# Patient Record
Sex: MALE | Race: WHITE | NOT HISPANIC OR LATINO
[De-identification: names, ages, dates, MRNs, and addresses within clinical notes are randomized per-mention and may not be internally consistent; named-entity substitution may affect disease eponyms.]

---

## 2017-01-20 ENCOUNTER — APPOINTMENT (OUTPATIENT)
Dept: ORTHOPEDIC SURGERY | Facility: CLINIC | Age: 72
End: 2017-01-20

## 2017-06-14 ENCOUNTER — APPOINTMENT (OUTPATIENT)
Dept: PHARMACY | Facility: CLINIC | Age: 72
End: 2017-06-14

## 2017-08-02 ENCOUNTER — APPOINTMENT (OUTPATIENT)
Dept: UROLOGY | Facility: CLINIC | Age: 72
End: 2017-08-02
Payer: MEDICARE

## 2017-08-02 DIAGNOSIS — N13.8 BENIGN PROSTATIC HYPERPLASIA WITH LOWER URINARY TRACT SYMPMS: ICD-10-CM

## 2017-08-02 DIAGNOSIS — N40.1 BENIGN PROSTATIC HYPERPLASIA WITH LOWER URINARY TRACT SYMPMS: ICD-10-CM

## 2017-08-02 PROCEDURE — 99214 OFFICE O/P EST MOD 30 MIN: CPT | Mod: 25

## 2017-08-02 PROCEDURE — 51798 US URINE CAPACITY MEASURE: CPT

## 2017-08-03 LAB
APPEARANCE: CLEAR
BACTERIA: NEGATIVE
BILIRUBIN URINE: NEGATIVE
BLOOD URINE: NEGATIVE
COLOR: YELLOW
CORE LAB FLUID CYTOLOGY: NORMAL
GLUCOSE QUALITATIVE U: NORMAL MG/DL
KETONES URINE: NEGATIVE
LEUKOCYTE ESTERASE URINE: NEGATIVE
MICROSCOPIC-UA: NORMAL
NITRITE URINE: NEGATIVE
PH URINE: 7
PROTEIN URINE: NEGATIVE MG/DL
PSA FREE FLD-MCNC: 10
PSA FREE SERPL-MCNC: 0.07 NG/ML
PSA SERPL-MCNC: 0.7 NG/ML
RED BLOOD CELLS URINE: 0 /HPF
SPECIFIC GRAVITY URINE: 1.01
SQUAMOUS EPITHELIAL CELLS: 0 /HPF
UROBILINOGEN URINE: NORMAL MG/DL
WHITE BLOOD CELLS URINE: 0 /HPF

## 2017-08-16 ENCOUNTER — OUTPATIENT (OUTPATIENT)
Dept: OUTPATIENT SERVICES | Facility: HOSPITAL | Age: 72
LOS: 1 days | End: 2017-08-16
Payer: MEDICARE

## 2017-08-16 VITALS
WEIGHT: 141.1 LBS | HEIGHT: 66 IN | RESPIRATION RATE: 18 BRPM | DIASTOLIC BLOOD PRESSURE: 82 MMHG | TEMPERATURE: 99 F | OXYGEN SATURATION: 99 % | SYSTOLIC BLOOD PRESSURE: 122 MMHG | HEART RATE: 66 BPM

## 2017-08-16 DIAGNOSIS — Z98.890 OTHER SPECIFIED POSTPROCEDURAL STATES: Chronic | ICD-10-CM

## 2017-08-16 DIAGNOSIS — Z96.651 PRESENCE OF RIGHT ARTIFICIAL KNEE JOINT: Chronic | ICD-10-CM

## 2017-08-16 DIAGNOSIS — G56.01 CARPAL TUNNEL SYNDROME, RIGHT UPPER LIMB: ICD-10-CM

## 2017-08-16 DIAGNOSIS — Z96.642 PRESENCE OF LEFT ARTIFICIAL HIP JOINT: Chronic | ICD-10-CM

## 2017-08-16 DIAGNOSIS — G56.00 CARPAL TUNNEL SYNDROME, UNSPECIFIED UPPER LIMB: ICD-10-CM

## 2017-08-16 DIAGNOSIS — Z01.818 ENCOUNTER FOR OTHER PREPROCEDURAL EXAMINATION: ICD-10-CM

## 2017-08-16 DIAGNOSIS — G47.33 OBSTRUCTIVE SLEEP APNEA (ADULT) (PEDIATRIC): ICD-10-CM

## 2017-08-16 LAB
ANION GAP SERPL CALC-SCNC: 14 MMOL/L — SIGNIFICANT CHANGE UP (ref 5–17)
BUN SERPL-MCNC: 19 MG/DL — SIGNIFICANT CHANGE UP (ref 7–23)
CALCIUM SERPL-MCNC: 10 MG/DL — SIGNIFICANT CHANGE UP (ref 8.4–10.5)
CHLORIDE SERPL-SCNC: 102 MMOL/L — SIGNIFICANT CHANGE UP (ref 96–108)
CO2 SERPL-SCNC: 25 MMOL/L — SIGNIFICANT CHANGE UP (ref 22–31)
CREAT SERPL-MCNC: 0.93 MG/DL — SIGNIFICANT CHANGE UP (ref 0.5–1.3)
GLUCOSE SERPL-MCNC: 90 MG/DL — SIGNIFICANT CHANGE UP (ref 70–99)
HCT VFR BLD CALC: 43 % — SIGNIFICANT CHANGE UP (ref 39–50)
HGB BLD-MCNC: 14.3 G/DL — SIGNIFICANT CHANGE UP (ref 13–17)
MCHC RBC-ENTMCNC: 30.8 PG — SIGNIFICANT CHANGE UP (ref 27–34)
MCHC RBC-ENTMCNC: 33.3 GM/DL — SIGNIFICANT CHANGE UP (ref 32–36)
MCV RBC AUTO: 92.7 FL — SIGNIFICANT CHANGE UP (ref 80–100)
PLATELET # BLD AUTO: 222 K/UL — SIGNIFICANT CHANGE UP (ref 150–400)
POTASSIUM SERPL-MCNC: 5.1 MMOL/L — SIGNIFICANT CHANGE UP (ref 3.5–5.3)
POTASSIUM SERPL-SCNC: 5.1 MMOL/L — SIGNIFICANT CHANGE UP (ref 3.5–5.3)
RBC # BLD: 4.64 M/UL — SIGNIFICANT CHANGE UP (ref 4.2–5.8)
RBC # FLD: 15.6 % — HIGH (ref 10.3–14.5)
SODIUM SERPL-SCNC: 141 MMOL/L — SIGNIFICANT CHANGE UP (ref 135–145)
WBC # BLD: 5.55 K/UL — SIGNIFICANT CHANGE UP (ref 3.8–10.5)
WBC # FLD AUTO: 5.55 K/UL — SIGNIFICANT CHANGE UP (ref 3.8–10.5)

## 2017-08-16 PROCEDURE — G0463: CPT

## 2017-08-16 PROCEDURE — 80048 BASIC METABOLIC PNL TOTAL CA: CPT

## 2017-08-16 PROCEDURE — 85027 COMPLETE CBC AUTOMATED: CPT

## 2017-08-16 RX ORDER — ACETAMINOPHEN 500 MG
975 TABLET ORAL ONCE
Qty: 0 | Refills: 0 | Status: COMPLETED | OUTPATIENT
Start: 2017-08-23 | End: 2017-08-23

## 2017-08-16 RX ORDER — LIDOCAINE HCL 20 MG/ML
0.2 VIAL (ML) INJECTION ONCE
Qty: 0 | Refills: 0 | Status: DISCONTINUED | OUTPATIENT
Start: 2017-08-23 | End: 2017-09-07

## 2017-08-16 RX ORDER — SODIUM CHLORIDE 9 MG/ML
3 INJECTION INTRAMUSCULAR; INTRAVENOUS; SUBCUTANEOUS EVERY 8 HOURS
Qty: 0 | Refills: 0 | Status: DISCONTINUED | OUTPATIENT
Start: 2017-08-23 | End: 2017-09-07

## 2017-08-16 RX ORDER — CELECOXIB 200 MG/1
200 CAPSULE ORAL ONCE
Qty: 0 | Refills: 0 | Status: COMPLETED | OUTPATIENT
Start: 2017-08-23 | End: 2017-08-23

## 2017-08-16 NOTE — H&P PST ADULT - PSH
History of hip replacement, total, left  2013/2014  History of knee replacement, total, right  2013/2014  S/P rotator cuff surgery  right repair open 1-2017  S/P sinus surgery  1981

## 2017-08-16 NOTE — H&P PST ADULT - HISTORY OF PRESENT ILLNESS
This is a 72 year old male with right carpal This is a 72 year old male with right carpal  syndrome x 1 year.  Pt states he tried a splint at night without any success.  Ring and middle finger are numb and pain is constant.  Now scheduled for right carpal tunnel release with flexor tenosynovectomy on 8-23-17

## 2017-08-16 NOTE — H&P PST ADULT - ATTENDING COMMENTS
The patient is admitted today for a Right CTR and flexor tenosynovectomy.  I have reviewed the procedure in detail again today with the patient.  The numerous risks, benefits, alternatives, possible complications and expectations are reviewed.  All questions have been thoroughly answered and the patient has verbalized understanding of all of the above and gives consent to proceed The patient is admitted today for a leftt CTR and flexor tenosynovectomy.  I have reviewed the procedure in detail again today with the patient.  The numerous risks, benefits, alternatives, possible complications and expectations are reviewed.  All questions have been thoroughly answered and the patient has verbalized understanding of all of the above and gives consent to proceed

## 2017-08-16 NOTE — H&P PST ADULT - PMH
Anxiety and depression  lexapro  Carpal tunnel syndrome  right and left current  Concussion  "I fell backwards on a hardwood floor"-10/2015  Diverticulitis  treated 2007  DJD (degenerative joint disease)  traction in back 15 years ago x 10 days  Eye abnormality  "I had a bleed in the back of the eye"-left, 2014  Hearing loss  b/l hearing aids  MARIAMA (obstructive sleep apnea)  sleep study 8-12-17/ no report yet/ ptv states he stopped breathing 18 times per hour  PND (post-nasal drip)    Prostate cancer  s/p radiation 2015  Psoriatic arthritis  b/l hip/ knees and wrist and hand pain tens unit on legs alternating days/  TMJ (temporomandibular joint disorder)  right pt with clicking x 1 month

## 2017-08-23 ENCOUNTER — OUTPATIENT (OUTPATIENT)
Dept: OUTPATIENT SERVICES | Facility: HOSPITAL | Age: 72
LOS: 1 days | End: 2017-08-23
Payer: MEDICARE

## 2017-08-23 ENCOUNTER — TRANSCRIPTION ENCOUNTER (OUTPATIENT)
Age: 72
End: 2017-08-23

## 2017-08-23 ENCOUNTER — RESULT REVIEW (OUTPATIENT)
Age: 72
End: 2017-08-23

## 2017-08-23 VITALS
DIASTOLIC BLOOD PRESSURE: 82 MMHG | HEIGHT: 66 IN | TEMPERATURE: 98 F | OXYGEN SATURATION: 97 % | SYSTOLIC BLOOD PRESSURE: 137 MMHG | WEIGHT: 141.1 LBS | HEART RATE: 55 BPM | RESPIRATION RATE: 16 BRPM

## 2017-08-23 VITALS
RESPIRATION RATE: 18 BRPM | OXYGEN SATURATION: 98 % | SYSTOLIC BLOOD PRESSURE: 140 MMHG | HEART RATE: 55 BPM | DIASTOLIC BLOOD PRESSURE: 72 MMHG

## 2017-08-23 DIAGNOSIS — Z98.890 OTHER SPECIFIED POSTPROCEDURAL STATES: Chronic | ICD-10-CM

## 2017-08-23 DIAGNOSIS — G56.01 CARPAL TUNNEL SYNDROME, RIGHT UPPER LIMB: ICD-10-CM

## 2017-08-23 DIAGNOSIS — Z96.642 PRESENCE OF LEFT ARTIFICIAL HIP JOINT: Chronic | ICD-10-CM

## 2017-08-23 DIAGNOSIS — Z96.651 PRESENCE OF RIGHT ARTIFICIAL KNEE JOINT: Chronic | ICD-10-CM

## 2017-08-23 PROCEDURE — 88304 TISSUE EXAM BY PATHOLOGIST: CPT

## 2017-08-23 PROCEDURE — 64721 CARPAL TUNNEL SURGERY: CPT | Mod: RT

## 2017-08-23 PROCEDURE — 25115 REMOVE WRIST/FOREARM LESION: CPT | Mod: RT,59

## 2017-08-23 PROCEDURE — 88304 TISSUE EXAM BY PATHOLOGIST: CPT | Mod: 26

## 2017-08-23 RX ORDER — ONDANSETRON 8 MG/1
4 TABLET, FILM COATED ORAL ONCE
Qty: 0 | Refills: 0 | Status: DISCONTINUED | OUTPATIENT
Start: 2017-08-23 | End: 2017-09-07

## 2017-08-23 RX ORDER — OXYCODONE HYDROCHLORIDE 5 MG/1
5 TABLET ORAL ONCE
Qty: 0 | Refills: 0 | Status: DISCONTINUED | OUTPATIENT
Start: 2017-08-23 | End: 2017-08-23

## 2017-08-23 RX ORDER — SODIUM CHLORIDE 9 MG/ML
1000 INJECTION INTRAMUSCULAR; INTRAVENOUS; SUBCUTANEOUS
Qty: 0 | Refills: 0 | Status: DISCONTINUED | OUTPATIENT
Start: 2017-08-23 | End: 2017-09-07

## 2017-08-23 RX ORDER — CELECOXIB 200 MG/1
200 CAPSULE ORAL ONCE
Qty: 0 | Refills: 0 | Status: DISCONTINUED | OUTPATIENT
Start: 2017-08-23 | End: 2017-09-07

## 2017-08-23 RX ADMIN — CELECOXIB 200 MILLIGRAM(S): 200 CAPSULE ORAL at 09:41

## 2017-08-23 RX ADMIN — Medication 975 MILLIGRAM(S): at 09:41

## 2017-08-23 RX ADMIN — SODIUM CHLORIDE 100 MILLILITER(S): 9 INJECTION INTRAMUSCULAR; INTRAVENOUS; SUBCUTANEOUS at 11:05

## 2017-08-23 NOTE — PRE-ANESTHESIA EVALUATION ADULT - NSANTHPMHFT_GEN_ALL_CORE
MARIAMA recently tested, no CPAP yet  concussion 2015 or 2016, fell backward, hit head on wooden floor, went to hospital one week after because somebody from work took him because of symptoms , saw neurologist, treated with rest, no neuro deficits

## 2017-08-23 NOTE — ASU DISCHARGE PLAN (ADULT/PEDIATRIC). - NOTIFY
Numbness, tingling/Swelling that continues/Fever greater than 101/Pain not relieved by Medications/Bleeding that does not stop

## 2017-08-23 NOTE — ASU PATIENT PROFILE, ADULT - ABILITY TO HEAR (WITH HEARING AID OR HEARING APPLIANCE IF NORMALLY USED):
Adequate: hears normal conversation without difficulty Mildly to Moderately Impaired: difficulty hearing in some environments or speaker may need to increase volume or speak distinctly/uses hearing aid

## 2017-08-28 LAB — SURGICAL PATHOLOGY STUDY: SIGNIFICANT CHANGE UP

## 2017-09-12 RX ORDER — SODIUM CHLORIDE 9 MG/ML
3 INJECTION INTRAMUSCULAR; INTRAVENOUS; SUBCUTANEOUS EVERY 8 HOURS
Qty: 0 | Refills: 0 | Status: DISCONTINUED | OUTPATIENT
Start: 2017-09-13 | End: 2017-09-28

## 2017-09-12 RX ORDER — ACETAMINOPHEN 500 MG
975 TABLET ORAL ONCE
Qty: 0 | Refills: 0 | Status: COMPLETED | OUTPATIENT
Start: 2017-09-13 | End: 2017-09-13

## 2017-09-12 RX ORDER — LIDOCAINE HCL 20 MG/ML
0.2 VIAL (ML) INJECTION ONCE
Qty: 0 | Refills: 0 | Status: DISCONTINUED | OUTPATIENT
Start: 2017-09-13 | End: 2017-09-28

## 2017-09-13 ENCOUNTER — OUTPATIENT (OUTPATIENT)
Dept: OUTPATIENT SERVICES | Facility: HOSPITAL | Age: 72
LOS: 1 days | End: 2017-09-13
Payer: MEDICARE

## 2017-09-13 ENCOUNTER — RESULT REVIEW (OUTPATIENT)
Age: 72
End: 2017-09-13

## 2017-09-13 ENCOUNTER — TRANSCRIPTION ENCOUNTER (OUTPATIENT)
Age: 72
End: 2017-09-13

## 2017-09-13 VITALS
SYSTOLIC BLOOD PRESSURE: 130 MMHG | HEART RATE: 70 BPM | RESPIRATION RATE: 16 BRPM | DIASTOLIC BLOOD PRESSURE: 81 MMHG | OXYGEN SATURATION: 99 % | TEMPERATURE: 98 F

## 2017-09-13 VITALS
HEART RATE: 66 BPM | WEIGHT: 141.1 LBS | RESPIRATION RATE: 18 BRPM | DIASTOLIC BLOOD PRESSURE: 82 MMHG | TEMPERATURE: 99 F | OXYGEN SATURATION: 99 % | SYSTOLIC BLOOD PRESSURE: 149 MMHG | HEIGHT: 66 IN

## 2017-09-13 DIAGNOSIS — Z98.890 OTHER SPECIFIED POSTPROCEDURAL STATES: Chronic | ICD-10-CM

## 2017-09-13 DIAGNOSIS — Z96.651 PRESENCE OF RIGHT ARTIFICIAL KNEE JOINT: Chronic | ICD-10-CM

## 2017-09-13 DIAGNOSIS — G56.02 CARPAL TUNNEL SYNDROME, LEFT UPPER LIMB: ICD-10-CM

## 2017-09-13 DIAGNOSIS — Z96.642 PRESENCE OF LEFT ARTIFICIAL HIP JOINT: Chronic | ICD-10-CM

## 2017-09-13 PROCEDURE — 25115 REMOVE WRIST/FOREARM LESION: CPT | Mod: LT

## 2017-09-13 PROCEDURE — 88304 TISSUE EXAM BY PATHOLOGIST: CPT

## 2017-09-13 PROCEDURE — 64721 CARPAL TUNNEL SURGERY: CPT | Mod: XS,LT

## 2017-09-13 PROCEDURE — 88304 TISSUE EXAM BY PATHOLOGIST: CPT | Mod: 26

## 2017-09-13 RX ORDER — CELECOXIB 200 MG/1
200 CAPSULE ORAL ONCE
Qty: 0 | Refills: 0 | Status: COMPLETED | OUTPATIENT
Start: 2017-09-13 | End: 2017-09-13

## 2017-09-13 RX ORDER — CELECOXIB 200 MG/1
200 CAPSULE ORAL ONCE
Qty: 0 | Refills: 0 | Status: DISCONTINUED | OUTPATIENT
Start: 2017-09-13 | End: 2017-09-28

## 2017-09-13 RX ORDER — SODIUM CHLORIDE 9 MG/ML
1000 INJECTION, SOLUTION INTRAVENOUS
Qty: 0 | Refills: 0 | Status: DISCONTINUED | OUTPATIENT
Start: 2017-09-13 | End: 2017-09-28

## 2017-09-13 RX ORDER — ONDANSETRON 8 MG/1
4 TABLET, FILM COATED ORAL ONCE
Qty: 0 | Refills: 0 | Status: DISCONTINUED | OUTPATIENT
Start: 2017-09-13 | End: 2017-09-28

## 2017-09-13 RX ORDER — OXYCODONE HYDROCHLORIDE 5 MG/1
5 TABLET ORAL ONCE
Qty: 0 | Refills: 0 | Status: DISCONTINUED | OUTPATIENT
Start: 2017-09-13 | End: 2017-09-13

## 2017-09-13 RX ADMIN — Medication 975 MILLIGRAM(S): at 09:10

## 2017-09-13 RX ADMIN — CELECOXIB 200 MILLIGRAM(S): 200 CAPSULE ORAL at 09:11

## 2017-09-13 NOTE — ASU DISCHARGE PLAN (ADULT/PEDIATRIC). - MEDICATION SUMMARY - MEDICATIONS TO TAKE
I will START or STAY ON the medications listed below when I get home from the hospital:    sulfaSALAzine 500 mg oral tablet  -- 2 tab(s) by mouth 2 times a day  -- Indication: For Home med    Excedrin oral tablet  -- 2  by mouth , As Needed for headache last dose 8-15-17  -- Indication: For Home med    Lexapro 10 mg oral tablet  -- 1 tab(s) by mouth once a day  -- Indication: For Home med    Sudafed 30 mg oral tablet  -- 1  by mouth , As Needed for PND  -- Indication: For Home med    Pepcid 20 mg oral tablet  --  by mouth  x2 per ASU protocol  -- Indication: For Home med    Flonase 50 mcg/inh nasal spray  -- 1 spray(s) into nose once a day. used x 5 days last was 8-15-17  -- Indication: For Home med    multivitamin  -- 1  by mouth once a day. last dose 8-15-17  -- Indication: For Home med    vitamin E 1000 intl units oral capsule  -- 1 cap(s) by mouth once a day last dose 8-15-17  -- Indication: For Home med    Vitamin C 500 mg oral tablet  -- 1 tab(s) by mouth once a day  -- Indication: For Home med    Vitamin D3 1000 intl units oral capsule  -- 1 cap(s) by mouth once a day  -- Indication: For Home med

## 2017-09-13 NOTE — ASU DISCHARGE PLAN (ADULT/PEDIATRIC). - ITEMS TO FOLLOWUP WITH YOUR PHYSICIAN'S
Please follow up with Dr. Cardenas within 1-2 weeks after discharge from the hospital. You may call [phone] to schedule an appointment.

## 2017-09-18 LAB — SURGICAL PATHOLOGY STUDY: SIGNIFICANT CHANGE UP

## 2017-09-22 ENCOUNTER — APPOINTMENT (OUTPATIENT)
Dept: PHARMACY | Facility: CLINIC | Age: 72
End: 2017-09-22
Payer: MEDICARE

## 2017-09-22 PROCEDURE — V5299A: CUSTOM | Mod: NC

## 2017-10-06 ENCOUNTER — APPOINTMENT (OUTPATIENT)
Age: 72
End: 2017-10-06

## 2017-10-10 ENCOUNTER — APPOINTMENT (OUTPATIENT)
Dept: PHARMACY | Facility: CLINIC | Age: 72
End: 2017-10-10
Payer: SELF-PAY

## 2017-10-10 ENCOUNTER — APPOINTMENT (OUTPATIENT)
Dept: ORTHOPEDIC SURGERY | Facility: CLINIC | Age: 72
End: 2017-10-10
Payer: MEDICARE

## 2017-10-10 VITALS
HEART RATE: 55 BPM | WEIGHT: 147 LBS | SYSTOLIC BLOOD PRESSURE: 138 MMHG | HEIGHT: 67 IN | BODY MASS INDEX: 23.07 KG/M2 | DIASTOLIC BLOOD PRESSURE: 81 MMHG

## 2017-10-10 DIAGNOSIS — M16.9 OSTEOARTHRITIS OF HIP, UNSPECIFIED: ICD-10-CM

## 2017-10-10 PROCEDURE — V5264A: CUSTOM

## 2017-10-10 PROCEDURE — 73521 X-RAY EXAM HIPS BI 2 VIEWS: CPT

## 2017-10-10 PROCEDURE — 99213 OFFICE O/P EST LOW 20 MIN: CPT

## 2017-10-10 RX ORDER — MULTIVITAMIN
TABLET ORAL
Refills: 0 | Status: ACTIVE | COMMUNITY

## 2017-10-10 RX ORDER — DIAZEPAM 10 MG/1
10 TABLET ORAL DAILY
Qty: 90 | Refills: 0 | Status: DISCONTINUED | COMMUNITY
Start: 2017-02-13 | End: 2017-10-10

## 2017-11-10 ENCOUNTER — OUTPATIENT (OUTPATIENT)
Dept: OUTPATIENT SERVICES | Facility: HOSPITAL | Age: 72
LOS: 1 days | End: 2017-11-10
Payer: MEDICARE

## 2017-11-10 VITALS
WEIGHT: 147.71 LBS | TEMPERATURE: 98 F | DIASTOLIC BLOOD PRESSURE: 80 MMHG | RESPIRATION RATE: 16 BRPM | OXYGEN SATURATION: 96 % | HEIGHT: 66 IN | HEART RATE: 64 BPM | SYSTOLIC BLOOD PRESSURE: 136 MMHG

## 2017-11-10 DIAGNOSIS — Z98.890 OTHER SPECIFIED POSTPROCEDURAL STATES: Chronic | ICD-10-CM

## 2017-11-10 DIAGNOSIS — M16.11 UNILATERAL PRIMARY OSTEOARTHRITIS, RIGHT HIP: ICD-10-CM

## 2017-11-10 DIAGNOSIS — Z01.818 ENCOUNTER FOR OTHER PREPROCEDURAL EXAMINATION: ICD-10-CM

## 2017-11-10 DIAGNOSIS — M25.551 PAIN IN RIGHT HIP: ICD-10-CM

## 2017-11-10 DIAGNOSIS — Z96.651 PRESENCE OF RIGHT ARTIFICIAL KNEE JOINT: Chronic | ICD-10-CM

## 2017-11-10 DIAGNOSIS — G47.33 OBSTRUCTIVE SLEEP APNEA (ADULT) (PEDIATRIC): ICD-10-CM

## 2017-11-10 DIAGNOSIS — Z96.642 PRESENCE OF LEFT ARTIFICIAL HIP JOINT: Chronic | ICD-10-CM

## 2017-11-10 LAB
ALBUMIN SERPL ELPH-MCNC: 3.8 G/DL — SIGNIFICANT CHANGE UP (ref 3.3–5)
ALP SERPL-CCNC: 64 U/L — SIGNIFICANT CHANGE UP (ref 30–120)
ALT FLD-CCNC: 30 U/L DA — SIGNIFICANT CHANGE UP (ref 10–60)
ANION GAP SERPL CALC-SCNC: 6 MMOL/L — SIGNIFICANT CHANGE UP (ref 5–17)
APTT BLD: 34.3 SEC — SIGNIFICANT CHANGE UP (ref 27.5–37.4)
AST SERPL-CCNC: 28 U/L — SIGNIFICANT CHANGE UP (ref 10–40)
BILIRUB SERPL-MCNC: 0.4 MG/DL — SIGNIFICANT CHANGE UP (ref 0.2–1.2)
BLD GP AB SCN SERPL QL: SIGNIFICANT CHANGE UP
BUN SERPL-MCNC: 28 MG/DL — HIGH (ref 7–23)
CALCIUM SERPL-MCNC: 9.5 MG/DL — SIGNIFICANT CHANGE UP (ref 8.4–10.5)
CHLORIDE SERPL-SCNC: 103 MMOL/L — SIGNIFICANT CHANGE UP (ref 96–108)
CO2 SERPL-SCNC: 31 MMOL/L — SIGNIFICANT CHANGE UP (ref 22–31)
CREAT SERPL-MCNC: 0.93 MG/DL — SIGNIFICANT CHANGE UP (ref 0.5–1.3)
GLUCOSE SERPL-MCNC: 81 MG/DL — SIGNIFICANT CHANGE UP (ref 70–99)
HCT VFR BLD CALC: 45.1 % — SIGNIFICANT CHANGE UP (ref 39–50)
HGB BLD-MCNC: 14.6 G/DL — SIGNIFICANT CHANGE UP (ref 13–17)
INR BLD: 1.09 RATIO — SIGNIFICANT CHANGE UP (ref 0.88–1.16)
MCHC RBC-ENTMCNC: 31.9 PG — SIGNIFICANT CHANGE UP (ref 27–34)
MCHC RBC-ENTMCNC: 32.3 GM/DL — SIGNIFICANT CHANGE UP (ref 32–36)
MCV RBC AUTO: 98.9 FL — SIGNIFICANT CHANGE UP (ref 80–100)
MRSA PCR RESULT.: SIGNIFICANT CHANGE UP
PLATELET # BLD AUTO: 187 K/UL — SIGNIFICANT CHANGE UP (ref 150–400)
POTASSIUM SERPL-MCNC: 4.5 MMOL/L — SIGNIFICANT CHANGE UP (ref 3.5–5.3)
POTASSIUM SERPL-SCNC: 4.5 MMOL/L — SIGNIFICANT CHANGE UP (ref 3.5–5.3)
PROT SERPL-MCNC: 7.4 G/DL — SIGNIFICANT CHANGE UP (ref 6–8.3)
PROTHROM AB SERPL-ACNC: 11.9 SEC — SIGNIFICANT CHANGE UP (ref 9.8–12.7)
RBC # BLD: 4.56 M/UL — SIGNIFICANT CHANGE UP (ref 4.2–5.8)
RBC # FLD: 13 % — SIGNIFICANT CHANGE UP (ref 10.3–14.5)
S AUREUS DNA NOSE QL NAA+PROBE: SIGNIFICANT CHANGE UP
SODIUM SERPL-SCNC: 140 MMOL/L — SIGNIFICANT CHANGE UP (ref 135–145)
WBC # BLD: 6.5 K/UL — SIGNIFICANT CHANGE UP (ref 3.8–10.5)
WBC # FLD AUTO: 6.5 K/UL — SIGNIFICANT CHANGE UP (ref 3.8–10.5)

## 2017-11-10 PROCEDURE — 86901 BLOOD TYPING SEROLOGIC RH(D): CPT

## 2017-11-10 PROCEDURE — 93005 ELECTROCARDIOGRAM TRACING: CPT

## 2017-11-10 PROCEDURE — 93010 ELECTROCARDIOGRAM REPORT: CPT | Mod: NC

## 2017-11-10 PROCEDURE — 36415 COLL VENOUS BLD VENIPUNCTURE: CPT

## 2017-11-10 PROCEDURE — 85730 THROMBOPLASTIN TIME PARTIAL: CPT

## 2017-11-10 PROCEDURE — 85610 PROTHROMBIN TIME: CPT

## 2017-11-10 PROCEDURE — G0463: CPT

## 2017-11-10 PROCEDURE — 86850 RBC ANTIBODY SCREEN: CPT

## 2017-11-10 PROCEDURE — 80053 COMPREHEN METABOLIC PANEL: CPT

## 2017-11-10 PROCEDURE — 87640 STAPH A DNA AMP PROBE: CPT

## 2017-11-10 PROCEDURE — 86900 BLOOD TYPING SEROLOGIC ABO: CPT

## 2017-11-10 PROCEDURE — 85027 COMPLETE CBC AUTOMATED: CPT

## 2017-11-10 PROCEDURE — 87641 MR-STAPH DNA AMP PROBE: CPT

## 2017-11-10 RX ORDER — FAMOTIDINE 10 MG/ML
0 INJECTION INTRAVENOUS
Qty: 0 | Refills: 0 | COMMUNITY

## 2017-11-10 RX ORDER — PSEUDOEPHEDRINE HCL 30 MG
1 TABLET ORAL
Qty: 0 | Refills: 0 | COMMUNITY

## 2017-11-10 RX ORDER — FLUTICASONE PROPIONATE 50 MCG
1 SPRAY, SUSPENSION NASAL
Qty: 0 | Refills: 0 | COMMUNITY

## 2017-11-10 RX ORDER — ESCITALOPRAM OXALATE 10 MG/1
1 TABLET, FILM COATED ORAL
Qty: 0 | Refills: 0 | COMMUNITY

## 2017-11-10 NOTE — H&P PST ADULT - PSH
History of hip replacement, total, left  2013/2014  History of knee replacement, total, right  2013/2014  S/P carpal tunnel release  bilaal 9/2017  S/P rotator cuff surgery  right repair open 1-2017  S/P sinus surgery  1981

## 2017-11-10 NOTE — H&P PST ADULT - PMH
Anxiety and depression  lexapro  Carpal tunnel syndrome  right and left  Concussion  "I fell backwards on a hardwood floor"-10/2015  Diverticulitis  treated 2007  DJD (degenerative joint disease)  traction in back 15 years ago x 10 days  Eye abnormality  "I had a bleed in the back of the eye"-left, 2014  Hearing loss  b/l hearing aids  Hip pain, right    MARIAMA (obstructive sleep apnea)  sleep study 8-12-17, setting of 5  PND (post-nasal drip)    Prostate cancer  s/p radiation 2015  Psoriatic arthritis  b/l hip/ knees and wrist and hand pain tens unit on legs alternating days/  TMJ (temporomandibular joint disorder)  right pt with clicking x 1 month

## 2017-11-10 NOTE — H&P PST ADULT - HISTORY OF PRESENT ILLNESS
71 yo male presents with long history o9f right hip pain.  Pain in right hip increases with ambulation and at night. 73 yo male presents with long history of right hip pain.  Pain in right hip increases with ambulation and at night.  Currently taking no analgesics.

## 2017-11-10 NOTE — H&P PST ADULT - ASSESSMENT
Pt presents to PST.  Pre op instructions reviewed and understood. Pt will obtain medical clearance and note from rheumatologist re Sulfasalazine.

## 2017-11-10 NOTE — H&P PST ADULT - MUSCULOSKELETAL
details… detailed exam decreased ROM due to pain/diminished strength/decreased ROM/no calf tenderness

## 2017-11-14 ENCOUNTER — APPOINTMENT (OUTPATIENT)
Dept: PHARMACY | Facility: CLINIC | Age: 72
End: 2017-11-14
Payer: SELF-PAY

## 2017-11-14 PROCEDURE — V5267D: CUSTOM

## 2017-11-17 RX ORDER — APREPITANT 80 MG/1
40 CAPSULE ORAL ONCE
Qty: 0 | Refills: 0 | Status: COMPLETED | OUTPATIENT
Start: 2017-11-29 | End: 2017-11-29

## 2017-11-17 RX ORDER — SODIUM CHLORIDE 9 MG/ML
1000 INJECTION, SOLUTION INTRAVENOUS
Qty: 0 | Refills: 0 | Status: DISCONTINUED | OUTPATIENT
Start: 2017-11-29 | End: 2017-12-01

## 2017-11-28 RX ORDER — ONDANSETRON 8 MG/1
4 TABLET, FILM COATED ORAL EVERY 6 HOURS
Qty: 0 | Refills: 0 | Status: DISCONTINUED | OUTPATIENT
Start: 2017-11-29 | End: 2017-12-01

## 2017-11-28 RX ORDER — PANTOPRAZOLE SODIUM 20 MG/1
40 TABLET, DELAYED RELEASE ORAL
Qty: 0 | Refills: 0 | Status: DISCONTINUED | OUTPATIENT
Start: 2017-11-29 | End: 2017-12-01

## 2017-11-28 RX ORDER — SENNA PLUS 8.6 MG/1
2 TABLET ORAL AT BEDTIME
Qty: 0 | Refills: 0 | Status: DISCONTINUED | OUTPATIENT
Start: 2017-11-29 | End: 2017-12-01

## 2017-11-28 RX ORDER — MAGNESIUM HYDROXIDE 400 MG/1
30 TABLET, CHEWABLE ORAL DAILY
Qty: 0 | Refills: 0 | Status: DISCONTINUED | OUTPATIENT
Start: 2017-11-29 | End: 2017-12-01

## 2017-11-28 RX ORDER — DOCUSATE SODIUM 100 MG
100 CAPSULE ORAL THREE TIMES A DAY
Qty: 0 | Refills: 0 | Status: DISCONTINUED | OUTPATIENT
Start: 2017-11-29 | End: 2017-12-01

## 2017-11-28 RX ORDER — SODIUM CHLORIDE 9 MG/ML
1000 INJECTION, SOLUTION INTRAVENOUS
Qty: 0 | Refills: 0 | Status: DISCONTINUED | OUTPATIENT
Start: 2017-11-29 | End: 2017-12-01

## 2017-11-28 RX ORDER — POLYETHYLENE GLYCOL 3350 17 G/17G
17 POWDER, FOR SOLUTION ORAL DAILY
Qty: 0 | Refills: 0 | Status: DISCONTINUED | OUTPATIENT
Start: 2017-11-29 | End: 2017-12-01

## 2017-11-29 ENCOUNTER — RESULT REVIEW (OUTPATIENT)
Age: 72
End: 2017-11-29

## 2017-11-29 ENCOUNTER — INPATIENT (INPATIENT)
Facility: HOSPITAL | Age: 72
LOS: 1 days | Discharge: NURSING, REHAB OR LONG-TERM CARE | DRG: 470 | End: 2017-12-01
Attending: ORTHOPAEDIC SURGERY | Admitting: ORTHOPAEDIC SURGERY
Payer: MEDICARE

## 2017-11-29 ENCOUNTER — APPOINTMENT (OUTPATIENT)
Dept: ORTHOPEDIC SURGERY | Facility: HOSPITAL | Age: 72
End: 2017-11-29

## 2017-11-29 VITALS
OXYGEN SATURATION: 98 % | WEIGHT: 142.86 LBS | DIASTOLIC BLOOD PRESSURE: 87 MMHG | TEMPERATURE: 99 F | HEART RATE: 58 BPM | SYSTOLIC BLOOD PRESSURE: 129 MMHG | HEIGHT: 67 IN

## 2017-11-29 DIAGNOSIS — M16.11 UNILATERAL PRIMARY OSTEOARTHRITIS, RIGHT HIP: ICD-10-CM

## 2017-11-29 DIAGNOSIS — Z98.890 OTHER SPECIFIED POSTPROCEDURAL STATES: Chronic | ICD-10-CM

## 2017-11-29 DIAGNOSIS — Z96.642 PRESENCE OF LEFT ARTIFICIAL HIP JOINT: Chronic | ICD-10-CM

## 2017-11-29 DIAGNOSIS — Z96.651 PRESENCE OF RIGHT ARTIFICIAL KNEE JOINT: Chronic | ICD-10-CM

## 2017-11-29 LAB
ANION GAP SERPL CALC-SCNC: 6 MMOL/L — SIGNIFICANT CHANGE UP (ref 5–17)
BUN SERPL-MCNC: 19 MG/DL — SIGNIFICANT CHANGE UP (ref 7–23)
CALCIUM SERPL-MCNC: 9.2 MG/DL — SIGNIFICANT CHANGE UP (ref 8.4–10.5)
CHLORIDE SERPL-SCNC: 105 MMOL/L — SIGNIFICANT CHANGE UP (ref 96–108)
CO2 SERPL-SCNC: 28 MMOL/L — SIGNIFICANT CHANGE UP (ref 22–31)
CREAT SERPL-MCNC: 0.82 MG/DL — SIGNIFICANT CHANGE UP (ref 0.5–1.3)
GLUCOSE SERPL-MCNC: 145 MG/DL — HIGH (ref 70–99)
HCT VFR BLD CALC: 41.5 % — SIGNIFICANT CHANGE UP (ref 39–50)
HGB BLD-MCNC: 13.5 G/DL — SIGNIFICANT CHANGE UP (ref 13–17)
POTASSIUM SERPL-MCNC: 4.5 MMOL/L — SIGNIFICANT CHANGE UP (ref 3.5–5.3)
POTASSIUM SERPL-SCNC: 4.5 MMOL/L — SIGNIFICANT CHANGE UP (ref 3.5–5.3)
SODIUM SERPL-SCNC: 139 MMOL/L — SIGNIFICANT CHANGE UP (ref 135–145)

## 2017-11-29 PROCEDURE — 88305 TISSUE EXAM BY PATHOLOGIST: CPT | Mod: 26

## 2017-11-29 PROCEDURE — 88311 DECALCIFY TISSUE: CPT | Mod: 26

## 2017-11-29 PROCEDURE — 27130 TOTAL HIP ARTHROPLASTY: CPT | Mod: RT

## 2017-11-29 PROCEDURE — 99223 1ST HOSP IP/OBS HIGH 75: CPT

## 2017-11-29 PROCEDURE — 27130 TOTAL HIP ARTHROPLASTY: CPT | Mod: 82,RT

## 2017-11-29 PROCEDURE — 73502 X-RAY EXAM HIP UNI 2-3 VIEWS: CPT | Mod: 26

## 2017-11-29 RX ORDER — CELECOXIB 200 MG/1
200 CAPSULE ORAL
Qty: 0 | Refills: 0 | Status: DISCONTINUED | OUTPATIENT
Start: 2017-11-29 | End: 2017-12-01

## 2017-11-29 RX ORDER — HYDROMORPHONE HYDROCHLORIDE 2 MG/ML
0.5 INJECTION INTRAMUSCULAR; INTRAVENOUS; SUBCUTANEOUS
Qty: 0 | Refills: 0 | Status: DISCONTINUED | OUTPATIENT
Start: 2017-11-29 | End: 2017-12-01

## 2017-11-29 RX ORDER — HYDROMORPHONE HYDROCHLORIDE 2 MG/ML
0.5 INJECTION INTRAMUSCULAR; INTRAVENOUS; SUBCUTANEOUS
Qty: 0 | Refills: 0 | Status: DISCONTINUED | OUTPATIENT
Start: 2017-11-29 | End: 2017-11-29

## 2017-11-29 RX ORDER — VANCOMYCIN HCL 1 G
1000 VIAL (EA) INTRAVENOUS ONCE
Qty: 0 | Refills: 0 | Status: COMPLETED | OUTPATIENT
Start: 2017-11-29 | End: 2017-11-29

## 2017-11-29 RX ORDER — ONDANSETRON 8 MG/1
4 TABLET, FILM COATED ORAL ONCE
Qty: 0 | Refills: 0 | Status: DISCONTINUED | OUTPATIENT
Start: 2017-11-29 | End: 2017-11-29

## 2017-11-29 RX ORDER — ACETAMINOPHEN 500 MG
1000 TABLET ORAL EVERY 6 HOURS
Qty: 0 | Refills: 0 | Status: COMPLETED | OUTPATIENT
Start: 2017-11-29 | End: 2017-11-30

## 2017-11-29 RX ORDER — ESCITALOPRAM OXALATE 10 MG/1
20 TABLET, FILM COATED ORAL DAILY
Qty: 0 | Refills: 0 | Status: DISCONTINUED | OUTPATIENT
Start: 2017-11-30 | End: 2017-12-01

## 2017-11-29 RX ORDER — SODIUM CHLORIDE 9 MG/ML
1000 INJECTION, SOLUTION INTRAVENOUS
Qty: 0 | Refills: 0 | Status: DISCONTINUED | OUTPATIENT
Start: 2017-11-29 | End: 2017-11-29

## 2017-11-29 RX ORDER — ACETAMINOPHEN 500 MG
1000 TABLET ORAL ONCE
Qty: 0 | Refills: 0 | Status: COMPLETED | OUTPATIENT
Start: 2017-11-29 | End: 2017-11-29

## 2017-11-29 RX ORDER — ACETAMINOPHEN 500 MG
1000 TABLET ORAL EVERY 8 HOURS
Qty: 0 | Refills: 0 | Status: DISCONTINUED | OUTPATIENT
Start: 2017-11-30 | End: 2017-12-01

## 2017-11-29 RX ORDER — OXYCODONE HYDROCHLORIDE 5 MG/1
10 TABLET ORAL
Qty: 0 | Refills: 0 | Status: DISCONTINUED | OUTPATIENT
Start: 2017-11-29 | End: 2017-12-01

## 2017-11-29 RX ORDER — ASPIRIN/CALCIUM CARB/MAGNESIUM 324 MG
162 TABLET ORAL EVERY 12 HOURS
Qty: 0 | Refills: 0 | Status: DISCONTINUED | OUTPATIENT
Start: 2017-11-30 | End: 2017-12-01

## 2017-11-29 RX ORDER — OXYCODONE HYDROCHLORIDE 5 MG/1
5 TABLET ORAL
Qty: 0 | Refills: 0 | Status: DISCONTINUED | OUTPATIENT
Start: 2017-11-29 | End: 2017-12-01

## 2017-11-29 RX ADMIN — Medication 250 MILLIGRAM(S): at 18:43

## 2017-11-29 RX ADMIN — SODIUM CHLORIDE 75 MILLILITER(S): 9 INJECTION, SOLUTION INTRAVENOUS at 10:07

## 2017-11-29 RX ADMIN — Medication 400 MILLIGRAM(S): at 14:15

## 2017-11-29 RX ADMIN — Medication 1000 MILLIGRAM(S): at 22:50

## 2017-11-29 RX ADMIN — OXYCODONE HYDROCHLORIDE 10 MILLIGRAM(S): 5 TABLET ORAL at 14:05

## 2017-11-29 RX ADMIN — HYDROMORPHONE HYDROCHLORIDE 0.5 MILLIGRAM(S): 2 INJECTION INTRAMUSCULAR; INTRAVENOUS; SUBCUTANEOUS at 20:05

## 2017-11-29 RX ADMIN — Medication 400 MILLIGRAM(S): at 21:55

## 2017-11-29 RX ADMIN — HYDROMORPHONE HYDROCHLORIDE 0.5 MILLIGRAM(S): 2 INJECTION INTRAMUSCULAR; INTRAVENOUS; SUBCUTANEOUS at 23:53

## 2017-11-29 RX ADMIN — OXYCODONE HYDROCHLORIDE 10 MILLIGRAM(S): 5 TABLET ORAL at 18:33

## 2017-11-29 RX ADMIN — OXYCODONE HYDROCHLORIDE 10 MILLIGRAM(S): 5 TABLET ORAL at 22:00

## 2017-11-29 RX ADMIN — OXYCODONE HYDROCHLORIDE 10 MILLIGRAM(S): 5 TABLET ORAL at 22:50

## 2017-11-29 RX ADMIN — Medication 1000 MILLIGRAM(S): at 14:15

## 2017-11-29 RX ADMIN — HYDROMORPHONE HYDROCHLORIDE 0.5 MILLIGRAM(S): 2 INJECTION INTRAMUSCULAR; INTRAVENOUS; SUBCUTANEOUS at 19:49

## 2017-11-29 RX ADMIN — APREPITANT 40 MILLIGRAM(S): 80 CAPSULE ORAL at 06:44

## 2017-11-29 NOTE — OCCUPATIONAL THERAPY INITIAL EVALUATION ADULT - ADDITIONAL COMMENTS
+ tub with doors 4 PILAR 1 HR, 1 flight up to bedroom 1 HR then 3 steps down no HR to bedroom.  + tub with doors. + SAC & RW

## 2017-11-29 NOTE — PHYSICAL THERAPY INITIAL EVALUATION ADULT - GENERAL OBSERVATIONS, REHAB EVAL
Pt received supine in bed. All lines intact. Pt agreeable to physical therapy. + telemetry + IV +hip abductor pillow, +PAS donned

## 2017-11-29 NOTE — PHYSICAL THERAPY INITIAL EVALUATION ADULT - ADDITIONAL COMMENTS
Pt lives in private home with 4 PILAR +HR and 13 steps + HR and 3 steps no HR to bedroom. Pt owns several SAC's and a RW from previous surgeries.

## 2017-11-29 NOTE — OCCUPATIONAL THERAPY INITIAL EVALUATION ADULT - IMPAIRED TRANSFERS: SIT/STAND, REHAB EVAL
decreased strength/THPs decreased strength/THPs, + side stepping with assist x 2 RW, dec sensation noted right LE (No buckling, + vc's for placement)

## 2017-11-29 NOTE — PHYSICAL THERAPY INITIAL EVALUATION ADULT - RANGE OF MOTION EXAMINATION, REHAB EVAL
right LE n/t due to recent sx/deficits as listed below/bilateral upper extremity ROM was WNL (within normal limits)/Left LE ROM was WFL (within functional limits)

## 2017-11-29 NOTE — CONSULT NOTE ADULT - ASSESSMENT
POD#0 s/p RIGHT THR  - Pain control  - Bowel regimen  - PT/OT  - DVT PPx per Ortho - ASA BID x 6 weeks    MARIAMA  - tele monitoring x 24 hours  - CPAP QHS    Hx of Psoriatic Arithritis    Hx Anxiety and Depression POD#0 s/p RIGHT THR  - Pain control  - Bowel regimen  - PT/OT  - DVT PPx per Ortho - ASA BID x 6 weeks  - Plan for DC to [facility]    MARIAMA  - tele monitoring x 24 hours  - CPAP QHS - brought in his own machine    Hx of Psoriatic Arithritis  - patient states that per his discussion with his Rheumatologist, he no longer will be taking Sulfasalazine since it was providing him minimal benefit    Hx Anxiety and Depression  - has been on duloxetine on and off over last 2-3 months, does not work for him and refuses to go back on it, explained risks/benefits including worsening depression, patient understood these risks and insisted on no longer taking this medication  - c/w Lexapro

## 2017-11-29 NOTE — OCCUPATIONAL THERAPY INITIAL EVALUATION ADULT - LEVEL OF INDEPENDENCE: DRESS LOWER BODY, OT EVAL
dependent (less than 25% patients effort)/tba further once hemovac removed dependent (less than 25% patients effort)

## 2017-11-29 NOTE — CONSULT NOTE ADULT - SUBJECTIVE AND OBJECTIVE BOX
PCP:  Dr Russell  [phone]    Outpatient Specialists:  Dr Villarreal (rheumatologist)    Information Obtained from:  Patient, EHR, Chart    CC:  Patient is a 72y old  Male who presents with a chief complaint of " I have a problem with my right hip." (28 Nov 2017 18:07)    HPI:  73yo M admitted s/p RIGHT THR today.  Has had chronic right hip pain             Anesthesia:    Baseline functional status:    REVIEW OF SYSTEMS:  CONSTITUTIONAL: No fever, weight loss, or fatigue  EYES: No eye pain, visual disturbances, or discharge  ENMT:  No difficulty hearing, tinnitus, vertigo; No sinus or throat pain  NECK: No pain or stiffness  BREASTS: No pain, masses, or nipple discharge  RESPIRATORY: No cough, wheezing, chills or hemoptysis; No shortness of breath  CARDIOVASCULAR: No chest pain, palpitations, dizziness, or leg swelling  GASTROINTESTINAL: No abdominal or epigastric pain. No nausea, vomiting, or hematemesis; No diarrhea or constipation. No melena or hematochezia.  GENITOURINARY: No dysuria, frequency, hematuria, or incontinence  NEUROLOGICAL: No headaches, memory loss, loss of strength,  or tremors  SKIN: No itching, burning, rashes, or lesions   LYMPH NODES: No enlarged glands  ENDOCRINE: No heat or cold intolerance; No hair loss  MUSCULOSKELETAL: No muscle or back pain  PSYCHIATRIC: No depression, anxiety, mood swings, or difficulty sleeping  HEME/LYMPH: No easy bruising, or bleeding gums  ALLERGY AND IMMUNOLOGIC: No hives or eczema    PAST MEDICAL & SURGICAL HISTORY:  Hip pain, right  Diverticulitis: treated 2007  TMJ (temporomandibular joint disorder): right pt with clicking x 1 month  DJD (degenerative joint disease): traction in back 15 years ago x 10 days  PND (post-nasal drip)  MARIAMA (obstructive sleep apnea): sleep study 8-12-17, setting of 5  Hearing loss: b/l hearing aids  Psoriatic arthritis: b/l hip/ knees and wrist and hand pain tens unit on legs alternating days/  Carpal tunnel syndrome: right and left  Anxiety and depression: lexapro  Eye abnormality: &quot;I had a bleed in the back of the eye&quot;-left, 2014  Concussion: &quot;I fell backwards on a hardwood floor&quot;-10/2015  Prostate cancer: s/p radiation 2015  S/P carpal tunnel release: bilaal 9/2017  S/P sinus surgery: 1981  S/P rotator cuff surgery: right repair open 1-2017  History of knee replacement, total, right: 2013/2014  History of hip replacement, total, left: 2013/2014    SOCIAL HISTORY:  Lives: With significant other  Smoking Hx:  Never  ETOH Hx:  <1 glass of wine/month on average  Illicit Drug Use:  None    Allergies    Keflex (Rash)    Home Medications:  DULoxetine 20 mg oral delayed release capsule: 1 cap(s) orally 3 times a day (29 Nov 2017 06:17)  famotidine 20 mg oral tablet:  (29 Nov 2017 06:17)  Lexapro 20 mg oral tablet: 1 tab(s) orally once a day (29 Nov 2017 06:17)  multivitamin: 1  orally once a day. last dose 8-15-17 (29 Nov 2017 06:17)  sulfaSALAzine 500 mg oral tablet: 2 tab(s) orally 2 times a day (29 Nov 2017 06:17)  Vitamin C 500 mg oral tablet: 1 tab(s) orally once a day (29 Nov 2017 06:17)  Vitamin D3 1000 intl units oral capsule: 1 cap(s) orally once a day (29 Nov 2017 06:17)  vitamin E 1000 intl units oral capsule: 1 cap(s) orally once a day last dose 8-15-17 (29 Nov 2017 06:17)    FAMILY HISTORY:  No pertinent family history in first degree relatives    PHYSICAL EXAM:  Vital Signs Last 24 Hrs  T(C): 37.1 (29 Nov 2017 06:45), Max: 37.1 (29 Nov 2017 06:45)  T(F): 98.8 (29 Nov 2017 06:45), Max: 98.8 (29 Nov 2017 06:45)  HR: 58 (29 Nov 2017 06:45) (58 - 58)  BP: 129/87 (29 Nov 2017 06:45) (129/87 - 129/87)  BP(mean): --  RR: --  SpO2: 98% (29 Nov 2017 06:45) (98% - 98%)    GENERAL: NAD, well-groomed, well-developed, awake, alert, oriented x 3, fluent and coherent speech  HEAD:  Atraumatic, Normocephalic  EYES: EOMI, PERRLA, conjunctiva and sclera clear  ENMT: No tonsillar erythema, exudates, or enlargement; Moist mucous membranes, Good dentition, No lesions  NECK: Supple, No JVD, No Cervical LAD, No thyromegaly, No thyroid nodules  NERVOUS SYSTEM:  Good concentration; Moving all 4 extremities;  No facial droop  CHEST WALL: No masses  CHEST/LUNG: Clear to auscultation bilaterally; No rales, rhonchi, wheezing, or rubs  HEART: Regular rate and rhythm; No murmurs, rubs, or gallops  ABDOMEN: Soft, Nontender, Nondistended, Bowel sounds present, No palpable masses or organomegaly, No bruits  EXTREMITIES:  2+ Peripheral Pulses, No clubbing, cyanosis, or edema  LYMPH: No lymphadenopathy note  SKIN: No rashes or lesions  INCISION:  Dressing clean/dry/intact    EKG:   Personally Reviewed: Yes PCP:  Dr Russell  [phone]    Outpatient Specialists:  Dr Villarreal (rheumatologist)    Information Obtained from:  Patient, EHR, Chart    CC:  Patient is a 72y old  Male who presents with a chief complaint of " I have a problem with my right hip." (28 Nov 2017 18:07)    HPI:  71yo M admitted s/p RIGHT THR today.  Has had chronic right hip pain, 3/10 up to 9/10 with activity.  Was impacting his quality of life recently.  He has had multiple orthopedic surgeries in the past including a right tkr and left thr.  Was very athletic and active up until about 5 years ago when his join pains began to limit him.  Tried opioids and NSAIDS with partial relief.    Anesthesia:  Spinal    Baseline functional status:  Independent    REVIEW OF SYSTEMS:  CONSTITUTIONAL: No fever, weight loss, or fatigue  EYES: No eye pain, visual disturbances, or discharge  ENMT:  No difficulty hearing, tinnitus, vertigo; No sinus or throat pain  NECK: No pain or stiffness  BREASTS: No pain, masses, or nipple discharge  RESPIRATORY: No cough, wheezing, chills or hemoptysis; No shortness of breath  CARDIOVASCULAR: No chest pain, palpitations, dizziness, or leg swelling  GASTROINTESTINAL: No abdominal or epigastric pain. No nausea, vomiting, or hematemesis; No diarrhea or constipation. No melena or hematochezia.  GENITOURINARY: No dysuria, frequency, hematuria, or incontinence  NEUROLOGICAL: No headaches, memory loss, loss of strength,  or tremors  SKIN: No itching, burning, rashes, or lesions   LYMPH NODES: No enlarged glands  ENDOCRINE: No heat or cold intolerance; No hair loss  MUSCULOSKELETAL: No muscle or back pain  PSYCHIATRIC: No depression, anxiety, mood swings, or difficulty sleeping  HEME/LYMPH: No easy bruising, or bleeding gums  ALLERGY AND IMMUNOLOGIC: No hives or eczema    PAST MEDICAL & SURGICAL HISTORY:  Hip pain, right  Diverticulitis: treated 2007  TMJ (temporomandibular joint disorder): right pt with clicking x 1 month  DJD (degenerative joint disease): traction in back 15 years ago x 10 days  PND (post-nasal drip)  MARIAMA (obstructive sleep apnea): sleep study 8-12-17, setting of 5  Hearing loss: b/l hearing aids  Psoriatic arthritis: b/l hip/ knees and wrist and hand pain tens unit on legs alternating days/  Carpal tunnel syndrome: right and left  Anxiety and depression: lexapro  Eye abnormality: &quot;I had a bleed in the back of the eye&quot;-left, 2014  Concussion: &quot;I fell backwards on a hardwood floor&quot;-10/2015  Prostate cancer: s/p radiation 2015  S/P carpal tunnel release: bilaal 9/2017  S/P sinus surgery: 1981  S/P rotator cuff surgery: right repair open 1-2017  History of knee replacement, total, right: 2013/2014  History of hip replacement, total, left: 2013/2014    SOCIAL HISTORY:  Lives: With significant other  Smoking Hx:  Never  ETOH Hx:  <1 glass of wine/month on average  Illicit Drug Use:  None    Allergies    Keflex (Rash)    Home Medications:  DULoxetine 20 mg oral delayed release capsule: 1 cap(s) orally 3 times a day (29 Nov 2017 06:17)  famotidine 20 mg oral tablet:  (29 Nov 2017 06:17)  Lexapro 20 mg oral tablet: 1 tab(s) orally once a day (29 Nov 2017 06:17)  multivitamin: 1  orally once a day. last dose 8-15-17 (29 Nov 2017 06:17)  sulfaSALAzine 500 mg oral tablet: 2 tab(s) orally 2 times a day (29 Nov 2017 06:17)  Vitamin C 500 mg oral tablet: 1 tab(s) orally once a day (29 Nov 2017 06:17)  Vitamin D3 1000 intl units oral capsule: 1 cap(s) orally once a day (29 Nov 2017 06:17)  vitamin E 1000 intl units oral capsule: 1 cap(s) orally once a day last dose 8-15-17 (29 Nov 2017 06:17)    FAMILY HISTORY:  No pertinent family history in first degree relatives    PHYSICAL EXAM:  Vital Signs Last 24 Hrs  T(C): 37.1 (29 Nov 2017 06:45), Max: 37.1 (29 Nov 2017 06:45)  T(F): 98.8 (29 Nov 2017 06:45), Max: 98.8 (29 Nov 2017 06:45)  HR: 58 (29 Nov 2017 06:45) (58 - 58)  BP: 129/87 (29 Nov 2017 06:45) (129/87 - 129/87)  BP(mean): --  RR: --  SpO2: 98% (29 Nov 2017 06:45) (98% - 98%)    GENERAL: NAD, well-groomed, well-developed, awake, alert, oriented x 3, fluent and coherent speech  HEAD:  Atraumatic, Normocephalic  EYES: EOMI, PERRLA, conjunctiva and sclera clear  ENMT: No tonsillar erythema, exudates, or enlargement; Moist mucous membranes, Good dentition, No lesions  NECK: Supple, No JVD, No Cervical LAD, No thyromegaly, No thyroid nodules  NERVOUS SYSTEM:  Good concentration; Moving all 4 extremities;  No facial droop  CHEST WALL: No masses  CHEST/LUNG: Clear to auscultation bilaterally; No rales, rhonchi, wheezing, or rubs  HEART: Regular rate and rhythm; No murmurs, rubs, or gallops  ABDOMEN: Soft, Nontender, Nondistended, Bowel sounds present, No palpable masses or organomegaly, No bruits  EXTREMITIES:  2+ Peripheral Pulses, No clubbing, cyanosis, or edema  LYMPH: No lymphadenopathy note  SKIN: No rashes or lesions  INCISION:  Dressing clean/dry/intact    EKG:   Personally Reviewed: Yes  65bpm with sinus arrythmia

## 2017-11-29 NOTE — OCCUPATIONAL THERAPY INITIAL EVALUATION ADULT - ORIENTATION, REHAB EVAL
oriented to person, place, time and situation/Patient educated verbally regarding role of OT, LE weight bearing status & pt. provided with education folder including functional exercises, THR education/precautions & caregiver guide pamphlet.

## 2017-11-29 NOTE — OCCUPATIONAL THERAPY INITIAL EVALUATION ADULT - PLANNED THERAPY INTERVENTIONS, OT EVAL
transfer training/Patient will recall/adhere to  3/3 Total Hip Precautions 100% of the time within 3-5 sessions/ADL retraining

## 2017-11-30 ENCOUNTER — TRANSCRIPTION ENCOUNTER (OUTPATIENT)
Age: 72
End: 2017-11-30

## 2017-11-30 LAB
ANION GAP SERPL CALC-SCNC: 4 MMOL/L — LOW (ref 5–17)
BUN SERPL-MCNC: 14 MG/DL — SIGNIFICANT CHANGE UP (ref 7–23)
CALCIUM SERPL-MCNC: 9 MG/DL — SIGNIFICANT CHANGE UP (ref 8.4–10.5)
CHLORIDE SERPL-SCNC: 105 MMOL/L — SIGNIFICANT CHANGE UP (ref 96–108)
CO2 SERPL-SCNC: 31 MMOL/L — SIGNIFICANT CHANGE UP (ref 22–31)
CREAT SERPL-MCNC: 0.79 MG/DL — SIGNIFICANT CHANGE UP (ref 0.5–1.3)
GLUCOSE SERPL-MCNC: 118 MG/DL — HIGH (ref 70–99)
HCT VFR BLD CALC: 35.6 % — LOW (ref 39–50)
HGB BLD-MCNC: 12.2 G/DL — LOW (ref 13–17)
MCHC RBC-ENTMCNC: 32.2 PG — SIGNIFICANT CHANGE UP (ref 27–34)
MCHC RBC-ENTMCNC: 34.2 GM/DL — SIGNIFICANT CHANGE UP (ref 32–36)
MCV RBC AUTO: 94.2 FL — SIGNIFICANT CHANGE UP (ref 80–100)
PLATELET # BLD AUTO: 192 K/UL — SIGNIFICANT CHANGE UP (ref 150–400)
POTASSIUM SERPL-MCNC: 4.6 MMOL/L — SIGNIFICANT CHANGE UP (ref 3.5–5.3)
POTASSIUM SERPL-SCNC: 4.6 MMOL/L — SIGNIFICANT CHANGE UP (ref 3.5–5.3)
RBC # BLD: 3.77 M/UL — LOW (ref 4.2–5.8)
RBC # FLD: 13.4 % — SIGNIFICANT CHANGE UP (ref 10.3–14.5)
SODIUM SERPL-SCNC: 140 MMOL/L — SIGNIFICANT CHANGE UP (ref 135–145)
WBC # BLD: 11.3 K/UL — HIGH (ref 3.8–10.5)
WBC # FLD AUTO: 11.3 K/UL — HIGH (ref 3.8–10.5)

## 2017-11-30 PROCEDURE — 99233 SBSQ HOSP IP/OBS HIGH 50: CPT

## 2017-11-30 RX ADMIN — CELECOXIB 200 MILLIGRAM(S): 200 CAPSULE ORAL at 17:25

## 2017-11-30 RX ADMIN — OXYCODONE HYDROCHLORIDE 10 MILLIGRAM(S): 5 TABLET ORAL at 03:56

## 2017-11-30 RX ADMIN — Medication 1000 MILLIGRAM(S): at 08:12

## 2017-11-30 RX ADMIN — HYDROMORPHONE HYDROCHLORIDE 0.5 MILLIGRAM(S): 2 INJECTION INTRAMUSCULAR; INTRAVENOUS; SUBCUTANEOUS at 00:09

## 2017-11-30 RX ADMIN — OXYCODONE HYDROCHLORIDE 10 MILLIGRAM(S): 5 TABLET ORAL at 10:32

## 2017-11-30 RX ADMIN — OXYCODONE HYDROCHLORIDE 10 MILLIGRAM(S): 5 TABLET ORAL at 11:47

## 2017-11-30 RX ADMIN — Medication 1000 MILLIGRAM(S): at 17:25

## 2017-11-30 RX ADMIN — CELECOXIB 200 MILLIGRAM(S): 200 CAPSULE ORAL at 08:10

## 2017-11-30 RX ADMIN — Medication 100 MILLIGRAM(S): at 13:12

## 2017-11-30 RX ADMIN — OXYCODONE HYDROCHLORIDE 10 MILLIGRAM(S): 5 TABLET ORAL at 16:00

## 2017-11-30 RX ADMIN — OXYCODONE HYDROCHLORIDE 10 MILLIGRAM(S): 5 TABLET ORAL at 21:04

## 2017-11-30 RX ADMIN — SENNA PLUS 2 TABLET(S): 8.6 TABLET ORAL at 21:02

## 2017-11-30 RX ADMIN — Medication 100 MILLIGRAM(S): at 21:02

## 2017-11-30 RX ADMIN — CELECOXIB 200 MILLIGRAM(S): 200 CAPSULE ORAL at 08:07

## 2017-11-30 RX ADMIN — PANTOPRAZOLE SODIUM 40 MILLIGRAM(S): 20 TABLET, DELAYED RELEASE ORAL at 05:26

## 2017-11-30 RX ADMIN — Medication 400 MILLIGRAM(S): at 03:03

## 2017-11-30 RX ADMIN — Medication 162 MILLIGRAM(S): at 21:02

## 2017-11-30 RX ADMIN — OXYCODONE HYDROCHLORIDE 10 MILLIGRAM(S): 5 TABLET ORAL at 03:03

## 2017-11-30 RX ADMIN — Medication 1000 MILLIGRAM(S): at 03:29

## 2017-11-30 RX ADMIN — Medication 162 MILLIGRAM(S): at 08:07

## 2017-11-30 RX ADMIN — OXYCODONE HYDROCHLORIDE 10 MILLIGRAM(S): 5 TABLET ORAL at 08:07

## 2017-11-30 NOTE — DISCHARGE NOTE ADULT - CARE PROVIDER_API CALL
Toby Brock), Orthopaedic Surgery  [address] [place] [address]  Phone: [phone]  Fax: [phone]

## 2017-11-30 NOTE — DISCHARGE NOTE ADULT - MEDICATION SUMMARY - MEDICATIONS TO TAKE
I will START or STAY ON the medications listed below when I get home from the hospital:    acetaminophen 500 mg oral tablet  -- 2 tab(s) by mouth every 8 hours  -- Indication: For Pain    oxyCODONE 5 mg oral tablet  -- 1 tab(s) by mouth every 3 hours, As needed, Mild Pain (1 - 3)  -- Indication: For Pain    oxyCODONE 10 mg oral tablet  -- 1 tab(s) by mouth every 3 hours, As needed, Moderate Pain (4 - 6)  -- Indication: For Pain    celecoxib 200 mg oral capsule  -- 1 cap(s) by mouth 2 times a day (with meals) for 21 days total post-operatively  -- Indication: For Prevention of excess bone growth at fracture site    aspirin 81 mg oral delayed release tablet  -- 2 tab(s) by mouth every 12 hours for 42 days total post-operatively  -- Indication: For Prevention of blood clots    Lexapro 20 mg oral tablet  -- 1 tab(s) by mouth once a day  -- Indication: For depression    senna oral tablet  -- 2 tab(s) by mouth once a day (at bedtime)  -- Indication: For constipation     docusate sodium 100 mg oral capsule  -- 1 cap(s) by mouth 3 times a day  -- Indication: For Stool softener     polyethylene glycol 3350 oral powder for reconstitution  -- 17 gram(s) by mouth once a day, As needed, Constipation  -- Indication: For constipation    pantoprazole 40 mg oral delayed release tablet  -- 1 tab(s) by mouth once a day (before a meal)  -- Indication: For Prevention of ulcers    Vitamin C 500 mg oral tablet  -- 1 tab(s) by mouth once a day  -- Indication: For vitamin    Vitamin D3 1000 intl units oral capsule  -- 1 cap(s) by mouth once a day  -- Indication: For Primary osteoarthritis of right hip

## 2017-11-30 NOTE — DISCHARGE NOTE ADULT - HOSPITAL COURSE
NANCIE BARAHONA    Admitted on 11-29-17     72y y.o.  Male with history of DJD (degenerative joint disease) of hip: Right    Surgery:   Hip replacement  DJD (degenerative joint disease) of pelvis    Orthopedic Surgeon:   Dr. NADINE Brock    Lilian-operative antibiotic:    vancomycin  IVPB:      Consulted Services : Hospitalist, Physical Therapy, Occupational Therapy    Typical Physical & occupational therapy modalities post Hip replacement  DJD (degenerative joint disease) of pelvis   were performed including ambulation training, range of motion, ADL's, and transfers.     DVT prophylaxis:  aspirin enteric coated: 162 milliGRAM(s)       The patient had a clean appearing surgical incision with no sign of surgical site infections and had a stable neuro / vascular exam of the operated extremity.  After progression of mobility guided by the PT/ OT staff,  the patient was felt to benefit from further rehabilitative care for restoration to level of function. This was felt to best be accomplished in a Rehab facility.    Discharge and Orthopedic Care instructions were delineated in the Discharge Plan and reviewed with the patient. All medications were delineated in the medication reconciliation tool and key points were reviewed with the patient.     This patient was deemed stable from an Orthopedic & medical standpoint for discharge today.  He will follow up with Dr. NADINE Brock for further Orthopedic care. NANCIE BARAHONA    Admitted on 11-29-17     72y y.o.  Male with history of DJD (degenerative joint disease) of hip: Right    Surgery:   Hip replacement  DJD (degenerative joint disease) of Right hip    Orthopedic Surgeon:   Dr. NADINE Brock    Lilian-operative antibiotic:    vancomycin  IVPB:      Consulted Services : Hospitalist, Physical Therapy, Occupational Therapy    Typical Physical & occupational therapy modalities post Hip replacement  DJD (degenerative joint disease) of pelvis   were performed including ambulation training, range of motion, ADL's, and transfers.     DVT prophylaxis:  aspirin enteric coated: 162 milliGRAM(s)       The patient had a clean appearing surgical incision with no sign of surgical site infections and had a stable neuro / vascular exam of the operated extremity.  After progression of mobility guided by the PT/ OT staff,  the patient was felt to benefit from further rehabilitative care for restoration to level of function. This was felt to best be accomplished in a Rehab facility.    Discharge and Orthopedic Care instructions were delineated in the Discharge Plan and reviewed with the patient. All medications were delineated in the medication reconciliation tool and key points were reviewed with the patient.     This patient was deemed stable from an Orthopedic & medical standpoint for discharge today.  He will follow up with Dr. NADINE Brock for further Orthopedic care.

## 2017-11-30 NOTE — DISCHARGE NOTE ADULT - PATIENT PORTAL LINK FT
“You can access the Follow[facility] Patient Portal, offered by [facility], by registering with the following website: [email or web address]”

## 2017-11-30 NOTE — PROGRESS NOTE ADULT - ASSESSMENT
POD#1 s/p RIGHT THR  - Pain controlled  - Bowel regimen  - PT/OT  - DVT PPx per Ortho - ASA BID x 6 weeks  - Plan for [facility]    MARIAMA  - tele monitoring x 24 hours  - CPAP QHS - using his own machine    Hx of Psoriatic Arithritis  - patient states that per his discussion with his Rheumatologist, he no longer will be taking Sulfasalazine since it was providing him minimal benefit    Hx Anxiety and Depression  - has been on duloxetine on and off over last 2-3 months, does not work for him and refuses to go back on it, explained risks/benefits including worsening depression, patient understood these risks and insisted on no longer taking this medication  - c/w Lexapro  - denies suicidal ideation

## 2017-11-30 NOTE — DISCHARGE NOTE ADULT - INSTRUCTIONS
None None  For Constipation :   • Increase your water intake. Drink at least 8 glasses of water daily.  • Try adding fiber to your diet by eating fruits, vegetables and foods that are rich in grains.  • If you do experience constipation, you may take an over-the-counter stool softener/laxative such as Lilian Colace, Senekot or  Milk of Magnesia. right hip

## 2017-11-30 NOTE — DISCHARGE NOTE ADULT - MEDICATION SUMMARY - MEDICATIONS TO STOP TAKING
I will STOP taking the medications listed below when I get home from the hospital:    sulfaSALAzine 500 mg oral tablet  -- 2 tab(s) by mouth 2 times a day    multivitamin  -- 1  by mouth once a day. last dose 8-15-17    vitamin E 1000 intl units oral capsule  -- 1 cap(s) by mouth once a day last dose 8-15-17    DULoxetine 20 mg oral delayed release capsule  -- 1 cap(s) by mouth 3 times a day    famotidine 20 mg oral tablet

## 2017-12-01 VITALS
OXYGEN SATURATION: 97 % | RESPIRATION RATE: 16 BRPM | DIASTOLIC BLOOD PRESSURE: 76 MMHG | TEMPERATURE: 98 F | SYSTOLIC BLOOD PRESSURE: 117 MMHG | HEART RATE: 68 BPM

## 2017-12-01 LAB
ANION GAP SERPL CALC-SCNC: 3 MMOL/L — LOW (ref 5–17)
BUN SERPL-MCNC: 19 MG/DL — SIGNIFICANT CHANGE UP (ref 7–23)
CALCIUM SERPL-MCNC: 8.7 MG/DL — SIGNIFICANT CHANGE UP (ref 8.4–10.5)
CHLORIDE SERPL-SCNC: 107 MMOL/L — SIGNIFICANT CHANGE UP (ref 96–108)
CO2 SERPL-SCNC: 32 MMOL/L — HIGH (ref 22–31)
CREAT SERPL-MCNC: 0.88 MG/DL — SIGNIFICANT CHANGE UP (ref 0.5–1.3)
GLUCOSE SERPL-MCNC: 97 MG/DL — SIGNIFICANT CHANGE UP (ref 70–99)
HCT VFR BLD CALC: 36.1 % — LOW (ref 39–50)
HGB BLD-MCNC: 11.5 G/DL — LOW (ref 13–17)
MCHC RBC-ENTMCNC: 30.6 PG — SIGNIFICANT CHANGE UP (ref 27–34)
MCHC RBC-ENTMCNC: 31.9 GM/DL — LOW (ref 32–36)
MCV RBC AUTO: 96 FL — SIGNIFICANT CHANGE UP (ref 80–100)
PLATELET # BLD AUTO: 167 K/UL — SIGNIFICANT CHANGE UP (ref 150–400)
POTASSIUM SERPL-MCNC: 4.6 MMOL/L — SIGNIFICANT CHANGE UP (ref 3.5–5.3)
POTASSIUM SERPL-SCNC: 4.6 MMOL/L — SIGNIFICANT CHANGE UP (ref 3.5–5.3)
RBC # BLD: 3.76 M/UL — LOW (ref 4.2–5.8)
RBC # FLD: 13.3 % — SIGNIFICANT CHANGE UP (ref 10.3–14.5)
SODIUM SERPL-SCNC: 142 MMOL/L — SIGNIFICANT CHANGE UP (ref 135–145)
WBC # BLD: 8.8 K/UL — SIGNIFICANT CHANGE UP (ref 3.8–10.5)
WBC # FLD AUTO: 8.8 K/UL — SIGNIFICANT CHANGE UP (ref 3.8–10.5)

## 2017-12-01 PROCEDURE — 99239 HOSP IP/OBS DSCHRG MGMT >30: CPT

## 2017-12-01 RX ORDER — DOCUSATE SODIUM 100 MG
1 CAPSULE ORAL
Qty: 0 | Refills: 0 | DISCHARGE
Start: 2017-12-01

## 2017-12-01 RX ORDER — SENNA PLUS 8.6 MG/1
2 TABLET ORAL
Qty: 0 | Refills: 0 | DISCHARGE
Start: 2017-12-01

## 2017-12-01 RX ORDER — ASPIRIN/CALCIUM CARB/MAGNESIUM 324 MG
2 TABLET ORAL
Qty: 0 | Refills: 0 | DISCHARGE
Start: 2017-12-01

## 2017-12-01 RX ORDER — ACETAMINOPHEN 500 MG
2 TABLET ORAL
Qty: 0 | Refills: 0 | DISCHARGE
Start: 2017-12-01 | End: 2017-12-13

## 2017-12-01 RX ORDER — FAMOTIDINE 10 MG/ML
0 INJECTION INTRAVENOUS
Qty: 0 | Refills: 0 | COMMUNITY

## 2017-12-01 RX ORDER — SULFASALAZINE 500 MG
2 TABLET ORAL
Qty: 0 | Refills: 0 | COMMUNITY

## 2017-12-01 RX ORDER — PANTOPRAZOLE SODIUM 20 MG/1
1 TABLET, DELAYED RELEASE ORAL
Qty: 0 | Refills: 0 | DISCHARGE
Start: 2017-12-01

## 2017-12-01 RX ORDER — OXYCODONE HYDROCHLORIDE 5 MG/1
1 TABLET ORAL
Qty: 0 | Refills: 0 | DISCHARGE
Start: 2017-12-01

## 2017-12-01 RX ORDER — DULOXETINE HYDROCHLORIDE 30 MG/1
1 CAPSULE, DELAYED RELEASE ORAL
Qty: 0 | Refills: 0 | COMMUNITY

## 2017-12-01 RX ORDER — POLYETHYLENE GLYCOL 3350 17 G/17G
17 POWDER, FOR SOLUTION ORAL
Qty: 0 | Refills: 0 | DISCHARGE
Start: 2017-12-01

## 2017-12-01 RX ORDER — CELECOXIB 200 MG/1
1 CAPSULE ORAL
Qty: 0 | Refills: 0 | DISCHARGE
Start: 2017-12-01

## 2017-12-01 RX ORDER — VITAMIN E 100 UNIT
1 CAPSULE ORAL
Qty: 0 | Refills: 0 | COMMUNITY

## 2017-12-01 RX ADMIN — Medication 1000 MILLIGRAM(S): at 09:19

## 2017-12-01 RX ADMIN — OXYCODONE HYDROCHLORIDE 10 MILLIGRAM(S): 5 TABLET ORAL at 04:15

## 2017-12-01 RX ADMIN — Medication 162 MILLIGRAM(S): at 09:19

## 2017-12-01 RX ADMIN — Medication 100 MILLIGRAM(S): at 05:50

## 2017-12-01 RX ADMIN — PANTOPRAZOLE SODIUM 40 MILLIGRAM(S): 20 TABLET, DELAYED RELEASE ORAL at 05:50

## 2017-12-01 RX ADMIN — OXYCODONE HYDROCHLORIDE 10 MILLIGRAM(S): 5 TABLET ORAL at 03:43

## 2017-12-01 RX ADMIN — Medication 100 MILLIGRAM(S): at 12:51

## 2017-12-01 RX ADMIN — Medication 1000 MILLIGRAM(S): at 09:20

## 2017-12-01 RX ADMIN — OXYCODONE HYDROCHLORIDE 10 MILLIGRAM(S): 5 TABLET ORAL at 10:15

## 2017-12-01 RX ADMIN — CELECOXIB 200 MILLIGRAM(S): 200 CAPSULE ORAL at 09:20

## 2017-12-01 RX ADMIN — ESCITALOPRAM OXALATE 20 MILLIGRAM(S): 10 TABLET, FILM COATED ORAL at 12:51

## 2017-12-01 RX ADMIN — OXYCODONE HYDROCHLORIDE 10 MILLIGRAM(S): 5 TABLET ORAL at 09:20

## 2017-12-01 NOTE — PROGRESS NOTE ADULT - SUBJECTIVE AND OBJECTIVE BOX
Ortho PA - Post Op Check - S/P - Right THR  (posterior) with spinal and IV sedation      Pt alert and comfortable with no complaints, pain controlled  Denies nausea     Vital Signs Last 24 Hrs  T(C): 37 (11-29-17 @ 12:36), Max: 37 (11-29-17 @ 12:36)  T(F): 98.6 (11-29-17 @ 12:36), Max: 98.6 (11-29-17 @ 12:36)  HR: 82 (11-29-17 @ 12:36) (60 - 82)  BP: 119/72 (11-29-17 @ 12:36) (110/63 - 128/72)  BP(mean): --  RR: 10 (11-29-17 @ 12:36) (10 - 18)  SpO2: 100% (11-29-17 @ 12:36) (99% - 100%)  I&O's Detail    29 Nov 2017 07:01  -  29 Nov 2017 13:28  --------------------------------------------------------  IN:    lactated ringers.: 2400 mL    Oral Fluid: 100 mL  Total IN: 2500 mL    OUT:    Estimated Blood Loss: 200 mL( in OR)  Total OUT: 200 mL    Total NET: 2300 mL    Due to void urine**    I&O's Summary    29 Nov 2017 07:01  -  29 Nov 2017 13:28  --------------------------------------------------------  IN: 2500 mL / OUT: 200 mL / NET: 2300 mL                       MEDICATIONS:acetaminophen  IVPB. 1000 milliGRAM(s) IV Intermittent every 6 hours  aluminum hydroxide/magnesium hydroxide/simethicone Suspension 30 milliLiter(s) Oral four times a day PRN  celecoxib 200 milliGRAM(s) Oral two times a day with meals  docusate sodium 100 milliGRAM(s) Oral three times a day  HYDROmorphone  Injectable 0.5 milliGRAM(s) IV Push every 3 hours PRN  lactated ringers. 1000 milliLiter(s) IV Continuous <Continuous>  lactated ringers. 1000 milliLiter(s) IV Continuous <Continuous>  magnesium hydroxide Suspension 30 milliLiter(s) Oral daily PRN  ondansetron Injectable 4 milliGRAM(s) IV Push every 6 hours PRN  oxyCODONE    IR 5 milliGRAM(s) Oral every 3 hours PRN  oxyCODONE    IR 10 milliGRAM(s) Oral every 3 hours PRN  pantoprazole    Tablet 40 milliGRAM(s) Oral before breakfast  polyethylene glycol 3350 17 Gram(s) Oral daily PRN  senna 2 Tablet(s) Oral at bedtime  vancomycin  IVPB 1000 milliGRAM(s) IV Intermittent once    Anticoagulation: PAS to LE's      Antibiotics:   vancomycin  IVPB 1000 milliGRAM(s) IV Intermittent once more post op      Pain medications:   acetaminophen  IVPB. 1000 milliGRAM(s) IV Intermittent every 6 hours  celecoxib 200 milliGRAM(s) Oral two times a day with meals  HYDROmorphone  Injectable 0.5 milliGRAM(s) IV Push every 3 hours PRN  ondansetron Injectable 4 milliGRAM(s) IV Push every 6 hours PRN  oxyCODONE    IR 5 milliGRAM(s) Oral every 3 hours PRN  oxyCODONE    IR 10 milliGRAM(s) Oral every 3 hours PRN          PE:  Right Hip-abduction pillow in place.  Primary surgical bandage dry and intact.  Feet mobile and sensate.  EHLs/ant.tibs. 5/5.  DP pulse 2+ right foot.  PAS on LE's.  Calves soft and nontender.    A/P: Ortho stable  - Continue post-op orders; pain management with current plan above.  - Check labs today and in A.M.  - DVT prevention with Ecotrin 162mg po very 12 hours for 6 weeks, starting tomorrow.  - PT /OT for OOB, full WBAT  -Hx of MARIAMA and use of C-pap at home. Pt. will use it in hospital.  - Medical consult with Dr. Kuhn  -Discharge planning for [facility] by Friday.  -Will continue to monitor closely with attendings.
INTERVAL HPI/OVERNIGHT EVENTS:   Patient seen and examined.  Surgical site pain controlled, has chronic widespread arthralgias intermittently.  Eating, voiding, +flatus, no BM yet.    REVIEW OF SYSTEMS:  See HPI,  all others negative    PHYSICAL EXAM:  Vital Signs Last 24 Hrs  T(C): 36.6 (30 Nov 2017 07:32), Max: 37.1 (29 Nov 2017 23:20)  T(F): 97.8 (30 Nov 2017 07:32), Max: 98.8 (29 Nov 2017 23:20)  HR: 60 (30 Nov 2017 07:32) (51 - 82)  BP: 136/73 (30 Nov 2017 07:32) (110/63 - 149/75)  BP(mean): --  RR: 18 (30 Nov 2017 07:32) (10 - 18)  SpO2: 96% (30 Nov 2017 07:32) (96% - 100%)    GENERAL: NAD, well-groomed, well-developed, awake, alert, oriented x 3, fluent and coherent speech  HEAD:  Atraumatic, Normocephalic  EYES: EOMI, PERRLA, conjunctiva and sclera clear  ENMT: No tonsillar erythema, exudates, or enlargement; Moist mucous membranes, Good dentition, No lesions  NECK: Supple, No JVD, No Cervical LAD, No thyromegaly, No thyroid nodules felt  NERVOUS SYSTEM:  Good concentration; Moving all 4 extremities; No gross sensory deficits, No facial droop  CHEST WALL: No masses  CHEST/LUNG: Clear to auscultation bilaterally; No rales, rhonchi, wheezing, or rubs  HEART: Regular rate and rhythm; No murmurs, rubs, or gallops  ABDOMEN: Soft, Nontender, Nondistended, Bowel sounds present, No palpable masses or organomegaly, No bruits  EXTREMITIES:  2+ Peripheral Pulses, No clubbing, cyanosis, or edema  LYMPH: No lymphadenopathy noted  SKIN: No rashes or lesions  INCISION: dressing c/d/i    LABS:                        12.2   11.3  )-----------( 192      ( 30 Nov 2017 07:15 )             35.6     30 Nov 2017 07:15    140    |  105    |  14     ----------------------------<  118    4.6     |  31     |  0.79     Ca    9.0        30 Nov 2017 07:15             RADIOLOGY & ADDITIONAL TESTS:
INTERVAL HPI/OVERNIGHT EVENTS:   Patient seen and examined.  Surgical site pain controlled, has chronic widespread arthralgias intermittently.  Eating, voiding, +flatus, no BM yet.  Early morning event note reviewed, no recorded tachycardia in vital signs.  Patient denies any fevers, chills, cp, palpitations, sob, n/v/d, abd pain, calf pain, or focal weakness.    EKGs reviewed, poor quality, hard to assess p-waves, but appears to be NSR with sinus arrythmia.    REVIEW OF SYSTEMS:  See HPI,  all others negative    PHYSICAL EXAM:  Vital Signs Last 24 Hrs  T(C): 36.7 (01 Dec 2017 07:15), Max: 37 (30 Nov 2017 19:00)  T(F): 98.1 (01 Dec 2017 07:15), Max: 98.6 (30 Nov 2017 19:00)  HR: 65 (01 Dec 2017 07:15) (51 - 69)  BP: 124/70 (01 Dec 2017 07:15) (110/74 - 149/84)  BP(mean): --  RR: 16 (01 Dec 2017 07:15) (15 - 18)  SpO2: 96% (01 Dec 2017 07:15) (96% - 97%)    GENERAL: NAD, well-groomed, well-developed, awake, alert, oriented x 3, fluent and coherent speech  HEAD:  Atraumatic, Normocephalic  EYES: EOMI, PERRLA, conjunctiva and sclera clear  ENMT: No tonsillar erythema, exudates, or enlargement; Moist mucous membranes, Good dentition, No lesions  NECK: Supple, No JVD, No Cervical LAD, No thyromegaly, No thyroid nodules felt  NERVOUS SYSTEM:  Good concentration; Moving all 4 extremities; No gross sensory deficits, No facial droop  CHEST/LUNG: Clear to auscultation bilaterally; No rales, rhonchi, wheezing, or rubs  HEART: Regular rate and rhythm; No murmurs, rubs, or gallops  ABDOMEN: Soft, Nontender, Nondistended, Bowel sounds present, No palpable masses or organomegaly, No bruits  EXTREMITIES:  2+ Peripheral Pulses, No clubbing, cyanosis, or edema  LYMPH: No lymphadenopathy noted  SKIN: No rashes or lesions  INCISION: incision clean and dry with minimal surrounding edema, no erythema or drainage    LABS:                        11.5   8.8   )-----------( 167      ( 01 Dec 2017 07:50 )             36.1   Basic Metabolic Panel (12.01.17 @ 07:50)    Sodium, Serum: 142 mmol/L    Potassium, Serum: 4.6 mmol/L    Chloride, Serum: 107 mmol/L    Carbon Dioxide, Serum: 32 mmol/L    Anion Gap, Serum: 3 mmol/L    Blood Urea Nitrogen, Serum: 19 mg/dL    Creatinine, Serum: 0.88 mg/dL    Glucose, Serum: 97 mg/dL    Calcium, Total Serum: 8.7 mg/dL    eGFR if Non : 87: Interpretative comment
Orthopedic P.A.- POD# 2 - s/p Right THR    Patient alert and comfortable in bed.  Denies hip pain or nausea.    Vital Signs Last 24 Hrs  T(C): 36.7 (01 Dec 2017 07:15), Max: 37 (30 Nov 2017 19:00)  T(F): 98.1 (01 Dec 2017 07:15), Max: 98.6 (30 Nov 2017 19:00)  HR: 65 (01 Dec 2017 07:15) (51 - 69)  BP: 124/70 (01 Dec 2017 07:15) (110/74 - 149/84)  BP(mean): --  RR: 16 (01 Dec 2017 07:15) (15 - 18)  SpO2: 96% (01 Dec 2017 07:15) (96% - 97%)         I&O's Detail            Labs:                          11.5<L>  8.8   )-----------( 167      ( 01 Dec 2017 07:50 )             36.1<L>  01 Dec 2017 07:50                 01 Dec 2017 07:50    142    |  107    |  19     ----------------------------<  97     4.6     |  32<H>  |  0.88     Ca    8.7        01 Dec 2017 07:50                    MEDICATIONS:acetaminophen   Tablet. 1000 milliGRAM(s) Oral every 8 hours  aluminum hydroxide/magnesium hydroxide/simethicone Suspension 30 milliLiter(s) Oral four times a day PRN  aspirin enteric coated 162 milliGRAM(s) Oral every 12 hours  bisacodyl Suppository 10 milliGRAM(s) Rectal daily PRN  celecoxib 200 milliGRAM(s) Oral two times a day with meals  docusate sodium 100 milliGRAM(s) Oral three times a day  escitalopram 20 milliGRAM(s) Oral daily  HYDROmorphone  Injectable 0.5 milliGRAM(s) IV Push every 3 hours PRN  lactated ringers. 1000 milliLiter(s) IV Continuous <Continuous>  lactated ringers. 1000 milliLiter(s) IV Continuous <Continuous>  magnesium hydroxide Suspension 30 milliLiter(s) Oral daily PRN  ondansetron Injectable 4 milliGRAM(s) IV Push every 6 hours PRN  oxyCODONE    IR 5 milliGRAM(s) Oral every 3 hours PRN  oxyCODONE    IR 10 milliGRAM(s) Oral every 3 hours PRN  pantoprazole    Tablet 40 milliGRAM(s) Oral before breakfast  polyethylene glycol 3350 17 Gram(s) Oral daily PRN  senna 2 Tablet(s) Oral at bedtime    Anticoagulation:  aspirin enteric coated 162 milliGRAM(s) Oral every 12 hours        Pain medications:   acetaminophen   Tablet. 1000 milliGRAM(s) Oral every 8 hours  celecoxib 200 milliGRAM(s) Oral two times a day with meals  escitalopram 20 milliGRAM(s) Oral daily  HYDROmorphone  Injectable 0.5 milliGRAM(s) IV Push every 3 hours PRN  ondansetron Injectable 4 milliGRAM(s) IV Push every 6 hours PRN  oxyCODONE    IR 5 milliGRAM(s) Oral every 3 hours PRN  oxyCODONE    IR 10 milliGRAM(s) Oral every 3 hours PRN                                       Physical Exam:  Right hip- Abduction pillow in place.  Primary surgical bandage removed; Prineo tape dry and intact.  Neurovascular grossly intact LE's.  EHL/ant.tibs 5/5.  PAS on LE's.  Calves soft and non-tender.                                                                                                                                                        A/P:  Orthopedically stable.  -Continue pain management with current above regimen  -DVT prophylaxis with Ecotrin for 6 weeks; Celebrex for HO prevention for 21 days.  -PT/OT to increase ambulation and reinforce posterior dislocation precautions  -Dr. Kuhn for medical clearance for discharge to rehab today.  -Further plan as per attendings.
Presurgical evaluation:  Allergy:  Cephalexin (Keflex®): Rash    Home Medications:   · 	sulfaSALAzine 500 mg -- 2 tabs 2 times a day  · 	multivitamin once a day  · 	vitamin E 1000 intl units once a day   · 	Vitamin C 500 mg orally once a day  · 	Vitamin D3 1000 intl units orally once a day  · 	Escitalopram (Lexapro®0 20 mg once a day  · 	DULoxetine 20 mg – up to 3 caps/day     Past Medical History:  Anxiety and depression   Carpal tunnel syndrome  right and left  Concussion  "I fell backwards on a hardwood floor"-10/2015  Diverticulitis  treated 2007  DJD (degenerative joint disease)  traction in back 15 years ago x 10 days  Eye abnormality  "I had a bleed in the back of the eye"-left, 2014  Hearing loss  b/l hearing aids  MARIAMA (obstructive sleep apnea)  sleep study 8-12-17, setting of 5  PND (post-nasal drip)    Prostate cancer  s/p radiation 2015  Psoriatic arthritis  b/l hip/ knees and wrist and hand pain tens unit on legs alternating days/  TMJ (temporomandibular joint disorder)      Past Surgical History:  History of hip replacement, total, left  2013/2014  History of knee replacement, total, right  2013/2014  S/P rotator cuff surgery  right repair open 1-2017    PST values of interest:  SCr 0.93 mg/dL  QTc 397ms (WNL)  LFTs WNL    Recommendations:  1.	IV TXA  2.	Vancomycin 15mg/kg preop (67kg in PST = vancomycin 1000mg over 60 min and repeat x1 in 12 hours)  3.	Per Dr. Yip (patient’s Rheumatologist):  a.	Acetaminophen and celecoxib for multimodal pain management  b.	ASA EC 162mg q12h x 6 weeks for VTE prophylaxis (ASA and Celecoxib both OK with concurrent sulfasalazine)
Procedure: Right Posterior THR  POD#: 1    S: Pt without complaints. No SOB,CP, N/V. Tolerated Fluids / Diet well.   Pain comfortable (3/10 ) on Interval Rx;  No BM yet, + flatus, No abdominal pain.  Pain Rx:   acetaminophen   Tablet. 1000 milliGRAM(s) Oral every 8 hours  celecoxib 200 milliGRAM(s) Oral two times a day with meals  escitalopram 20 milliGRAM(s) Oral daily  HYDROmorphone  Injectable 0.5 milliGRAM(s) IV Push every 3 hours PRN  oxyCODONE    IR 5 milliGRAM(s) Oral every 3 hours PRN  oxyCODONE    IR 10 milliGRAM(s) Oral every 3 hours PRN    O: General: Pt Alert and oriented, On exam NAD,   VS: Vital Signs Last 24 Hrs  T(C): 36.6 (30 Nov 2017 07:32), Max: 37.1 (29 Nov 2017 23:20)  T(F): 97.8 (30 Nov 2017 07:32), Max: 98.8 (29 Nov 2017 23:20)  HR: 60 (30 Nov 2017 07:32) (51 - 82)  BP: 136/73 (30 Nov 2017 07:32) (110/63 - 149/75)  RR: 18 (30 Nov 2017 07:32) (10 - 18)  SpO2: 96% (30 Nov 2017 07:32) (96% - 100%)  Lungs: BS clear bilat.  [Abdomen]: Soft; no distention, benign exam    Ext( Right  Post. Hip): ABD  Dressing; [ Posterior ] clean, dry, & intact, Min STS thigh;   Neurologic: Has sensation bilat. feet & toes ;  Full AROM bilat feet & toes. EHL / AT  = Bilat: 5/5   Vascular: Feet toes warm, pink. DP = 2+. Calves soft ; w/o tenderness bilat..  HVAC = N/A  VTEP: On Bilat. Venodynes + aspirin enteric coated 162 milliGRAM(s) Oral every 12 hours     H.O Prophylaxis: Celebrex 200mg BID x Goal 21 Days  Activity in PT yesterday Noted.[Stood & stepped with walker].    Labs Today:                       12.2   11.3  )-----------( 192      ( 30 Nov 2017 07:15 )             35.6     11-30    140  |  105  |  14  ----------------------------<  118<H>  4.6   |  31  |  0.79    Ca    9.0      30 Nov 2017 07:15        Hospitalist input noted    Primary Orthopedic Assessment:  • Stable from Orthopedic perspective  • Neuro motor exam stable  • Labs:  CBC & Chem stable      Plan:   • Continue:  PT/OT/Weightbearing as tolerated with assistance of a walker/Posterior THR precautions/Ice to hip/          Incentive spirometry encouraged / Celebrex for H.O  • Continue DVT prophylaxis as prescribed, including use of compression devices and ankle pumps  • Continue Pain Rx  • Posterior THR hip precautions reviewed with patient  • Plans per Medicine / Anesthesia   • Discharge planning – anticipated discharge is subacute rehabilitation when medically stable & cleared by PT/OT

## 2017-12-01 NOTE — CHART NOTE - NSCHARTNOTEFT_GEN_A_CORE
Referred by nurse as monitor showed ? afb.    Patient evaluted immediately - reports feeling; denies any symptoms,.  Denies dizzines, palpitaion, chest pain or any other symptoms.      Physical exam - resting in bed comfortably without distress    T - 98.3, /79, P - 69, R - 16, Pulse ox - 96%    Chest - clear  Heart - S1+, S2+, regular  Abdomen - soft, bowel sounds +  CNS - awake alert and oriented      EKG obtained - shows normal sinus rhythm at around 60 / minute; no acute ST - T changes      Plan - continue current treatment

## 2017-12-01 NOTE — PROGRESS NOTE ADULT - ASSESSMENT
POD#2 s/p RIGHT THR  - Pain controlled  - Bowel regimen  - PT/OT  - DVT PPx per Ortho - ASA BID x 6 weeks  - Plan for DC to [facility] today, stable for DC from medical perspective    MARIAMA  - tele monitoring x 24 hours - no events, dc tele  - CPAP QHS - using his own machine    Hx of Psoriatic Arithritis  - patient states that per his discussion with his Rheumatologist, he no longer will be taking Sulfasalazine since it was providing him minimal benefit    Hx Anxiety and Depression  - has been on duloxetine on and off over last 2-3 months, does not work for him and refuses to go back on it, explained risks/benefits including worsening depression, patient understood these risks and insisted on no longer taking this medication  - c/w Lexapro  - denies suicidal ideation

## 2017-12-14 ENCOUNTER — APPOINTMENT (OUTPATIENT)
Dept: ORTHOPEDIC SURGERY | Facility: CLINIC | Age: 72
End: 2017-12-14
Payer: MEDICARE

## 2017-12-14 PROCEDURE — 99024 POSTOP FOLLOW-UP VISIT: CPT

## 2017-12-14 PROCEDURE — 73502 X-RAY EXAM HIP UNI 2-3 VIEWS: CPT

## 2017-12-19 ENCOUNTER — APPOINTMENT (OUTPATIENT)
Dept: ORTHOPEDIC SURGERY | Facility: CLINIC | Age: 72
End: 2017-12-19
Payer: MEDICARE

## 2017-12-19 VITALS — HEART RATE: 56 BPM | HEIGHT: 67 IN | DIASTOLIC BLOOD PRESSURE: 79 MMHG | SYSTOLIC BLOOD PRESSURE: 128 MMHG

## 2017-12-19 PROCEDURE — 88305 TISSUE EXAM BY PATHOLOGIST: CPT

## 2017-12-19 PROCEDURE — 88311 DECALCIFY TISSUE: CPT

## 2017-12-19 PROCEDURE — C1776: CPT

## 2017-12-19 PROCEDURE — 97535 SELF CARE MNGMENT TRAINING: CPT

## 2017-12-19 PROCEDURE — 97161 PT EVAL LOW COMPLEX 20 MIN: CPT

## 2017-12-19 PROCEDURE — 97116 GAIT TRAINING THERAPY: CPT

## 2017-12-19 PROCEDURE — 85018 HEMOGLOBIN: CPT

## 2017-12-19 PROCEDURE — 94664 DEMO&/EVAL PT USE INHALER: CPT

## 2017-12-19 PROCEDURE — 85027 COMPLETE CBC AUTOMATED: CPT

## 2017-12-19 PROCEDURE — 97110 THERAPEUTIC EXERCISES: CPT

## 2017-12-19 PROCEDURE — C1889: CPT

## 2017-12-19 PROCEDURE — 80048 BASIC METABOLIC PNL TOTAL CA: CPT

## 2017-12-19 PROCEDURE — 73502 X-RAY EXAM HIP UNI 2-3 VIEWS: CPT

## 2017-12-19 PROCEDURE — 97165 OT EVAL LOW COMPLEX 30 MIN: CPT

## 2017-12-19 PROCEDURE — 99024 POSTOP FOLLOW-UP VISIT: CPT

## 2017-12-29 ENCOUNTER — APPOINTMENT (OUTPATIENT)
Dept: PHARMACY | Facility: CLINIC | Age: 72
End: 2017-12-29
Payer: SELF-PAY

## 2017-12-29 PROCEDURE — V5299A: CUSTOM | Mod: NC

## 2018-01-02 ENCOUNTER — CHART COPY (OUTPATIENT)
Age: 73
End: 2018-01-02

## 2018-01-23 ENCOUNTER — APPOINTMENT (OUTPATIENT)
Dept: ORTHOPEDIC SURGERY | Facility: CLINIC | Age: 73
End: 2018-01-23
Payer: MEDICARE

## 2018-01-23 VITALS
HEART RATE: 64 BPM | WEIGHT: 150 LBS | SYSTOLIC BLOOD PRESSURE: 119 MMHG | DIASTOLIC BLOOD PRESSURE: 74 MMHG | BODY MASS INDEX: 23.54 KG/M2 | HEIGHT: 67 IN

## 2018-01-23 DIAGNOSIS — M25.559 PAIN IN UNSPECIFIED HIP: ICD-10-CM

## 2018-01-23 PROCEDURE — 73501 X-RAY EXAM HIP UNI 1 VIEW: CPT

## 2018-01-23 PROCEDURE — 99024 POSTOP FOLLOW-UP VISIT: CPT

## 2018-01-25 NOTE — H&P PST ADULT - WEIGHT IN KG
"  [facility] DISCHARGE SUMMARY    PATIENT'S NAME: Sherri Bender  YOB: 1930  MEDICAL RECORD NUMBER:  [id number]    PRIMARY CARE PROVIDER AND CLINIC RESPONSIBLE AFTER TRANSFER: Kameron Cool [address] [facility] [address]     CODE STATUS/ADVANCE DIRECTIVES DISCUSSION:   DNR / DNI       Allergies   Allergen Reactions     Nkda [No Known Drug Allergies]        TRANSFERRING PROVIDERS: VALERIE Shultz CNP, Aquiles Hernandez MD  DATE OF SNF ADMISSION:  January / 17 / 2018  DATE OF SNF (anticipated) DISCHARGE: January / 26 / 2018  DISCHARGE DISPOSITION: [facility] Provider   Nursing Facility: [facility] stay 1/14/18  to 1/17/18.     Condition on Discharge:  Stable.  Function:  Ambulatory with assist short distances, does self transfer at times, uses wheelchair for most mobility    Equipment: wheelchair    DISCHARGE DIAGNOSIS:   1. Dementia with behavioral disturbance, unspecified dementia type    2. Agitation    3. Falls frequently    4. Mild major depression (H)    5. HTN, goal below 150/90    6. Advanced directives, counseling/discussion        HPI Nursing Facility Course:  HPI information obtained from: facility chart records, facility staff and [facility] chart review.  Sherri [facility] to [facility] at [place] after fall and increased confusion. She has been found standing/on the floor a several times since admission. Family feels they can no longer take care of patient at home. She was admitted to [place], but plans discharge in order to admit to [facility] for LTC placement (which is closer to family/Muslim friends)  Dementia with behavioral disturbance, unspecified dementia type  Agitation  On low dose donepezil, seroquel bid, zoloft, trazodone.  She was previously on risperidol and higher dose aricept, ativan.  Risperidol and ativan discontinued, Aricept decreased as this may have been \"activating\" for her instead " of helpful.  Seroquel has been decreased, and ongoing GDR will likely be helpful as well. Since these changes, she has had less falls, less behaviors. Today, she is confused but alert, unable to answer simple questions.     Falls frequently  Non-traumatic falls while at [facility] [place], no injuries    Mild major depression (H)  On zoloft, no anxiety noted today. The current medical regimen is effective;  continue present plan and medications.     HTN, goal below 150/90  On losartan, hydrochlorothiazide. The current medical regimen is effective;  continue present plan and medications.   BP Readings from Last 6 Encounters:   01/25/18 151/80   01/22/18 103/43   01/17/18 121/60   10/11/17 136/82   10/05/16 125/74   11/10/15 142/73        Advanced directives, counseling/discussion  Per signed POLST on file.   - DNR/DNI    PAST MEDICAL HISTORY:  has a past medical history of Dementia; Depression; Environmental allergies; HTN (hypertension); and Osteoarthritis.    DISCHARGE MEDICATIONS:  Current Outpatient Prescriptions   Medication Sig Dispense Refill     hydrochlorothiazide (MICROZIDE) 12.5 MG capsule Take 12.5 mg by mouth daily       Acetaminophen (TYLENOL PO) Take 1,000 mg by mouth 3 times daily       Donepezil HCl (ARICEPT PO) Take 5 mg by mouth At Bedtime       Sertraline HCl (ZOLOFT PO) Take by mouth daily 75mg PO daily       TRAZODONE HCL PO Take 25 mg by mouth 2 times daily as needed for sleep       QUEtiapine (SEROQUEL) 50 MG tablet Take 1 tablet (50 mg) by mouth At Bedtime 60 tablet      QUEtiapine (SEROQUEL) 25 MG tablet Take 12.5 mg by mouth daily (with breakfast)  60 tablet      CRANBERRY PO Take 250 mg by mouth 3 times daily        MELATONIN PO Take 10 mg by mouth At Bedtime       loratadine (CLARITIN) 10 MG tablet Take 10 mg by mouth daily       losartan (COZAAR) 25 MG tablet Take 1 tablet (25 mg) by mouth daily (Needs follow-up appointment for this medication) 30 tablet 0     ASPIRIN CAPS 81 MG OR  1 TABLET DAILY         MEDICATION CHANGES/RATIONALE:   As noted above.   Controlled medications sent with patient: not applicable/none     ROS:    Unobtainable secondary to cognitive impairment or aphasia, but today pt reports no new concerns.     Physical Exam:   Vitals: /80  Pulse 76  Temp 95.3  F (35.2  C)  Resp 18  Wt 167 lb 3.2 oz (75.8 kg)  SpO2 93%  BMI 29.38 kg/m2  BMI= Body mass index is 29.38 kg/(m^2).  GENERAL APPEARANCE:  Alert, in no distress  HEAD:  Normal, normocephalic, atraumatic  EYE EXAM: normal external eye, conjunctiva, lids, CJ  NECK EXAM: supple, no JVD  CHEST/RESP:  respiratory effort normal, lung sounds CTA , no respiratory distress  CV:  Rate reg, rhythm reg, no murmur, no peripheral edema   M/S:   extremities normal, gait abnormal-needs assist, normal muscle tone, and range of motion normal   NEUROLOGIC EXAM: Normal gross motor movement, tone and coordination. No tremor.  Cranial nerves 2-12 are normal tested and grossly at patient's baseline  PSYCH:  Alert and oriented to self with forgetfulness, affect pleasant , judgement impaired by cognitive losses        DISCHARGE PLAN:  discharge to LTC at [facility]  Patient instructed to follow-up with:  PCP in 7 days  , or establish care with in-house rounding team    Current [place] scheduled appointments:  No future appointments.    MTM referral needed and placed by this provider: No    Pending labs: none    [facility] labs   Hospital Laboratory on 01/25/2018   Component Date Value Ref Range Status     TSH 01/25/2018 2.00  0.40 - 4.00 mU/L Final         Discharge Treatments:  Orders:  1. May Discharge to [facility] with all current medications and treatments  2. Establish care with primary Rounding team (lois) at [facility]    TOTAL DISCHARGE TIME:   Greater than 30 minutes  Electronically signed by:  VALERIE Shultz CNP   67

## 2018-03-02 ENCOUNTER — CHART COPY (OUTPATIENT)
Age: 73
End: 2018-03-02

## 2018-03-10 ENCOUNTER — APPOINTMENT (OUTPATIENT)
Dept: ORTHOPEDIC SURGERY | Facility: CLINIC | Age: 73
End: 2018-03-10
Payer: MEDICARE

## 2018-03-10 VITALS
BODY MASS INDEX: 22.6 KG/M2 | HEIGHT: 67 IN | WEIGHT: 144 LBS | SYSTOLIC BLOOD PRESSURE: 150 MMHG | HEART RATE: 58 BPM | DIASTOLIC BLOOD PRESSURE: 78 MMHG

## 2018-03-10 PROCEDURE — 99213 OFFICE O/P EST LOW 20 MIN: CPT

## 2018-03-10 PROCEDURE — 73502 X-RAY EXAM HIP UNI 2-3 VIEWS: CPT

## 2018-03-10 RX ORDER — OXYCODONE 5 MG/1
5 TABLET ORAL
Qty: 60 | Refills: 0 | Status: DISCONTINUED | COMMUNITY
Start: 2018-01-23 | End: 2018-03-10

## 2018-03-10 RX ORDER — OXYCODONE 5 MG/1
5 TABLET ORAL
Qty: 50 | Refills: 0 | Status: DISCONTINUED | COMMUNITY
Start: 2017-12-14 | End: 2018-03-10

## 2018-03-10 RX ORDER — OXYCODONE 10 MG/1
10 TABLET ORAL
Qty: 20 | Refills: 0 | Status: DISCONTINUED | COMMUNITY
Start: 2017-12-07 | End: 2018-03-10

## 2018-03-19 ENCOUNTER — FORM ENCOUNTER (OUTPATIENT)
Age: 73
End: 2018-03-19

## 2018-03-20 ENCOUNTER — APPOINTMENT (OUTPATIENT)
Dept: MRI IMAGING | Facility: CLINIC | Age: 73
End: 2018-03-20
Payer: MEDICARE

## 2018-03-20 ENCOUNTER — OUTPATIENT (OUTPATIENT)
Dept: OUTPATIENT SERVICES | Facility: HOSPITAL | Age: 73
LOS: 1 days | End: 2018-03-20
Payer: MEDICARE

## 2018-03-20 DIAGNOSIS — Z00.8 ENCOUNTER FOR OTHER GENERAL EXAMINATION: ICD-10-CM

## 2018-03-20 DIAGNOSIS — Z96.642 PRESENCE OF LEFT ARTIFICIAL HIP JOINT: Chronic | ICD-10-CM

## 2018-03-20 DIAGNOSIS — Z98.890 OTHER SPECIFIED POSTPROCEDURAL STATES: Chronic | ICD-10-CM

## 2018-03-20 DIAGNOSIS — Z96.651 PRESENCE OF RIGHT ARTIFICIAL KNEE JOINT: Chronic | ICD-10-CM

## 2018-03-20 PROCEDURE — 73721 MRI JNT OF LWR EXTRE W/O DYE: CPT | Mod: 26,RT

## 2018-03-20 PROCEDURE — 72148 MRI LUMBAR SPINE W/O DYE: CPT

## 2018-03-20 PROCEDURE — 73721 MRI JNT OF LWR EXTRE W/O DYE: CPT

## 2018-03-20 PROCEDURE — 72148 MRI LUMBAR SPINE W/O DYE: CPT | Mod: 26

## 2018-04-24 ENCOUNTER — APPOINTMENT (OUTPATIENT)
Dept: ORTHOPEDIC SURGERY | Facility: CLINIC | Age: 73
End: 2018-04-24
Payer: MEDICARE

## 2018-04-24 VITALS — DIASTOLIC BLOOD PRESSURE: 82 MMHG | HEART RATE: 101 BPM | HEIGHT: 67 IN | SYSTOLIC BLOOD PRESSURE: 117 MMHG

## 2018-04-24 PROCEDURE — 99213 OFFICE O/P EST LOW 20 MIN: CPT

## 2018-05-02 ENCOUNTER — APPOINTMENT (OUTPATIENT)
Dept: ORTHOPEDIC SURGERY | Facility: CLINIC | Age: 73
End: 2018-05-02
Payer: MEDICARE

## 2018-05-02 VITALS
DIASTOLIC BLOOD PRESSURE: 82 MMHG | BODY MASS INDEX: 22.6 KG/M2 | WEIGHT: 144 LBS | HEART RATE: 51 BPM | SYSTOLIC BLOOD PRESSURE: 135 MMHG | HEIGHT: 67 IN

## 2018-05-02 DIAGNOSIS — M70.61 TROCHANTERIC BURSITIS, RIGHT HIP: ICD-10-CM

## 2018-05-02 PROCEDURE — 99214 OFFICE O/P EST MOD 30 MIN: CPT

## 2018-05-02 PROCEDURE — 72110 X-RAY EXAM L-2 SPINE 4/>VWS: CPT

## 2018-05-14 ENCOUNTER — APPOINTMENT (OUTPATIENT)
Dept: PHARMACY | Facility: CLINIC | Age: 73
End: 2018-05-14
Payer: SELF-PAY

## 2018-05-14 PROCEDURE — V5014C: CUSTOM

## 2018-05-23 ENCOUNTER — APPOINTMENT (OUTPATIENT)
Dept: OTOLARYNGOLOGY | Facility: CLINIC | Age: 73
End: 2018-05-23
Payer: MEDICARE

## 2018-05-23 VITALS
SYSTOLIC BLOOD PRESSURE: 144 MMHG | BODY MASS INDEX: 22.6 KG/M2 | HEART RATE: 51 BPM | WEIGHT: 144 LBS | HEIGHT: 67 IN | DIASTOLIC BLOOD PRESSURE: 79 MMHG

## 2018-05-23 DIAGNOSIS — Z87.19 PERSONAL HISTORY OF OTHER DISEASES OF THE DIGESTIVE SYSTEM: ICD-10-CM

## 2018-05-23 DIAGNOSIS — Z82.2 FAMILY HISTORY OF DEAFNESS AND HEARING LOSS: ICD-10-CM

## 2018-05-23 DIAGNOSIS — H81.13 BENIGN PAROXYSMAL VERTIGO, BILATERAL: ICD-10-CM

## 2018-05-23 PROCEDURE — 99214 OFFICE O/P EST MOD 30 MIN: CPT

## 2018-06-06 ENCOUNTER — APPOINTMENT (OUTPATIENT)
Dept: OTOLARYNGOLOGY | Facility: CLINIC | Age: 73
End: 2018-06-06
Payer: MEDICARE

## 2018-06-06 PROCEDURE — 92537 CALORIC VSTBLR TEST W/REC: CPT

## 2018-06-06 PROCEDURE — 92540 BASIC VESTIBULAR EVALUATION: CPT

## 2018-06-06 PROCEDURE — 92585: CPT

## 2018-06-06 PROCEDURE — ZZZZZ: CPT

## 2018-06-07 ENCOUNTER — CHART COPY (OUTPATIENT)
Age: 73
End: 2018-06-07

## 2018-06-08 ENCOUNTER — RESULT REVIEW (OUTPATIENT)
Age: 73
End: 2018-06-08

## 2018-06-18 ENCOUNTER — APPOINTMENT (OUTPATIENT)
Dept: ORTHOPEDIC SURGERY | Facility: CLINIC | Age: 73
End: 2018-06-18
Payer: MEDICARE

## 2018-06-18 VITALS
HEIGHT: 67 IN | SYSTOLIC BLOOD PRESSURE: 128 MMHG | BODY MASS INDEX: 22.6 KG/M2 | HEART RATE: 91 BPM | WEIGHT: 144 LBS | DIASTOLIC BLOOD PRESSURE: 80 MMHG

## 2018-06-18 DIAGNOSIS — Z96.641 PRESENCE OF RIGHT ARTIFICIAL HIP JOINT: ICD-10-CM

## 2018-06-18 PROCEDURE — 99213 OFFICE O/P EST LOW 20 MIN: CPT

## 2018-06-18 PROCEDURE — 73502 X-RAY EXAM HIP UNI 2-3 VIEWS: CPT

## 2018-07-19 ENCOUNTER — APPOINTMENT (OUTPATIENT)
Dept: OTOLARYNGOLOGY | Facility: CLINIC | Age: 73
End: 2018-07-19

## 2018-07-25 ENCOUNTER — APPOINTMENT (OUTPATIENT)
Dept: NEUROLOGY | Facility: CLINIC | Age: 73
End: 2018-07-25

## 2018-08-01 ENCOUNTER — APPOINTMENT (OUTPATIENT)
Dept: UROLOGY | Facility: CLINIC | Age: 73
End: 2018-08-01
Payer: MEDICARE

## 2018-08-01 PROBLEM — M19.90 UNSPECIFIED OSTEOARTHRITIS, UNSPECIFIED SITE: Chronic | Status: ACTIVE | Noted: 2017-08-16

## 2018-08-01 PROBLEM — K57.92 DIVERTICULITIS OF INTESTINE, PART UNSPECIFIED, WITHOUT PERFORATION OR ABSCESS WITHOUT BLEEDING: Chronic | Status: ACTIVE | Noted: 2017-08-16

## 2018-08-01 PROBLEM — H91.90 UNSPECIFIED HEARING LOSS, UNSPECIFIED EAR: Chronic | Status: ACTIVE | Noted: 2017-08-16

## 2018-08-01 PROBLEM — R09.82 POSTNASAL DRIP: Chronic | Status: ACTIVE | Noted: 2017-08-16

## 2018-08-01 PROBLEM — M26.609 UNSPECIFIED TEMPOROMANDIBULAR JOINT DISORDER, UNSPECIFIED SIDE: Chronic | Status: ACTIVE | Noted: 2017-08-16

## 2018-08-01 PROCEDURE — 99214 OFFICE O/P EST MOD 30 MIN: CPT | Mod: 25

## 2018-08-01 PROCEDURE — 51798 US URINE CAPACITY MEASURE: CPT

## 2018-08-02 LAB
APPEARANCE: CLEAR
BACTERIA: NEGATIVE
BILIRUBIN URINE: NEGATIVE
BLOOD URINE: NEGATIVE
COLOR: YELLOW
GLUCOSE QUALITATIVE U: NEGATIVE MG/DL
KETONES URINE: NEGATIVE
LEUKOCYTE ESTERASE URINE: NEGATIVE
MICROSCOPIC-UA: NORMAL
NITRITE URINE: NEGATIVE
PH URINE: 6
PROTEIN URINE: NEGATIVE MG/DL
PSA FREE FLD-MCNC: 9.3
PSA FREE SERPL-MCNC: 0.05 NG/ML
PSA SERPL-MCNC: 0.54 NG/ML
RED BLOOD CELLS URINE: 1 /HPF
SPECIFIC GRAVITY URINE: 1.02
SQUAMOUS EPITHELIAL CELLS: 0 /HPF
UROBILINOGEN URINE: NEGATIVE MG/DL
WHITE BLOOD CELLS URINE: 0 /HPF

## 2018-08-03 ENCOUNTER — APPOINTMENT (OUTPATIENT)
Dept: ORTHOPEDIC SURGERY | Facility: CLINIC | Age: 73
End: 2018-08-03

## 2018-08-03 LAB — CORE LAB FLUID CYTOLOGY: NORMAL

## 2018-08-07 ENCOUNTER — APPOINTMENT (OUTPATIENT)
Dept: OTOLARYNGOLOGY | Facility: CLINIC | Age: 73
End: 2018-08-07
Payer: MEDICARE

## 2018-08-07 VITALS
HEIGHT: 67 IN | WEIGHT: 150 LBS | SYSTOLIC BLOOD PRESSURE: 125 MMHG | BODY MASS INDEX: 23.54 KG/M2 | HEART RATE: 75 BPM | DIASTOLIC BLOOD PRESSURE: 70 MMHG

## 2018-08-07 PROCEDURE — 92557 COMPREHENSIVE HEARING TEST: CPT

## 2018-08-07 PROCEDURE — 92567 TYMPANOMETRY: CPT

## 2018-08-07 PROCEDURE — 99213 OFFICE O/P EST LOW 20 MIN: CPT

## 2018-11-19 NOTE — PACU DISCHARGE NOTE - NAUSEA/VOMITING:
Message  Called patient left message . Reminder to bring in a current medication list, insurance card & a photo I.D     Signatures   Electronically signed by : Magi Hogan CMA; May  7 2018  4:59PM CST (Acknowledgement)    
None

## 2018-11-21 NOTE — H&P PST ADULT - HEARING LOSS
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 \par Recent bx non-contributory\par Pt is stridorous with prolonged periods of apnea sec to protuberant tongue and external compression of airway\par Lethargic, desaturates to 89% during episodes of prolonged apnea. Concern for CO2 retention and airway compromise. \par Will send pt to ER for airway management and work up. \par Please call ENT when pt arrives in ER\par OV upon d/c from the hospital.  L/R

## 2019-01-02 ENCOUNTER — APPOINTMENT (OUTPATIENT)
Dept: PHARMACY | Facility: CLINIC | Age: 74
End: 2019-01-02
Payer: SELF-PAY

## 2019-01-02 PROCEDURE — V5267B: CUSTOM

## 2019-01-02 PROCEDURE — V5014C: CUSTOM

## 2019-01-09 ENCOUNTER — OUTPATIENT (OUTPATIENT)
Dept: OUTPATIENT SERVICES | Facility: HOSPITAL | Age: 74
LOS: 1 days | End: 2019-01-09
Payer: MEDICARE

## 2019-01-09 DIAGNOSIS — Z98.890 OTHER SPECIFIED POSTPROCEDURAL STATES: Chronic | ICD-10-CM

## 2019-01-09 DIAGNOSIS — Z96.642 PRESENCE OF LEFT ARTIFICIAL HIP JOINT: Chronic | ICD-10-CM

## 2019-01-09 DIAGNOSIS — Z96.651 PRESENCE OF RIGHT ARTIFICIAL KNEE JOINT: Chronic | ICD-10-CM

## 2019-01-09 DIAGNOSIS — M54.16 RADICULOPATHY, LUMBAR REGION: ICD-10-CM

## 2019-01-09 PROCEDURE — 77003 FLUOROGUIDE FOR SPINE INJECT: CPT

## 2019-01-09 PROCEDURE — 62323 NJX INTERLAMINAR LMBR/SAC: CPT

## 2019-01-30 ENCOUNTER — OUTPATIENT (OUTPATIENT)
Dept: OUTPATIENT SERVICES | Facility: HOSPITAL | Age: 74
LOS: 1 days | End: 2019-01-30
Payer: MEDICARE

## 2019-01-30 DIAGNOSIS — Z98.890 OTHER SPECIFIED POSTPROCEDURAL STATES: Chronic | ICD-10-CM

## 2019-01-30 DIAGNOSIS — Z96.642 PRESENCE OF LEFT ARTIFICIAL HIP JOINT: Chronic | ICD-10-CM

## 2019-01-30 DIAGNOSIS — M54.16 RADICULOPATHY, LUMBAR REGION: ICD-10-CM

## 2019-01-30 DIAGNOSIS — Z96.651 PRESENCE OF RIGHT ARTIFICIAL KNEE JOINT: Chronic | ICD-10-CM

## 2019-01-30 PROCEDURE — 77003 FLUOROGUIDE FOR SPINE INJECT: CPT

## 2019-01-30 PROCEDURE — 62323 NJX INTERLAMINAR LMBR/SAC: CPT

## 2019-03-06 ENCOUNTER — OUTPATIENT (OUTPATIENT)
Dept: OUTPATIENT SERVICES | Facility: HOSPITAL | Age: 74
LOS: 1 days | End: 2019-03-06
Payer: MEDICARE

## 2019-03-06 DIAGNOSIS — Z96.651 PRESENCE OF RIGHT ARTIFICIAL KNEE JOINT: Chronic | ICD-10-CM

## 2019-03-06 DIAGNOSIS — M54.16 RADICULOPATHY, LUMBAR REGION: ICD-10-CM

## 2019-03-06 DIAGNOSIS — Z98.890 OTHER SPECIFIED POSTPROCEDURAL STATES: Chronic | ICD-10-CM

## 2019-03-06 DIAGNOSIS — Z96.642 PRESENCE OF LEFT ARTIFICIAL HIP JOINT: Chronic | ICD-10-CM

## 2019-03-06 PROCEDURE — 77003 FLUOROGUIDE FOR SPINE INJECT: CPT

## 2019-03-06 PROCEDURE — 62323 NJX INTERLAMINAR LMBR/SAC: CPT

## 2019-06-28 ENCOUNTER — APPOINTMENT (OUTPATIENT)
Dept: PHARMACY | Facility: CLINIC | Age: 74
End: 2019-06-28
Payer: SELF-PAY

## 2019-06-28 PROCEDURE — V5014E: CUSTOM

## 2019-07-12 ENCOUNTER — APPOINTMENT (OUTPATIENT)
Dept: PHARMACY | Facility: CLINIC | Age: 74
End: 2019-07-12

## 2019-08-01 ENCOUNTER — APPOINTMENT (OUTPATIENT)
Dept: UROLOGY | Facility: CLINIC | Age: 74
End: 2019-08-01
Payer: MEDICARE

## 2019-08-01 PROCEDURE — 99214 OFFICE O/P EST MOD 30 MIN: CPT

## 2019-08-01 NOTE — PHYSICAL EXAM
[General Appearance - Well Nourished] : well nourished [General Appearance - Well Developed] : well developed [Well Groomed] : well groomed [Normal Appearance] : normal appearance [General Appearance - In No Acute Distress] : no acute distress [Abdomen Soft] : soft [Abdomen Tenderness] : non-tender [Costovertebral Angle Tenderness] : no ~M costovertebral angle tenderness [Urethral Meatus] : meatus normal [Urinary Bladder Findings] : the bladder was normal on palpation [Scrotum] : the scrotum was normal [No Prostate Nodules] : no prostate nodules [Testes Mass (___cm)] : there were no testicular masses [Edema] : no peripheral edema [] : no respiratory distress [Respiration, Rhythm And Depth] : normal respiratory rhythm and effort [Exaggerated Use Of Accessory Muscles For Inspiration] : no accessory muscle use [Oriented To Time, Place, And Person] : oriented to person, place, and time [Affect] : the affect was normal [Mood] : the mood was normal [Not Anxious] : not anxious [Normal Station and Gait] : the gait and station were normal for the patient's age [No Focal Deficits] : no focal deficits [No Palpable Adenopathy] : no palpable adenopathy

## 2019-08-02 LAB
APPEARANCE: CLEAR
BACTERIA: NEGATIVE
BILIRUBIN URINE: NEGATIVE
BLOOD URINE: NEGATIVE
COLOR: YELLOW
GLUCOSE QUALITATIVE U: NEGATIVE
HYALINE CASTS: 0 /LPF
KETONES URINE: NORMAL
LEUKOCYTE ESTERASE URINE: NEGATIVE
MICROSCOPIC-UA: NORMAL
NITRITE URINE: NEGATIVE
PH URINE: 6.5
PROTEIN URINE: NORMAL
PSA FREE FLD-MCNC: 10 %
PSA FREE SERPL-MCNC: 0.07 NG/ML
PSA SERPL-MCNC: 0.71 NG/ML
RED BLOOD CELLS URINE: 1 /HPF
SPECIFIC GRAVITY URINE: 1.02
SQUAMOUS EPITHELIAL CELLS: 0 /HPF
UROBILINOGEN URINE: NORMAL
WHITE BLOOD CELLS URINE: 0 /HPF

## 2019-08-03 LAB — URINE CYTOLOGY: NORMAL

## 2019-09-04 ENCOUNTER — OUTPATIENT (OUTPATIENT)
Dept: OUTPATIENT SERVICES | Facility: HOSPITAL | Age: 74
LOS: 1 days | Discharge: HOME | End: 2019-09-04
Payer: MEDICARE

## 2019-09-04 DIAGNOSIS — Z96.642 PRESENCE OF LEFT ARTIFICIAL HIP JOINT: Chronic | ICD-10-CM

## 2019-09-04 DIAGNOSIS — Z98.890 OTHER SPECIFIED POSTPROCEDURAL STATES: Chronic | ICD-10-CM

## 2019-09-04 DIAGNOSIS — Z96.651 PRESENCE OF RIGHT ARTIFICIAL KNEE JOINT: Chronic | ICD-10-CM

## 2019-09-04 DIAGNOSIS — M54.12 RADICULOPATHY, CERVICAL REGION: ICD-10-CM

## 2019-09-04 PROCEDURE — 77003 FLUOROGUIDE FOR SPINE INJECT: CPT

## 2019-09-04 PROCEDURE — 62321 NJX INTERLAMINAR CRV/THRC: CPT

## 2019-10-02 ENCOUNTER — OUTPATIENT (OUTPATIENT)
Dept: OUTPATIENT SERVICES | Facility: HOSPITAL | Age: 74
LOS: 1 days | End: 2019-10-02
Payer: MEDICARE

## 2019-10-02 DIAGNOSIS — Z98.890 OTHER SPECIFIED POSTPROCEDURAL STATES: Chronic | ICD-10-CM

## 2019-10-02 DIAGNOSIS — Z96.651 PRESENCE OF RIGHT ARTIFICIAL KNEE JOINT: Chronic | ICD-10-CM

## 2019-10-02 DIAGNOSIS — M54.12 RADICULOPATHY, CERVICAL REGION: ICD-10-CM

## 2019-10-02 DIAGNOSIS — Z96.642 PRESENCE OF LEFT ARTIFICIAL HIP JOINT: Chronic | ICD-10-CM

## 2019-10-02 PROCEDURE — 62321 NJX INTERLAMINAR CRV/THRC: CPT

## 2019-10-02 PROCEDURE — 77003 FLUOROGUIDE FOR SPINE INJECT: CPT

## 2019-10-23 ENCOUNTER — OUTPATIENT (OUTPATIENT)
Dept: OUTPATIENT SERVICES | Facility: HOSPITAL | Age: 74
LOS: 1 days | End: 2019-10-23
Payer: MEDICARE

## 2019-10-23 DIAGNOSIS — Z96.642 PRESENCE OF LEFT ARTIFICIAL HIP JOINT: Chronic | ICD-10-CM

## 2019-10-23 DIAGNOSIS — M54.12 RADICULOPATHY, CERVICAL REGION: ICD-10-CM

## 2019-10-23 DIAGNOSIS — Z96.651 PRESENCE OF RIGHT ARTIFICIAL KNEE JOINT: Chronic | ICD-10-CM

## 2019-10-23 DIAGNOSIS — Z98.890 OTHER SPECIFIED POSTPROCEDURAL STATES: Chronic | ICD-10-CM

## 2019-10-23 PROCEDURE — 62321 NJX INTERLAMINAR CRV/THRC: CPT

## 2019-10-23 PROCEDURE — 77003 FLUOROGUIDE FOR SPINE INJECT: CPT

## 2019-11-19 ENCOUNTER — EMERGENCY (EMERGENCY)
Facility: HOSPITAL | Age: 74
LOS: 1 days | Discharge: HOME | End: 2019-11-19
Attending: EMERGENCY MEDICINE
Payer: MEDICARE

## 2019-11-19 VITALS
RESPIRATION RATE: 20 BRPM | TEMPERATURE: 98 F | WEIGHT: 149.91 LBS | DIASTOLIC BLOOD PRESSURE: 71 MMHG | HEIGHT: 67 IN | OXYGEN SATURATION: 97 % | HEART RATE: 76 BPM | SYSTOLIC BLOOD PRESSURE: 129 MMHG

## 2019-11-19 DIAGNOSIS — Z98.890 OTHER SPECIFIED POSTPROCEDURAL STATES: Chronic | ICD-10-CM

## 2019-11-19 DIAGNOSIS — Z96.642 PRESENCE OF LEFT ARTIFICIAL HIP JOINT: Chronic | ICD-10-CM

## 2019-11-19 DIAGNOSIS — Z96.651 PRESENCE OF RIGHT ARTIFICIAL KNEE JOINT: Chronic | ICD-10-CM

## 2019-11-19 PROCEDURE — 99284 EMERGENCY DEPT VISIT MOD MDM: CPT | Mod: 25

## 2019-11-19 PROCEDURE — 99284 EMERGENCY DEPT VISIT MOD MDM: CPT

## 2019-11-19 PROCEDURE — 90715 TDAP VACCINE 7 YRS/> IM: CPT

## 2019-11-19 PROCEDURE — 73060 X-RAY EXAM OF HUMERUS: CPT

## 2019-11-19 PROCEDURE — 73090 X-RAY EXAM OF FOREARM: CPT

## 2019-11-19 PROCEDURE — 73080 X-RAY EXAM OF ELBOW: CPT

## 2019-11-19 PROCEDURE — 90471 IMMUNIZATION ADMIN: CPT

## 2019-11-19 RX ORDER — ACETAMINOPHEN 500 MG
975 TABLET ORAL ONCE
Refills: 0 | Status: COMPLETED | OUTPATIENT
Start: 2019-11-19 | End: 2019-11-19

## 2019-11-19 RX ORDER — TETANUS TOXOID, REDUCED DIPHTHERIA TOXOID AND ACELLULAR PERTUSSIS VACCINE, ADSORBED 5; 2.5; 8; 8; 2.5 [IU]/.5ML; [IU]/.5ML; UG/.5ML; UG/.5ML; UG/.5ML
0.5 SUSPENSION INTRAMUSCULAR ONCE
Refills: 0 | Status: COMPLETED | OUTPATIENT
Start: 2019-11-19 | End: 2019-11-19

## 2019-11-19 RX ADMIN — TETANUS TOXOID, REDUCED DIPHTHERIA TOXOID AND ACELLULAR PERTUSSIS VACCINE, ADSORBED 0.5 MILLILITER(S): 5; 2.5; 8; 8; 2.5 SUSPENSION INTRAMUSCULAR at 22:45

## 2019-11-19 RX ADMIN — Medication 975 MILLIGRAM(S): at 22:45

## 2019-11-19 NOTE — ED ADULT NURSE NOTE - OBJECTIVE STATEMENT
73 y/o male PMH prostate cancer and bilateral hip replacements presents to ED reporting R elbow pain. Pt reports falling a few hours ago and scraping R shin and R elbow. Pt denies hitting head. On exam, AOx3 speaking in complete sentences. Lung sounds CTA, NAD. Abdomen soft, non-tender, non-distended. +2 peripheral pulses, capillary refill less than 2 seconds. Abrasion noted to R shin, no active bleeding at this time. Pt denies fever/chills, CP, SOB, n/v/d and dizziness. Seen and evaluated by MD. Awaiting imaging at this time.

## 2019-11-19 NOTE — ED PROVIDER NOTE - PATIENT PORTAL LINK FT
You can access the FollowMyHealth Patient Portal offered by [facility] by registering at the following website: [email or web address]. By joining [employer]’s FollowMyHealth portal, you will also be able to view your health information using other applications (apps) compatible with our system.

## 2019-11-19 NOTE — ED PROVIDER NOTE - ATTENDING CONTRIBUTION TO CARE
74M presenting ot the ED s/p mechanical trip and fall. States he was trying to step over wall and could not get his leg high enough and fell forward landing directly on the right elbow. Since has noticed swelling and pain in the elbow with difficulty moving elbow. Denies any paresthesias. Denies any tingling. Reports some "dullness" not numbness to R elbow. Denies any head injury/LOC. States the pain is exacerbated when he fully extends his elbow, alleviated by keeping it by his side. Took his home Oxycodone at 6PM, takes Oxycodone and Lyrica for chronic pain.     PMHx & PSHx: as above in note.  Allergies: See EMR.  SocHx: Denies ETOH/drugs/smoking.    PHYSICAL EXAMINATION:  VITALS REVIEWED.  VS slightly hypertensive, otherwise normal  GENERALIZED APPEARANCE:  Comfortable, no acute distress, ambulating without difficulty  SKIN:  Warm, dry, no cyanosis  HEAD:  No obvious scalp lesions  EYES:  Conjunctiva pink, no icterus  ENMT:  Mucus membranes moist, no stridor  NECK:  Supple, non-tender  CHEST AND RESPIRATORY:  Clear to auscultation B/L, good air entry B/L, equal chest expansion  HEART AND CARDIOVASCULAR:  Regular rate, no obvious murmur  ABDOMEN AND GI:  Soft, non-tender, non-distended.  No rebound, no guarding, no CVA-area tenderness  EXTREMITIES: RUE mild swelling and tenderness to distal humerus, no obvious gross deformity, ROM of elbow and forearm intact, but elicits pain, radial, median, and ulnar nerve distributions intact sensation, strength 5/5 , radial pulse 2+, cap refill <2s, brachial pulse 2+, able to perform O-jaya sign. No obvious shoulder deformity. LUE normal; RLE with abrasion to anterior proximal shin, non-tender, LLE normal  NEURO: AAOx3, gross motor and sensory intact (aside from RUE as documented above), CN2-12 intac,    Impression:  R/o fracture, ?contusion/sprain; XR, pain management PRN (patient defers at this time, states that he will take his Oxycodone and Lyrica once he gets home, asked multiple times by myself and PA if needs pain medication), sling and orthopedic follow up

## 2019-11-19 NOTE — ED ADULT NURSE REASSESSMENT NOTE - NS ED NURSE REASSESS COMMENT FT1
Received report from Jenifer JIMÉNEZ. Patient resting comfortably in stretcher. A&Ox4. Patient in no acute distress. Patient given ice pack for comfort. Patient pending x-ray. Plan of care discussed. Safety and comfort measures maintained. Will continue to monitor.

## 2019-11-19 NOTE — ED PROVIDER NOTE - NSFOLLOWUPCLINICS_GEN_ALL_ED_FT
[facility] Orthopedic Surgery  Orthopedic Surgery  [address], 3rd & 4th [place] [address]  Phone: [phone]  Fax:   Follow Up Time: 1-3 Days

## 2019-11-19 NOTE — ED PROVIDER NOTE - PHYSICAL EXAMINATION
CONSTITUTIONAL: Patient is awake, alert and oriented x 3. Patient is well appearing and in no acute distress.  HEAD: NCAT,   LUNGS: CTA B/L,  HEART: RRR.+S1S2 no murmurs,   ABDOMEN: Soft nd/nt  EXTREMITY: no edema or calf tenderness b/l, FROM left upper and lower ext b/l. RUE: theres is swelling and deformity on the distal humerus and elbow w/ limited ROM elbow. FROM wrist and hand, Decreased sensation over elbow, otherwise normal gross sensation in RUE in all aspects. 2+ distal pulse   NEURO: No focal deficits

## 2019-11-19 NOTE — ED PROVIDER NOTE - PROGRESS NOTE DETAILS
Patient re-evaluated. Imaging reviewed. Patient reports feeling better, able to move elbow more, likely contusion, no acute fractures on my read as well as prelim read. Provided sling and orthopedic follow up information. Patient wants to go home, all questions answered to patients stated satisfaction.  Imaging results (if any), were reviewed with the patient. Patient understands to follow up with their regular doctor. Patient understands to return to the ED is symptoms worsen or progress. Discharge instructions were given to the patient and discussed with patient. Rx (if any) were electronically sent to patient's preferred pharmacy.

## 2019-11-19 NOTE — ED PROVIDER NOTE - CARE PLAN
Principal Discharge DX:	Abrasion of right lower extremity, initial encounter  Secondary Diagnosis:	Fall, initial encounter  Secondary Diagnosis:	Right arm pain

## 2019-11-19 NOTE — ED PROVIDER NOTE - OBJECTIVE STATEMENT
74 year old male presents c/o right elbow pain s/p mechanical trip and fall this evening. Patient reports he was trying to step over wall and could not get his leg high enough and fell forward landing directly on the right elbow. Since has noticed swelling and pain in the elbow with difficulty moving elbow with numb sensation at the elbow. Denies head injury, loc, other msk pain, chest pain, sob, abd pain, n/v/d. Patient took home oxy 10 at 6pm

## 2019-11-20 VITALS
DIASTOLIC BLOOD PRESSURE: 82 MMHG | HEART RATE: 61 BPM | OXYGEN SATURATION: 97 % | SYSTOLIC BLOOD PRESSURE: 133 MMHG | RESPIRATION RATE: 18 BRPM

## 2019-11-20 PROCEDURE — 73090 X-RAY EXAM OF FOREARM: CPT | Mod: 26,LT

## 2019-11-20 PROCEDURE — 73080 X-RAY EXAM OF ELBOW: CPT | Mod: 26,RT

## 2019-11-20 PROCEDURE — 73060 X-RAY EXAM OF HUMERUS: CPT | Mod: 26,RT

## 2020-08-05 ENCOUNTER — APPOINTMENT (OUTPATIENT)
Dept: UROLOGY | Facility: CLINIC | Age: 75
End: 2020-08-05
Payer: MEDICARE

## 2020-08-05 VITALS — TEMPERATURE: 98.1 F

## 2020-08-05 LAB
PSA FREE FLD-MCNC: 10 %
PSA FREE SERPL-MCNC: 0.08 NG/ML
PSA SERPL-MCNC: 0.76 NG/ML

## 2020-08-05 PROCEDURE — 88112 CYTOPATH CELL ENHANCE TECH: CPT | Mod: 26

## 2020-08-05 PROCEDURE — 99213 OFFICE O/P EST LOW 20 MIN: CPT

## 2020-08-05 NOTE — PHYSICAL EXAM
[General Appearance - Well Developed] : well developed [General Appearance - Well Nourished] : well nourished [Normal Appearance] : normal appearance [Abdomen Tenderness] : non-tender [Abdomen Soft] : soft [General Appearance - In No Acute Distress] : no acute distress [Well Groomed] : well groomed [Costovertebral Angle Tenderness] : no ~M costovertebral angle tenderness [Urethral Meatus] : meatus normal [Urinary Bladder Findings] : the bladder was normal on palpation [Testes Mass (___cm)] : there were no testicular masses [Scrotum] : the scrotum was normal [No Prostate Nodules] : no prostate nodules [Edema] : no peripheral edema [] : no respiratory distress [Respiration, Rhythm And Depth] : normal respiratory rhythm and effort [Exaggerated Use Of Accessory Muscles For Inspiration] : no accessory muscle use [Oriented To Time, Place, And Person] : oriented to person, place, and time [Mood] : the mood was normal [Affect] : the affect was normal [Not Anxious] : not anxious [Normal Station and Gait] : the gait and station were normal for the patient's age [No Focal Deficits] : no focal deficits [No Palpable Adenopathy] : no palpable adenopathy

## 2020-08-06 LAB
APPEARANCE: CLEAR
BACTERIA: NEGATIVE
BILIRUBIN URINE: NEGATIVE
BLOOD URINE: NEGATIVE
COLOR: ABNORMAL
GLUCOSE QUALITATIVE U: NEGATIVE
HYALINE CASTS: 1 /LPF
KETONES URINE: NEGATIVE
LEUKOCYTE ESTERASE URINE: NEGATIVE
MICROSCOPIC-UA: NORMAL
NITRITE URINE: NEGATIVE
PH URINE: 7
PROTEIN URINE: ABNORMAL
RED BLOOD CELLS URINE: 0 /HPF
SPECIFIC GRAVITY URINE: 1.02
SQUAMOUS EPITHELIAL CELLS: 1 /HPF
UROBILINOGEN URINE: NORMAL
WHITE BLOOD CELLS URINE: 1 /HPF

## 2020-08-07 LAB — URINE CYTOLOGY: NORMAL

## 2020-11-09 ENCOUNTER — APPOINTMENT (OUTPATIENT)
Dept: ORTHOPEDIC SURGERY | Facility: CLINIC | Age: 75
End: 2020-11-09
Payer: MEDICARE

## 2020-11-09 VITALS
HEART RATE: 80 BPM | BODY MASS INDEX: 23.54 KG/M2 | HEIGHT: 67 IN | DIASTOLIC BLOOD PRESSURE: 81 MMHG | SYSTOLIC BLOOD PRESSURE: 156 MMHG | WEIGHT: 150 LBS

## 2020-11-09 VITALS — TEMPERATURE: 97.8 F

## 2020-11-09 PROCEDURE — 99214 OFFICE O/P EST MOD 30 MIN: CPT

## 2020-11-09 NOTE — PHYSICAL EXAM
[Stooped] : stooped [Shuffling] : shuffling [Normal Finger/nose] : finger to nose coordination [Abl Mood] : displaying an abnormal mood [Poor Appearance] : well-appearing [Acute Distress] : not in acute distress [Abl Affect] : with normal affect [Poor Coordination] : normal coordination [Disorientation] : oriented x 3 [de-identified] : Cervical range of motion is significantly limited in all directions by stiffness rather than pain there is minimal tenderness in the posterior cervical paraspinal muscles. There were no long track signs specifically no Milo's or Babinski's. There is no evidence of cervical spine instability and no evidence of spasms. There is quite tender in the hands especially in the right basilar joint of the thumb.. Motor exam shows completely normal with no weakness discerned in any muscle group, 5/5 to manual testing. Sensory exam show decreased to light touch in a patchy distribution reflex exam shows symmetrical 1+ to 2+ bilateral biceps brachioradialis and triceps.  Pulses in the upper extremities are normal. Shoulder, elbow and wrist range of motion are full. There is no sign of shoulder elbow or wrist instability.\par There is no lymphadenopathy. Skin exam is intact in all 4 extremities and cervical and lumbar areas.\par There is minimal tenderness in the lumbar spine with no palpable masses.  There is some restriction of range of motion in extension and side flexion bilaterally with ipsilateral low back pain on side flexion.. There is no Right lower extremity tenderness. There is no Left lower extremity tenderness. There are no signs of spinal instability or spasm. Straight leg raising is restricted at 70 to 80 degrees bilaterally with mostly lower back pain.. Neurological evaluation of the lower extremities shows motor function to be normal to manual testing. Sensory testing is normal to light touch and pin.  Reflexes are symmetrical bilaterally. Individual toe raises are normal. Heel walking is normal. Single leg knee bend is done on each side without signs of giving way or weakness. Pulses are 2+ and symmetrical in the lower extremities. There is no lymphadenopathy.Range of motion of the hips is full bilaterally. Range of motion of the knees is full bilaterally. Range of motion of the ankles is full bilaterally. There is no sign of hip knee or ankle instability or spasm. [de-identified] : An MRI of the lumbar spine is available from 2018 and shows fairly significant spinal stenosis at the L3-4 level but the most prominent finding is marked degenerative disc disease throughout the lumbar spine without spondylolisthesis.\par \par There is no RI available of the cervical spine the patient says he has never had 1.

## 2020-11-09 NOTE — REASON FOR VISIT
[Initial Visit] : an initial visit for [Back Pain] : back pain [Spouse] : spouse [FreeTextEntry2] : C/o lower back pain ongoing, bilateral hip, bilateral knees, and bilateral hand pain x around 2 years. Pain intensity and length of time varies by day and by what caused the pain at that time.

## 2020-11-09 NOTE — DISCUSSION/SUMMARY
[de-identified] : I suspect the patient will probably have cervical spinal stenosis based on the history.  However his hand pain is atypical with no other findings and I wonder if he does not have just intrinsic arthritis in his hands.  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  An MRI of the cervical spine will give us some insight into how much spinal cord or nerve compression he has.  MRI of the lumbar spine is ordered to check progression of the lumbar stenosis.  Follow-up with phone call after the MRIs are completed.  Discussed the possibility of minimally invasive surgery in the cervical and/or lumbar spine if indicated.

## 2020-11-09 NOTE — CONSULT LETTER
[Dear  ___] : Dear  [unfilled], [Consult Letter:] : I had the pleasure of evaluating your patient, [unfilled]. [Please see my note below.] : Please see my note below. [Consult Closing:] : Thank you very much for allowing me to participate in the care of this patient.  If you have any questions, please do not hesitate to contact me. [Sincerely,] : Sincerely, [DrMilan  ___] : Dr. HOFFMAN [FreeTextEntry3] : Fran Wheeler M.D.\par , Department of Orthopaedics\par [facility]

## 2020-11-09 NOTE — HISTORY OF PRESENT ILLNESS
[de-identified] : The patient is being seen for spinal consultation at the request of Dr. Nelson Russell.  He is a 75-year-old gentleman who is a longtime patient of Dr. Yonas Gavin for pain management who comes in with a chief complaint of bilateral hand pain.  His baseline pain in his hands is 4–5/10 but over the past few days it has been as high as 8–9/10 especially in the left hand.  He denies any neck pain associated with this.  He has noted some weakness in his hands as well which is more subjective and he has not noted any significant change in his fine motor coordination.  He has not noted any gait disturbance although he does have a long history of lumbar pain which definitely limits his ability to walk.  He is status post multiple joint replacements which have given him some improvement in his joint pain although he is still in significant pain in those areas.  He complains specifically of his right long and ring finger getting very cold at times.  He is on narcotic medications on a daily basis taking oxycodone 5 pills/day.  He is under the direct care of Dr. Gavin for this.  He has had multiple lumbar injections with only minimal relief.  He has not had any cervical injections.

## 2020-11-19 ENCOUNTER — RESULT REVIEW (OUTPATIENT)
Age: 75
End: 2020-11-19

## 2020-11-19 ENCOUNTER — APPOINTMENT (OUTPATIENT)
Dept: MRI IMAGING | Facility: CLINIC | Age: 75
End: 2020-11-19
Payer: MEDICARE

## 2020-11-19 ENCOUNTER — OUTPATIENT (OUTPATIENT)
Dept: OUTPATIENT SERVICES | Facility: HOSPITAL | Age: 75
LOS: 1 days | End: 2020-11-19
Payer: MEDICARE

## 2020-11-19 DIAGNOSIS — Z98.890 OTHER SPECIFIED POSTPROCEDURAL STATES: Chronic | ICD-10-CM

## 2020-11-19 DIAGNOSIS — Z96.642 PRESENCE OF LEFT ARTIFICIAL HIP JOINT: Chronic | ICD-10-CM

## 2020-11-19 DIAGNOSIS — Z96.651 PRESENCE OF RIGHT ARTIFICIAL KNEE JOINT: Chronic | ICD-10-CM

## 2020-11-19 DIAGNOSIS — Z00.8 ENCOUNTER FOR OTHER GENERAL EXAMINATION: ICD-10-CM

## 2020-11-19 PROCEDURE — 72141 MRI NECK SPINE W/O DYE: CPT

## 2020-11-19 PROCEDURE — 72141 MRI NECK SPINE W/O DYE: CPT | Mod: 26

## 2020-11-19 PROCEDURE — 72148 MRI LUMBAR SPINE W/O DYE: CPT | Mod: 26

## 2020-11-19 PROCEDURE — 72148 MRI LUMBAR SPINE W/O DYE: CPT

## 2020-11-23 DIAGNOSIS — N88.9 NONINFLAMMATORY DISORDER OF CERVIX UTERI, UNSPECIFIED: ICD-10-CM

## 2020-12-03 ENCOUNTER — RESULT REVIEW (OUTPATIENT)
Age: 75
End: 2020-12-03

## 2020-12-03 ENCOUNTER — APPOINTMENT (OUTPATIENT)
Dept: MRI IMAGING | Facility: CLINIC | Age: 75
End: 2020-12-03
Payer: MEDICARE

## 2020-12-03 ENCOUNTER — OUTPATIENT (OUTPATIENT)
Dept: OUTPATIENT SERVICES | Facility: HOSPITAL | Age: 75
LOS: 1 days | End: 2020-12-03
Payer: MEDICARE

## 2020-12-03 DIAGNOSIS — Z98.890 OTHER SPECIFIED POSTPROCEDURAL STATES: Chronic | ICD-10-CM

## 2020-12-03 DIAGNOSIS — Z96.651 PRESENCE OF RIGHT ARTIFICIAL KNEE JOINT: Chronic | ICD-10-CM

## 2020-12-03 DIAGNOSIS — Z00.8 ENCOUNTER FOR OTHER GENERAL EXAMINATION: ICD-10-CM

## 2020-12-03 DIAGNOSIS — Z96.642 PRESENCE OF LEFT ARTIFICIAL HIP JOINT: Chronic | ICD-10-CM

## 2020-12-03 PROCEDURE — A9585: CPT

## 2020-12-03 PROCEDURE — 70543 MRI ORBT/FAC/NCK W/O &W/DYE: CPT | Mod: 26

## 2020-12-03 PROCEDURE — 70543 MRI ORBT/FAC/NCK W/O &W/DYE: CPT

## 2020-12-07 ENCOUNTER — NON-APPOINTMENT (OUTPATIENT)
Age: 75
End: 2020-12-07

## 2020-12-09 ENCOUNTER — APPOINTMENT (OUTPATIENT)
Dept: ORTHOPEDIC SURGERY | Facility: CLINIC | Age: 75
End: 2020-12-09
Payer: MEDICARE

## 2020-12-09 VITALS
TEMPERATURE: 97.1 F | WEIGHT: 144 LBS | HEIGHT: 67 IN | DIASTOLIC BLOOD PRESSURE: 78 MMHG | BODY MASS INDEX: 22.6 KG/M2 | HEART RATE: 78 BPM | SYSTOLIC BLOOD PRESSURE: 128 MMHG

## 2020-12-09 DIAGNOSIS — G56.20 LESION OF ULNAR NERVE, UNSPECIFIED UPPER LIMB: ICD-10-CM

## 2020-12-09 PROCEDURE — 99214 OFFICE O/P EST MOD 30 MIN: CPT

## 2020-12-09 NOTE — REASON FOR VISIT
[Follow-Up Visit] : a follow-up visit for [Back Pain] : back pain [Neck Pain] : neck pain [Radiculopathy] : radiculopathy [Spouse] : spouse

## 2020-12-09 NOTE — DISCUSSION/SUMMARY
[Medication Risks Reviewed] : Medication risks reviewed [de-identified] : The patient has symptoms of ulnar neuropathy with neural compression right elbow more than left with bilateral hand symptoms right more than left.  He does have degenerative changes cervical spine at C5-6 and C6-7 though his symptoms likely are related to peripheral neural compression rather than cervical stenosis or cervical radiculopathy.  At this time recommended evaluation by a neurologist for his likely cubital tunnel syndrome.\par \par With regard to the lumbar spine he has significant spinal stenosis at the L3-4 level and to a lesser extent also at L2-3.  For this condition we discussed lumbar laminectomy at those levels.  He understands he has multilevel spinal stenosis and disc degeneration and he may not get adequate resolution of all his symptoms including back pain.  Given his difficulty walking and leg symptoms a limited surgical intervention for spinal stenosis with laminectomy at L2-3 and L3-4 with possible include L4-5 may be a consideration.  He understands the plan outlined above and will follow up with me after evaluation by the neurologist to discuss the proposed lumbar surgical intervention.\par \par With regard to the cyst in his neck he will follow-up as instructed by Dr. Wheeler.\par \par The patient was educated regarding their condition, treatment options as well as prescribed course of treatment. \par Risks and benefits as well as alternatives to the proposed treatment were also provided to the patient \par They were given the opportunity to have all their questions answered to their satisfaction.\par \par Vital signs were reviewed with the patient and the patient was instructed to followup with their primary care provider for further management.\par \par Healthy lifestyle recommendations were also made including a tobacco free lifestyle, proper diet, and weight control.

## 2020-12-09 NOTE — PHYSICAL EXAM
[UE/LE] : Sensory: Intact in bilateral upper & lower extremities [1+] : left ankle jerk 1+ [Normal] : Gait: normal [Medrano's Sign] : negative Medrano's sign [Plantar Reflex Right Only] : absent on the right [Plantar Reflex Left Only] : absent on the left [DTR Reflexes Clonus Of Right Ankle (___ Beats)] : absent on the right [DTR Reflexes Clonus Of Left Ankle (___ Beats)] : absent on the left [de-identified] : + Tinles right elbow more than left\par The pt is awake, alert and oriented to self, place and time, is comfortable and in no acute distress. Gait examination reveals a narrow based, non-ataxic, non-antalgic gait. The pt can heel and toe walk without difficulty. No rashes or ecchymotic lesions noted over the neck, back and lower extremities bilaterally. No obvious abnormal spinal curvature in the sagittal and coronal planes. No tenderness over the cervical, thoracic or lumbar spine, paraspinal or upper and lower extremity musculature. There is no sacroiliac tenderness bilaterally. No tenderness over the greater trochanter bilaterally. No atrophy or abnormal movements noted in the upper or lower extremities bilaterally. No swelling seen in the upper or lower extremities bilaterally. No joint laxity noted in the upper and lower extremity joints bilaterally.\par No cervical lymphadenopathy noted anteriorly. \par Cervical spine range of motion is limited by discomfort at end range of motion.Fforward flexion of chin to chest, extension 30 degrees, rotation laterally 20 degrees to the left and 30 degrees to the right. Full range of motion of both shoulders. Negative Spurling's sign bilaterally. There is a negative Neer's sign and Hawkin's sign bilaterally. \par Lumbar spine range of motion is limited by discomfort. Forward flexion to mid tibia, and extension 30 degrees without pain. Range of motion of hip is internal rotation 20 degrees,  30° external rotation without pain\par Negative straight leg raise to 60° in the supine position. No groin pain with hip internal rotation, negative RANJITH test bilaterally. There are 2+ DP pulses bilaterally. There is a negative Babinski sign and no clonus bilaterally in the upper or lower extremities. [de-identified] : Positive Tinel's right elbow [de-identified] : EXAM: MR SPINE CERVICAL\par \par PROCEDURE DATE: 11/19/2020\par \par INTERPRETATION: CLINICAL INDICATION: Bilateral hand pain.\par \par Multiplanar Multisequence MR of the CERVICAL SPINE without contrast\par \par Prior Studies: None.\par \par FINDINGS:\par \par ALIGNMENT: There is straightening of the cervical lordosis. There is trace degenerative anterolisthesis of C3 on C4, C4 on C5, and C7 on T1.\par \par VERTEBRAL BODIES: No acute compression deformity.\par \par DISC SPACES: There is multilevel disc desiccation. There is severe disc height loss at C5/C6 and C6/C7 with mild vacuum disc phenomenon.\par \par MARROW: Edematous endplate changes are seen at C5/C6 and C6/C7. There is mild facet edema on the right at C5/C6. This is suggestive of mild osseous stress reaction.\par \par IMAGED BRAIN: Grossly unremarkable.\par \par CERVICAL CORD: Normal in caliber and signal characteristics.\par \par IMAGED NECK SOFT TISSUES: At the level of C7/T1 on the right there is a rounded hyperintense T2 and hypointense T1 lobulated structure within the myofascial plane between the trapezius muscle and the adjacent right posterior neck musculature. This lesion measures approximately 1.4 x 1.7 x 2.4 cm. Further evaluation with a contrast-enhanced MRI is recommended to determine whether this is a cystic or solid lesion. There is mucosal thickening within the right greater than left maxillary sinuses.\par \par The findings at the individual levels are as follows:\par \par C1/2: There is mild arthrosis between the anterior arch of C1 and the dens.\par \par C2/3: There is moderate to severe left facet arthrosis. There is moderate left neural foraminal narrowing. There is no central canal or right-sided neural foraminal narrowing.\par \par C3/4: There is a minimal disc bulge. There is minimal bilateral uncovertebral hypertrophy. There is bilateral facet arthrosis. Findings result in flattening of the ventral thecal sac without contacting of the cord. There is bilateral neural foraminal narrowing, moderate to severe on the right and mild to moderate on the left.\par \par C4/5: There is uncovering of the posterior disc. There is bilateral facet arthrosis, moderate to severe on the right and mild to moderate on the left. Findings result in flattening of the ventral thecal sac without contacting of the cord. There is bilateral neural foraminal narrowing, moderate to severe on the right and mild to moderate on the left.\par \par C5/6: There is a diffuse disc bulge posterior osseous ridging. There is left greater than right uncovertebral hypertrophy. There is bilateral facet arthrosis. Findings result in prominent effacement of the left lateral recess with contacting of the exiting left C6 nerve root. There is overall moderate spinal canal narrowing with contacting of the ventral cord. There is severe left and mild right neural foraminal narrowing.\par \par C6/7: There is a diffuse disc bulge posterior osseous ridging and bilateral uncovertebral hypertrophy. There is bilateral facet arthrosis. Findings result in moderate spinal canal narrowing with near contacting of the ventral cord. There is severe bilateral neural foraminal narrowing.\par \par C7/T1: There is uncovering of the posterior disc. There is bilateral facet arthrosis. Findings result in mild flattening of the ventral thecal sac. There is moderate bilateral neural foraminal narrowing.\par \par IMPRESSION:\par \par Indeterminate lobulated hyperintense T2 and hypointense T1 focus within the right-sided myofascial plane between the trapezius muscle and right posterior neck musculature at the level of C7/T1. Further evaluation with contrast-enhanced MRI is recommended to determine whether this lesion is cystic or solid.\par \par Multilevel cervical spondylosis. Trace degenerative grade 1 anterolisthesis of C3 on C4, C4 on C5, and C7 on T1. Mild facet edema on the right at C5/C6 suggestive of mild osseous stress reaction.\par \par C2/3: There is moderate left neural foraminal narrowing.\par \par C3/4: There is bilateral neural foraminal narrowing, moderate to severe on the right and mild to moderate on the left.\par \par C4/5: There is bilateral neural foraminal narrowing, moderate to severe on the right and mild to moderate on the left.\par \par C5/6: There is prominent effacement of the left lateral recess with contacting of the exiting left C6 nerve root. There is overall moderate spinal canal narrowing with contacting of the ventral cord. There is severe left and mild right neural foraminal narrowing.\par \par C6/7: There is moderate spinal canal narrowing with near contacting of the ventral cord. There is severe bilateral neural foraminal narrowing.\par \par C7/T1: There is moderate bilateral neural foraminal narrowing.\par \par MICHELE BELL MD; Attending Radiologist\par This document has been electronically signed. Nov 20 2020 8:56PM\par \par ________\par \par EXAM: MR SPINE LUMBAR\par \par PROCEDURE DATE: 11/19/2020\par \par INTERPRETATION: CLINICAL INDICATION: Severe lower back pain.\par \par Multiplanar Multisequence MR of the LUMBAR SPINE\par \par Prior Studies: MRI of the lumbar spine from March 20, 2018.\par \par FINDINGS:\par \par ALIGNMENT: There is slight dextroscoliosis. Lumbar lordosis is maintained. There is mild retrolisthesis of L1 on L2 and trace retrolisthesis of L2 on L3. This is grossly unchanged.\par \par VERTEBRAL BODIES: No acute compression deformity.\par \par DISC SPACES: There is multilevel disc desiccation with multilevel moderate to severe disc height loss and chronic Schmorl's node formation. This is grossly unchanged in appearance.\par \par MARROW: Edematous endplate changes are seen at L3/L4, L4/L5, and L5/S1. 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 Small bilateral renal cysts are noted.\par \par The findings at the individual levels are as follows:\par \par T12/L1: This level is imaged only in sagittal plane, however there is no evidence of spinal canal or neural foraminal narrowing.\par \par L1/2: There is a mild diffuse disc bulge which is asymmetric to the right. There is bilateral facet arthrosis and ligamentum flavum hypertrophy. Findings result in mild spinal canal narrowing and moderate bilateral neural foraminal narrowing. Findings appear grossly unchanged.\par \par L2/3: There is a diffuse disc bulge posterior osseous ridging. There is bilateral facet arthrosis. Findings result in moderate to severe spinal canal narrowing and moderate bilateral neural foraminal narrowing. Findings are grossly unchanged.\par \par L3/4: There is a mild diffuse disc bulge with posterior osseous ridging. There is bilateral facet arthrosis and ligamentum flavum hypertrophy. Findings result in severe spinal canal narrowing and bilateral neural foraminal narrowing, severe on the left and moderate to severe on the right. Findings grossly unchanged.\par \par L4/5: There is a diffuse disc bulge with posterior osseous ridging. There is bilateral facet arthrosis and ligamentum flavum hypertrophy. Findings result in moderate to severe bilateral neural foraminal narrowing and moderate to severe spinal canal narrowing. Findings are grossly unchanged.\par \par L5/S1: There is a diffuse disc bulge with a superimposed central disc protrusion. There is posterior osseous ridging. There is bilateral facet arthrosis. Findings result in effacement of the bilateral lateral recesses and mild to moderate central canal narrowing. There is severe bilateral neural foraminal narrowing. Findings are grossly unchanged.\par \par IMPRESSION:\par \par Grossly unchanged multilevel lumbar spondylosis as outlined above.\par \par MICHELE BELL MD; Attending Radiologist\par This document has been electronically signed. Nov 20 2020 8:45PM

## 2020-12-28 ENCOUNTER — APPOINTMENT (OUTPATIENT)
Dept: NEUROLOGY | Facility: CLINIC | Age: 75
End: 2020-12-28
Payer: MEDICARE

## 2020-12-28 ENCOUNTER — APPOINTMENT (OUTPATIENT)
Dept: NEUROLOGY | Facility: CLINIC | Age: 75
End: 2020-12-28

## 2020-12-28 VITALS
DIASTOLIC BLOOD PRESSURE: 78 MMHG | BODY MASS INDEX: 23.23 KG/M2 | SYSTOLIC BLOOD PRESSURE: 136 MMHG | HEART RATE: 63 BPM | HEIGHT: 67 IN | WEIGHT: 148 LBS

## 2020-12-28 PROCEDURE — 95886 MUSC TEST DONE W/N TEST COMP: CPT | Mod: 59

## 2020-12-28 PROCEDURE — 95911 NRV CNDJ TEST 9-10 STUDIES: CPT

## 2021-01-04 ENCOUNTER — APPOINTMENT (OUTPATIENT)
Dept: ORTHOPEDIC SURGERY | Facility: CLINIC | Age: 76
End: 2021-01-04
Payer: MEDICARE

## 2021-01-04 VITALS
HEIGHT: 67 IN | SYSTOLIC BLOOD PRESSURE: 126 MMHG | HEART RATE: 57 BPM | DIASTOLIC BLOOD PRESSURE: 72 MMHG | WEIGHT: 146 LBS | TEMPERATURE: 97 F | BODY MASS INDEX: 22.91 KG/M2

## 2021-01-04 PROCEDURE — 99214 OFFICE O/P EST MOD 30 MIN: CPT

## 2021-01-04 NOTE — DISCUSSION/SUMMARY
[Medication Risks Reviewed] : Medication risks reviewed [de-identified] : Based on the patient's present evaluation he complains of significant leg pain and would like to proceed with surgical intervention.  We will try and schedule lumbar laminectomy at the L2-3 and L3-4 levels and possibly to include L4-5 with fenestration at his earliest convenience.  He understands that laminectomy alone may not address all his complaints of lumbar spine including back pain associated with this degeneration and he is agreeable with that plan of care.\par \par With regard to his arm and hand symptoms these are not related to cervical radiculopathy based on the nerve conduction studies evaluation.  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  Recommended he follow-up with the hand surgeon in this office for further evaluation and management of these symptoms.\par \par I spent 25 minutes of face-to-face time with the patient of which over 50% was spent in counseling the discussion above\par \par The patient was advised that based upon their clinical presentation and radiographic findings they meet inclusion criteria for spinal surgery to address their spinal pathology.\par The spectrum of treatments for their spinal condition were reviewed in detail. The goals, alternatives, risks and benefits of spinal surgery were reviewed in detail with the patient.  \par Benefits and risks of operative and nonoperative treatment for the patient's pathology were outlined at length with the patient.  Specifically, those risks are understood to be, but not limited to, bleeding, infection, fracture (both during surgery and after surgery), adjacent level disease, failure to heal (significantly increased by smoking), need for further surgery, failure of instrumentation (if used), recurrence of problem and symptoms, neurovascular injury, dural tears or leaks resulting in spinal fluid leakage and requiring additional invasive and non-invasive treatments, need for additional procedures, possible paralysis resulting in loss of use of arms, legs, bowel and bladder function as well as sexual dysfunction, and anesthetic risks including death. \par Available alternatives to surgery were also discussed with the patient. In addition, the patient further understands that there may be indicated need for somatosensory evoked potential monitoring, real-time EMG monitoring, and motor evoked potential monitoring during the procedure along with placement of needle electrodes. A neuromonitoring service will discuss the risks and benefits separately with the patient.\par The patient also understands that having a surgical procedure and being hospitalized carries risks in addition to the ones described above.\par \par The patient was advised that Dr. Gonzalez operates as a surgical team with his associate Dr. Talbert and/or with surgical Physician Assistants ([place])\par \par The patient was advised that they will require a medical preoperative risk evaluation by their PCP. Further medical subspecialty clearances such as cardiac may be indicated if felt needed by their PCP.\par \par The patient was advised he/she may call our office after careful consideration of their treatment options and further consultation with any other physician to begin the process of preoperative planning for elective spinal surgery at a time of their choosing. \par The patient verbalized an understanding of the procedure, its indications, and its common potential complications and attendant risks, and is willing to proceed. No guarantees were made with regard to a complete recovery. We will proceed in a timely manner after obtaining medical clearance.

## 2021-01-04 NOTE — REASON FOR VISIT
[Follow-Up Visit] : a follow-up visit for [Back Pain] : back pain [Spinal Stenosis] : spinal stenosis

## 2021-01-04 NOTE — PHYSICAL EXAM
[Normal] : Gait: normal [UE/LE] : Sensory: Intact in bilateral upper & lower extremities [1+] : left ankle jerk 1+ [Medrano's Sign] : negative Medrano's sign [Plantar Reflex Right Only] : absent on the right [Plantar Reflex Left Only] : absent on the left [DTR Reflexes Clonus Of Right Ankle (___ Beats)] : absent on the right [DTR Reflexes Clonus Of Left Ankle (___ Beats)] : absent on the left [de-identified] : + Tinles right elbow more than left\par The pt is awake, alert and oriented to self, place and time, is comfortable and in no acute distress. Gait examination reveals a narrow based, non-ataxic, non-antalgic gait. The pt can heel and toe walk without difficulty. No rashes or ecchymotic lesions noted over the neck, back and lower extremities bilaterally. No obvious abnormal spinal curvature in the sagittal and coronal planes. No tenderness over the cervical, thoracic or lumbar spine, paraspinal or upper and lower extremity musculature. There is no sacroiliac tenderness bilaterally. No tenderness over the greater trochanter bilaterally. No atrophy or abnormal movements noted in the upper or lower extremities bilaterally. No swelling seen in the upper or lower extremities bilaterally. No joint laxity noted in the upper and lower extremity joints bilaterally.\par No cervical lymphadenopathy noted anteriorly. \par Cervical spine range of motion is limited by discomfort at end range of motion.Fforward flexion of chin to chest, extension 30 degrees, rotation laterally 20 degrees to the left and 30 degrees to the right. Full range of motion of both shoulders. Negative Spurling's sign bilaterally. There is a negative Neer's sign and Hawkin's sign bilaterally. \par Lumbar spine range of motion is limited by discomfort. Forward flexion to mid tibia, and extension 30 degrees without pain. Range of motion of hip is internal rotation 20 degrees,  30° external rotation without pain\par Negative straight leg raise to 60° in the supine position. No groin pain with hip internal rotation, negative RANJITH test bilaterally. There are 2+ DP pulses bilaterally. There is a negative Babinski sign and no clonus bilaterally in the upper or lower extremities. [de-identified] : Positive Tinel's right elbow

## 2021-01-04 NOTE — HISTORY OF PRESENT ILLNESS
[Worsening] : worsening [Prolonged Sitting] : worsened by prolonged sitting [Walking] : worsened by walking [None] : No relieving factors are noted [de-identified] : Patient is here today to discuss Dr. Joyner's consultation and emg/ncv results.  Continues to complain of feeling cold in his fingertips specially along the ring and little finger of his right hand more than left.  Has prior history of carpal tunnel release bilaterally.\par \par Patient also reports significant calf discomfort and pain especially at night.  His back pain is manageable. [de-identified] : lying down stairs

## 2021-01-06 ENCOUNTER — APPOINTMENT (OUTPATIENT)
Dept: ORTHOPEDIC SURGERY | Facility: CLINIC | Age: 76
End: 2021-01-06
Payer: MEDICARE

## 2021-01-06 DIAGNOSIS — Z87.39 PERSONAL HISTORY OF OTHER DISEASES OF THE MUSCULOSKELETAL SYSTEM AND CONNECTIVE TISSUE: ICD-10-CM

## 2021-01-06 DIAGNOSIS — Z85.9 PERSONAL HISTORY OF MALIGNANT NEOPLASM, UNSPECIFIED: ICD-10-CM

## 2021-01-06 DIAGNOSIS — Z78.9 OTHER SPECIFIED HEALTH STATUS: ICD-10-CM

## 2021-01-06 PROCEDURE — 20605 DRAIN/INJ JOINT/BURSA W/O US: CPT | Mod: RT

## 2021-01-06 PROCEDURE — 73110 X-RAY EXAM OF WRIST: CPT | Mod: 50

## 2021-01-06 PROCEDURE — 99214 OFFICE O/P EST MOD 30 MIN: CPT | Mod: 25

## 2021-01-06 PROCEDURE — 73130 X-RAY EXAM OF HAND: CPT | Mod: 50

## 2021-01-06 NOTE — CONSULT LETTER
[Dear  ___] : Dear  [unfilled], [Consult Letter:] : I had the pleasure of evaluating your patient, [unfilled]. [Please see my note below.] : Please see my note below. [Consult Closing:] : Thank you very much for allowing me to participate in the care of this patient.  If you have any questions, please do not hesitate to contact me. [Sincerely,] : Sincerely, [FreeTextEntry3] : Derrick Amado M.D.\par Surgery of the Hand & Upper Extremity\par Orthopaedic Surgery\par Chief, Hand Service, [facility]\par Director, Hand Service, [facility]\par  of Orthopedic Surgery, [facility] \par [facility] HospitalEmail: [email or web address] Office Phone: [phone]

## 2021-01-06 NOTE — HISTORY OF PRESENT ILLNESS
[Right] : right hand dominant [FreeTextEntry1] : He comes in today for evaluation of bilateral hand pain. He is status post bilateral carpal tunnel releases approximately 5 years ago by Dr. Cardenas. He had noted no relief after surgery. He has also received cortisone injections into the bilateral carpal tunnel with no relief, as well as multiple epidural injections into the cervical spine. He continues to have numbness and tingling bilaterally, as well as pain at the base of his right thumb. He complains that his right ring and little fingers occasionally become freezing cold. He also reports that his left hand does not open smoothly if he forms a fist for a long time, most notably after waking form sleep. He states that if feels as though each joint has to open one by one. He also has triggering of his left middle finger. His symptoms are exacerbated with grasping objects. He has been taking oxycodone for pain relief. He rates his pain a 7 out of 10 on the right and a 4 out of 10 on the left. \par \par I reviewed his EMGs from 12/28/2020. This demonstrated moderate bilateral carpal tunnel syndrome, slightly worse on the right. There is also possibility of Guyon's canal entrapment with positive Tinel's sign. \par \par He was referred by Dr. Gonzalez. He is scheduled for lumbar laminectomy with him in 13 days.

## 2021-01-06 NOTE — PROCEDURE
[FreeTextEntry1] : - After a discussion of risks and benefits, the patient agreed to proceed with a cortisone injection.  \par -  Side Injected: Right thumb carpometacarpal joint.\par -  Medications injected: 0.5cc of 1% Lidocaine and 1cc of 40mg of Depomedrol, using sterile technique.\par -  Patient tolerated the procedure well, without complications.\par -  Patient noted immediate relief of the symptoms, secondary to the anesthetic effects of the injection.\par -  Patient was told that the pain may worsen for a day or two, and should then begin to improve.\par -  Instructions: The patient was instructed on the use of ice, anti-inflammatory agents, or Tylenol, and activity modification.\par -  Follow-up: Within 4 weeks to assess the response to the injection.

## 2021-01-06 NOTE — END OF VISIT
[FreeTextEntry3] : This note was written by Zaida Salguero on 01/06/2021 acting solely as a scribe for Dr. Derrick Amado.\par  \par All medical record entries made by the Scribe were at my, Dr. Derrick Amado, direction and personally dictated by me on 01/06/2021. I have personally reviewed the chart and agree that the record accurately reflects my personal performance of the history, physical exam, assessment and plan.

## 2021-01-06 NOTE — ADDENDUM
[FreeTextEntry1] : I, Zaida Salguero, acted solely as a scribe for Dr. Amado on this date 01/06/2021.

## 2021-01-06 NOTE — DISCUSSION/SUMMARY
[FreeTextEntry1] : He has findings consistent with bilateral hand pain and cold feeling, more notable on the right side.  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  At this time I recommended initially trying a cortisone injection at the right thumb CMC joint.  He understands that this may not provide him with long-term relief.  However, I would like to  his response to the injection.\par \par The patient has agreed to this plan of management and has expressed full understanding.  All questions were fully answered to the patient's satisfaction.\par \par I spent at least 30 minutes in total on this patient's visit. This included review of the medical records, review of pertinent diagnostic studies, examination and counseling of the patient and documentation in the medical records. Over 50% of this time was spent on counseling the patient on the above diagnosis, treatment plan and prognosis.

## 2021-01-06 NOTE — PHYSICAL EXAM
[de-identified] : - Constitutional: This is a male in no obvious distress.  \par - Psych: Patient is alert and oriented to person, place and time.  Patient has a normal mood and affect.\par - Cardiovascular: Normal pulses throughout the upper extremities.  No significant varicosities are noted in the upper extremities. \par - Neuro: Strength and sensation are intact throughout the upper extremities.  Patient has normal coordination.\par - Respiratory:  Patient exhibits no evidence of shortness of breath or difficulty breathing.\par - Skin: No rashes, lesions, or other abnormalities are noted in the upper extremities.\par \par ---\par  \par Examination of his right hand demonstrates swelling and tenderness along the CMC joint of the thumb.  These findings are consistent with arthritis.  There is no evidence of a trigger thumb or de Quervain's tendinitis.  He also has complaints of cold feeling along the ulnar nerve distribution.  He has a well-healed carpal tunnel incision.  Provocative signs for carpal, cubital tunnel syndrome as well as compression of the ulnar nerve at Guyon's canal are negative.  Grossly, he has normal sensation to light touch throughout the radial, ulnar and median nerve distributions.\par \par Examination of his left hand demonstrates swelling and tenderness along the A1 pulley of the middle finger.  There is triggering.  There is no triggering of the other digits.  There is no localized swelling or tenderness along the CMC joint.  He has a well-healed carpal tunnel incision.  Provocative signs for carpal, cubital tunnel syndrome as well as compression of the ulnar nerve at Guyon's canal are negative.  Grossly, he has normal sensation to light touch throughout the radial, ulnar and median nerve distributions. 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

## 2021-01-07 ENCOUNTER — OUTPATIENT (OUTPATIENT)
Dept: OUTPATIENT SERVICES | Facility: HOSPITAL | Age: 76
LOS: 1 days | End: 2021-01-07
Payer: MEDICARE

## 2021-01-07 VITALS
WEIGHT: 145.95 LBS | TEMPERATURE: 99 F | SYSTOLIC BLOOD PRESSURE: 153 MMHG | DIASTOLIC BLOOD PRESSURE: 87 MMHG | OXYGEN SATURATION: 100 % | HEART RATE: 73 BPM | RESPIRATION RATE: 11 BRPM | HEIGHT: 67 IN

## 2021-01-07 DIAGNOSIS — Z98.890 OTHER SPECIFIED POSTPROCEDURAL STATES: Chronic | ICD-10-CM

## 2021-01-07 DIAGNOSIS — Z96.641 PRESENCE OF RIGHT ARTIFICIAL HIP JOINT: Chronic | ICD-10-CM

## 2021-01-07 DIAGNOSIS — Z96.651 PRESENCE OF RIGHT ARTIFICIAL KNEE JOINT: Chronic | ICD-10-CM

## 2021-01-07 DIAGNOSIS — M48.07 SPINAL STENOSIS, LUMBOSACRAL REGION: ICD-10-CM

## 2021-01-07 DIAGNOSIS — Z96.642 PRESENCE OF LEFT ARTIFICIAL HIP JOINT: Chronic | ICD-10-CM

## 2021-01-07 DIAGNOSIS — M48.061 SPINAL STENOSIS, LUMBAR REGION WITHOUT NEUROGENIC CLAUDICATION: ICD-10-CM

## 2021-01-07 RX ORDER — ESCITALOPRAM OXALATE 10 MG/1
1 TABLET, FILM COATED ORAL
Qty: 0 | Refills: 0 | DISCHARGE

## 2021-01-07 RX ORDER — CHOLECALCIFEROL (VITAMIN D3) 125 MCG
1 CAPSULE ORAL
Qty: 0 | Refills: 0 | DISCHARGE

## 2021-01-07 RX ORDER — ASCORBIC ACID 60 MG
1 TABLET,CHEWABLE ORAL
Qty: 0 | Refills: 0 | DISCHARGE

## 2021-01-07 NOTE — H&P PST ADULT - NSICDXFAMILYHX_GEN_ALL_CORE_FT
Yes FAMILY HISTORY:  Father  Still living? No  Family history of heart attack, Age at diagnosis: Age Unknown    Mother  Still living? No  Family history of CHF (congestive heart failure), Age at diagnosis: Age Unknown    Sibling  Still living? No  Family history of heart attack, Age at diagnosis: Age Unknown

## 2021-01-07 NOTE — H&P PST ADULT - NSICDXPROBLEM_GEN_ALL_CORE_FT
PROBLEM DIAGNOSES  Problem: Lumbar spinal stenosis  Assessment and Plan: lumbar laminectomy L 2-3, L 3-4, possible L 4-5. medical clearance requested. Instructed to stop MVI and balance of nature today. May take oxycontin AM of surhery with sips of water. covid PCR appt confirmed for 1/17/21 at 1000

## 2021-01-07 NOTE — H&P PST ADULT - ADDITIONAL PE
physical exam only today at bedside  history taken by another provider at another date  Russel, LANNY-C

## 2021-01-07 NOTE — H&P PST ADULT - NSICDXPASTSURGICALHX_GEN_ALL_CORE_FT
PAST SURGICAL HISTORY:  H/O total knee replacement, right 2016    History of left hip replacement 2013    History of right hip replacement 2014    S/P carpal tunnel release bilaal 9/2017    S/P rotator cuff surgery right repair open 1-2017    S/P sinus surgery 1981

## 2021-01-07 NOTE — H&P PST ADULT - CONSTITUTIONAL
Well-developed, well nourished Melolabial Interpolation Flap Division And Inset Text: Division and inset of the melolabial interpolation flap was performed to achieve optimal aesthetic result, restore normal anatomic appearance and avoid distortion of normal anatomy, expedite and facilitate wound healing, achieve optimal functional result and because linear closure either not possible or would produce suboptimal result. The patient was prepped and draped in the usual manner. The pedicle was infiltrated with local anesthesia. The pedicle was sectioned with a #15 blade. The pedicle was de-bulked and trimmed to match the shape of the defect. Hemostasis was achieved. The flap donor site and free margin of the flap were secured with deep buried sutures and the wound edges were re-approximated.

## 2021-01-07 NOTE — H&P PST ADULT - SOURCE OF INFORMATION, PROFILE
medical history obtained via telephone as per current covid19 protocol, physical exam to be done day of surgery/patient

## 2021-01-07 NOTE — H&P PST ADULT - HISTORY OF PRESENT ILLNESS
77 yo male presents with 2 year history of low back pain with radiation to bilateral legs. He states that the legs "feel tired all the time". He has been treated in the past with epidural injections, PT and oxycodone with no relief. Currently takes oxycontin 10 mg 5 times per day with only mild relief. Denies numbness ot tingling.

## 2021-01-07 NOTE — H&P PST ADULT - NSICDXPASTMEDICALHX_GEN_ALL_CORE_FT
PAST MEDICAL HISTORY:  Anxiety and depression no medication    Concussion "I fell backwards on a hardwood floor"-10/2015    Diverticulitis treated 2007    DJD (degenerative joint disease) traction in back 15 years ago x 10 days    Eye abnormality "I had a bleed in the back of the eye"-left, 2014    Hearing loss b/l hearing aids    Lumbar spinal stenosis     Nocturia x 2-3    MARIAMA (obstructive sleep apnea) sleep study 8-12-17,no treatment    Osteoarthritis     PND (post-nasal drip)     Prostate cancer s/p radiation 2015    TMJ (temporomandibular joint disorder) right pt with clicking x 1 month    Urinary frequency

## 2021-01-12 DIAGNOSIS — Z01.818 ENCOUNTER FOR OTHER PREPROCEDURAL EXAMINATION: ICD-10-CM

## 2021-01-12 PROCEDURE — G0463: CPT

## 2021-01-13 PROBLEM — R35.0 FREQUENCY OF MICTURITION: Chronic | Status: ACTIVE | Noted: 2021-01-07

## 2021-01-13 PROBLEM — M19.90 UNSPECIFIED OSTEOARTHRITIS, UNSPECIFIED SITE: Chronic | Status: ACTIVE | Noted: 2021-01-07

## 2021-01-13 PROBLEM — M48.061 SPINAL STENOSIS, LUMBAR REGION WITHOUT NEUROGENIC CLAUDICATION: Chronic | Status: ACTIVE | Noted: 2021-01-07

## 2021-01-13 PROBLEM — G47.33 OBSTRUCTIVE SLEEP APNEA (ADULT) (PEDIATRIC): Chronic | Status: ACTIVE | Noted: 2017-08-16

## 2021-01-13 PROBLEM — R35.1 NOCTURIA: Chronic | Status: ACTIVE | Noted: 2021-01-07

## 2021-01-14 NOTE — PROVIDER CONTACT NOTE (OTHER) - ASSESSMENT
The Spine Pre-Operative Education packet was given to the patient on 1/4/21. The patient and NP reviewed the information included in the packet. All his questions were answered and he gave a clear understanding of the instructions. He was advised to call the office at any time with further questions or concerns.

## 2021-01-17 ENCOUNTER — OUTPATIENT (OUTPATIENT)
Dept: OUTPATIENT SERVICES | Facility: HOSPITAL | Age: 76
LOS: 1 days | End: 2021-01-17
Payer: MEDICARE

## 2021-01-17 DIAGNOSIS — Z20.828 CONTACT WITH AND (SUSPECTED) EXPOSURE TO OTHER VIRAL COMMUNICABLE DISEASES: ICD-10-CM

## 2021-01-17 DIAGNOSIS — Z96.641 PRESENCE OF RIGHT ARTIFICIAL HIP JOINT: Chronic | ICD-10-CM

## 2021-01-17 DIAGNOSIS — Z96.651 PRESENCE OF RIGHT ARTIFICIAL KNEE JOINT: Chronic | ICD-10-CM

## 2021-01-17 DIAGNOSIS — Z96.642 PRESENCE OF LEFT ARTIFICIAL HIP JOINT: Chronic | ICD-10-CM

## 2021-01-17 DIAGNOSIS — Z98.890 OTHER SPECIFIED POSTPROCEDURAL STATES: Chronic | ICD-10-CM

## 2021-01-17 LAB — SARS-COV-2 RNA SPEC QL NAA+PROBE: SIGNIFICANT CHANGE UP

## 2021-01-18 ENCOUNTER — TRANSCRIPTION ENCOUNTER (OUTPATIENT)
Age: 76
End: 2021-01-18

## 2021-01-19 ENCOUNTER — INPATIENT (INPATIENT)
Facility: HOSPITAL | Age: 76
LOS: 2 days | Discharge: HOME | DRG: 517 | End: 2021-01-22
Attending: ORTHOPAEDIC SURGERY | Admitting: ORTHOPAEDIC SURGERY
Payer: MEDICARE

## 2021-01-19 ENCOUNTER — TRANSCRIPTION ENCOUNTER (OUTPATIENT)
Age: 76
End: 2021-01-19

## 2021-01-19 ENCOUNTER — APPOINTMENT (OUTPATIENT)
Dept: ORTHOPEDIC SURGERY | Facility: HOSPITAL | Age: 76
End: 2021-01-19

## 2021-01-19 ENCOUNTER — RESULT REVIEW (OUTPATIENT)
Age: 76
End: 2021-01-19

## 2021-01-19 VITALS
HEART RATE: 153 BPM | SYSTOLIC BLOOD PRESSURE: 153 MMHG | WEIGHT: 138.67 LBS | RESPIRATION RATE: 87 BRPM | TEMPERATURE: 99 F | HEIGHT: 67 IN | OXYGEN SATURATION: 100 % | DIASTOLIC BLOOD PRESSURE: 87 MMHG

## 2021-01-19 DIAGNOSIS — Z98.890 OTHER SPECIFIED POSTPROCEDURAL STATES: Chronic | ICD-10-CM

## 2021-01-19 DIAGNOSIS — C61 MALIGNANT NEOPLASM OF PROSTATE: ICD-10-CM

## 2021-01-19 DIAGNOSIS — Z96.641 PRESENCE OF RIGHT ARTIFICIAL HIP JOINT: Chronic | ICD-10-CM

## 2021-01-19 DIAGNOSIS — Z96.642 PRESENCE OF LEFT ARTIFICIAL HIP JOINT: Chronic | ICD-10-CM

## 2021-01-19 DIAGNOSIS — M48.07 SPINAL STENOSIS, LUMBOSACRAL REGION: ICD-10-CM

## 2021-01-19 DIAGNOSIS — Z96.651 PRESENCE OF RIGHT ARTIFICIAL KNEE JOINT: Chronic | ICD-10-CM

## 2021-01-19 LAB — BLD GP AB SCN SERPL QL: SIGNIFICANT CHANGE UP

## 2021-01-19 PROCEDURE — 63048 LAM FACETEC &FORAMOT EA ADDL: CPT

## 2021-01-19 PROCEDURE — 99223 1ST HOSP IP/OBS HIGH 75: CPT

## 2021-01-19 PROCEDURE — 63047 LAM FACETEC & FORAMOT LUMBAR: CPT

## 2021-01-19 PROCEDURE — 88304 TISSUE EXAM BY PATHOLOGIST: CPT | Mod: 26

## 2021-01-19 PROCEDURE — 63047 LAM FACETEC & FORAMOT LUMBAR: CPT | Mod: 82

## 2021-01-19 PROCEDURE — 88311 DECALCIFY TISSUE: CPT | Mod: 26

## 2021-01-19 PROCEDURE — 63048 LAM FACETEC &FORAMOT EA ADDL: CPT | Mod: 82

## 2021-01-19 RX ORDER — NALOXONE HYDROCHLORIDE 4 MG/.1ML
0.1 SPRAY NASAL
Refills: 0 | Status: DISCONTINUED | OUTPATIENT
Start: 2021-01-19 | End: 2021-01-20

## 2021-01-19 RX ORDER — ACETAMINOPHEN 500 MG
1000 TABLET ORAL ONCE
Refills: 0 | Status: COMPLETED | OUTPATIENT
Start: 2021-01-19 | End: 2021-01-19

## 2021-01-19 RX ORDER — ACETAMINOPHEN 500 MG
1000 TABLET ORAL EVERY 8 HOURS
Refills: 0 | Status: DISCONTINUED | OUTPATIENT
Start: 2021-01-20 | End: 2021-01-22

## 2021-01-19 RX ORDER — VANCOMYCIN HCL 1 G
1000 VIAL (EA) INTRAVENOUS ONCE
Refills: 0 | Status: COMPLETED | OUTPATIENT
Start: 2021-01-19 | End: 2021-01-19

## 2021-01-19 RX ORDER — APREPITANT 80 MG/1
40 CAPSULE ORAL ONCE
Refills: 0 | Status: COMPLETED | OUTPATIENT
Start: 2021-01-19 | End: 2021-01-19

## 2021-01-19 RX ORDER — MAGNESIUM HYDROXIDE 400 MG/1
30 TABLET, CHEWABLE ORAL EVERY 12 HOURS
Refills: 0 | Status: DISCONTINUED | OUTPATIENT
Start: 2021-01-19 | End: 2021-01-22

## 2021-01-19 RX ORDER — SENNA PLUS 8.6 MG/1
2 TABLET ORAL AT BEDTIME
Refills: 0 | Status: DISCONTINUED | OUTPATIENT
Start: 2021-01-19 | End: 2021-01-22

## 2021-01-19 RX ORDER — SODIUM CHLORIDE 9 MG/ML
1000 INJECTION, SOLUTION INTRAVENOUS
Refills: 0 | Status: DISCONTINUED | OUTPATIENT
Start: 2021-01-19 | End: 2021-01-19

## 2021-01-19 RX ORDER — ACETAMINOPHEN 500 MG
1000 TABLET ORAL ONCE
Refills: 0 | Status: COMPLETED | OUTPATIENT
Start: 2021-01-20 | End: 2021-01-20

## 2021-01-19 RX ORDER — SODIUM CHLORIDE 9 MG/ML
1000 INJECTION, SOLUTION INTRAVENOUS
Refills: 0 | Status: DISCONTINUED | OUTPATIENT
Start: 2021-01-19 | End: 2021-01-20

## 2021-01-19 RX ORDER — ONDANSETRON 8 MG/1
4 TABLET, FILM COATED ORAL EVERY 6 HOURS
Refills: 0 | Status: DISCONTINUED | OUTPATIENT
Start: 2021-01-19 | End: 2021-01-22

## 2021-01-19 RX ORDER — HYDROMORPHONE HYDROCHLORIDE 2 MG/ML
0.5 INJECTION INTRAMUSCULAR; INTRAVENOUS; SUBCUTANEOUS
Refills: 0 | Status: DISCONTINUED | OUTPATIENT
Start: 2021-01-19 | End: 2021-01-19

## 2021-01-19 RX ORDER — DIAZEPAM 5 MG
5 TABLET ORAL EVERY 8 HOURS
Refills: 0 | Status: DISCONTINUED | OUTPATIENT
Start: 2021-01-19 | End: 2021-01-21

## 2021-01-19 RX ORDER — HYDROMORPHONE HYDROCHLORIDE 2 MG/ML
30 INJECTION INTRAMUSCULAR; INTRAVENOUS; SUBCUTANEOUS
Refills: 0 | Status: DISCONTINUED | OUTPATIENT
Start: 2021-01-19 | End: 2021-01-20

## 2021-01-19 RX ORDER — ONDANSETRON 8 MG/1
4 TABLET, FILM COATED ORAL ONCE
Refills: 0 | Status: DISCONTINUED | OUTPATIENT
Start: 2021-01-19 | End: 2021-01-19

## 2021-01-19 RX ADMIN — Medication 250 MILLIGRAM(S): at 10:44

## 2021-01-19 RX ADMIN — HYDROMORPHONE HYDROCHLORIDE 30 MILLILITER(S): 2 INJECTION INTRAMUSCULAR; INTRAVENOUS; SUBCUTANEOUS at 19:00

## 2021-01-19 RX ADMIN — APREPITANT 40 MILLIGRAM(S): 80 CAPSULE ORAL at 10:44

## 2021-01-19 RX ADMIN — HYDROMORPHONE HYDROCHLORIDE 0.5 MILLIGRAM(S): 2 INJECTION INTRAMUSCULAR; INTRAVENOUS; SUBCUTANEOUS at 15:38

## 2021-01-19 RX ADMIN — Medication 250 MILLIGRAM(S): at 23:05

## 2021-01-19 RX ADMIN — HYDROMORPHONE HYDROCHLORIDE 30 MILLILITER(S): 2 INJECTION INTRAMUSCULAR; INTRAVENOUS; SUBCUTANEOUS at 18:04

## 2021-01-19 RX ADMIN — HYDROMORPHONE HYDROCHLORIDE 30 MILLILITER(S): 2 INJECTION INTRAMUSCULAR; INTRAVENOUS; SUBCUTANEOUS at 16:04

## 2021-01-19 RX ADMIN — Medication 400 MILLIGRAM(S): at 19:27

## 2021-01-19 NOTE — CONSULT NOTE ADULT - SUBJECTIVE AND OBJECTIVE BOX
HPI: 76M with Lumbar Spinal Stenosis, MARIAMA, Prostate Cancer, and Anxiety / Depression,   has been combatting pain in lower back for several years which has progressively worsened.  Patient has tried multiple options for pain relief including OTC medication and as well as PT with minimal relief and has undergone elective lumbar laminectomy successfully.  She is currently resting in bed comfortable with good pain control.     REVIEW OF SYSTEMS:  CONSTITUTIONAL: No fever, weight loss, or fatigue  EYES: No eye pain, visual disturbances, or discharge  ENMT:  No difficulty hearing, tinnitus, vertigo; No sinus or throat pain  NECK: No pain or stiffness  RESPIRATORY: No cough, wheezing, chills or hemoptysis; No shortness of breath  CARDIOVASCULAR: No chest pain, palpitations, dizziness, or leg swelling  GASTROINTESTINAL: No abdominal or epigastric pain. No nausea, vomiting, or hematemesis; No diarrhea or constipation. No melena or hematochezia.  GENITOURINARY: No dysuria, frequency, hematuria, or incontinence  NEUROLOGICAL: No headaches, memory loss, loss of strength, numbness, or tremors  MUSCULOSKELETAL: No muscle or back pain      PAST MEDICAL & SURGICAL HISTORY:  Lumbar spinal stenosis    Urinary frequency    Nocturia  x 2-3    Osteoarthritis    Diverticulitis  treated 2007    TMJ (temporomandibular joint disorder)  right pt with clicking x 1 month    DJD (degenerative joint disease)  traction in back 15 years ago x 10 days    PND (post-nasal drip)    MARIAMA (obstructive sleep apnea)  sleep study 8-12-17,no treatment    Hearing loss  b/l hearing aids    Anxiety and depression  no medication    Eye abnormality  &quot;I had a bleed in the back of the eye&quot;-left, 2014    Concussion  &quot;I fell backwards on a hardwood floor&quot;-10/2015    Prostate cancer  s/p radiation 2015    H/O total knee replacement, right  2016    History of right hip replacement  2014    History of left hip replacement  2013    S/P carpal tunnel release  bilaal 9/2017    S/P sinus surgery  1981    S/P rotator cuff surgery  right repair open 1-2017        SOCIAL HISTORY:  Tobacco; EtOH; Illicit Drugs    Allergies    Keflex (Rash)    Intolerances        MEDICATIONS  (STANDING):  acetaminophen  IVPB .. 1000 milliGRAM(s) IV Intermittent once  HYDROmorphone PCA (1 mG/mL) 30 milliLiter(s) PCA Continuous PCA Continuous  lactated ringers. 1000 milliLiter(s) (100 mL/Hr) IV Continuous <Continuous>  senna 2 Tablet(s) Oral at bedtime  vancomycin  IVPB 1000 milliGRAM(s) IV Intermittent once    MEDICATIONS  (PRN):  aluminum hydroxide/magnesium hydroxide/simethicone Suspension 30 milliLiter(s) Oral every 12 hours PRN Indigestion  diazepam    Tablet 5 milliGRAM(s) Oral every 8 hours PRN muscle spasms  magnesium hydroxide Suspension 30 milliLiter(s) Oral every 12 hours PRN Constipation  naloxone Injectable 0.1 milliGRAM(s) IV Push every 3 minutes PRN For ANY of the following changes in patient status:  A. RR LESS THAN 10 breaths per minute, B. Oxygen saturation LESS THAN 90%, C. Sedation score of 6  ondansetron Injectable 4 milliGRAM(s) IV Push every 6 hours PRN Nausea      FAMILY HISTORY:  Family history of heart attack (Father, Sibling)    Family history of CHF (congestive heart failure) (Mother)        Vital Signs Last 24 Hrs  T(C): 36.9 (19 Jan 2021 17:30), Max: 37.3 (19 Jan 2021 15:21)  T(F): 98.5 (19 Jan 2021 17:30), Max: 99.2 (19 Jan 2021 15:21)  HR: 60 (19 Jan 2021 18:11) (51 - 153)  BP: 104/59 (19 Jan 2021 18:00) (102/70 - 153/87)  BP(mean): 71 (19 Jan 2021 18:00) (71 - 95)  RR: 16 (19 Jan 2021 18:00) (10 - 87)  SpO2: 100% (19 Jan 2021 18:11) (96% - 100%)    PHYSICAL EXAM:    GENERAL: NAD, well-developed  HEAD:  Atraumatic, Normocephalic  EYES: EOMI, PERRLA, conjunctiva and sclera clear  ENMT: No tonsillar erythema, exudates, or enlargement; Moist mucous membranes, Good dentition, No lesions  NECK: Supple, No JVD, Normal thyroid  NERVOUS SYSTEM:  Alert & Oriented X3, Good concentration;  CHEST/LUNG: Clear to auscultation bilaterally; No rales, rhonchi, wheezing, or rubs  HEART: Regular rate and rhythm; No murmurs, rubs, or gallops  ABDOMEN: Soft, Nontender, Nondistended; Bowel sounds present  EXTREMITIES:  2+ Peripheral Pulses, No clubbing, cyanosis, or edema      LABS:              CAPILLARY BLOOD GLUCOSE          RADIOLOGY & ADDITIONAL STUDIES:    EKG:   Personally Reviewed:  [ ] YES     Imaging:   Personally Reviewed:  [ ] YES     Consultant(s) Notes Reviewed:      Care Discussed with Consultants/Other Providers:               
CHIEF COMPLAINT: Unable to pass Morales catheter intraoperatively    HISTORY OF PRESENT ILLNESS:    The patient is a 76 year old male with a complex urological history now post op lumbar laminectomy. He has a h/o prostate cancer s/p radiation therapy. He has had issues with urinary incontinence and difficult catheterization since treatment. An attempt was made intraoperative to place a Morales catheter which was unsuccessful. Post operatively patient has been voiding. Bladder scan performed demonstrated a minimal PVR.     PAST MEDICAL & SURGICAL HISTORY:  Lumbar spinal stenosis    Urinary frequency    Nocturia  x 2-3    Osteoarthritis    Diverticulitis  treated 2007    TMJ (temporomandibular joint disorder)  right pt with clicking x 1 month    DJD (degenerative joint disease)  traction in back 15 years ago x 10 days    PND (post-nasal drip)    MARIAMA (obstructive sleep apnea)  sleep study 8-12-17,no treatment    Hearing loss  b/l hearing aids    Anxiety and depression  no medication    Eye abnormality  &quot;I had a bleed in the back of the eye&quot;-left, 2014    Concussion  &quot;I fell backwards on a hardwood floor&quot;-10/2015    Prostate cancer  s/p radiation 2015    H/O total knee replacement, right  2016    History of right hip replacement  2014    History of left hip replacement  2013    S/P carpal tunnel release  bilaal 9/2017    S/P sinus surgery  1981    S/P rotator cuff surgery  right repair open 1-2017        REVIEW OF SYSTEMS:    CONSTITUTIONAL: No weakness, fevers or chills  CARDIOVASCULAR: No chest pain or palpitations  GASTROINTESTINAL: No abdominal or epigastric pain. No nausea, vomiting, or hematemesis; No diarrhea or constipation. No melena or hematochezia.  GENITOURINARY: No dysuria, frequency or hematuria  NEUROLOGICAL: No numbness or weakness  SKIN: No itching, burning, rashes, or lesions   All other review of systems is negative unless indicated above.    MEDICATIONS  (STANDING):  acetaminophen  IVPB .. 1000 milliGRAM(s) IV Intermittent once  HYDROmorphone PCA (1 mG/mL) 30 milliLiter(s) PCA Continuous PCA Continuous  lactated ringers. 1000 milliLiter(s) (100 mL/Hr) IV Continuous <Continuous>  senna 2 Tablet(s) Oral at bedtime  vancomycin  IVPB 1000 milliGRAM(s) IV Intermittent once    MEDICATIONS  (PRN):  aluminum hydroxide/magnesium hydroxide/simethicone Suspension 30 milliLiter(s) Oral every 12 hours PRN Indigestion  diazepam    Tablet 5 milliGRAM(s) Oral every 8 hours PRN muscle spasms  magnesium hydroxide Suspension 30 milliLiter(s) Oral every 12 hours PRN Constipation  naloxone Injectable 0.1 milliGRAM(s) IV Push every 3 minutes PRN For ANY of the following changes in patient status:  A. RR LESS THAN 10 breaths per minute, B. Oxygen saturation LESS THAN 90%, C. Sedation score of 6  ondansetron Injectable 4 milliGRAM(s) IV Push every 6 hours PRN Nausea      Allergies    Keflex (Rash)    Intolerances        SOCIAL HISTORY:    FAMILY HISTORY:  Family history of heart attack (Father, Sibling)    Family history of CHF (congestive heart failure) (Mother)        Vital Signs Last 24 Hrs  T(C): 36.8 (19 Jan 2021 18:24), Max: 37.3 (19 Jan 2021 15:21)  T(F): 98.2 (19 Jan 2021 18:24), Max: 99.2 (19 Jan 2021 15:21)  HR: 56 (19 Jan 2021 18:24) (51 - 153)  BP: 137/77 (19 Jan 2021 18:24) (102/70 - 153/87)  BP(mean): 71 (19 Jan 2021 18:00) (71 - 95)  RR: 16 (19 Jan 2021 18:24) (10 - 87)  SpO2: 100% (19 Jan 2021 18:24) (96% - 100%)    PHYSICAL EXAM:    Constitutional: NAD, well-developed  Back: No CVA tenderness  Abd: Soft, NT/ND, No CVAT  : Normal phallus,open meatus,bilateral descended testes, no masses  Extremities: No peripheral edema  Skin: No rashes

## 2021-01-19 NOTE — DISCHARGE NOTE PROVIDER - NSDCFUSCHEDAPPT_GEN_ALL_CORE_FT
NANCIE BARAHONA ; 02/01/2021 ; [facility]  NANCIE BARAHONA ; 02/03/2021 ; [facility] [address] [place] [address]

## 2021-01-19 NOTE — DISCHARGE NOTE PROVIDER - CARE PROVIDER_API CALL
Casimiro Gonzalez (MD)  Orthopaedic Surgery  [address] [facility], [address]  [place] [address]  Phone: [phone]  Fax: [phone]  Scheduled Appointment: 02/01/2021 09:45 AM

## 2021-01-19 NOTE — PROGRESS NOTE ADULT - SUBJECTIVE AND OBJECTIVE BOX
physical exam at bedside  lower back pain eith radiation to posterior legs  vital signs stable  took Oxycontin today  Allergic to Keflex  last solids 1/18/2021

## 2021-01-19 NOTE — DISCHARGE NOTE PROVIDER - HOSPITAL COURSE
This is a 76  year old  M admitted to [facility] on 1/19/2021 for elective L3-L5 laminectomy by Dr Gonzalez to treat symptoms of Lumbar spinal stenosis with failed conservative treatment.   PST performed at [facility]  including H & P, pre anesthesia screening, Pre-Op testing. Preoperative medical clearances were obtained.    The patient underwent lumbar laminectomies of L2-5 by Dr. Gonzalez on 1/19/21. Pre-op prophylactic antibiotics were given.  No [place]-operative complications occurred. Patient had a stable PACU course, and was then transferred to floor for acute post-operative surgical care.  PT and OT consulted for ambulation and routine Post Spine surgery modalities, with the goal of increased mobility and independence.   Bilat LE Venodynes were utilized for post-op DVT prevention.    Patient was deemed stable for discharge home plan medical, orthopedic & PT standpoint. All discharge instructions were reviewed. Patient will follow up with Dr. Gonzalez for post-operative care.

## 2021-01-19 NOTE — CONSULT NOTE ADULT - PROBLEM SELECTOR RECOMMENDATION 9
H/o prostate cancer s/p RT in the past. Patient has been voiding well and there in no need for catheterization at this time. Please call if he develops difficulty voiding.

## 2021-01-19 NOTE — CONSULT NOTE ADULT - ASSESSMENT
76M with Lumbar Spinal Stenosis, MARIAMA, Prostate Cancer, and Anxiety / Depression, admitted for aftercare following Lumbar Laminectomy     S/P Lumbar Laminectomy POD 0  Continue Bowel and pain control regimen.   Incentive Spirometer for lung expansion.  Work with PT to increase ambulation as per orthopedics.  Monitor Hgb and follow up electrolytes.   Orthopedics on board and following    MARIAMA  No CPAP  Telemetry Monitoring  Avoid sedating medications     Anxiety/Depression   Xanax PRN     Diet  Regular    DVT Prophylaxis  SCD     Disposition  Full Code/Inpatient  Discharge planning pending hospital course

## 2021-01-19 NOTE — PROGRESS NOTE ADULT - SUBJECTIVE AND OBJECTIVE BOX
Orthopedic Post-Op Check Note  POD#: 0    S: Pt seen and examined in PACU s/p lumbar laminectomy L2-L5. Patient reports he is feeling well. He denies and CP, SOB, abdominal pain, or new numbness or tingling. He does complain of some pain in his back, for which he has been utilizing his PCA.   HYDROmorphone  Injectable 0.5 milliGRAM(s) IV Push every 10 minutes PRN  HYDROmorphone PCA (1 mG/mL) 30 milliLiter(s) PCA Continuous PCA Continuous  ondansetron Injectable 4 milliGRAM(s) IV Push every 6 hours PRN  ondansetron Injectable 4 milliGRAM(s) IV Push once PRN    O: On exam, No Apparent Distress; Alert & Oriented   T(C): 37.3 (01-19-21 @ 15:21), Max: 37.3 (01-19-21 @ 15:21)  HR: 62 (01-19-21 @ 16:30) (51 - 153)  BP: 116/62 (01-19-21 @ 16:30) (102/70 - 153/87)  RR: 14 (01-19-21 @ 16:30) (10 - 87)  SpO2: 97% (01-19-21 @ 16:30) (97% - 100%)  Wt(kg): --      Dressing clean, dry, and intact  HV drain to suction, minimal output  TA/GS/EHL 5/5, quads and hams intact but patient reports back pain when firing proximal leg muscles  sensation intact to light touch to bilateral lower extremities  extremities wwp, capillary refill <2 sec        I&O's Detail    19 Jan 2021 07:01  -  19 Jan 2021 16:38  --------------------------------------------------------  IN:    Lactated Ringers: 800 mL  Total IN: 800 mL    OUT:    Estimated Blood Loss (mL): 100 mL  Total OUT: 100 mL    Total NET: 700 mL        A: 75 y/o male s/p lumbar laminectomy, stable post-op    PLAN:  Continue pain control  PAS stockings for DVT prevention  PT/OT consult  Hospitalist to consult  Unable to place Morales in OR, urology consulted to place Morales post-op if patient is unable to void  Monitor Hemovac output  Discharge planning for home tomorrow pending PT/OT and medical clearance     Orthopedic Post-Op Check Note  POD#: 0    S: Pt seen and examined in PACU s/p lumbar laminectomy L3-L5. Patient reports he is feeling well. He denies and CP, SOB, abdominal pain, or new numbness or tingling. He does complain of some pain in his back, for which he has been utilizing his PCA.   HYDROmorphone  Injectable 0.5 milliGRAM(s) IV Push every 10 minutes PRN  HYDROmorphone PCA (1 mG/mL) 30 milliLiter(s) PCA Continuous PCA Continuous  ondansetron Injectable 4 milliGRAM(s) IV Push every 6 hours PRN  ondansetron Injectable 4 milliGRAM(s) IV Push once PRN    O: On exam, No Apparent Distress; Alert & Oriented   T(C): 37.3 (01-19-21 @ 15:21), Max: 37.3 (01-19-21 @ 15:21)  HR: 62 (01-19-21 @ 16:30) (51 - 153)  BP: 116/62 (01-19-21 @ 16:30) (102/70 - 153/87)  RR: 14 (01-19-21 @ 16:30) (10 - 87)  SpO2: 97% (01-19-21 @ 16:30) (97% - 100%)  Wt(kg): --      Dressing clean, dry, and intact  HV drain to suction, minimal output  TA/GS/EHL 5/5, quads and hams intact but patient reports back pain when firing proximal leg muscles  sensation intact to light touch to bilateral lower extremities  extremities wwp, capillary refill <2 sec        I&O's Detail    19 Jan 2021 07:01  -  19 Jan 2021 16:38  --------------------------------------------------------  IN:    Lactated Ringers: 800 mL  Total IN: 800 mL    OUT:    Estimated Blood Loss (mL): 100 mL  Total OUT: 100 mL    Total NET: 700 mL        A: 75 y/o male s/p lumbar laminectomy, stable post-op    PLAN:  Continue pain control  PAS stockings for DVT prevention  PT/OT consult  Hospitalist to consult  Unable to place Morales in OR, urology consulted to place Morales post-op if patient is unable to void  Monitor Hemovac output  Discharge planning for home tomorrow pending PT/OT and medical clearance

## 2021-01-19 NOTE — DISCHARGE NOTE PROVIDER - NSDCMRMEDTOKEN_GEN_ALL_CORE_FT
acetaminophen 500 mg oral tablet: 2 tab(s) orally every 8 hours, As Needed - for severe pain  balance of nature: 1 tab(s) orally once a day  Multiple Vitamins oral tablet: 1 tab(s) orally once a day  OxyCONTIN 10 mg oral tablet, extended release: 1 tab(s) orally 5 times a day   acetaminophen 500 mg oral tablet: 2 tab(s) orally every 8 hours  Multiple Vitamins oral tablet: 1 tab(s) orally once a day  oxyCODONE 10 mg oral tablet: 1 tab(s) orally every 4 hours, As Needed MDD:6  OxyCONTIN 10 mg oral tablet, extended release: 1 tab(s) orally 5 times a day  senna oral tablet: 2 tab(s) orally once a day (at bedtime)   acetaminophen 500 mg oral tablet: 2 tab(s) orally every 8 hours  diazePAM 5 mg oral tablet: 1 tab(s) orally every 6 hours, As needed, muscle spasms MDD:4 tabs  HYDROmorphone 2 mg oral tablet: Take 1 tab(s) orally every 4 hours, as needed for moderate pain or 2 tabs orally every 4 hours for severe pain. MDD:10 tabs  Multiple Vitamins oral tablet: 1 tab(s) orally once a day  OxyCONTIN 10 mg oral tablet, extended release: 1 tab(s) orally 5 times a day  Quick Draw Lumbar Corset: Dx: s/p lumbar laminectomy L3-4-5  senna oral tablet: 2 tab(s) orally once a day (at bedtime)

## 2021-01-19 NOTE — PROGRESS NOTE ADULT - ASSESSMENT
77 yo male is scheduled for lumbar laminectomy L2-3 L3-4 possible L4-5 today at [facility]

## 2021-01-19 NOTE — BRIEF OPERATIVE NOTE - NSICDXBRIEFPROCEDURE_GEN_ALL_CORE_FT
PROCEDURES:  Laminectomy, spine, lumbar, open 19-Jan-2021 15:39:31 L3-4-5 bilateral Joseph Schuler

## 2021-01-19 NOTE — BRIEF OPERATIVE NOTE - COMMENTS
Watts attempted preprocedure but unable to pass. Small amount of bloody drainage after attempt. Case done without watts.

## 2021-01-19 NOTE — DISCHARGE NOTE PROVIDER - NSDCCPCAREPLAN_GEN_ALL_CORE_FT
PRINCIPAL DISCHARGE DIAGNOSIS  Diagnosis: Lumbar spinal stenosis  Assessment and Plan of Treatment: s/p laminectomy L3-L5

## 2021-01-19 NOTE — PRE-OP CHECKLIST - ALLERGIES REVIEWED
A catheter was used to selectively engage and inject the left superficial femoral artery by hand injection. Multiple views were taken.  done

## 2021-01-19 NOTE — DISCHARGE NOTE PROVIDER - NSDCCPTREATMENT_GEN_ALL_CORE_FT
PRINCIPAL PROCEDURE  Procedure: Laminectomy, spine, lumbar, open  Findings and Treatment: L3-4-5 bilateral  You have just had spine surgery.  Please take care of your back by adhering to some basic recommendations.  Your surgeon recommends taking acetaminophen (tylenol) 1000mg 3 times per day for the next 14 to 21 days.  This can help to minimize the need for stronger medications.  No heavy lifting. Do not lift more than 10 pounds.  Avoid twisting and bending over.  Do NOT drive a car.  You may be driven in a car.  You may shower if you cover the existing dressing with plastic and tape to prevent it from getting wet.  Change dressing with dry, sterile gauze and tape if it becomes loose, wet or soiled.     Observe low back precautions as described by the Physical Therapist.  Walking is your best exercise.  It is best not to remain in one position for long periods such as long car rides.  Smoking should be avoided.  Tobacco products are associated with back problems.

## 2021-01-19 NOTE — PHYSICAL THERAPY INITIAL EVALUATION ADULT - PHYSICAL ASSIST/NONPHYSICAL ASSIST: SUPINE/SIT, REHAB EVAL
Department of Anesthesiology  Postprocedure Note    Patient: Karen Shaikh  MRN: [id number]  YOB: 1999  Date of evaluation: 4/10/2020  Time:  8:54 AM     Procedure Summary     Date:  04/08/20 Room / Location:      Anesthesia Start:  1128 Anesthesia Stop:  1637    Procedure:  Labor Analgesia Diagnosis:      Scheduled Providers:   Responsible Provider:  RERE Chase CRNA    Anesthesia Type:  epidural ASA Status:  2          Anesthesia Type: No value filed. Giuliano Phase I:      Giuliano Phase II:      Last vitals: Reviewed and per EMR flowsheets.        Anesthesia Post Evaluation    Patient location during evaluation: bedside  Patient participation: complete - patient participated  Level of consciousness: awake  Pain score: 0  Airway patency: patent  Nausea & Vomiting: no nausea and no vomiting  Complications: no  Cardiovascular status: blood pressure returned to baseline  Respiratory status: acceptable  Hydration status: euvolemic
1 person assist

## 2021-01-20 LAB
ANION GAP SERPL CALC-SCNC: 4 MMOL/L — LOW (ref 5–17)
BASOPHILS # BLD AUTO: 0.03 K/UL — SIGNIFICANT CHANGE UP (ref 0–0.2)
BASOPHILS NFR BLD AUTO: 0.2 % — SIGNIFICANT CHANGE UP (ref 0–2)
BUN SERPL-MCNC: 14 MG/DL — SIGNIFICANT CHANGE UP (ref 7–23)
CALCIUM SERPL-MCNC: 9.2 MG/DL — SIGNIFICANT CHANGE UP (ref 8.4–10.5)
CHLORIDE SERPL-SCNC: 105 MMOL/L — SIGNIFICANT CHANGE UP (ref 96–108)
CO2 SERPL-SCNC: 29 MMOL/L — SIGNIFICANT CHANGE UP (ref 22–31)
CREAT SERPL-MCNC: 0.53 MG/DL — SIGNIFICANT CHANGE UP (ref 0.5–1.3)
EOSINOPHIL # BLD AUTO: 0.01 K/UL — SIGNIFICANT CHANGE UP (ref 0–0.5)
EOSINOPHIL NFR BLD AUTO: 0.1 % — SIGNIFICANT CHANGE UP (ref 0–6)
GLUCOSE SERPL-MCNC: 114 MG/DL — HIGH (ref 70–99)
HCT VFR BLD CALC: 39.1 % — SIGNIFICANT CHANGE UP (ref 39–50)
HGB BLD-MCNC: 13 G/DL — SIGNIFICANT CHANGE UP (ref 13–17)
IMM GRANULOCYTES NFR BLD AUTO: 0.6 % — SIGNIFICANT CHANGE UP (ref 0–1.5)
LYMPHOCYTES # BLD AUTO: 1.08 K/UL — SIGNIFICANT CHANGE UP (ref 1–3.3)
LYMPHOCYTES # BLD AUTO: 7.8 % — LOW (ref 13–44)
MCHC RBC-ENTMCNC: 29.7 PG — SIGNIFICANT CHANGE UP (ref 27–34)
MCHC RBC-ENTMCNC: 33.2 GM/DL — SIGNIFICANT CHANGE UP (ref 32–36)
MCV RBC AUTO: 89.3 FL — SIGNIFICANT CHANGE UP (ref 80–100)
MONOCYTES # BLD AUTO: 1.22 K/UL — HIGH (ref 0–0.9)
MONOCYTES NFR BLD AUTO: 8.8 % — SIGNIFICANT CHANGE UP (ref 2–14)
NEUTROPHILS # BLD AUTO: 11.38 K/UL — HIGH (ref 1.8–7.4)
NEUTROPHILS NFR BLD AUTO: 82.5 % — HIGH (ref 43–77)
NRBC # BLD: 0 /100 WBCS — SIGNIFICANT CHANGE UP (ref 0–0)
PLATELET # BLD AUTO: 203 K/UL — SIGNIFICANT CHANGE UP (ref 150–400)
POTASSIUM SERPL-MCNC: 4.3 MMOL/L — SIGNIFICANT CHANGE UP (ref 3.5–5.3)
POTASSIUM SERPL-SCNC: 4.3 MMOL/L — SIGNIFICANT CHANGE UP (ref 3.5–5.3)
RBC # BLD: 4.38 M/UL — SIGNIFICANT CHANGE UP (ref 4.2–5.8)
RBC # FLD: 14.6 % — HIGH (ref 10.3–14.5)
SODIUM SERPL-SCNC: 138 MMOL/L — SIGNIFICANT CHANGE UP (ref 135–145)
TROPONIN I SERPL-MCNC: 0 NG/ML — LOW (ref 0.02–0.06)
WBC # BLD: 13.8 K/UL — HIGH (ref 3.8–10.5)
WBC # FLD AUTO: 13.8 K/UL — HIGH (ref 3.8–10.5)

## 2021-01-20 PROCEDURE — 93010 ELECTROCARDIOGRAM REPORT: CPT

## 2021-01-20 PROCEDURE — 99233 SBSQ HOSP IP/OBS HIGH 50: CPT

## 2021-01-20 RX ORDER — OXYCODONE HYDROCHLORIDE 5 MG/1
10 TABLET ORAL EVERY 4 HOURS
Refills: 0 | Status: DISCONTINUED | OUTPATIENT
Start: 2021-01-20 | End: 2021-01-21

## 2021-01-20 RX ORDER — SENNA PLUS 8.6 MG/1
2 TABLET ORAL
Qty: 0 | Refills: 0 | DISCHARGE
Start: 2021-01-20

## 2021-01-20 RX ORDER — SODIUM CHLORIDE 9 MG/ML
1000 INJECTION, SOLUTION INTRAVENOUS
Refills: 0 | Status: DISCONTINUED | OUTPATIENT
Start: 2021-01-20 | End: 2021-01-21

## 2021-01-20 RX ORDER — HYDROMORPHONE HYDROCHLORIDE 2 MG/ML
0.5 INJECTION INTRAMUSCULAR; INTRAVENOUS; SUBCUTANEOUS
Refills: 0 | Status: DISCONTINUED | OUTPATIENT
Start: 2021-01-20 | End: 2021-01-22

## 2021-01-20 RX ORDER — OXYCODONE HYDROCHLORIDE 5 MG/1
1 TABLET ORAL
Qty: 42 | Refills: 0
Start: 2021-01-20 | End: 2021-01-26

## 2021-01-20 RX ORDER — OXYCODONE HYDROCHLORIDE 5 MG/1
10 TABLET ORAL
Refills: 0 | Status: DISCONTINUED | OUTPATIENT
Start: 2021-01-20 | End: 2021-01-22

## 2021-01-20 RX ORDER — ACETAMINOPHEN 500 MG
2 TABLET ORAL
Qty: 0 | Refills: 0 | DISCHARGE
Start: 2021-01-20

## 2021-01-20 RX ADMIN — OXYCODONE HYDROCHLORIDE 10 MILLIGRAM(S): 5 TABLET ORAL at 20:55

## 2021-01-20 RX ADMIN — OXYCODONE HYDROCHLORIDE 10 MILLIGRAM(S): 5 TABLET ORAL at 11:10

## 2021-01-20 RX ADMIN — SENNA PLUS 2 TABLET(S): 8.6 TABLET ORAL at 20:55

## 2021-01-20 RX ADMIN — Medication 1000 MILLIGRAM(S): at 06:27

## 2021-01-20 RX ADMIN — SODIUM CHLORIDE 100 MILLILITER(S): 9 INJECTION, SOLUTION INTRAVENOUS at 19:16

## 2021-01-20 RX ADMIN — OXYCODONE HYDROCHLORIDE 10 MILLIGRAM(S): 5 TABLET ORAL at 19:16

## 2021-01-20 RX ADMIN — Medication 400 MILLIGRAM(S): at 01:25

## 2021-01-20 RX ADMIN — Medication 1000 MILLIGRAM(S): at 19:16

## 2021-01-20 RX ADMIN — OXYCODONE HYDROCHLORIDE 10 MILLIGRAM(S): 5 TABLET ORAL at 16:37

## 2021-01-20 RX ADMIN — SODIUM CHLORIDE 100 MILLILITER(S): 9 INJECTION, SOLUTION INTRAVENOUS at 09:42

## 2021-01-20 RX ADMIN — HYDROMORPHONE HYDROCHLORIDE 30 MILLILITER(S): 2 INJECTION INTRAMUSCULAR; INTRAVENOUS; SUBCUTANEOUS at 07:11

## 2021-01-20 NOTE — PROGRESS NOTE ADULT - ASSESSMENT
76M with Lumbar Spinal Stenosis, MARIAMA, Prostate Cancer, and Anxiety / Depression, admitted for aftercare following Lumbar Laminectomy     S/P Lumbar Laminectomy POD 1  Continue Bowel and pain control with PCA  PCA to be discontinued today and transition to oral Oxycontin  Patient of Dr. Gavin (pain management) to be seen later today  Incentive Spirometer for lung expansion.  Work with PT to increase ambulation as per orthopedics.  Monitor Hgb and follow up electrolytes.   Orthopedics on board and following    MARIAMA  No CPAP  Telemetry Monitoring  Avoid sedating medications     Anxiety/Depression   Xanax PRN     Diet  Regular    DVT Prophylaxis  SCD     Disposition  Full Code/Inpatient  Discharge as per Ortho

## 2021-01-20 NOTE — PROGRESS NOTE ADULT - SUBJECTIVE AND OBJECTIVE BOX
ORTHOPEDIC PA PROGRESS NOTE  NANCIE BARAHONA      76y Male                                                                                                                               POD #    1    STATUS POST:               Pre-Op Dx: Spinal stenosis of lumbar region    Post-Op Dx:  Spinal stenosis of lumbar region    Procedure: Laminectomy, spine, lumbar, open  L3-4-5 bilateral    Pain (0-10): 2  Current Pain Management:  [x ] PCA   [ ] Po Analgesics [ ] IM /IV Anagesics     T(F): 97.7  HR: 49  BP: 129/74  RR: 20  SpO2: 98%                      hemovac: 73cc overnight    Physical Exam :    -  Dressing sterile  -  Distal Neurvascular status intact grossly.   -  Warm well perfused; capillary refill <3 seconds   -  (+)EHL/FHL 5/5  -  (+) Sensation to light touch  -  (-) Calf tenderness Bilaterally    A/P: 76y Male s/p Spinal stenosis of lumbar region      -   Ortho Stable  -   Pain control   -   Medicine to follow  -   DVT ppx:     [x]SCDs     [ ] ASA     [ ] Eliquis     [ ] Lovenox  -   Weight bearing status:  WBAT [x]        PWB    [ ]     TTWB  [ ]      NWB  [ ]   -  Dispo:     Home [x]     Acute Rehab [ ]     LIZ [ ]     TBD [ ]  -  D/C PCA today and switch to oral pain medication.  Discussed with Dr. Gavin (sees Dr. Gavin as an outpatient)- recommendation:  Continue oxycontin patient was taking preoperatively, add oxycodone IR 10mg for post operative pain management.  -  Dressing change and hemovac removal prior to discharge home                                                                                ORTHOPEDIC PA PROGRESS NOTE  NANCIE BARAHONA      76y Male                                                                                                                               POD #    1    STATUS POST:               Pre-Op Dx: Spinal stenosis of lumbar region    Post-Op Dx:  Spinal stenosis of lumbar region    Procedure: Laminectomy, spine, lumbar, open  L3-4-5 bilateral    Pain (0-10): 2  Current Pain Management:  [x ] PCA   [ ] Po Analgesics [ ] IM /IV Anagesics     T(F): 97.7  HR: 49  BP: 129/74  RR: 20  SpO2: 98%                      hemovac: 73cc overnight    Physical Exam :    -  Dressing sterile  -  Distal Neurvascular status intact grossly.   -  Warm well perfused; capillary refill <3 seconds   -  (+)EHL/FHL 5/5  -  (+) Sensation to light touch  -  (-) Calf tenderness Bilaterally    A/P: 76y Male s/p Spinal stenosis of lumbar region      -   Ortho Stable  -   Pain control   -   Medicine to follow  -   DVT ppx:     [x]SCDs     [ ] ASA     [ ] Eliquis     [ ] Lovenox  -   Weight bearing status:  WBAT [x]        PWB    [ ]     TTWB  [ ]      NWB  [ ]   -  Dispo:     Home [x]     Acute Rehab [ ]     LIZ [ ]     TBD [ ]  -   D/C PCA today and switch to oral pain medication.  Discussed with Dr. Gavin (sees Dr. Gavin as an outpatient)- recommendation:  Continue oxycontin patient was taking preoperatively, add oxycodone IR 10mg for post operative pain management.  -   Discussed with Dr. Gonzalez, switch drain to gravity, probable removal later today if drainage slows down, will change dressing then prior to discharge

## 2021-01-20 NOTE — PROGRESS NOTE ADULT - SUBJECTIVE AND OBJECTIVE BOX
Subjective: Patient HR was low overnight into the 40's.  Possibly during sleeping.  On PCA.  Today got up and got clammy and unable to work with PT thus far.     MEDICATIONS  (STANDING):  acetaminophen   Tablet .. 1000 milliGRAM(s) Oral every 8 hours  lactated ringers. 1000 milliLiter(s) (100 mL/Hr) IV Continuous <Continuous>  senna 2 Tablet(s) Oral at bedtime    MEDICATIONS  (PRN):  aluminum hydroxide/magnesium hydroxide/simethicone Suspension 30 milliLiter(s) Oral every 12 hours PRN Indigestion  diazepam    Tablet 5 milliGRAM(s) Oral every 8 hours PRN muscle spasms  magnesium hydroxide Suspension 30 milliLiter(s) Oral every 12 hours PRN Constipation  ondansetron Injectable 4 milliGRAM(s) IV Push every 6 hours PRN Nausea      Allergies    Keflex (Rash)    Intolerances        Vital Signs Last 24 Hrs  T(C): 36.5 (20 Jan 2021 07:38), Max: 37.3 (19 Jan 2021 15:21)  T(F): 97.7 (20 Jan 2021 07:38), Max: 99.2 (19 Jan 2021 15:21)  HR: 54 (20 Jan 2021 09:45) (45 - 62)  BP: 127/69 (20 Jan 2021 09:45) (102/70 - 137/77)  BP(mean): 71 (19 Jan 2021 18:00) (71 - 95)  RR: 18 (20 Jan 2021 09:45) (10 - 20)  SpO2: 96% (20 Jan 2021 09:45) (96% - 100%)    PHYSICAL EXAM:  GENERAL: NAD, well-groomed, well-developed  HEAD:  Atraumatic, Normocephalic  ENMT: Moist mucous membranes,   NECK: Supple, No JVD, Normal thyroid  NERVOUS SYSTEM:  All 4 extremities mobile, no gross sensory deficits.   CHEST/LUNG: Clear to auscultation bilaterally; No rales, rhonchi, wheezing, or rubs  HEART: Regular rate and rhythm; No murmurs, rubs, or gallops  ABDOMEN: Soft, Nontender, Nondistended; Bowel sounds present  EXTREMITIES:  2+ Peripheral Pulses, No clubbing, cyanosis, or edema      LABS:                        13.0   13.80 )-----------( 203      ( 20 Jan 2021 07:32 )             39.1     20 Jan 2021 07:32    138    |  105    |  14     ----------------------------<  114    4.3     |  29     |  0.53     Ca    9.2        20 Jan 2021 07:32          CAPILLARY BLOOD GLUCOSE          RADIOLOGY & ADDITIONAL TESTS:    Imaging Personally Reviewed:  [ ] YES     Consultant(s) Notes Reviewed:      Care Discussed with Consultants/Other Providers:    Advanced Directives: [ ] DNR  [ ] No feeding tube  [ ] MOLST in chart  [ ] MOLST completed today  [ ] Unknown

## 2021-01-20 NOTE — PROGRESS NOTE ADULT - SUBJECTIVE AND OBJECTIVE BOX
Discharge medication calendar:  APAP 1000mg q8h x 2-3 weeks  Oxycodone ER 10mg q6h (home med)  Oxycodone 10mg q4h PRN  Docusate 100mg TID while taking narcotic  Miralax, Senna, or Bisacodyl PRN for treatment of constipation

## 2021-01-20 NOTE — DIETITIAN INITIAL EVALUATION ADULT. - OTHER INFO
75yo male s/p lumbar laminectomy with hx of urinary frequency, osteoarthritis, diverticulitis, TMJ, MARIAMA, hearing loss, anxiety, depression, prostate cancer. Pt assessed due to report of unintended weight loss. Pt appeared to be well nourished. CBW (138lbs), he reports weight to be stable around 140-145 (with clothes on). No significant weight loss. Pt reports tolerating regular diet and reports no changes in appetite. PTA reports consuming foods rich in CHO, pro, fat. Encouraged pt to continue balanced diet upon d/c for optimal healing post-op.

## 2021-01-21 LAB
ANION GAP SERPL CALC-SCNC: 4 MMOL/L — LOW (ref 5–17)
BUN SERPL-MCNC: 11 MG/DL — SIGNIFICANT CHANGE UP (ref 7–23)
CALCIUM SERPL-MCNC: 9 MG/DL — SIGNIFICANT CHANGE UP (ref 8.4–10.5)
CHLORIDE SERPL-SCNC: 104 MMOL/L — SIGNIFICANT CHANGE UP (ref 96–108)
CO2 SERPL-SCNC: 30 MMOL/L — SIGNIFICANT CHANGE UP (ref 22–31)
CREAT SERPL-MCNC: 0.78 MG/DL — SIGNIFICANT CHANGE UP (ref 0.5–1.3)
GLUCOSE SERPL-MCNC: 106 MG/DL — HIGH (ref 70–99)
HCT VFR BLD CALC: 40.5 % — SIGNIFICANT CHANGE UP (ref 39–50)
HGB BLD-MCNC: 13.2 G/DL — SIGNIFICANT CHANGE UP (ref 13–17)
MCHC RBC-ENTMCNC: 29.4 PG — SIGNIFICANT CHANGE UP (ref 27–34)
MCHC RBC-ENTMCNC: 32.6 GM/DL — SIGNIFICANT CHANGE UP (ref 32–36)
MCV RBC AUTO: 90.2 FL — SIGNIFICANT CHANGE UP (ref 80–100)
NRBC # BLD: 0 /100 WBCS — SIGNIFICANT CHANGE UP (ref 0–0)
PLATELET # BLD AUTO: 183 K/UL — SIGNIFICANT CHANGE UP (ref 150–400)
POTASSIUM SERPL-MCNC: 4.2 MMOL/L — SIGNIFICANT CHANGE UP (ref 3.5–5.3)
POTASSIUM SERPL-SCNC: 4.2 MMOL/L — SIGNIFICANT CHANGE UP (ref 3.5–5.3)
RBC # BLD: 4.49 M/UL — SIGNIFICANT CHANGE UP (ref 4.2–5.8)
RBC # FLD: 15.1 % — HIGH (ref 10.3–14.5)
SODIUM SERPL-SCNC: 138 MMOL/L — SIGNIFICANT CHANGE UP (ref 135–145)
WBC # BLD: 11.65 K/UL — HIGH (ref 3.8–10.5)
WBC # FLD AUTO: 11.65 K/UL — HIGH (ref 3.8–10.5)

## 2021-01-21 PROCEDURE — 99233 SBSQ HOSP IP/OBS HIGH 50: CPT

## 2021-01-21 RX ORDER — DIAZEPAM 5 MG
5 TABLET ORAL EVERY 6 HOURS
Refills: 0 | Status: DISCONTINUED | OUTPATIENT
Start: 2021-01-21 | End: 2021-01-22

## 2021-01-21 RX ORDER — HYDROMORPHONE HYDROCHLORIDE 2 MG/ML
2 INJECTION INTRAMUSCULAR; INTRAVENOUS; SUBCUTANEOUS
Refills: 0 | Status: DISCONTINUED | OUTPATIENT
Start: 2021-01-21 | End: 2021-01-21

## 2021-01-21 RX ORDER — CELECOXIB 200 MG/1
200 CAPSULE ORAL EVERY 12 HOURS
Refills: 0 | Status: DISCONTINUED | OUTPATIENT
Start: 2021-01-21 | End: 2021-01-22

## 2021-01-21 RX ORDER — HYDROMORPHONE HYDROCHLORIDE 2 MG/ML
2 INJECTION INTRAMUSCULAR; INTRAVENOUS; SUBCUTANEOUS EVERY 4 HOURS
Refills: 0 | Status: DISCONTINUED | OUTPATIENT
Start: 2021-01-21 | End: 2021-01-22

## 2021-01-21 RX ADMIN — OXYCODONE HYDROCHLORIDE 10 MILLIGRAM(S): 5 TABLET ORAL at 04:30

## 2021-01-21 RX ADMIN — SENNA PLUS 2 TABLET(S): 8.6 TABLET ORAL at 21:11

## 2021-01-21 RX ADMIN — Medication 5 MILLIGRAM(S): at 01:26

## 2021-01-21 RX ADMIN — SODIUM CHLORIDE 100 MILLILITER(S): 9 INJECTION, SOLUTION INTRAVENOUS at 01:27

## 2021-01-21 RX ADMIN — Medication 1000 MILLIGRAM(S): at 21:11

## 2021-01-21 RX ADMIN — HYDROMORPHONE HYDROCHLORIDE 2 MILLIGRAM(S): 2 INJECTION INTRAMUSCULAR; INTRAVENOUS; SUBCUTANEOUS at 19:21

## 2021-01-21 RX ADMIN — OXYCODONE HYDROCHLORIDE 10 MILLIGRAM(S): 5 TABLET ORAL at 00:05

## 2021-01-21 RX ADMIN — Medication 5 MILLIGRAM(S): at 21:14

## 2021-01-21 RX ADMIN — OXYCODONE HYDROCHLORIDE 10 MILLIGRAM(S): 5 TABLET ORAL at 01:26

## 2021-01-21 RX ADMIN — OXYCODONE HYDROCHLORIDE 10 MILLIGRAM(S): 5 TABLET ORAL at 06:15

## 2021-01-21 RX ADMIN — CELECOXIB 200 MILLIGRAM(S): 200 CAPSULE ORAL at 19:21

## 2021-01-21 RX ADMIN — Medication 1000 MILLIGRAM(S): at 13:44

## 2021-01-21 RX ADMIN — Medication 5 MILLIGRAM(S): at 13:47

## 2021-01-21 RX ADMIN — Medication 500 MILLIGRAM(S): at 04:34

## 2021-01-21 RX ADMIN — HYDROMORPHONE HYDROCHLORIDE 0.5 MILLIGRAM(S): 2 INJECTION INTRAMUSCULAR; INTRAVENOUS; SUBCUTANEOUS at 15:19

## 2021-01-21 RX ADMIN — OXYCODONE HYDROCHLORIDE 10 MILLIGRAM(S): 5 TABLET ORAL at 18:13

## 2021-01-21 RX ADMIN — OXYCODONE HYDROCHLORIDE 10 MILLIGRAM(S): 5 TABLET ORAL at 23:14

## 2021-01-21 RX ADMIN — HYDROMORPHONE HYDROCHLORIDE 2 MILLIGRAM(S): 2 INJECTION INTRAMUSCULAR; INTRAVENOUS; SUBCUTANEOUS at 23:15

## 2021-01-21 RX ADMIN — OXYCODONE HYDROCHLORIDE 10 MILLIGRAM(S): 5 TABLET ORAL at 08:09

## 2021-01-21 RX ADMIN — OXYCODONE HYDROCHLORIDE 10 MILLIGRAM(S): 5 TABLET ORAL at 12:22

## 2021-01-21 NOTE — PROGRESS NOTE ADULT - SUBJECTIVE AND OBJECTIVE BOX
Progress Note    Post Op Day # 2    76y Male  s/p L3-4-5 Laminectomy, bilateral    SUBJECTIVE    Patient seen and examined at bedside.   Complains of lower back discomfort. No radiculopathy  Denies CP, SOB, N/V/D, weakness, numbness     OBJECTIVE     Vital Signs Last 24 Hrs  T(C): 36.7 (21 Jan 2021 07:42), Max: 37.3 (20 Jan 2021 23:30)  T(F): 98 (21 Jan 2021 07:42), Max: 99.1 (20 Jan 2021 23:30)  HR: 62 (21 Jan 2021 07:42) (47 - 62)  BP: 120/66 (21 Jan 2021 07:42) (116/63 - 136/72)  BP(mean): --  RR: 18 (21 Jan 2021 07:42) (16 - 18)  SpO2: 97% (21 Jan 2021 07:42) (95% - 100%)  I&O's Summary    20 Jan 2021 07:01  -  21 Jan 2021 07:00  --------------------------------------------------------  IN: 1200 mL / OUT: 1990 mL / NET: -790 mL        PHYSICAL EXAM    Surgical incision is C/D/I  No erythema, no exudate, no ecchymosis  No drainage from hemovac overnight  Hemovac removed. Well tolerated by patient  Distal pulses (+2) bilaterally  Calves supple/nontender bilaterally  Sensation grossly intact to light touch bilaterally  EHL/FHL intact bilaterally    LABS                                 13.2   11.65<H> )-----------( 183      ( 21 Jan 2021 08:04 )             40.5   21 Jan 2021 08:04                        13.0   13.80<H> )-----------( 203      ( 20 Jan 2021 07:32 )             39.1   20 Jan 2021 07:32    21 Jan 2021 08:03    138    |  104    |  11     ----------------------------<  106<H>  4.2     |  30     |  0.78   20 Jan 2021 07:32    138    |  105    |  14     ----------------------------<  114<H>  4.3     |  29     |  0.53     Ca    9.0        21 Jan 2021 08:03  Ca    9.2        20 Jan 2021 07:32        ASSESSMENT AND PLAN:     -  Pain management as recommended by Dr. Gavin  -  DVT prophylaxis: SCDs       -  PT/OT: Weight bearing as tolerated, spine precautions   -  Incentive spirometry  - Follow up labs  - Disposition: Home when cleared by medicine, PT, and OT

## 2021-01-21 NOTE — PROGRESS NOTE ADULT - SUBJECTIVE AND OBJECTIVE BOX
Patient is a 76y old  Male who presents with a chief complaint of Lumbar laminectomy (20 Jan 2021 10:56)      INTERVAL HPI/OVERNIGHT EVENTS:  no overnight events    MEDICATIONS  (STANDING):  acetaminophen   Tablet .. 1000 milliGRAM(s) Oral every 8 hours  lactated ringers. 1000 milliLiter(s) (100 mL/Hr) IV Continuous <Continuous>  oxyCODONE  ER Tablet 10 milliGRAM(s) Oral <User Schedule>  senna 2 Tablet(s) Oral at bedtime    MEDICATIONS  (PRN):  aluminum hydroxide/magnesium hydroxide/simethicone Suspension 30 milliLiter(s) Oral every 12 hours PRN Indigestion  diazepam    Tablet 5 milliGRAM(s) Oral every 8 hours PRN muscle spasms  HYDROmorphone  Injectable 0.5 milliGRAM(s) IV Push every 3 hours PRN breakthrough pain  magnesium hydroxide Suspension 30 milliLiter(s) Oral every 12 hours PRN Constipation  ondansetron Injectable 4 milliGRAM(s) IV Push every 6 hours PRN Nausea  oxyCODONE    IR 10 milliGRAM(s) Oral every 4 hours PRN Severe Pain (7 - 10)      Allergies    Keflex (Rash)    Intolerances        Vital Signs Last 24 Hrs  T(C): 36.7 (21 Jan 2021 07:42), Max: 37.3 (20 Jan 2021 23:30)  T(F): 98 (21 Jan 2021 07:42), Max: 99.1 (20 Jan 2021 23:30)  HR: 62 (21 Jan 2021 07:42) (47 - 62)  BP: 120/66 (21 Jan 2021 07:42) (116/63 - 136/72)  BP(mean): --  RR: 18 (21 Jan 2021 07:42) (16 - 18)  SpO2: 97% (21 Jan 2021 07:42) (95% - 100%)    PHYSICAL EXAM:  GENERAL: NAD, well-groomed, well-developed  HEAD:  Atraumatic, Normocephalic  ENMT: Moist mucous membranes,   NECK: Supple, No JVD, Normal thyroid  NERVOUS SYSTEM:  All 4 extremities mobile, no gross sensory deficits.   CHEST/LUNG: Clear to auscultation bilaterally; No rales, rhonchi, wheezing, or rubs  HEART: Regular rate and rhythm; No murmurs, rubs, or gallops  ABDOMEN: Soft, Nontender, Nondistended; Bowel sounds present  EXTREMITIES:  2+ Peripheral Pulses, No clubbing, cyanosis, or edema    LABS:                        13.2   11.65 )-----------( 183      ( 21 Jan 2021 08:04 )             40.5     21 Jan 2021 08:03    138    |  104    |  11     ----------------------------<  106    4.2     |  30     |  0.78     Ca    9.0        21 Jan 2021 08:03          CAPILLARY BLOOD GLUCOSE          RADIOLOGY & ADDITIONAL TESTS:    Imaging Personally Reviewed:  [ ] YES     Consultant(s) Notes Reviewed:      Care Discussed with Consultants/Other Providers:    Advanced Directives: [ ] DNR  [ ] No feeding tube  [ ] MOLST in chart  [ ] MOLST completed today  [ ] Unknown   Patient is a 76y old  Male who presents with a chief complaint of Lumbar laminectomy (20 Jan 2021 10:56)      INTERVAL HPI/OVERNIGHT EVENTS:  no overnight events  seen by Dr. Gonzalez today.     MEDICATIONS  (STANDING):  acetaminophen   Tablet .. 1000 milliGRAM(s) Oral every 8 hours  lactated ringers. 1000 milliLiter(s) (100 mL/Hr) IV Continuous <Continuous>  oxyCODONE  ER Tablet 10 milliGRAM(s) Oral <User Schedule>  senna 2 Tablet(s) Oral at bedtime    MEDICATIONS  (PRN):  aluminum hydroxide/magnesium hydroxide/simethicone Suspension 30 milliLiter(s) Oral every 12 hours PRN Indigestion  diazepam    Tablet 5 milliGRAM(s) Oral every 8 hours PRN muscle spasms  HYDROmorphone  Injectable 0.5 milliGRAM(s) IV Push every 3 hours PRN breakthrough pain  magnesium hydroxide Suspension 30 milliLiter(s) Oral every 12 hours PRN Constipation  ondansetron Injectable 4 milliGRAM(s) IV Push every 6 hours PRN Nausea  oxyCODONE    IR 10 milliGRAM(s) Oral every 4 hours PRN Severe Pain (7 - 10)      Allergies    Keflex (Rash)    Intolerances        Vital Signs Last 24 Hrs  T(C): 36.7 (21 Jan 2021 07:42), Max: 37.3 (20 Jan 2021 23:30)  T(F): 98 (21 Jan 2021 07:42), Max: 99.1 (20 Jan 2021 23:30)  HR: 62 (21 Jan 2021 07:42) (47 - 62)  BP: 120/66 (21 Jan 2021 07:42) (116/63 - 136/72)  BP(mean): --  RR: 18 (21 Jan 2021 07:42) (16 - 18)  SpO2: 97% (21 Jan 2021 07:42) (95% - 100%)    PHYSICAL EXAM:  GENERAL: NAD, well-groomed, well-developed  HEAD:  Atraumatic, Normocephalic  ENMT: Moist mucous membranes,   NECK: Supple, No JVD, Normal thyroid  NERVOUS SYSTEM:  All 4 extremities mobile, no gross sensory deficits.   CHEST/LUNG: Clear to auscultation bilaterally; No rales, rhonchi, wheezing, or rubs  HEART: Regular rate and rhythm; No murmurs, rubs, or gallops  ABDOMEN: Soft, Nontender, Nondistended; Bowel sounds present  EXTREMITIES:  2+ Peripheral Pulses, No clubbing, cyanosis, or edema    LABS:                        13.2   11.65 )-----------( 183      ( 21 Jan 2021 08:04 )             40.5     21 Jan 2021 08:03    138    |  104    |  11     ----------------------------<  106    4.2     |  30     |  0.78     Ca    9.0        21 Jan 2021 08:03          CAPILLARY BLOOD GLUCOSE          RADIOLOGY & ADDITIONAL TESTS:    Imaging Personally Reviewed:  [ ] YES     Consultant(s) Notes Reviewed:      Care Discussed with Consultants/Other Providers:    Advanced Directives: [ ] DNR  [ ] No feeding tube  [ ] MOLST in chart  [ ] MOLST completed today  [ ] Unknown

## 2021-01-21 NOTE — PROGRESS NOTE ADULT - ASSESSMENT
76M with Lumbar Spinal Stenosis, MARIAMA, Prostate Cancer, and Anxiety / Depression, admitted for aftercare following Lumbar Laminectomy     S/P Lumbar Laminectomy POD 2  Continue Bowel and pain control with PCA  PCA to be discontinued today and transition to oral Oxycontin  Patient of Dr. Gavin (pain management) to be seen later today  Incentive Spirometer for lung expansion.  Work with PT to increase ambulation as per orthopedics.  Monitor Hgb and follow up electrolytes.   Orthopedics on board and following    MARIAMA  No CPAP  Telemetry Monitoring  Avoid sedating medications     Anxiety/Depression   Xanax PRN     Diet  Regular    DVT Prophylaxis  SCD     Disposition  Full Code/Inpatient  Discharge as per Ortho    76M with Lumbar Spinal Stenosis, MARIAMA, Prostate Cancer, and Anxiety / Depression, admitted for aftercare following Lumbar Laminectomy     S/P Lumbar Laminectomy POD 2  Continue Bowel and pain control with PCA  PCA to be discontinued today and transition to oral Oxycontin  Patient of Dr. Gavin (pain management)   Incentive Spirometer for lung expansion.  Work with PT to increase ambulation as per orthopedics.  Monitor Hgb and follow up electrolytes.   Orthopedics on board and following, seen by Dr. Gonzalez, recommending valium(spasms) and dilaudid for pain control and celebrex BID.  Recommending Brace.  He will go to pain management and see if anybody can see pt.   Pt doesn't want to go to rehab.     MARIAMA  No CPAP  Telemetry Monitoring  Avoid sedating medications     Anxiety/Depression   Xanax PRN     Diet  Regular    DVT Prophylaxis  SCD     Disposition  Full Code/Inpatient  Discharge as per Ortho

## 2021-01-21 NOTE — CHART NOTE - NSCHARTNOTEFT_GEN_A_CORE
Called to evaluate patient for chest pain.  Happened after drinking some juice and felt like it was "heartburn" according to patient. 12 Lead EKG with sinus bradycardia and no ST-T segment changes.  Will get Troponins.  Doubt cardiac in nature. Pain has now resolved.
Order received from ortho pa to fit patient with LSO to be used following surgery.  Pt measured and brace adhusted  Pt instructed to don and doff  Written instructions left at bedside  Follow up if needed      Bernabe Agosto [place]  [phone]

## 2021-01-22 ENCOUNTER — TRANSCRIPTION ENCOUNTER (OUTPATIENT)
Age: 76
End: 2021-01-22

## 2021-01-22 VITALS
TEMPERATURE: 98 F | SYSTOLIC BLOOD PRESSURE: 115 MMHG | HEART RATE: 62 BPM | OXYGEN SATURATION: 96 % | DIASTOLIC BLOOD PRESSURE: 72 MMHG | RESPIRATION RATE: 18 BRPM

## 2021-01-22 PROCEDURE — 86900 BLOOD TYPING SEROLOGIC ABO: CPT

## 2021-01-22 PROCEDURE — 97116 GAIT TRAINING THERAPY: CPT

## 2021-01-22 PROCEDURE — 84484 ASSAY OF TROPONIN QUANT: CPT

## 2021-01-22 PROCEDURE — 97161 PT EVAL LOW COMPLEX 20 MIN: CPT

## 2021-01-22 PROCEDURE — 88311 DECALCIFY TISSUE: CPT

## 2021-01-22 PROCEDURE — 85025 COMPLETE CBC W/AUTO DIFF WBC: CPT

## 2021-01-22 PROCEDURE — 76000 FLUOROSCOPY <1 HR PHYS/QHP: CPT

## 2021-01-22 PROCEDURE — 86901 BLOOD TYPING SEROLOGIC RH(D): CPT

## 2021-01-22 PROCEDURE — 88304 TISSUE EXAM BY PATHOLOGIST: CPT

## 2021-01-22 PROCEDURE — 36415 COLL VENOUS BLD VENIPUNCTURE: CPT

## 2021-01-22 PROCEDURE — C1889: CPT

## 2021-01-22 PROCEDURE — U0003: CPT

## 2021-01-22 PROCEDURE — 86850 RBC ANTIBODY SCREEN: CPT

## 2021-01-22 PROCEDURE — 97530 THERAPEUTIC ACTIVITIES: CPT

## 2021-01-22 PROCEDURE — 85027 COMPLETE CBC AUTOMATED: CPT

## 2021-01-22 PROCEDURE — 99232 SBSQ HOSP IP/OBS MODERATE 35: CPT

## 2021-01-22 PROCEDURE — 97110 THERAPEUTIC EXERCISES: CPT

## 2021-01-22 PROCEDURE — 80048 BASIC METABOLIC PNL TOTAL CA: CPT

## 2021-01-22 PROCEDURE — U0005: CPT

## 2021-01-22 PROCEDURE — 93005 ELECTROCARDIOGRAM TRACING: CPT

## 2021-01-22 RX ORDER — HYDROMORPHONE HYDROCHLORIDE 2 MG/ML
1 INJECTION INTRAMUSCULAR; INTRAVENOUS; SUBCUTANEOUS
Qty: 30 | Refills: 0
Start: 2021-01-22

## 2021-01-22 RX ORDER — CELECOXIB 200 MG/1
1 CAPSULE ORAL
Qty: 14 | Refills: 0
Start: 2021-01-22 | End: 2021-01-28

## 2021-01-22 RX ORDER — DIAZEPAM 5 MG
1 TABLET ORAL
Qty: 10 | Refills: 0
Start: 2021-01-22

## 2021-01-22 RX ADMIN — HYDROMORPHONE HYDROCHLORIDE 2 MILLIGRAM(S): 2 INJECTION INTRAMUSCULAR; INTRAVENOUS; SUBCUTANEOUS at 08:01

## 2021-01-22 RX ADMIN — HYDROMORPHONE HYDROCHLORIDE 2 MILLIGRAM(S): 2 INJECTION INTRAMUSCULAR; INTRAVENOUS; SUBCUTANEOUS at 15:38

## 2021-01-22 RX ADMIN — Medication 1000 MILLIGRAM(S): at 12:06

## 2021-01-22 RX ADMIN — Medication 5 MILLIGRAM(S): at 12:11

## 2021-01-22 RX ADMIN — Medication 5 MILLIGRAM(S): at 05:06

## 2021-01-22 RX ADMIN — OXYCODONE HYDROCHLORIDE 10 MILLIGRAM(S): 5 TABLET ORAL at 05:06

## 2021-01-22 RX ADMIN — Medication 1000 MILLIGRAM(S): at 05:06

## 2021-01-22 RX ADMIN — HYDROMORPHONE HYDROCHLORIDE 2 MILLIGRAM(S): 2 INJECTION INTRAMUSCULAR; INTRAVENOUS; SUBCUTANEOUS at 03:06

## 2021-01-22 RX ADMIN — OXYCODONE HYDROCHLORIDE 10 MILLIGRAM(S): 5 TABLET ORAL at 17:34

## 2021-01-22 RX ADMIN — CELECOXIB 200 MILLIGRAM(S): 200 CAPSULE ORAL at 08:01

## 2021-01-22 RX ADMIN — OXYCODONE HYDROCHLORIDE 10 MILLIGRAM(S): 5 TABLET ORAL at 12:05

## 2021-01-22 NOTE — PROGRESS NOTE ADULT - PROVIDER SPECIALTY LIST ADULT
Orthopedics
Orthopedics
Pharmacy
Pre-Surgical Testing
Hospitalist
Hospitalist
Orthopedics
Orthopedics
Hospitalist

## 2021-01-22 NOTE — DISCHARGE NOTE NURSING/CASE MANAGEMENT/SOCIAL WORK - PATIENT PORTAL LINK FT
You can access the FollowMyHealth Patient Portal offered by [facility] by registering at the following website: [email or web address]. By joining [facility]’s FollowMyHealth portal, you will also be able to view your health information using other applications (apps) compatible with our system.

## 2021-01-22 NOTE — PROGRESS NOTE ADULT - ASSESSMENT
76M with Lumbar Spinal Stenosis, MARIAMA, Prostate Cancer, and Anxiety / Depression, admitted for aftercare following Lumbar Laminectomy     S/P Lumbar Laminectomy POD 2  Continue Bowel and pain control with PCA  PCA to be discontinued today and transition to oral Oxycontin  Patient of Dr. Gavin (pain management)   Incentive Spirometer for lung expansion.  Work with PT to increase ambulation as per orthopedics.  Monitor Hgb and follow up electrolytes.   Orthopedics on board and following, seen by Dr. Gonzalez, recommending valium(spasms) and dilaudid for pain control and celebrex BID.  Recommending Brace.  He will go to pain management and see if anybody can see pt.   Pt doesn't want to go to rehab.   medically stable for dc    MARIAMA  No CPAP  Telemetry Monitoring  Avoid sedating medications     Anxiety/Depression   Xanax PRN     Diet  Regular    DVT Prophylaxis  SCD     Disposition  Full Code/Inpatient  Discharge as per Ortho

## 2021-01-22 NOTE — PROGRESS NOTE ADULT - SUBJECTIVE AND OBJECTIVE BOX
Orthopedic Daily Progress Note    S: Patient seen and examined, lying on his right side in bed. He states his pain is better controlled on the dilaudid, however he has not yet gotten out of bed today. He states his leg symptoms have improved since the surgery. He did have some right leg cramping earlier this morning, which resolved on its own. He denies any new numbness or tingling.     Pain Rx:  acetaminophen   Tablet .. 1000 milliGRAM(s) Oral every 8 hours  celecoxib 200 milliGRAM(s) Oral every 12 hours  diazepam    Tablet 5 milliGRAM(s) Oral every 6 hours PRN  HYDROmorphone   Tablet 2 milliGRAM(s) Oral every 4 hours PRN  HYDROmorphone  Injectable 0.5 milliGRAM(s) IV Push every 3 hours PRN  oxyCODONE  ER Tablet 10 milliGRAM(s) Oral <User Schedule>    O: On exam, No Apparent Distress; Alert & Oriented   T(C): 36.3 (01-22-21 @ 07:59), Max: 36.6 (01-21-21 @ 23:12)  HR: 62 (01-22-21 @ 07:59) (57 - 69)  BP: 108/66 (01-22-21 @ 07:59) (108/66 - 126/76)  RR: 18 (01-22-21 @ 07:59) (16 - 18)  SpO2: 100% (01-22-21 @ 07:59) (97% - 100%)  Wt(kg): --      Lumbar: dressing with mild serosanguinous drainage. New gauze and tegaderm applied. Incision c/d/i. Steri-strips in place.  Neurologic: Sensation intact to light touch to bilateral lower extremities.   Vascular: Toes warm, pink. DP palpable 2+.   Motor: TA/GS/EHL/quad/ham 5/5 bilaterally  VTEP:  On Venodynes bilat LE        Labs:               13.2   11.65 )-----------( 183      ( 21 Jan 2021 08:04 )             40.5   01-21    138  |  104  |  11  ----------------------------<  106<H>  4.2   |  30  |  0.78    Ca    9.0      21 Jan 2021 08:03              A: Orthopedically stable post-op    PLAN:   Cont. PT, OT for ambulation  Continue pain control  Follow up medicine recommendations  Discharge planning for home today if cleared by medicine and PT/OT

## 2021-01-22 NOTE — PROGRESS NOTE ADULT - SUBJECTIVE AND OBJECTIVE BOX
Patient is a 76y old  Male who presents with a chief complaint of Lumbar laminectomy (20 Jan 2021 10:56)      INTERVAL HPI/OVERNIGHT EVENTS:  no overnight events.  passing gas.  Still having back pain.  Received brace yesterday  MEDICATIONS  (STANDING):  acetaminophen   Tablet .. 1000 milliGRAM(s) Oral every 8 hours  celecoxib 200 milliGRAM(s) Oral every 12 hours  oxyCODONE  ER Tablet 10 milliGRAM(s) Oral <User Schedule>  senna 2 Tablet(s) Oral at bedtime    MEDICATIONS  (PRN):  aluminum hydroxide/magnesium hydroxide/simethicone Suspension 30 milliLiter(s) Oral every 12 hours PRN Indigestion  diazepam    Tablet 5 milliGRAM(s) Oral every 6 hours PRN muscle spasms  HYDROmorphone   Tablet 2 milliGRAM(s) Oral every 4 hours PRN Severe Pain (7 - 10)  HYDROmorphone  Injectable 0.5 milliGRAM(s) IV Push every 3 hours PRN breakthrough pain  magnesium hydroxide Suspension 30 milliLiter(s) Oral every 12 hours PRN Constipation  ondansetron Injectable 4 milliGRAM(s) IV Push every 6 hours PRN Nausea      Allergies    Keflex (Rash)    Intolerances        Vital Signs Last 24 Hrs  T(C): 36.3 (22 Jan 2021 07:59), Max: 36.6 (21 Jan 2021 23:12)  T(F): 97.4 (22 Jan 2021 07:59), Max: 97.9 (21 Jan 2021 23:12)  HR: 62 (22 Jan 2021 07:59) (57 - 81)  BP: 108/66 (22 Jan 2021 07:59) (108/66 - 126/76)  BP(mean): --  RR: 18 (22 Jan 2021 07:59) (16 - 18)  SpO2: 100% (22 Jan 2021 07:59) (97% - 100%)    PHYSICAL EXAM:  GENERAL: NAD, well-groomed, well-developed  HEAD:  Atraumatic, Normocephalic  ENMT: Moist mucous membranes,   NECK: Supple, No JVD, Normal thyroid  NERVOUS SYSTEM:  All 4 extremities mobile, no gross sensory deficits.   CHEST/LUNG: Clear to auscultation bilaterally; No rales, rhonchi, wheezing, or rubs  HEART: Regular rate and rhythm; No murmurs, rubs, or gallops  ABDOMEN: Soft, Nontender, Nondistended; Bowel sounds present  EXTREMITIES:  2+ Peripheral Pulses, No clubbing, cyanosis, or edema  LABS:      Ca    9.0        21 Jan 2021 08:03          CAPILLARY BLOOD GLUCOSE          RADIOLOGY & ADDITIONAL TESTS:    Imaging Personally Reviewed:  [ ] YES     Consultant(s) Notes Reviewed:      Care Discussed with Consultants/Other Providers:    Advanced Directives: [ ] DNR  [ ] No feeding tube  [ ] MOLST in chart  [ ] MOLST completed today  [ ] Unknown

## 2021-02-03 ENCOUNTER — APPOINTMENT (OUTPATIENT)
Dept: ORTHOPEDIC SURGERY | Facility: CLINIC | Age: 76
End: 2021-02-03
Payer: MEDICARE

## 2021-02-03 VITALS — HEIGHT: 67 IN | TEMPERATURE: 97.2 F

## 2021-02-03 PROCEDURE — 99024 POSTOP FOLLOW-UP VISIT: CPT

## 2021-02-03 NOTE — HISTORY OF PRESENT ILLNESS
[___ Weeks Post Op] : [unfilled] weeks post op [Clean/Dry/Intact] : clean, dry and intact [Vascular Intact] : ~T peripheral vascular exam normal [Negative Tg's] : maneuvers demonstrated a negative Tg's sign [Doing Well] : is doing well [Excellent Pain Control] : has excellent pain control [No Sign of Infection] : is showing no signs of infection [Steri-Strips Removed & Replaced] : steri-strips removed and replaced [Limited ADLs] : to participate in activities of daily living with limitations [No Work] : not to work [No Housework] : not to do housework [No Sports] : not to participate in sports [2] : the patient reports pain that is 2/10 in severity [Erythema] : not erythematous [Discharge] : absent of discharge [Swelling] : not swollen [Dehiscence] : not dehisced [de-identified] : Lumbar laminectomy\par 1/16/21 [de-identified] : Taking oxycontin 10 mg qid at baseline.  Back pain is improved significantly and he has no residual leg symptoms. [de-identified] : seen with wife, walker.\par 5 out of 5 motor strength of hip flexion knee extension flexion ankle dorsiflexion plantarflexion EHL bilaterally.  Grossly intact light touch sensation bilateral lower extremities. [de-identified] : At this time recommended physical therapy which she can start in 1 to 2 weeks as tolerated.  He can continue following up with his pain management specialist.  Overall he is pleased with the outcome of surgery.  Recommended follow-up in 1 month for a final checkup.\par \par The patient was educated regarding their condition, treatment options as well as prescribed course of treatment. \par Risks and benefits as well as alternatives to the proposed treatment were also provided to the patient \par They were given the opportunity to have all their questions answered to their satisfaction.\par \par Vital signs were reviewed with the patient and the patient was instructed to followup with their primary care provider for further management.\par \par Healthy lifestyle recommendations were also made including a tobacco free lifestyle, proper diet, and weight control. 108 ms

## 2021-02-16 ENCOUNTER — APPOINTMENT (OUTPATIENT)
Dept: ORTHOPEDIC SURGERY | Facility: CLINIC | Age: 76
End: 2021-02-16
Payer: MEDICARE

## 2021-02-16 VITALS — DIASTOLIC BLOOD PRESSURE: 81 MMHG | TEMPERATURE: 97.3 F | HEART RATE: 62 BPM | SYSTOLIC BLOOD PRESSURE: 136 MMHG

## 2021-02-16 PROCEDURE — 72110 X-RAY EXAM L-2 SPINE 4/>VWS: CPT

## 2021-02-16 PROCEDURE — 99024 POSTOP FOLLOW-UP VISIT: CPT

## 2021-02-19 ENCOUNTER — APPOINTMENT (OUTPATIENT)
Dept: ORTHOPEDIC SURGERY | Facility: CLINIC | Age: 76
End: 2021-02-19
Payer: MEDICARE

## 2021-02-19 VITALS — HEIGHT: 67 IN | TEMPERATURE: 96.8 F

## 2021-02-19 PROCEDURE — 99214 OFFICE O/P EST MOD 30 MIN: CPT

## 2021-02-19 NOTE — DISCUSSION/SUMMARY
[FreeTextEntry1] : I had a discussion regarding today's visit, the diagnosis and treatment recommendations / options.  With regard to his right thumb, as he is improved, I have recommended observation. \par \par With regard to his left middle finger, as his symptoms are relatively mild and he is not bothered by this, I have recommended observation. He will return to the office if he has worsening symptoms in the future. \par \par The patient has agreed to the above plan of management and has expressed full understanding.  All questions were fully answered to the patient's satisfaction.\par \par I time spent on this visit included: Preparation for the visit, review of the medical records, review of pertinent diagnostic studies, examination and counseling of the patient on the above diagnosis, treatment plan and prognosis, orders of diagnostic tests, medications and/or appropriate procedures and documentation in the medical records of today's visit.

## 2021-02-19 NOTE — PHYSICAL EXAM
[de-identified] : - Constitutional: This is a male in no obvious distress.  He is accompanied by his wife today.\par - Psych: Patient is alert and oriented to person, place and time.  Patient has a normal mood and affect.\par - Cardiovascular: Normal pulses throughout the upper extremities.  No significant varicosities are noted in the upper extremities. \par - Neuro: Strength and sensation are intact throughout the upper extremities.  Patient has normal coordination.\par - Respiratory:  Patient exhibits no evidence of shortness of breath or difficulty breathing.\par - Skin: No rashes, lesions, or other abnormalities are noted in the upper extremities.\par \par ---\par  \par Examination of his right hand demonstrates no residual swelling or tenderness along the CMC joint of the thumb.  There is no evidence of a trigger thumb or de Quervain's tendinitis.  He also has complaints of a mild residual cold feeling along the ulnar nerve distribution, but he states that this is improved.  He has a well-healed carpal tunnel incision.  Provocative signs for carpal, cubital tunnel syndrome as well as compression of the ulnar nerve at Guyon's canal are negative.  Grossly, he has normal sensation to light touch throughout the radial, ulnar and median nerve distributions.\par \par Examination of his left hand demonstrates no residual swelling and tenderness along the A1 pulley of the middle finger.  There is mild triggering.  There is no triggering of the other digits.  There is no localized swelling or tenderness along the CMC joint.  He has a well-healed carpal tunnel incision.  Provocative signs for carpal, cubital tunnel syndrome as well as compression of the ulnar nerve at Guyon's canal are negative.  Grossly, he has normal sensation to light touch throughout the radial, ulnar and median nerve distributions. 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

## 2021-02-22 ENCOUNTER — APPOINTMENT (OUTPATIENT)
Dept: ORTHOPEDIC SURGERY | Facility: CLINIC | Age: 76
End: 2021-02-22
Payer: MEDICARE

## 2021-02-22 VITALS
SYSTOLIC BLOOD PRESSURE: 151 MMHG | BODY MASS INDEX: 22.76 KG/M2 | HEIGHT: 67 IN | HEART RATE: 66 BPM | WEIGHT: 145 LBS | TEMPERATURE: 98.7 F | DIASTOLIC BLOOD PRESSURE: 81 MMHG

## 2021-02-22 PROCEDURE — 99024 POSTOP FOLLOW-UP VISIT: CPT

## 2021-02-22 NOTE — HISTORY OF PRESENT ILLNESS
[___ Months Post Op] : [unfilled] months post op [Doing Well] : is doing well [No Sign of Infection] : is showing no signs of infection [Adequate Pain Control] : has adequate pain control [Limited ADLs] : to participate in activities of daily living with limitations [No Work] : not to work [Limited Housework] : to do housework with limitations [No Sports] : not to participate in sports [de-identified] : Lumbar laminectomy 1/19/21 L2-4, L4-5 [de-identified] : Patient is here today due to seeing Melina GARCIA on 2/16/21 due to pain in his lower back xrays were done and was told everything was good to see Dr. Gonzalez this week. Patient states he is not sleeping wants mri lumbar spine.\par Pain along right buttock with prolonged laying down. [de-identified] : seen with his wife\par LSO\par 5 out of 5 motor strength of hip flexion knee extension flexion ankle dorsiflexion plantarflexion EHL bilaterally. Grossly intact light touch sensation bilateral lower extremities. The surgical incision site(s) was clean, dry and intact, not erythematous, absent of discharge, not swollen and not dehisced. Additional findings included peripheral vascular exam normal and maneuvers demonstrated a negative Tg's sign. \par cane [de-identified] : The patient reports episode of increased right buttock and lateral thigh pain after straining a bowel movement last week.  His symptoms appear to be very positional when he is laying on his right side at night.  On exam today he is able to move without pain and there are no focal neurologic deficits that are asymmetric.  His symptoms are suggestive of likely neural irritation without any focal deficits noted on exam.  Recommended a trial of oral steroids and gabapentin which were prescribed today.  Recommended use of a lumbar corset at this time.  Recommended that he contact the office in a week to give an update on symptoms.  If there is persistence or progression of symptoms MRI lumbar spine with and without contrast will be recommended.  Physical therapy was also prescribed for him which he may start at this time.\par \par The patient was educated regarding their condition, treatment options as well as prescribed course of treatment. \par Risks and benefits as well as alternatives to the proposed treatment were also provided to the patient \par They were given the opportunity to have all their questions answered to their satisfaction.\par \par Vital signs were reviewed with the patient and the patient was instructed to followup with their primary care provider for further management.\par \par Healthy lifestyle recommendations were also made including a tobacco free lifestyle, proper diet, and weight control.

## 2021-03-03 ENCOUNTER — APPOINTMENT (OUTPATIENT)
Dept: ORTHOPEDIC SURGERY | Facility: CLINIC | Age: 76
End: 2021-03-03

## 2021-05-12 ENCOUNTER — APPOINTMENT (OUTPATIENT)
Dept: ORTHOPEDIC SURGERY | Facility: CLINIC | Age: 76
End: 2021-05-12
Payer: MEDICARE

## 2021-05-12 VITALS — SYSTOLIC BLOOD PRESSURE: 146 MMHG | HEART RATE: 45 BPM | TEMPERATURE: 97.6 F | DIASTOLIC BLOOD PRESSURE: 80 MMHG

## 2021-05-12 PROCEDURE — 99213 OFFICE O/P EST LOW 20 MIN: CPT

## 2021-05-12 NOTE — DISCUSSION/SUMMARY
[Medication Risks Reviewed] : Medication risks reviewed [de-identified] : The patient complains of right anterior lateral tibial spasms.  He is also reported left-sided low back pain rating to his left buttock region.  At this time recommended MRI lumbar spine with and without contrast to better assess his condition.  Also recommended he follow-up with his pain management specialist Dr. Gavin for management of his pain symptoms.  I will see him back after the MRI to discuss further treatment options as appropriate.  He can continue taking gabapentin 300 mg twice a day.  His OxyContin 10 mg 4 times a day is being managed by his pain management specialist.\par \par The patient was educated regarding their condition, treatment options as well as prescribed course of treatment. \par Risks and benefits as well as alternatives to the proposed treatment were also provided to the patient \par They were given the opportunity to have all their questions answered to their satisfaction.\par \par Vital signs were reviewed with the patient and the patient was instructed to followup with their primary care provider for further management.\par \par Healthy lifestyle recommendations were also made including a tobacco free lifestyle, proper diet, and weight control.

## 2021-05-12 NOTE — HISTORY OF PRESENT ILLNESS
[Worsening] : worsening [___ mths] : [unfilled] month(s) ago [9] : a current pain level of 9/10 [Prolonged Sitting] : worsened by prolonged sitting [Prolonged Standing] : worsened by prolonged standing [Walking] : worsened by walking [de-identified] : Patient presents today with worsening pain x 3 months.  Patient states on 2/14/21, patient was sitting on the toilet having a bowel and felt sharp pain to left hip and to surgical site.  \par Feels spasms right anterolateral tibia occasionally at night when sleeping\par Patient states the pain has not gone away at this point.  Patient states the pain to left hip is 5/10.\par Patient also complains of stabbing pains to hip and low back  and cramping/tightening to outside right leg x 2 months; pain level 7/10.  Pain worsens in the evening.\par Started PT 4/12/21, twice a week, not helping\par Is on oxycontin 10 mg 4 times a day\par took aleve in the past.\par Gabapentin 600 mg qd\par \par Patient is taking Oxycontin 10mg four times daily which does not really help.  Takes Gabapentin 600mg once at night does not help.  Patient states he can only sleep for an hour before he has to wake up.\par Currently doing PT twice a week and does not feel like it is really helping.\par  [de-identified] : movement, laying down, stairs [de-identified] : Oxycontin 10mg [Doing Well] : is doing well [No Sign of Infection] : is showing no signs of infection [Adequate Pain Control] : has adequate pain control [Limited ADLs] : to participate in activities of daily living with limitations [No Work] : not to work [Limited Housework] : to do housework with limitations [No Sports] : not to participate in sports [de-identified] : seen with his wife\par LSO\par 5 out of 5 motor strength of hip flexion knee extension flexion ankle dorsiflexion plantarflexion EHL bilaterally. Grossly intact light touch sensation bilateral lower extremities. The surgical incision site(s) was clean, dry and intact, not erythematous, absent of discharge, not swollen and not dehisced. Additional findings included peripheral vascular exam normal and maneuvers demonstrated a negative Tg's sign. \par cane

## 2021-05-12 NOTE — REASON FOR VISIT
[Follow-Up Visit] : a follow-up visit for [Back Pain] : back pain [FreeTextEntry2] : S/p lumbar laminectomy 1/19/21

## 2021-05-21 ENCOUNTER — OUTPATIENT (OUTPATIENT)
Dept: OUTPATIENT SERVICES | Facility: HOSPITAL | Age: 76
LOS: 1 days | End: 2021-05-21
Payer: MEDICARE

## 2021-05-21 ENCOUNTER — APPOINTMENT (OUTPATIENT)
Dept: MRI IMAGING | Facility: CLINIC | Age: 76
End: 2021-05-21
Payer: MEDICARE

## 2021-05-21 DIAGNOSIS — Z98.890 OTHER SPECIFIED POSTPROCEDURAL STATES: Chronic | ICD-10-CM

## 2021-05-21 DIAGNOSIS — Z96.642 PRESENCE OF LEFT ARTIFICIAL HIP JOINT: Chronic | ICD-10-CM

## 2021-05-21 DIAGNOSIS — Z96.641 PRESENCE OF RIGHT ARTIFICIAL HIP JOINT: Chronic | ICD-10-CM

## 2021-05-21 DIAGNOSIS — Z98.890 OTHER SPECIFIED POSTPROCEDURAL STATES: ICD-10-CM

## 2021-05-21 DIAGNOSIS — Z96.651 PRESENCE OF RIGHT ARTIFICIAL KNEE JOINT: Chronic | ICD-10-CM

## 2021-05-21 PROCEDURE — A9585: CPT

## 2021-05-21 PROCEDURE — 72158 MRI LUMBAR SPINE W/O & W/DYE: CPT

## 2021-05-21 PROCEDURE — 72158 MRI LUMBAR SPINE W/O & W/DYE: CPT | Mod: 26,MH

## 2021-06-02 ENCOUNTER — APPOINTMENT (OUTPATIENT)
Dept: ORTHOPEDIC SURGERY | Facility: CLINIC | Age: 76
End: 2021-06-02
Payer: MEDICARE

## 2021-06-02 VITALS — SYSTOLIC BLOOD PRESSURE: 135 MMHG | HEART RATE: 50 BPM | DIASTOLIC BLOOD PRESSURE: 70 MMHG | TEMPERATURE: 97 F

## 2021-06-02 PROCEDURE — 99214 OFFICE O/P EST MOD 30 MIN: CPT

## 2021-06-02 NOTE — PHYSICAL EXAM
[Normal] : Gait: normal [UE/LE] : Sensory: Intact in bilateral upper & lower extremities [1+] : left ankle jerk 1+ [Medrano's Sign] : negative Medrano's sign [Plantar Reflex Right Only] : absent on the right [Plantar Reflex Left Only] : absent on the left [DTR Reflexes Clonus Of Right Ankle (___ Beats)] : absent on the right [DTR Reflexes Clonus Of Left Ankle (___ Beats)] : absent on the left [de-identified] : The pt is awake, alert and oriented to self, place and time, is comfortable and in no acute distress. Gait examination reveals a narrow based, non-ataxic, non-antalgic gait. The pt can heel and toe walk without difficulty. No rashes or ecchymotic lesions noted over the neck, back and lower extremities bilaterally. No obvious abnormal spinal curvature in the sagittal and coronal planes. No tenderness over the cervical, thoracic or lumbar spine, paraspinal or upper and lower extremity musculature. There is no sacroiliac tenderness bilaterally. No tenderness over the greater trochanter bilaterally. No atrophy or abnormal movements noted in the upper or lower extremities bilaterally. No swelling seen in the upper or lower extremities bilaterally. No joint laxity noted in the upper and lower extremity joints bilaterally.\par No cervical lymphadenopathy noted anteriorly. \par Cervical spine range of motion is limited by discomfort at end range of motion. Forward flexion of chin to chest, extension 30 degrees, rotation laterally 20 degrees to the left and 30 degrees to the right. Full range of motion of both shoulders. Negative Spurling's sign bilaterally. There is a negative Neer's sign and Hawkin's sign bilaterally. \par Lumbar spine range of motion is limited by discomfort. Forward flexion to mid tibia, and extension 30 degrees without pain. Range of motion of hip is internal rotation 20 degrees,  30° external rotation without pain\par Negative straight leg raise to 60° in the supine position. No groin pain with hip internal rotation, negative RANJITH test bilaterally. There are 2+ DP pulses bilaterally. There is a negative Babinski sign and no clonus bilaterally in the upper or lower extremities. [de-identified] : seen with his wife\par healed lumbar incision [de-identified] : EXAM: MR SPINE LUMBAR WAW IC\par \par PROCEDURE DATE: 05/21/2021\par \par INTERPRETATION: CLINICAL INDICATION: Back and leg pain. Status post laminectomy in January 2021.\par \par Multiplanar Multisequence MR of the MR spine was performed with and without intravenous contrast.\par \par 7.5 cc of Gadavist was administered intravenously. 0 cc was discarded.\par \par Prior Studies: MRI of the lumbar spine from November 19, 2020.\par \par FINDINGS:\par \par POSTSURGICAL FINDINGS: The patient is status post laminectomy at the level of L4/L5. Granulation tissue seen within the post laminectomy bed with foci of susceptibility artifact likely related to micrometallic debris. There is no peripherally enhancing fluid collection.\par \par ALIGNMENT: There is unchanged trace retrolisthesis of L1 on L2 and L2 on L3. There is unchanged mild dextro scoliosis.\par \par VERTEBRAL BODIES: No acute compression deformity.\par \par DISC SPACES: There is multilevel disc desiccation with multilevel moderate to severe disc height loss and chronic Schmorl's node formation. This is grossly unchanged in appearance.\par \par MARROW: Edematous endplate changes are seen at L3/L4, L4/L5, and L5/S1 which appears slightly more prominent compared to the prior examination.\par \par SACROILIAC JOINTS: Intact.\par \par CONUS AND CAUDA EQUINA: Conus is normal in morphology terminating at the level of L1. There is no abnormal intramedullary or leptomeningeal enhancement.\par \par IMAGED ABDOMINAL AND PELVIC STRUCTURES: Scattered renal cysts are noted bilaterally. There is colonic diverticulosis.\par \par The findings at the individual levels are as follows:\par \par T11/T12: This level is imaged only in the sagittal plane. There is mild diffuse disc bulge resulting in flattening of the ventral thecal sac near contacting of the cord. There is mild to moderate bilateral neural foraminal narrowing. Findings appear grossly unchanged.\par \par T12/L1: There is a minimal disc bulge with mild bilateral facet arthrosis. There is no spinal canal or neural foraminal narrowing. This is grossly unchanged.\par \par L1/2: There is a diffuse disc bulge with posterior osseous ridging and bilateral facet arthrosis. There is left greater than right uncovertebral hypertrophy. Findings result in mild spinal canal narrowing and moderate bilateral neural foraminal narrowing. Findings appear grossly unchanged.\par \par L2/3: There is a diffuse disc bulge posterior osseous ridging. There is bilateral facet arthrosis and ligamentum flavum hypertrophy. Findings result in moderate spinal canal narrowing and moderate bilateral neural foraminal narrowing. Findings are grossly unchanged.\par \par L3/4: There is a diffuse disc bulge posterior osseous ridging and bilateral facet arthrosis. There is mild spinal canal narrowing which is improved compared to the prior examination. There is unchanged severe left and moderate to severe right neural foraminal narrowing. There is mild enhancing granulation tissue along the lateral aspect of the epidural region bilaterally.\par \par L4/5: Patient is status post posterior decompression at this level. Mild diffuse disc bulge with posterior osseous ridging. There is mild effacement of the bilateral lateral recesses. There is no central canal narrowing. These findings are improved compared to the prior examination. There is unchanged moderate to severe bilateral neural foraminal narrowing. There is mild enhancing circumferential granulation tissue within the epidural region.\par \par L5/S1: There is a diffuse disc bulge posterior osseous ridging. There is bilateral facet arthrosis. Findings result in effacement of the bilateral lateral recesses mild central canal narrowing. There is severe left and moderate to severe right neural foraminal narrowing. Findings appear grossly unchanged.\par \par IMPRESSION:\par \par Status post posterior decompression at the level of L4/L5.\par \par Multilevel lumbar spondylosis.\par \par L3/4: There is mild spinal canal narrowing which is improved compared to the prior examination. There is unchanged severe left and moderate to severe right neural foraminal narrowing. There is mild enhancing granulation tissue along the lateral aspect of the epidural region bilaterally.\par \par L4/5: There is mild effacement of the bilateral lateral recesses. There is no central canal narrowing. These findings are improved compared to the prior examination. There is unchanged moderate to severe bilateral neural foraminal narrowing. There is mild enhancing circumferential granulation tissue within the epidural region.\par \par MICHELE BELL MD; Attending Radiologist\par This document has been electronically signed. May 25 2021 10:22AM

## 2021-06-02 NOTE — HISTORY OF PRESENT ILLNESS
[Worsening] : worsening [9] : a current pain level of 9/10 [None] : No relieving factors are noted [de-identified] : Patient is here today for MRI review 5/21/21. He states that his symptoms have been getting worse. He has taken the medication prescribed but has gotten no relief with it.\par Takes gabapentin 600 mg qhs\par s/p L2-4, L4-5 laminectomy on 1/19/21 [de-identified] : doing to much

## 2021-06-02 NOTE — DISCUSSION/SUMMARY
[Medication Risks Reviewed] : Medication risks reviewed [de-identified] : The patient has reported hypersensitivity along the medial aspect of his right thigh between the groin and the knee as well as some altered sensation on the plantar right foot.  MRI reveals improved stenosis from L2-L5 with laminectomy from L2-L4 and fenestration decompression at the L4-5 level.  Symptoms are suggestive of possible lumbosacral junctional radicular symptoms.  For his symptoms recommended trial of epidural steroid injection we will try and right L5 and S1 transforaminal epidural steroid injection at his earliest convenience.  Increase his gabapentin to 300 mg in the morning and 600 mg nightly.  He understands that at this time no clear indication exists for surgical intervention.  Recommended continued physical therapy and self-directed exercise as well.\par \par I spent over 30 minutes reviewing the patient's MRI findings as well as performing the clinical evaluation and outlining the plan of care as discussed above.\par \par The patient was educated regarding their condition, treatment options as well as prescribed course of treatment. \par Risks and benefits as well as alternatives to the proposed treatment were also provided to the patient \par They were given the opportunity to have all their questions answered to their satisfaction.\par \par Vital signs were reviewed with the patient and the patient was instructed to followup with their primary care provider for further management.\par \par Healthy lifestyle recommendations were also made including a tobacco free lifestyle, proper diet, and weight control.

## 2021-06-02 NOTE — REASON FOR VISIT
[Follow-Up Visit] : a follow-up visit for [Family Member] : family member [FreeTextEntry2] : lumbar laminectomy 1/19/21

## 2021-06-06 ENCOUNTER — NON-APPOINTMENT (OUTPATIENT)
Age: 76
End: 2021-06-06

## 2021-06-09 PROBLEM — G56.02 CARPAL TUNNEL SYNDROME OF LEFT WRIST: Status: ACTIVE | Noted: 2021-01-06

## 2021-06-09 PROBLEM — M18.11 PRIMARY OSTEOARTHRITIS OF FIRST CARPOMETACARPAL JOINT OF RIGHT HAND: Status: ACTIVE | Noted: 2021-01-06

## 2021-06-09 PROBLEM — G56.01 CARPAL TUNNEL SYNDROME OF RIGHT WRIST: Status: ACTIVE | Noted: 2021-01-06

## 2021-06-09 PROBLEM — M65.332 TRIGGER MIDDLE FINGER OF LEFT HAND: Status: ACTIVE | Noted: 2021-01-06

## 2021-06-16 ENCOUNTER — APPOINTMENT (OUTPATIENT)
Dept: ORTHOPEDIC SURGERY | Facility: CLINIC | Age: 76
End: 2021-06-16
Payer: MEDICARE

## 2021-06-16 VITALS — TEMPERATURE: 97.6 F

## 2021-06-16 DIAGNOSIS — M65.332 TRIGGER FINGER, LEFT MIDDLE FINGER: ICD-10-CM

## 2021-06-16 DIAGNOSIS — M18.11 UNILATERAL PRIMARY OSTEOARTHRITIS OF FIRST CARPOMETACARPAL JOINT, RIGHT HAND: ICD-10-CM

## 2021-06-16 DIAGNOSIS — G56.01 CARPAL TUNNEL SYNDROME, RIGHT UPPER LIMB: ICD-10-CM

## 2021-06-16 DIAGNOSIS — G56.02 CARPAL TUNNEL SYNDROME, LEFT UPPER LIMB: ICD-10-CM

## 2021-06-16 PROCEDURE — 20550 NJX 1 TENDON SHEATH/LIGAMENT: CPT | Mod: F2,59

## 2021-06-16 PROCEDURE — 99214 OFFICE O/P EST MOD 30 MIN: CPT | Mod: 25

## 2021-06-16 PROCEDURE — 20605 DRAIN/INJ JOINT/BURSA W/O US: CPT | Mod: RT

## 2021-06-16 RX ADMIN — LIDOCAINE HYDROCHLORIDE 0.5 %: 10 INJECTION, SOLUTION INFILTRATION; PERINEURAL at 00:00

## 2021-06-16 RX ADMIN — TRIAMCINOLONE ACETONIDE 0 MG/ML: 40 INJECTION, SUSPENSION INTRA-ARTICULAR; INTRAMUSCULAR at 00:00

## 2021-06-16 RX ADMIN — METHYLPREDNISOLONE ACETATE 1 MG/ML: 40 INJECTION, SUSPENSION INTRALESIONAL; INTRAMUSCULAR; INTRASYNOVIAL; SOFT TISSUE at 00:00

## 2021-06-16 NOTE — PHYSICAL EXAM
[de-identified] : - Constitutional: This is a male in no obvious distress.  He is accompanied by his wife today.\par - Psych: Patient is alert and oriented to person, place and time.  Patient has a normal mood and affect.\par - Cardiovascular: Normal pulses throughout the upper extremities.  No significant varicosities are noted in the upper extremities. \par - Neuro: Strength and sensation are intact throughout the upper extremities.  Patient has normal coordination.\par - Respiratory:  Patient exhibits no evidence of shortness of breath or difficulty breathing.\par - Skin: No rashes, lesions, or other abnormalities are noted in the upper extremities.\par \par ---\par  \par Examination of his right hand demonstrates no residual swelling or tenderness along the CMC joint of the thumb.  There is no evidence of a trigger thumb or de Quervain's tendinitis. He has a well-healed carpal tunnel incision.  Provocative signs for carpal, cubital tunnel syndrome as well as compression of the ulnar nerve at Guyon's canal are negative.  The has normal sensation to light touch throughout the radial, ulnar and median nerve distributions.\par \par Examination of his left hand demonstrates swelling and tenderness along the A1 pulley of the middle finger.  There is mild triggering.  There is no triggering of the other digits.  There is no localized swelling or tenderness along the CMC joint.  He has a well-healed carpal tunnel incision.  Provocative signs for carpal, cubital tunnel syndrome as well as compression of the ulnar nerve at Guyon's canal are negative.  He has normal sensation to light touch throughout the radial, ulnar and median nerve distributions. 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

## 2021-06-16 NOTE — ADDENDUM
[FreeTextEntry1] : I, Zaida Salguero, acted solely as a scribe for Dr. Amado on this date 06/16/2021.

## 2021-06-16 NOTE — END OF VISIT
[FreeTextEntry3] : This note was written by Zaida Salguero on 06/16/2021 acting solely as a scribe for Dr. Derrick Amado.\par  \par All medical record entries made by the Scribe were at my, Dr. Derrick Amado, direction and personally dictated by me on 06/16/2021. I have personally reviewed the chart and agree that the record accurately reflects my personal performance of the history, physical exam, assessment and plan.

## 2021-06-16 NOTE — DISCUSSION/SUMMARY
[FreeTextEntry1] : I had a discussion regarding today's visit, the diagnosis and treatment recommendations and options.  We also discussed changes since the last visit.  With regard to the right thumb, he agreed to proceed with a repeat cortisone injection today at the CMC joint. \par \par With regard to the left middle finger trigger finger, I also recommended a cortisone injection. He agreed to proceed. \par \par The patient has agreed to the above plan of management and has expressed full understanding.  All questions were fully answered to the patient's satisfaction.\par \par My cumulative time spent on today's visit was greater than 30 minutes and included: Preparation for the visit, review of the medical records, review of pertinent diagnostic studies, examination and counseling of the patient on the above diagnosis, treatment plan and prognosis, orders of diagnostic tests, medications and/or appropriate procedures and documentation in the medical records of today's visit.

## 2021-06-16 NOTE — HISTORY OF PRESENT ILLNESS
[FreeTextEntry1] : Follow-up regarding bilateral hands.  See prior notes.  He was last seen in the office 4 months ago.  He was given a cortisone injection at his right thumb CMC joint.  He also has a left middle finger trigger finger that was not injected.  He also has compression of his left ulnar nerve at Guyon's canal.\par \par He returns today with recurrent symptoms. He reports recurrent pain at the base of his right thumb, as well as pain along his left middle finger. He denies any numbness or tingling. His pain has been waking him at night. \par \par He is status post lumbar laminectomy by Dr. Gonzalez, on 1/16/2021.\par \par EMGs from 12/28/2020 demonstrated moderate bilateral carpal tunnel syndrome, slightly worse on the right. There is also possibility of Guyon's canal entrapment.\par \par He is accompanied by his wife today.

## 2021-06-16 NOTE — PROCEDURE
[FreeTextEntry1] : - After a discussion of risks and benefits, the patient agreed to proceed with a cortisone injection.  \par -  Side Injected: Right thumb carpometacarpal joint.\par -  Medications injected: 0.5cc of 1% Lidocaine and 1cc of 40mg of Depomedrol, using sterile technique.\par -  Patient tolerated the procedure well, without complications.\par -  Patient noted immediate relief of the symptoms, secondary to the anesthetic effects of the injection.\par -  Patient was told that the pain may worsen for a day or two, and should then begin to improve.\par -  Instructions: The patient was instructed on the use of ice, anti-inflammatory agents, or Tylenol, and activity modification.\par -  Follow-up: Within 4 weeks to assess the response to the injection. \par \par -- \par \par -  After a discussion of risks and benefits, the patient agreed to proceed with a cortisone injection.  \par -  Side: Left \par -  Finger: Middle finger trigger finger\par -  Medications: 0.5 cc of 1% Lidocaine and 1 cc of Kenalog, 40mg/cc, using sterile technique.\par -  Patient tolerated the procedure well, without complications.\par -  Patient was told that the symptoms may worsen for a day or two, and should then begin to improve. \par -  Instructions: Patient was instructed on activity modification for the next several days.\par -  Follow-up: Within 4 weeks to assess response to the injection.

## 2021-06-17 RX ORDER — TRIAMCINOLONE ACETONIDE 40 MG/ML
40 SUSPENSION INTRA-ARTERIAL; INTRAMUSCULAR
Qty: 1 | Refills: 0 | Status: COMPLETED | OUTPATIENT
Start: 2021-06-16

## 2021-06-17 RX ORDER — LIDOCAINE HYDROCHLORIDE 10 MG/ML
1 INJECTION, SOLUTION INFILTRATION; PERINEURAL
Refills: 0 | Status: COMPLETED | OUTPATIENT
Start: 2021-06-16

## 2021-06-17 RX ORDER — METHYLPRED ACET/NACL,ISO-OS/PF 40 MG/ML
40 VIAL (ML) INJECTION
Qty: 1 | Refills: 0 | Status: COMPLETED | OUTPATIENT
Start: 2021-06-16

## 2021-06-18 ENCOUNTER — OUTPATIENT (OUTPATIENT)
Dept: OUTPATIENT SERVICES | Facility: HOSPITAL | Age: 76
LOS: 1 days | End: 2021-06-18
Payer: MEDICARE

## 2021-06-18 ENCOUNTER — APPOINTMENT (OUTPATIENT)
Dept: ORTHOPEDIC SURGERY | Facility: HOSPITAL | Age: 76
End: 2021-06-18

## 2021-06-18 ENCOUNTER — RESULT REVIEW (OUTPATIENT)
Age: 76
End: 2021-06-18

## 2021-06-18 DIAGNOSIS — Z96.641 PRESENCE OF RIGHT ARTIFICIAL HIP JOINT: Chronic | ICD-10-CM

## 2021-06-18 DIAGNOSIS — Z96.651 PRESENCE OF RIGHT ARTIFICIAL KNEE JOINT: Chronic | ICD-10-CM

## 2021-06-18 DIAGNOSIS — M54.16 RADICULOPATHY, LUMBAR REGION: ICD-10-CM

## 2021-06-18 DIAGNOSIS — Z96.642 PRESENCE OF LEFT ARTIFICIAL HIP JOINT: Chronic | ICD-10-CM

## 2021-06-18 DIAGNOSIS — Z98.890 OTHER SPECIFIED POSTPROCEDURAL STATES: Chronic | ICD-10-CM

## 2021-06-18 DIAGNOSIS — Z98.890 OTHER SPECIFIED POSTPROCEDURAL STATES: ICD-10-CM

## 2021-06-18 PROCEDURE — 64483 NJX AA&/STRD TFRM EPI L/S 1: CPT | Mod: RT

## 2021-06-18 PROCEDURE — 64484 NJX AA&/STRD TFRM EPI L/S EA: CPT | Mod: RT

## 2021-07-14 ENCOUNTER — APPOINTMENT (OUTPATIENT)
Dept: ORTHOPEDIC SURGERY | Facility: CLINIC | Age: 76
End: 2021-07-14
Payer: MEDICARE

## 2021-07-14 VITALS
BODY MASS INDEX: 22.76 KG/M2 | DIASTOLIC BLOOD PRESSURE: 75 MMHG | WEIGHT: 145 LBS | SYSTOLIC BLOOD PRESSURE: 146 MMHG | HEART RATE: 55 BPM | HEIGHT: 67 IN

## 2021-07-14 PROCEDURE — 99213 OFFICE O/P EST LOW 20 MIN: CPT

## 2021-07-14 NOTE — REASON FOR VISIT
[Follow-Up Visit] : a follow-up visit for [Back Pain] : back pain [Radiculopathy] : radiculopathy [FreeTextEntry2] : Lumbar laminectiomy 1/19/21

## 2021-07-14 NOTE — HISTORY OF PRESENT ILLNESS
[7] : a current pain level of 7/10 [Walking] : walking [Daily] : ~He/She~ states the symptoms seem to be occuring daily [Physical Therapy] : relieved by physical therapy [Rest] : relieved by rest [de-identified] : Patient is here today for re evaluation and one month post lumbar epidural injection plus he is going for physical therapy stating 25% better. R L5, S1 SHANIA on 6/18/21. Heaviness of both legs, worse at the end of the end.\par No back pain after the SHANIA\par Feels numbness along bottom of feet and toes right. [de-identified] : bilateral legs are heavy at the end of the day [de-identified] : lumbar epidural injection

## 2021-07-14 NOTE — PHYSICAL EXAM
[Normal] : Gait: normal [UE/LE] : Sensory: Intact in bilateral upper & lower extremities [1+] : left ankle jerk 1+ [Medrano's Sign] : negative Medrano's sign [Plantar Reflex Right Only] : absent on the right [Plantar Reflex Left Only] : absent on the left [DTR Reflexes Clonus Of Right Ankle (___ Beats)] : absent on the right [DTR Reflexes Clonus Of Left Ankle (___ Beats)] : absent on the left [de-identified] : The pt is awake, alert and oriented to self, place and time, is comfortable and in no acute distress. Gait examination reveals a narrow based, non-ataxic, non-antalgic gait. The pt can heel and toe walk without difficulty. No rashes or ecchymotic lesions noted over the neck, back and lower extremities bilaterally. No obvious abnormal spinal curvature in the sagittal and coronal planes. No tenderness over the cervical, thoracic or lumbar spine, paraspinal or upper and lower extremity musculature. There is no sacroiliac tenderness bilaterally. No tenderness over the greater trochanter bilaterally. No atrophy or abnormal movements noted in the upper or lower extremities bilaterally. No swelling seen in the upper or lower extremities bilaterally. No joint laxity noted in the upper and lower extremity joints bilaterally.\par No cervical lymphadenopathy noted anteriorly. \par Cervical spine range of motion is limited by discomfort at end range of motion. Forward flexion of chin to chest, extension 30 degrees, rotation laterally 20 degrees to the left and 30 degrees to the right. Full range of motion of both shoulders. Negative Spurling's sign bilaterally. There is a negative Neer's sign and Hawkin's sign bilaterally. \par Lumbar spine range of motion is limited by discomfort. Forward flexion to mid tibia, and extension 30 degrees without pain. Range of motion of hip is internal rotation 20 degrees,  30° external rotation without pain\par Negative straight leg raise to 60° in the supine position. No groin pain with hip internal rotation, negative RANJITH test bilaterally. There are 2+ DP pulses bilaterally. There is a negative Babinski sign and no clonus bilaterally in the upper or lower extremities. [de-identified] : healed lumbar incision\par healed bilateral hip incisions

## 2021-07-14 NOTE — DISCUSSION/SUMMARY
[Medication Risks Reviewed] : Medication risks reviewed [de-identified] : The patient feels that his back pain has resolved following the right L5 and S1 transforaminal epidural steroid injection performed onJune 18, 2021.  He still feels some dysesthesias in the plantar foot as well as in his toes on the right side.  Recommended he go back to see Dr. Joyner for neurologic evaluation of his condition.  He understands that given the duration of symptoms it may be incomplete recovery of nerve function.  Based on his postop MRI there is no clear indication for additional surgical interventions at this time.  I will continue to see him back on as-needed basis for his symptoms.  He can continue following up with Dr. Adames of his pain management specialist for further treatments.\par \par The patient was educated regarding their condition, treatment options as well as prescribed course of treatment. \par Risks and benefits as well as alternatives to the proposed treatment were also provided to the patient \par They were given the opportunity to have all their questions answered to their satisfaction.\par \par Vital signs were reviewed with the patient and the patient was instructed to followup with their primary care provider for further management.\par \par Healthy lifestyle recommendations were also made including a tobacco free lifestyle, proper diet, and weight control.

## 2021-08-16 NOTE — PHYSICAL THERAPY INITIAL EVALUATION ADULT - ASR EQUIP NEEDS DISCH PT EVAL
Patient calling to report blood pressure. 8/11/21    148/96  8/12/21    171/99  8/13/21    170/93  8/14/21    164/92    8/15/21    157/97  8/16/21    151/94  Patient states Dr Vikki Berumen is adjusting his blood pressure medication. He is requesting a call back with instructions. TBD

## 2021-08-23 ENCOUNTER — APPOINTMENT (OUTPATIENT)
Dept: UROLOGY | Facility: CLINIC | Age: 76
End: 2021-08-23
Payer: MEDICARE

## 2021-08-23 PROCEDURE — 88112 CYTOPATH CELL ENHANCE TECH: CPT | Mod: 26

## 2021-08-23 PROCEDURE — 99214 OFFICE O/P EST MOD 30 MIN: CPT

## 2021-08-24 LAB
APPEARANCE: CLEAR
BACTERIA: ABNORMAL
BILIRUBIN URINE: NEGATIVE
BLOOD URINE: NEGATIVE
COLOR: YELLOW
GLUCOSE QUALITATIVE U: NEGATIVE
HYALINE CASTS: 0 /LPF
KETONES URINE: NORMAL
LEUKOCYTE ESTERASE URINE: NEGATIVE
MICROSCOPIC-UA: NORMAL
NITRITE URINE: NEGATIVE
PH URINE: 6
PROTEIN URINE: ABNORMAL
PSA FREE FLD-MCNC: 9 %
PSA FREE SERPL-MCNC: 0.07 NG/ML
PSA SERPL-MCNC: 0.84 NG/ML
RED BLOOD CELLS URINE: 0 /HPF
SPECIFIC GRAVITY URINE: >=1.03
SQUAMOUS EPITHELIAL CELLS: 2 /HPF
URINE CYTOLOGY: NORMAL
UROBILINOGEN URINE: NORMAL
WHITE BLOOD CELLS URINE: 1 /HPF

## 2021-09-22 ENCOUNTER — APPOINTMENT (OUTPATIENT)
Dept: OTOLARYNGOLOGY | Facility: CLINIC | Age: 76
End: 2021-09-22
Payer: MEDICARE

## 2021-09-22 VITALS
HEIGHT: 67 IN | BODY MASS INDEX: 21.35 KG/M2 | TEMPERATURE: 98 F | WEIGHT: 136 LBS | SYSTOLIC BLOOD PRESSURE: 140 MMHG | HEART RATE: 60 BPM | DIASTOLIC BLOOD PRESSURE: 70 MMHG

## 2021-09-22 DIAGNOSIS — R42 DIZZINESS AND GIDDINESS: ICD-10-CM

## 2021-09-22 PROCEDURE — 99214 OFFICE O/P EST MOD 30 MIN: CPT | Mod: 25

## 2021-09-22 PROCEDURE — 92557 COMPREHENSIVE HEARING TEST: CPT

## 2021-09-22 PROCEDURE — 92504 EAR MICROSCOPY EXAMINATION: CPT

## 2021-09-22 PROCEDURE — 92567 TYMPANOMETRY: CPT

## 2021-09-22 NOTE — DATA REVIEWED
[de-identified] : An audiogram was ordered and performed including tympanometry, pure tones and speech, for patient's complaint of continued hearing loss and no recent audiogram\par I have independently reviewed the patient's audiogram from today and my findings include efrem SNHL, symmetric, 5-10dB worse than last\par \par \par \par

## 2021-09-22 NOTE — REASON FOR VISIT
[Subsequent Evaluation] : a subsequent evaluation for [FreeTextEntry2] : C/o of hearing even with the hearing aids

## 2021-09-22 NOTE — PROCEDURE
[Other ___] : [unfilled] [Same] : same as the Pre Op Dx. [] : Binocular Microscopy [FreeTextEntry1] : efrem WARREN [FreeTextEntry4] : none [FreeTextEntry6] : Operative microscope was used to examine the ear canal, ear drum and visible middle ear landmarks. Adequate exam would not have been possible without the use of a microscope. Findings are described.\par \par

## 2021-09-22 NOTE — HISTORY OF PRESENT ILLNESS
[de-identified] : 75yo mn for f.u hearing loss\par believes HAs still leave him missing out on conversation\par no drainage\par no pain\par has not been to program hearing aids recently\par additionally pt with lightheadedness\par feels it on and off\par pt on multiple neuro, psych and hypnotic meds\par

## 2021-09-22 NOTE — PHYSICAL EXAM
[Binocular Microscopic Exam] : Binocular microscopic exam was performed [Hearing Loss Right Only] : normal [Hearing Loss Left Only] : normal [Normal] : no rashes

## 2021-10-26 ENCOUNTER — APPOINTMENT (OUTPATIENT)
Dept: PHARMACY | Facility: CLINIC | Age: 76
End: 2021-10-26

## 2021-10-27 ENCOUNTER — APPOINTMENT (OUTPATIENT)
Dept: OTOLARYNGOLOGY | Facility: CLINIC | Age: 76
End: 2021-10-27

## 2022-01-18 ENCOUNTER — APPOINTMENT (OUTPATIENT)
Dept: RHEUMATOLOGY | Facility: CLINIC | Age: 77
End: 2022-01-18
Payer: MEDICARE

## 2022-01-18 DIAGNOSIS — M25.541 PAIN IN JOINTS OF RIGHT HAND: ICD-10-CM

## 2022-01-18 DIAGNOSIS — M79.671 PAIN IN RIGHT FOOT: ICD-10-CM

## 2022-01-18 DIAGNOSIS — M25.542 PAIN IN JOINTS OF RIGHT HAND: ICD-10-CM

## 2022-01-18 PROCEDURE — 99204 OFFICE O/P NEW MOD 45 MIN: CPT | Mod: 25

## 2022-01-18 PROCEDURE — 36415 COLL VENOUS BLD VENIPUNCTURE: CPT

## 2022-01-18 RX ORDER — VENLAFAXINE 37.5 MG/1
37.5 TABLET ORAL
Qty: 90 | Refills: 0 | Status: DISCONTINUED | COMMUNITY
Start: 2018-02-07 | End: 2022-01-18

## 2022-01-18 RX ORDER — DULOXETINE HYDROCHLORIDE 20 MG/1
20 CAPSULE, DELAYED RELEASE PELLETS ORAL
Qty: 90 | Refills: 0 | Status: DISCONTINUED | COMMUNITY
Start: 2017-10-13 | End: 2022-01-18

## 2022-01-18 RX ORDER — METHYLPREDNISOLONE 4 MG/1
4 TABLET ORAL
Qty: 1 | Refills: 0 | Status: DISCONTINUED | COMMUNITY
Start: 2021-02-22 | End: 2022-01-18

## 2022-01-18 RX ORDER — HYDROMORPHONE HYDROCHLORIDE 2 MG/1
2 TABLET ORAL
Qty: 30 | Refills: 0 | Status: DISCONTINUED | COMMUNITY
Start: 2021-01-22 | End: 2022-01-18

## 2022-01-18 RX ORDER — ESCITALOPRAM OXALATE 5 MG/1
TABLET, FILM COATED ORAL
Refills: 0 | Status: DISCONTINUED | COMMUNITY
End: 2022-01-18

## 2022-01-18 RX ORDER — CELECOXIB 200 MG/1
200 CAPSULE ORAL
Qty: 30 | Refills: 0 | Status: DISCONTINUED | COMMUNITY
Start: 2021-02-16 | End: 2022-01-18

## 2022-01-18 RX ORDER — ESCITALOPRAM OXALATE 20 MG/1
20 TABLET ORAL
Qty: 30 | Refills: 0 | Status: DISCONTINUED | COMMUNITY
Start: 2017-05-17 | End: 2022-01-18

## 2022-01-18 RX ORDER — SULFASALAZINE 500 MG/1
500 TABLET, DELAYED RELEASE ORAL
Qty: 120 | Refills: 0 | Status: DISCONTINUED | COMMUNITY
Start: 2017-08-01 | End: 2022-01-18

## 2022-01-18 RX ORDER — CELECOXIB 200 MG/1
200 CAPSULE ORAL
Qty: 27 | Refills: 0 | Status: DISCONTINUED | COMMUNITY
Start: 2017-12-07 | End: 2022-01-18

## 2022-01-18 RX ORDER — OXYCODONE 10 MG/1
10 TABLET ORAL
Refills: 0 | Status: DISCONTINUED | COMMUNITY
End: 2022-01-18

## 2022-01-18 RX ORDER — METHYLPREDNISOLONE 4 MG/1
4 TABLET ORAL
Qty: 21 | Refills: 0 | Status: DISCONTINUED | COMMUNITY
Start: 2018-05-23 | End: 2022-01-18

## 2022-01-18 RX ORDER — AMOXICILLIN 875 MG/1
875 TABLET, FILM COATED ORAL
Qty: 14 | Refills: 0 | Status: DISCONTINUED | COMMUNITY
Start: 2020-11-30 | End: 2022-01-18

## 2022-01-18 RX ORDER — PANTOPRAZOLE 40 MG/1
40 TABLET, DELAYED RELEASE ORAL
Qty: 30 | Refills: 0 | Status: DISCONTINUED | COMMUNITY
Start: 2017-12-07 | End: 2022-01-18

## 2022-01-18 RX ORDER — DICLOFENAC SODIUM 10 MG/G
1 GEL TOPICAL TWICE DAILY
Qty: 1 | Refills: 2 | Status: DISCONTINUED | COMMUNITY
Start: 2018-05-02 | End: 2022-01-18

## 2022-01-18 RX ORDER — ZOLPIDEM TARTRATE 10 MG/1
10 TABLET ORAL
Qty: 10 | Refills: 0 | Status: DISCONTINUED | COMMUNITY
Start: 2018-01-23 | End: 2022-01-18

## 2022-01-18 NOTE — CONSULT LETTER
[Dear  ___] : Dear  [unfilled], [Consult Letter:] : I had the pleasure of evaluating your patient, [unfilled]. [Please see my note below.] : Please see my note below. [Sincerely,] : Sincerely, [FreeTextEntry3] : Shana Hernadez MD

## 2022-01-18 NOTE — ASSESSMENT
[FreeTextEntry1] : 76 y/o man  with b/l hip replacement, right knee replacement, right surgery shoulder, lumbar laminectomy, depression, anxiety, prostate CA, presents for evaluation of joint pain.  Differentials include OA, vs. spondyloarthritis, and lower suspicion for RA.  \par There is a possible psoriatic lesion on LLE appreciated. \par \par \par Plan:\par -Derm eval to confirm/refute Psoriasis\par -XRs hands, right foot, SI joints. \par -May need advanced imaging\par 2/1/22 telephonic 6pm

## 2022-01-18 NOTE — HISTORY OF PRESENT ILLNESS
[FreeTextEntry1] : 76 y/o man  with b/l hip replacement, right knee replacement, right surgery shoulder, lumbar laminectomy, depression, anxiety, prostate CA, presents for evaluation of joint pain.  \par \par Right foot feels like there's a sensation under right foot like there's a pad underneath. It is not painful\par b/L wrist bones and ankle bones sometimes feel like grass and will break.  \par He also has weight bearing pain on b/L wrists and ankles.\par \par Previously saw Dr. Yip, but did not f/u with plan to use humira as he thought it may have been Rx'd for the wrong reasons.  \par \par Both wrists have had carpal tunnel release. \par he has also then had CS injections.\par \par He has had epidurals neck from Dr. Gavin.   \par He has tried NSAID therapy in the past.  \par \par He has gone to ENT, eye doctor, neurologist.  He has had MRI brain, normal.\par \par No joint swelling other than right knee after surgery, compared to the left.  \par \par Stiffness 1.5 hours.  \par \par No personal or fam hx of psoriasis or IBD\par \par No inflammatory eye disease, but has hx of possible leak in retina.

## 2022-01-19 LAB
CRP SERPL-MCNC: <3 MG/L
ERYTHROCYTE [SEDIMENTATION RATE] IN BLOOD BY WESTERGREN METHOD: 3 MM/HR
RHEUMATOID FACT SER QL: 10 IU/ML

## 2022-01-24 LAB
CCP AB SER IA-ACNC: <8 UNITS
RF+CCP IGG SER-IMP: NEGATIVE

## 2022-01-27 LAB — HLA-B27 RELATED AG QL: NEGATIVE

## 2022-02-10 ENCOUNTER — APPOINTMENT (OUTPATIENT)
Dept: RHEUMATOLOGY | Facility: CLINIC | Age: 77
End: 2022-02-10
Payer: MEDICARE

## 2022-02-10 PROCEDURE — 99442: CPT | Mod: 95

## 2022-03-10 ENCOUNTER — APPOINTMENT (OUTPATIENT)
Dept: RHEUMATOLOGY | Facility: CLINIC | Age: 77
End: 2022-03-10
Payer: MEDICARE

## 2022-03-10 PROCEDURE — 99442: CPT | Mod: 95

## 2022-06-08 ENCOUNTER — APPOINTMENT (OUTPATIENT)
Dept: ORTHOPEDIC SURGERY | Facility: CLINIC | Age: 77
End: 2022-06-08
Payer: MEDICARE

## 2022-06-08 ENCOUNTER — NON-APPOINTMENT (OUTPATIENT)
Age: 77
End: 2022-06-08

## 2022-06-08 VITALS
DIASTOLIC BLOOD PRESSURE: 75 MMHG | SYSTOLIC BLOOD PRESSURE: 133 MMHG | WEIGHT: 143 LBS | HEIGHT: 67 IN | BODY MASS INDEX: 22.44 KG/M2 | HEART RATE: 56 BPM

## 2022-06-08 PROCEDURE — 99214 OFFICE O/P EST MOD 30 MIN: CPT

## 2022-06-08 NOTE — HISTORY OF PRESENT ILLNESS
[8] : a current pain level of 8/10 [Daily] : ~He/She~ states the symptoms seem to be occuring daily [None] : No relieving factors are noted [de-identified] : Patient is here today for re evaluation and to review emg/ncv 5/18/22. Patient saw Rheumatologist who referred patient to Podiatrist question walter's neuroma and was told to see Neurologist negative for neuroma. Neurologist performed EMG told to see spine md.\par R L5, S1 SHANIA on 6/18/21\par lumbar laminectomy 1/19/21 L2-L5\par HAs right hand pain primarily\par Feels crampy pain at night along both legs, anterolateral tibia [de-identified] : lying down and sleeping

## 2022-06-08 NOTE — REASON FOR VISIT
[Follow-Up Visit] : a follow-up visit for [Degenerative Joint Disease] : degenerative joint disease [Back Pain] : back pain [Radiculopathy] : radiculopathy [FreeTextEntry2] : lumbar laminectomy

## 2022-06-08 NOTE — DISCUSSION/SUMMARY
[Medication Risks Reviewed] : Medication risks reviewed [de-identified] : The patient presents symptoms of back pain with pain radiating to his legs.  He has seen a podiatrist as well as rheumatologist in the past.  He has a prior history of lumbar laminectomy from L2-L5.  He does have multilevel disc degeneration understands that long-term laminectomy and fusion from L2 S1 may be an option for him.  However that is fairly extensive surgery and he is not interested in surgery that would end up increasing stiffness across his lumbar spine.  Recommended updated MRI of lumbar spine to better assess his neurologic status and treatments can be discussed after the MRIs been performed.  Physical therapy was prescribed for him today as well.  He will continue his current medications.  Also continuing following the neurologist as needed.

## 2022-06-08 NOTE — PHYSICAL EXAM
[Normal] : Gait: normal [Medrano's Sign] : negative Medrano's sign [UE/LE] : Sensory: Intact in bilateral upper & lower extremities [1+] : left ankle jerk 1+ [Plantar Reflex Right Only] : absent on the right [Plantar Reflex Left Only] : absent on the left [DTR Reflexes Clonus Of Right Ankle (___ Beats)] : absent on the right [DTR Reflexes Clonus Of Left Ankle (___ Beats)] : absent on the left [de-identified] : The pt is awake, alert and oriented to self, place and time, is comfortable and in no acute distress. Gait examination reveals a narrow based, non-ataxic, non-antalgic gait. The pt can heel and toe walk without difficulty. No rashes or ecchymotic lesions noted over the neck, back and lower extremities bilaterally. No obvious abnormal spinal curvature in the sagittal and coronal planes. No tenderness over the cervical, thoracic or lumbar spine, paraspinal or upper and lower extremity musculature. There is no sacroiliac tenderness bilaterally. No tenderness over the greater trochanter bilaterally. No atrophy or abnormal movements noted in the upper or lower extremities bilaterally. No swelling seen in the upper or lower extremities bilaterally. No joint laxity noted in the upper and lower extremity joints bilaterally.\par No cervical lymphadenopathy noted anteriorly. \par Cervical spine range of motion is limited by discomfort at end range of motion. Forward flexion of chin to chest, extension 30 degrees, rotation laterally 20 degrees to the left and 30 degrees to the right. Full range of motion of both shoulders. Negative Spurling's sign bilaterally. There is a negative Neer's sign and Hawkin's sign bilaterally. \par Lumbar spine range of motion is limited by discomfort. Forward flexion to mid tibia, and extension 30 degrees without pain. Range of motion of hip is internal rotation 20 degrees,  30° external rotation without pain\par Negative straight leg raise to 60° in the supine position. No groin pain with hip internal rotation, negative RANJITH test bilaterally. There are 2+ DP pulses bilaterally. There is a negative Babinski sign and no clonus bilaterally in the upper or lower extremities. 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

## 2022-06-23 ENCOUNTER — APPOINTMENT (OUTPATIENT)
Dept: RHEUMATOLOGY | Facility: CLINIC | Age: 77
End: 2022-06-23

## 2022-07-13 ENCOUNTER — APPOINTMENT (OUTPATIENT)
Dept: ORTHOPEDIC SURGERY | Facility: CLINIC | Age: 77
End: 2022-07-13

## 2022-07-13 VITALS
HEIGHT: 67 IN | SYSTOLIC BLOOD PRESSURE: 133 MMHG | WEIGHT: 140 LBS | BODY MASS INDEX: 21.97 KG/M2 | HEART RATE: 75 BPM | DIASTOLIC BLOOD PRESSURE: 77 MMHG

## 2022-07-13 DIAGNOSIS — G57.51 TARSAL TUNNEL SYNDROME, RIGHT LOWER LIMB: ICD-10-CM

## 2022-07-13 PROCEDURE — 99213 OFFICE O/P EST LOW 20 MIN: CPT

## 2022-07-13 NOTE — HISTORY OF PRESENT ILLNESS
[Stable] : stable [4] : a current pain level of 4/10 [Daily] : ~He/She~ states the symptoms seem to be occuring daily [Rest] : relieved by rest [de-identified] : Patient is here today to review mri lumbar spine 6/9/22 no films available for today's results only report.\par Back pain is manageable with pain, reports some numbness of right foot with occasional pain.\par Feeling better now, >50% better. \par Takes diclofenac with relief, takes nexium, oxycodone prn, duloxetine [de-identified] : afternoon through the evening [de-identified] : diclofenac

## 2022-07-13 NOTE — PHYSICAL EXAM
[Normal] : Gait: normal [UE/LE] : Sensory: Intact in bilateral upper & lower extremities [1+] : left ankle jerk 1+ [Medrano's Sign] : negative Medrano's sign [Plantar Reflex Right Only] : absent on the right [Plantar Reflex Left Only] : absent on the left [DTR Reflexes Clonus Of Right Ankle (___ Beats)] : absent on the right [DTR Reflexes Clonus Of Left Ankle (___ Beats)] : absent on the left [de-identified] : The pt is awake, alert and oriented to self, place and time, is comfortable and in no acute distress. Gait examination reveals a narrow based, non-ataxic, non-antalgic gait. The pt can heel and toe walk without difficulty. No rashes or ecchymotic lesions noted over the neck, back and lower extremities bilaterally. No obvious abnormal spinal curvature in the sagittal and coronal planes. No tenderness over the cervical, thoracic or lumbar spine, paraspinal or upper and lower extremity musculature. There is no sacroiliac tenderness bilaterally. No tenderness over the greater trochanter bilaterally. No atrophy or abnormal movements noted in the upper or lower extremities bilaterally. No swelling seen in the upper or lower extremities bilaterally. No joint laxity noted in the upper and lower extremity joints bilaterally.\par No cervical lymphadenopathy noted anteriorly. \par Cervical spine range of motion is limited by discomfort at end range of motion. Forward flexion of chin to chest, extension 30 degrees, rotation laterally 20 degrees to the left and 30 degrees to the right. Full range of motion of both shoulders. Negative Spurling's sign bilaterally. There is a negative Neer's sign and Hawkin's sign bilaterally. \par Lumbar spine range of motion is limited by discomfort. Forward flexion to mid tibia, and extension 30 degrees without pain. Range of motion of hip is internal rotation 20 degrees,  30° external rotation without pain\par Negative straight leg raise to 60° in the supine position. No groin pain with hip internal rotation, negative RANJITH test bilaterally. There are 2+ DP pulses bilaterally. There is a negative Babinski sign and no clonus bilaterally in the upper or lower extremities. [de-identified] : healed lumbar incision\par healed bilateral hip incisions [de-identified] : MRI lumbar spine was performed previously at [facility] radiology on June 9, 2022 and MRI findings were reviewed with the patient.  Laminectomy seen at the L3-L5 levels.  Multilevel disc degeneration seen from L3-S1 broad-based disc bulge.  No acute fractures.  Small nodes in multiple levels.  Bone island.

## 2022-07-13 NOTE — DISCUSSION/SUMMARY
[Medication Risks Reviewed] : Medication risks reviewed [de-identified] : At this point the patient reports significant improvement of his symptoms with the use of diclofenac.  He has reported some numbness and discomfort in his right foot and ankle and referral to foot and ankle orthopedic surgeon was provided today.  Physical therapy was prescribed for his lumbar spine.  He may continues to use of gabapentin prescribed previously.  He is not interested in more invasive intervention.  Decompression and fusion from L3-S1 decompression from L3-S1.  I will see him back on as-needed basis in 2 to 3 months.

## 2022-08-24 ENCOUNTER — APPOINTMENT (OUTPATIENT)
Dept: UROLOGY | Facility: CLINIC | Age: 77
End: 2022-08-24

## 2022-08-24 VITALS
WEIGHT: 140 LBS | SYSTOLIC BLOOD PRESSURE: 147 MMHG | RESPIRATION RATE: 17 BRPM | BODY MASS INDEX: 21.97 KG/M2 | HEART RATE: 80 BPM | HEIGHT: 67 IN | DIASTOLIC BLOOD PRESSURE: 72 MMHG

## 2022-08-24 DIAGNOSIS — R35.0 FREQUENCY OF MICTURITION: ICD-10-CM

## 2022-08-24 DIAGNOSIS — R39.15 URGENCY OF URINATION: ICD-10-CM

## 2022-08-24 DIAGNOSIS — C61 MALIGNANT NEOPLASM OF PROSTATE: ICD-10-CM

## 2022-08-24 PROCEDURE — 99214 OFFICE O/P EST MOD 30 MIN: CPT

## 2022-08-25 LAB
APPEARANCE: CLEAR
BACTERIA: NEGATIVE
BILIRUBIN URINE: NEGATIVE
BLOOD URINE: NEGATIVE
COLOR: YELLOW
GLUCOSE QUALITATIVE U: NEGATIVE
HYALINE CASTS: 0 /LPF
KETONES URINE: NEGATIVE
LEUKOCYTE ESTERASE URINE: NEGATIVE
MICROSCOPIC-UA: NORMAL
NITRITE URINE: NEGATIVE
PH URINE: 6
PROTEIN URINE: NORMAL
PSA FREE FLD-MCNC: 12 %
PSA FREE SERPL-MCNC: 0.1 NG/ML
PSA SERPL-MCNC: 0.87 NG/ML
RED BLOOD CELLS URINE: 2 /HPF
SPECIFIC GRAVITY URINE: 1.02
SQUAMOUS EPITHELIAL CELLS: 0 /HPF
UROBILINOGEN URINE: NORMAL
WHITE BLOOD CELLS URINE: 0 /HPF

## 2022-08-26 LAB — URINE CYTOLOGY: NORMAL

## 2022-09-01 NOTE — PRE-OP CHECKLIST - TAMPON REMOVED
Pedro Morgan was in for his testosterone injection. He received it in his right arm, tolerated well. Appointment already made for return in 14 days. Pedro Mora made aware he needs refills for next injection. n/a

## 2022-12-12 VITALS — HEIGHT: 67 IN

## 2022-12-30 ENCOUNTER — NON-APPOINTMENT (OUTPATIENT)
Age: 77
End: 2022-12-30

## 2022-12-30 DIAGNOSIS — M25.562 PAIN IN RIGHT KNEE: ICD-10-CM

## 2022-12-30 DIAGNOSIS — Z86.79 PERSONAL HISTORY OF OTHER DISEASES OF THE CIRCULATORY SYSTEM: ICD-10-CM

## 2022-12-30 DIAGNOSIS — Z86.69 PERSONAL HISTORY OF OTHER DISEASES OF THE NERVOUS SYSTEM AND SENSE ORGANS: ICD-10-CM

## 2022-12-30 DIAGNOSIS — M25.561 PAIN IN RIGHT KNEE: ICD-10-CM

## 2022-12-30 DIAGNOSIS — F11.20 OPIOID DEPENDENCE, UNCOMPLICATED: ICD-10-CM

## 2022-12-30 DIAGNOSIS — M79.10 MYALGIA, UNSPECIFIED SITE: ICD-10-CM

## 2022-12-30 DIAGNOSIS — Z79.891 LONG TERM (CURRENT) USE OF OPIATE ANALGESIC: ICD-10-CM

## 2022-12-30 DIAGNOSIS — Z87.39 PERSONAL HISTORY OF OTHER DISEASES OF THE MUSCULOSKELETAL SYSTEM AND CONNECTIVE TISSUE: ICD-10-CM

## 2022-12-30 DIAGNOSIS — Z86.59 PERSONAL HISTORY OF OTHER MENTAL AND BEHAVIORAL DISORDERS: ICD-10-CM

## 2022-12-30 DIAGNOSIS — G89.29 PAIN IN RIGHT KNEE: ICD-10-CM

## 2022-12-30 DIAGNOSIS — Z87.828 PERSONAL HISTORY OF OTHER (HEALED) PHYSICAL INJURY AND TRAUMA: ICD-10-CM

## 2023-01-11 ENCOUNTER — APPOINTMENT (OUTPATIENT)
Dept: PAIN MANAGEMENT | Facility: CLINIC | Age: 78
End: 2023-01-11
Payer: MEDICARE

## 2023-01-11 VITALS — BODY MASS INDEX: 21.97 KG/M2 | HEIGHT: 67 IN | WEIGHT: 140 LBS

## 2023-01-11 PROCEDURE — 99213 OFFICE O/P EST LOW 20 MIN: CPT | Mod: 95

## 2023-01-11 NOTE — DISCUSSION/SUMMARY
[Medication Risks Reviewed] : Medication risks reviewed [de-identified] : Prescriptions renewed. Opioid agreement/obtained on chart [place]  rewiewed and appropriate. SOAPP-R completed on chart. The patient's medications are documented to the best of their ability. Quality of life and functional ability improved on medications. The patient is showing no aberrant behavior or evidence of diversion. The patient was advised not to use narcotic medication while operating an automobile or heavy machinery due to potential sedation or dizziness. The patient was educated to the risks associated with potential opioid dependence and addiction. Urine toxicology screens as per office protocol. Use of multimodal analgesia used prn. Follow up one month.\par

## 2023-01-11 NOTE — ASSESSMENT
[FreeTextEntry1] : Alert and oriented  X 3. Since last visit there are no changes to the patient's physical status.\par

## 2023-01-11 NOTE — REASON FOR VISIT
[Follow-Up Visit] : a follow-up pain management visit [Home] : at home, [unfilled] , at the time of the visit. [Medical Office: ([facility])___] : at the medical office located in  [Patient] : the patient [Self] : self [FreeTextEntry2] : Back pain

## 2023-01-11 NOTE — HISTORY OF PRESENT ILLNESS
[Lower back] : lower back [Gradual] : gradual [8] : 8 [9] : 9 [Dull/Aching] : dull/aching [Radiating] : radiating [Shooting] : shooting [Constant] : constant [Household chores] : household chores [Work] : work [Sleep] : sleep [Rest] : rest [Meds] : meds [Ice] : ice [Nothing helps with pain getting better] : Nothing helps with pain getting better [Retired] : Work status: retired [Steroid] : Steroid [FreeTextEntry1] : Chronic back pain, worse pain radiates to right foot. To see another Neurologist, Dr Lu. Neck pain increased and a cold pain radiates to his ring and pinky fingers. Pain meds helpful. He is currently taking Gabapenti 300mg po bid and Pregabalin 50mg po bid. Advised to continue until his Neurology appt and not stop abruptly.  [] : no [FreeTextEntry7] : BOTH LEGS TO FOOT  [de-identified] : NONE

## 2023-02-08 ENCOUNTER — APPOINTMENT (OUTPATIENT)
Dept: PAIN MANAGEMENT | Facility: CLINIC | Age: 78
End: 2023-02-08
Payer: MEDICARE

## 2023-02-08 VITALS — HEIGHT: 67 IN | WEIGHT: 135 LBS | BODY MASS INDEX: 21.19 KG/M2

## 2023-02-08 DIAGNOSIS — G89.29 OTHER CHRONIC PAIN: ICD-10-CM

## 2023-02-08 PROCEDURE — 99213 OFFICE O/P EST LOW 20 MIN: CPT | Mod: 95

## 2023-02-08 NOTE — HISTORY OF PRESENT ILLNESS
[Neck] : neck [Lower back] : lower back [Gradual] : gradual [6] : 6 [Sharp] : sharp [Constant] : constant [Household chores] : household chores [Work] : work [Sleep] : sleep [Social interactions] : social interactions [Nothing helps with pain getting better] : Nothing helps with pain getting better [Sitting] : sitting [Standing] : standing [Walking] : walking [Bending forward] : bending forward [Exercising] : exercising [Stairs] : stairs [Retired] : Work status: retired [Steroid] : Steroid [] : no [FreeTextEntry1] : B/L FEET  [de-identified] : LUMBAR  [de-identified] : EPIDURAL  [TWNoteComboBox1] : 30%

## 2023-02-08 NOTE — REASON FOR VISIT
[Follow-Up Visit] : a follow-up pain management visit [Home] : at home, [unfilled] , at the time of the visit. [Medical Office: ([facility])___] : at the medical office located in  [Patient] : the patient [Self] : self [FreeTextEntry2] : Back pain

## 2023-02-08 NOTE — DISCUSSION/SUMMARY
[Medication Risks Reviewed] : Medication risks reviewed [de-identified] : Prescriptions renewed. [place]obtained [place]  reviewed and appropriate. SOAPP-R completed on chart. The patient's medications are documented to the best of their ability. Quality of life and functional ability improved on medications. The patient is showing no aberrant behavior or evidence of diversion. The patient was advised not to use narcotic medication while operating an automobile or heavy machinery due to potential sedation or dizziness. The patient was educated to the risks associated with potential opioid dependence and addiction. Urine toxicology screens as per office protocol. Use of multimodal analgesia used prn.\par Follow  up one month.

## 2023-02-10 NOTE — H&P PST ADULT - VENOUS THROMBOEMBOLISM BMI
Topical Retinoid counseling:  Patient advised to apply a pea-sized amount only at bedtime and wait 30 minutes after washing their face before applying.  If too drying, patient may add a non-comedogenic moisturizer. The patient verbalized understanding of the proper use and possible adverse effects of retinoids.  All of the patient's questions and concerns were addressed. (0) indicator not present

## 2023-03-10 ENCOUNTER — APPOINTMENT (OUTPATIENT)
Dept: PAIN MANAGEMENT | Facility: CLINIC | Age: 78
End: 2023-03-10
Payer: MEDICARE

## 2023-03-10 PROCEDURE — 99213 OFFICE O/P EST LOW 20 MIN: CPT | Mod: 95

## 2023-03-10 NOTE — HISTORY OF PRESENT ILLNESS
[FreeTextEntry1] : States he  saw Dr Clementine Watson, a new Neurologist and bloodwork WNL. States she started him on Amitriptyline at bedtime, felt groggy in the morning. Advised to try again and take earlier as he found helpful with pain. States she is now prescribing his Pregabalin. He has also been taking baths with Kosher salt and hydrogen peroxide and states he feels much better. Also notes that when he is  able to be more physically active he seems to feel better in general. Pain meds effective.  pt was cooperative  with medical team Hyperlipidemia , DM pt graduated  from college  and has degree in Finance . worked in mortgage , unemployed since 2007 ,

## 2023-03-10 NOTE — DISCUSSION/SUMMARY
[Medication Risks Reviewed] : Medication risks reviewed [de-identified] : Prescriptions renewed. [place] and [place]. SOAPP-R completed on chart. The patient's medications are documented to the best of their ability. Quality of life and functional ability improved on medications. The patient is showing no aberrant behavior or evidence of diversion. The patient was advised not to use narcotic medication while operating an automobile or heavy machinery due to potential sedation or dizziness. The patient was educated to the risks associated with potential opioid dependence and addiction. Urine toxicology screens as per office protocol. Use of multimodal analgesia used prn.\par Pregabalin now prescribed by Dr Watson. \par Follow up one month.

## 2023-03-10 NOTE — REASON FOR VISIT
[Follow-Up Visit] : a follow-up pain management visit [Home] : at home, [unfilled] , at the time of the visit. [Medical Office: ([facility])___] : at the medical office located in  [Patient] : the patient [Self] : self [FreeTextEntry2] : Back pain

## 2023-04-11 ENCOUNTER — APPOINTMENT (OUTPATIENT)
Dept: PAIN MANAGEMENT | Facility: CLINIC | Age: 78
End: 2023-04-11
Payer: MEDICARE

## 2023-04-11 VITALS — WEIGHT: 135 LBS | BODY MASS INDEX: 21.19 KG/M2 | HEIGHT: 67 IN

## 2023-04-11 PROCEDURE — 99213 OFFICE O/P EST LOW 20 MIN: CPT

## 2023-04-11 NOTE — HISTORY OF PRESENT ILLNESS
[Neck] : neck [Lower back] : lower back [Gradual] : gradual [7] : 7 [6] : 6 [Sharp] : sharp [Constant] : constant [Household chores] : household chores [Work] : work [Sleep] : sleep [Social interactions] : social interactions [Nothing helps with pain getting better] : Nothing helps with pain getting better [Sitting] : sitting [Standing] : standing [Walking] : walking [Bending forward] : bending forward [Exercising] : exercising [Stairs] : stairs [Retired] : Work status: retired [Steroid] : Steroid [] : no [FreeTextEntry1] : B/L FEET  [de-identified] : LUMBAR  [de-identified] : EPIDURAL  [TWNoteComboBox1] : 30%

## 2023-04-11 NOTE — DISCUSSION/SUMMARY
[Medication Risks Reviewed] : Medication risks reviewed [de-identified] : Prescriptions renewed. [place] and [place]. SOAPP-R completed on chart. The patient's medications are documented to the best of their ability. Quality of life and functional ability improved on medications. The patient is showing no aberrant behavior or evidence of diversion. The patient was advised not to use narcotic medication while operating an automobile or heavy machinery due to potential sedation or dizziness. The patient was educated to the risks associated with potential opioid dependence and addiction. Urine toxicology screens as per office protocol. Use of multimodal analgesia used prn.\par Advised to consider Neurology visit and to discuss with Dr Jacobs.\par Follow up one month.

## 2023-05-09 RX ORDER — OXYCODONE HYDROCHLORIDE 10 MG/1
10 TABLET, FILM COATED, EXTENDED RELEASE ORAL 4 TIMES DAILY
Qty: 120 | Refills: 0 | Status: DISCONTINUED | COMMUNITY
Start: 2023-03-10 | End: 2023-05-09

## 2023-05-09 RX ORDER — OXYCODONE HYDROCHLORIDE 10 MG/1
10 TABLET, FILM COATED, EXTENDED RELEASE ORAL 4 TIMES DAILY
Qty: 120 | Refills: 0 | Status: DISCONTINUED | COMMUNITY
Start: 2023-02-08 | End: 2023-05-09

## 2023-05-09 RX ORDER — OXYCODONE HYDROCHLORIDE 10 MG/1
10 TABLET, FILM COATED, EXTENDED RELEASE ORAL 4 TIMES DAILY
Qty: 120 | Refills: 0 | Status: DISCONTINUED | COMMUNITY
Start: 2018-04-26 | End: 2023-05-09

## 2023-05-09 RX ORDER — GABAPENTIN 300 MG/1
300 CAPSULE ORAL TWICE DAILY
Qty: 90 | Refills: 0 | Status: DISCONTINUED | COMMUNITY
Start: 2021-02-22 | End: 2023-05-09

## 2023-05-10 ENCOUNTER — APPOINTMENT (OUTPATIENT)
Dept: PAIN MANAGEMENT | Facility: CLINIC | Age: 78
End: 2023-05-10
Payer: MEDICARE

## 2023-05-10 VITALS — HEIGHT: 67 IN | BODY MASS INDEX: 21.19 KG/M2 | WEIGHT: 135 LBS

## 2023-05-10 PROCEDURE — 99213 OFFICE O/P EST LOW 20 MIN: CPT | Mod: 95

## 2023-05-10 NOTE — HISTORY OF PRESENT ILLNESS
[Neck] : neck [Lower back] : lower back [Gradual] : gradual [7] : 7 [6] : 6 [Sharp] : sharp [Constant] : constant [Household chores] : household chores [Work] : work [Sleep] : sleep [Social interactions] : social interactions [Nothing helps with pain getting better] : Nothing helps with pain getting better [Sitting] : sitting [Standing] : standing [Walking] : walking [Bending forward] : bending forward [Exercising] : exercising [Stairs] : stairs [Retired] : Work status: retired [Steroid] : Steroid [] : no [FreeTextEntry1] : B/L FEET  [FreeTextEntry6] : CAN NOT OPEN AND CLOSE HANDS  [de-identified] : HOME EXERCISES 3-4X WEEK  [de-identified] : LUMBAR  [de-identified] : EPIDURAL  [TWNoteComboBox1] : 30%

## 2023-05-10 NOTE — DISCUSSION/SUMMARY
[Medication Risks Reviewed] : Medication risks reviewed [de-identified] : Prescriptions renewed. [place] and [place]. SOAPP-R completed on chart. The patient's medications are documented to the best of their ability. Quality of life and functional ability improved on medications. The patient is showing no aberrant behavior or evidence of diversion. The patient was advised not to use narcotic medication while operating an automobile or heavy machinery due to potential sedation or dizziness. The patient was educated to the risks associated with potential opioid dependence and addiction. Urine toxicology screens as per office protocol. Use of multimodal analgesia used prn.\par Follow up one month.

## 2023-05-10 NOTE — REASON FOR VISIT
[Follow-Up Visit] : a follow-up pain management visit [Home] : at home, [unfilled] , at the time of the visit. [Medical Office: ([facility])___] : at the medical office located in  [Patient] : the patient [Self] : self [FreeTextEntry2] : MED RENEWAL

## 2023-05-30 NOTE — ED PROVIDER NOTE - NEURO NEGATIVE STATEMENT, MLM
Adderall ER 20 mg p.o. q.a.m./10 mg p.o. q.aftrnoon    DC CONCERTA AND OK TO RETURN TO ABOVE ADDERALL DOSE  
From: Rachel Joe  To: Celso Chavez  Sent: 5/30/2023 8:28 AM CDT  Subject: Adderall    I have decided to stop taking the concerta because it’s actually making me MORE tired than without anything. Can I please go back to Adderall XR, the 20mg in AM and 10mg in afternoon?   
no loss of consciousness, no gait abnormality, no headache, no sensory deficits, and no weakness.

## 2023-06-09 ENCOUNTER — APPOINTMENT (OUTPATIENT)
Dept: PAIN MANAGEMENT | Facility: CLINIC | Age: 78
End: 2023-06-09
Payer: MEDICARE

## 2023-06-09 VITALS — WEIGHT: 135 LBS | BODY MASS INDEX: 21.19 KG/M2 | HEIGHT: 67 IN

## 2023-06-09 PROCEDURE — 99214 OFFICE O/P EST MOD 30 MIN: CPT | Mod: 95

## 2023-06-09 RX ORDER — OXYCODONE HYDROCHLORIDE 10 MG/1
10 TABLET, FILM COATED, EXTENDED RELEASE ORAL 4 TIMES DAILY
Qty: 120 | Refills: 0 | Status: DISCONTINUED | COMMUNITY
Start: 2023-04-11 | End: 2023-06-09

## 2023-06-09 NOTE — DISCUSSION/SUMMARY
[Medication Risks Reviewed] : Medication risks reviewed [de-identified] : Prescriptions renewed. [place] and [place]. SOAPP-R completed on chart. The patient's medications are documented to the best of their ability. Quality of life and functional ability improved on medications. The patient is showing no aberrant behavior or evidence of diversion. The patient was advised not to use narcotic medication while operating an automobile or heavy machinery due to potential sedation or dizziness. The patient was educated to the risks associated with potential opioid dependence and addiction. Urine toxicology screens as per office protocol. Use of multimodal analgesia used prn.\par Encouraged to follow up with Dr Horta.\par Follow up one month.

## 2023-06-09 NOTE — HISTORY OF PRESENT ILLNESS
[Neck] : neck [Lower back] : lower back [Gradual] : gradual [6] : 6 [Sharp] : sharp [Constant] : constant [Household chores] : household chores [Work] : work [Sleep] : sleep [Social interactions] : social interactions [Nothing helps with pain getting better] : Nothing helps with pain getting better [Sitting] : sitting [Standing] : standing [Walking] : walking [Bending forward] : bending forward [Exercising] : exercising [Stairs] : stairs [Retired] : Work status: retired [Steroid] : Steroid [] : Post Surgical Visit: no [FreeTextEntry1] : B/L FEET b/l hands  [FreeTextEntry6] : CAN NOT OPEN AND CLOSE HANDS , CRAMPING  [de-identified] : PT CONTINUING WITH HOME EXERCISES 3-4X WEEK  [TWNoteComboBox1] : 30%

## 2023-06-09 NOTE — REASON FOR VISIT
[Follow-Up Visit] : a follow-up pain management visit [Home] : at home, [unfilled] , at the time of the visit. [Medical Office: ([facility])___] : at the medical office located in  [Patient] : the patient [Self] : self [FreeTextEntry2] : MED RENEWAL

## 2023-07-10 ENCOUNTER — APPOINTMENT (OUTPATIENT)
Dept: PAIN MANAGEMENT | Facility: CLINIC | Age: 78
End: 2023-07-10
Payer: MEDICARE

## 2023-07-10 PROCEDURE — 99214 OFFICE O/P EST MOD 30 MIN: CPT | Mod: 95

## 2023-07-10 RX ORDER — OXYCODONE HYDROCHLORIDE 10 MG/1
10 TABLET, FILM COATED, EXTENDED RELEASE ORAL 4 TIMES DAILY
Qty: 120 | Refills: 0 | Status: DISCONTINUED | COMMUNITY
Start: 2023-05-10 | End: 2023-07-10

## 2023-07-10 NOTE — REASON FOR VISIT
[Home] : at home, [unfilled] , at the time of the visit. [Medical Office: ([facility])___] : at the medical office located in  [Patient] : the patient [Self] : self [FreeTextEntry2] : med refill

## 2023-07-10 NOTE — HISTORY OF PRESENT ILLNESS
[Neck] : neck [Lower back] : lower back [Gradual] : gradual [8] : 8 [6] : 6 [Dull/Aching] : dull/aching [Sharp] : sharp [Constant] : constant [Household chores] : household chores [Work] : work [Sleep] : sleep [Social interactions] : social interactions [Nothing helps with pain getting better] : Nothing helps with pain getting better [Sitting] : sitting [Standing] : standing [Walking] : walking [Bending forward] : bending forward [Exercising] : exercising [Stairs] : stairs [Retired] : Work status: retired [Steroid] : Steroid [de-identified] : States low back, neck and bilateral hand numbness persists. He underwent  testing with Dr Horta and states brain MRI, Cervical MRI and Doppler studies were  performed. To have upper extremity EMG. He maintains a functional ADL despite any pain  as he tries to remain active. Pain meds are helpful.  [] : Post Surgical Visit: no [FreeTextEntry1] : B/L FEET b/l hands  [FreeTextEntry6] : CAN NOT OPEN AND CLOSE HANDS , CRAMPING  [de-identified] : PT CONTINUING WITH HOME EXERCISES 3-4X WEEK  [TWNoteComboBox1] : 30%

## 2023-07-10 NOTE — DISCUSSION/SUMMARY
[Medication Risks Reviewed] : Medication risks reviewed [de-identified] : Prescriptions renewed. [place] and [place]. SOAPP-R completed on chart. The patient's medications are documented to the best of their ability. Quality of life and functional ability improved on medications. The patient is showing no aberrant behavior or evidence of diversion. The patient was advised not to use narcotic medication while operating an automobile or heavy machinery due to potential sedation or dizziness. The patient was educated to the risks associated with potential opioid dependence and addiction. Urine toxicology screens as per office protocol. Use of multimodal analgesia used prn.\par He will call Dr Horta's office and have Cervical MRI, Doppler studies and Brain MRI faxed to our office. Upper extremity  EMG's in process of being scheduled there. Advised to follow up with Dr. Lisa rodriguez his lower back. \par Follow up one month.\par

## 2023-07-12 NOTE — BRIEF OPERATIVE NOTE - PROCEDURE
<<-----Click on this checkbox to enter Procedure Hip replacement  11/29/2017  Right  Active  Armando Quezada What Type Of Note Output Would You Prefer (Optional)?: Bullet Format Hpi Title: Evaluation of Skin Lesions How Severe Are Your Spot(S)?: mild Have Your Spot(S) Been Treated In The Past?: has not been treated

## 2023-07-18 NOTE — H&P PST ADULT - CONSTITUTIONAL COMMENTS
s/p sleeve gastrectomy pt reports generalized discomfort and frustration re "not finding out a reason".

## 2023-07-29 ENCOUNTER — APPOINTMENT (OUTPATIENT)
Dept: MRI IMAGING | Facility: CLINIC | Age: 78
End: 2023-07-29

## 2023-08-08 ENCOUNTER — APPOINTMENT (OUTPATIENT)
Dept: PAIN MANAGEMENT | Facility: CLINIC | Age: 78
End: 2023-08-08
Payer: MEDICARE

## 2023-08-08 PROCEDURE — 99213 OFFICE O/P EST LOW 20 MIN: CPT

## 2023-08-08 NOTE — HISTORY OF PRESENT ILLNESS
[Neck] : neck [Lower back] : lower back [Gradual] : gradual [8] : 8 [6] : 6 [Dull/Aching] : dull/aching [Sharp] : sharp [Constant] : constant [Household chores] : household chores [Work] : work [Sleep] : sleep [Social interactions] : social interactions [Nothing helps with pain getting better] : Nothing helps with pain getting better [Sitting] : sitting [Standing] : standing [Walking] : walking [Bending forward] : bending forward [Exercising] : exercising [Stairs] : stairs [Retired] : Work status: retired [Steroid] : Steroid [de-identified] : Chronic neck, back and radiating arm pain and numbness.States underwent new diagnostic testing and will fax to our office. Notes plan is to start Physical therapy for all issues. Meds effective.  [] : Post Surgical Visit: no [FreeTextEntry1] : B/L FEET b/l hands  [FreeTextEntry6] : CAN NOT OPEN AND CLOSE HANDS , CRAMPING  [de-identified] : AS OF [id number] CONTINUING WITH HOME EXERCISES 3-4X WEEK  [TWNoteComboBox1] : 30%

## 2023-08-08 NOTE — DISCUSSION/SUMMARY
[Medication Risks Reviewed] : Medication risks reviewed [de-identified] : Prescriptions renewed. [place] and [place]. SOAPP-R completed on chart. The patient's medications are documented to the best of their ability. Quality of life and functional ability improved on medications. The patient is showing no aberrant behavior or evidence of diversion. The patient was advised not to use narcotic medication while operating an automobile or heavy machinery due to potential sedation or dizziness. The patient was educated to the risks associated with potential opioid dependence and addiction. Urine toxicology screens as per office protocol. Use of multimodal analgesia used prn. Follow up one month.

## 2023-09-06 ENCOUNTER — APPOINTMENT (OUTPATIENT)
Dept: PAIN MANAGEMENT | Facility: CLINIC | Age: 78
End: 2023-09-06
Payer: MEDICARE

## 2023-09-06 PROCEDURE — 99213 OFFICE O/P EST LOW 20 MIN: CPT | Mod: 95

## 2023-09-06 RX ORDER — PREGABALIN 50 MG/1
50 CAPSULE ORAL TWICE DAILY
Qty: 60 | Refills: 0 | Status: DISCONTINUED | COMMUNITY
Start: 1900-01-01 | End: 2023-09-06

## 2023-09-06 RX ORDER — PREGABALIN 50 MG/1
50 CAPSULE ORAL TWICE DAILY
Qty: 60 | Refills: 0 | Status: DISCONTINUED | COMMUNITY
Start: 2023-02-08 | End: 2023-09-06

## 2023-09-06 NOTE — HISTORY OF PRESENT ILLNESS
[Neck] : neck [Lower back] : lower back [Gradual] : gradual [8] : 8 [6] : 6 [Dull/Aching] : dull/aching [Sharp] : sharp [Constant] : constant [Household chores] : household chores [Work] : work [Sleep] : sleep [Social interactions] : social interactions [Nothing helps with pain getting better] : Nothing helps with pain getting better [Sitting] : sitting [Standing] : standing [Walking] : walking [Bending forward] : bending forward [Exercising] : exercising [Stairs] : stairs [Retired] : Work status: retired [Steroid] : Steroid [Home] : at home, [unfilled] , at the time of the visit. [Medical Office: ([facility])___] : at the medical office located in  [Verbal consent obtained from patient] : the patient, [unfilled] [] : Post Surgical Visit: no [FreeTextEntry1] : B/L FEET b/l hands  [FreeTextEntry6] : CAN NOT OPEN AND CLOSE HANDS , CRAMPING  [de-identified] : AS OF [id number] CONTINUING WITH HOME EXERCISES 3-4X WEEK  [TWNoteComboBox1] : 30%

## 2023-10-06 ENCOUNTER — APPOINTMENT (OUTPATIENT)
Dept: PAIN MANAGEMENT | Facility: CLINIC | Age: 78
End: 2023-10-06
Payer: MEDICARE

## 2023-10-06 PROCEDURE — 99214 OFFICE O/P EST MOD 30 MIN: CPT | Mod: 95

## 2023-11-03 ENCOUNTER — APPOINTMENT (OUTPATIENT)
Dept: PAIN MANAGEMENT | Facility: CLINIC | Age: 78
End: 2023-11-03
Payer: MEDICARE

## 2023-11-03 VITALS — WEIGHT: 135 LBS | BODY MASS INDEX: 21.19 KG/M2 | HEIGHT: 67 IN

## 2023-11-03 DIAGNOSIS — M54.9 DORSALGIA, UNSPECIFIED: ICD-10-CM

## 2023-11-03 PROCEDURE — 99213 OFFICE O/P EST LOW 20 MIN: CPT | Mod: 95

## 2023-11-27 ENCOUNTER — RESULT REVIEW (OUTPATIENT)
Age: 78
End: 2023-11-27

## 2023-11-27 ENCOUNTER — INPATIENT (INPATIENT)
Facility: HOSPITAL | Age: 78
LOS: 0 days | Discharge: HOME | DRG: 384 | End: 2023-11-28
Attending: INTERNAL MEDICINE | Admitting: INTERNAL MEDICINE
Payer: COMMERCIAL

## 2023-11-27 VITALS
DIASTOLIC BLOOD PRESSURE: 76 MMHG | SYSTOLIC BLOOD PRESSURE: 143 MMHG | RESPIRATION RATE: 20 BRPM | TEMPERATURE: 98 F | HEIGHT: 66 IN | WEIGHT: 147.93 LBS | HEART RATE: 69 BPM | OXYGEN SATURATION: 98 %

## 2023-11-27 DIAGNOSIS — Z96.641 PRESENCE OF RIGHT ARTIFICIAL HIP JOINT: Chronic | ICD-10-CM

## 2023-11-27 DIAGNOSIS — Z96.651 PRESENCE OF RIGHT ARTIFICIAL KNEE JOINT: Chronic | ICD-10-CM

## 2023-11-27 DIAGNOSIS — Z98.890 OTHER SPECIFIED POSTPROCEDURAL STATES: Chronic | ICD-10-CM

## 2023-11-27 DIAGNOSIS — Z96.642 PRESENCE OF LEFT ARTIFICIAL HIP JOINT: Chronic | ICD-10-CM

## 2023-11-27 DIAGNOSIS — R10.13 EPIGASTRIC PAIN: ICD-10-CM

## 2023-11-27 LAB
ALBUMIN SERPL ELPH-MCNC: 4 G/DL — SIGNIFICANT CHANGE UP (ref 3.3–5)
ALBUMIN SERPL ELPH-MCNC: 4 G/DL — SIGNIFICANT CHANGE UP (ref 3.3–5)
ALP SERPL-CCNC: 72 U/L — SIGNIFICANT CHANGE UP (ref 40–120)
ALP SERPL-CCNC: 72 U/L — SIGNIFICANT CHANGE UP (ref 40–120)
ALT FLD-CCNC: 24 U/L — SIGNIFICANT CHANGE UP (ref 10–45)
ALT FLD-CCNC: 24 U/L — SIGNIFICANT CHANGE UP (ref 10–45)
ANION GAP SERPL CALC-SCNC: 13 MMOL/L — SIGNIFICANT CHANGE UP (ref 5–17)
ANION GAP SERPL CALC-SCNC: 13 MMOL/L — SIGNIFICANT CHANGE UP (ref 5–17)
AST SERPL-CCNC: 31 U/L — SIGNIFICANT CHANGE UP (ref 10–40)
AST SERPL-CCNC: 31 U/L — SIGNIFICANT CHANGE UP (ref 10–40)
BASOPHILS # BLD AUTO: 0.03 K/UL — SIGNIFICANT CHANGE UP (ref 0–0.2)
BASOPHILS # BLD AUTO: 0.03 K/UL — SIGNIFICANT CHANGE UP (ref 0–0.2)
BASOPHILS NFR BLD AUTO: 0.3 % — SIGNIFICANT CHANGE UP (ref 0–2)
BASOPHILS NFR BLD AUTO: 0.3 % — SIGNIFICANT CHANGE UP (ref 0–2)
BILIRUB SERPL-MCNC: 0.7 MG/DL — SIGNIFICANT CHANGE UP (ref 0.2–1.2)
BILIRUB SERPL-MCNC: 0.7 MG/DL — SIGNIFICANT CHANGE UP (ref 0.2–1.2)
BUN SERPL-MCNC: 33 MG/DL — HIGH (ref 7–23)
BUN SERPL-MCNC: 33 MG/DL — HIGH (ref 7–23)
CALCIUM SERPL-MCNC: 9.3 MG/DL — SIGNIFICANT CHANGE UP (ref 8.4–10.5)
CALCIUM SERPL-MCNC: 9.3 MG/DL — SIGNIFICANT CHANGE UP (ref 8.4–10.5)
CHLORIDE SERPL-SCNC: 106 MMOL/L — SIGNIFICANT CHANGE UP (ref 96–108)
CHLORIDE SERPL-SCNC: 106 MMOL/L — SIGNIFICANT CHANGE UP (ref 96–108)
CO2 SERPL-SCNC: 23 MMOL/L — SIGNIFICANT CHANGE UP (ref 22–31)
CO2 SERPL-SCNC: 23 MMOL/L — SIGNIFICANT CHANGE UP (ref 22–31)
CREAT SERPL-MCNC: 0.93 MG/DL — SIGNIFICANT CHANGE UP (ref 0.5–1.3)
CREAT SERPL-MCNC: 0.93 MG/DL — SIGNIFICANT CHANGE UP (ref 0.5–1.3)
EGFR: 85 ML/MIN/1.73M2 — SIGNIFICANT CHANGE UP
EGFR: 85 ML/MIN/1.73M2 — SIGNIFICANT CHANGE UP
EOSINOPHIL # BLD AUTO: 0.07 K/UL — SIGNIFICANT CHANGE UP (ref 0–0.5)
EOSINOPHIL # BLD AUTO: 0.07 K/UL — SIGNIFICANT CHANGE UP (ref 0–0.5)
EOSINOPHIL NFR BLD AUTO: 0.7 % — SIGNIFICANT CHANGE UP (ref 0–6)
EOSINOPHIL NFR BLD AUTO: 0.7 % — SIGNIFICANT CHANGE UP (ref 0–6)
GLUCOSE SERPL-MCNC: 117 MG/DL — HIGH (ref 70–99)
GLUCOSE SERPL-MCNC: 117 MG/DL — HIGH (ref 70–99)
HCT VFR BLD CALC: 43 % — SIGNIFICANT CHANGE UP (ref 39–50)
HCT VFR BLD CALC: 43 % — SIGNIFICANT CHANGE UP (ref 39–50)
HGB BLD-MCNC: 14.4 G/DL — SIGNIFICANT CHANGE UP (ref 13–17)
HGB BLD-MCNC: 14.4 G/DL — SIGNIFICANT CHANGE UP (ref 13–17)
IMM GRANULOCYTES NFR BLD AUTO: 0.3 % — SIGNIFICANT CHANGE UP (ref 0–0.9)
IMM GRANULOCYTES NFR BLD AUTO: 0.3 % — SIGNIFICANT CHANGE UP (ref 0–0.9)
LIDOCAIN IGE QN: 24 U/L — SIGNIFICANT CHANGE UP (ref 7–60)
LIDOCAIN IGE QN: 24 U/L — SIGNIFICANT CHANGE UP (ref 7–60)
LYMPHOCYTES # BLD AUTO: 0.75 K/UL — LOW (ref 1–3.3)
LYMPHOCYTES # BLD AUTO: 0.75 K/UL — LOW (ref 1–3.3)
LYMPHOCYTES # BLD AUTO: 7.4 % — LOW (ref 13–44)
LYMPHOCYTES # BLD AUTO: 7.4 % — LOW (ref 13–44)
MAGNESIUM SERPL-MCNC: 2.2 MG/DL — SIGNIFICANT CHANGE UP (ref 1.6–2.6)
MAGNESIUM SERPL-MCNC: 2.2 MG/DL — SIGNIFICANT CHANGE UP (ref 1.6–2.6)
MCHC RBC-ENTMCNC: 30.4 PG — SIGNIFICANT CHANGE UP (ref 27–34)
MCHC RBC-ENTMCNC: 30.4 PG — SIGNIFICANT CHANGE UP (ref 27–34)
MCHC RBC-ENTMCNC: 33.5 GM/DL — SIGNIFICANT CHANGE UP (ref 32–36)
MCHC RBC-ENTMCNC: 33.5 GM/DL — SIGNIFICANT CHANGE UP (ref 32–36)
MCV RBC AUTO: 90.9 FL — SIGNIFICANT CHANGE UP (ref 80–100)
MCV RBC AUTO: 90.9 FL — SIGNIFICANT CHANGE UP (ref 80–100)
MONOCYTES # BLD AUTO: 0.76 K/UL — SIGNIFICANT CHANGE UP (ref 0–0.9)
MONOCYTES # BLD AUTO: 0.76 K/UL — SIGNIFICANT CHANGE UP (ref 0–0.9)
MONOCYTES NFR BLD AUTO: 7.5 % — SIGNIFICANT CHANGE UP (ref 2–14)
MONOCYTES NFR BLD AUTO: 7.5 % — SIGNIFICANT CHANGE UP (ref 2–14)
NEUTROPHILS # BLD AUTO: 8.49 K/UL — HIGH (ref 1.8–7.4)
NEUTROPHILS # BLD AUTO: 8.49 K/UL — HIGH (ref 1.8–7.4)
NEUTROPHILS NFR BLD AUTO: 83.8 % — HIGH (ref 43–77)
NEUTROPHILS NFR BLD AUTO: 83.8 % — HIGH (ref 43–77)
NRBC # BLD: 0 /100 WBCS — SIGNIFICANT CHANGE UP (ref 0–0)
NRBC # BLD: 0 /100 WBCS — SIGNIFICANT CHANGE UP (ref 0–0)
PHOSPHATE SERPL-MCNC: 2.7 MG/DL — SIGNIFICANT CHANGE UP (ref 2.5–4.5)
PHOSPHATE SERPL-MCNC: 2.7 MG/DL — SIGNIFICANT CHANGE UP (ref 2.5–4.5)
PLATELET # BLD AUTO: 182 K/UL — SIGNIFICANT CHANGE UP (ref 150–400)
PLATELET # BLD AUTO: 182 K/UL — SIGNIFICANT CHANGE UP (ref 150–400)
POTASSIUM SERPL-MCNC: 4.1 MMOL/L — SIGNIFICANT CHANGE UP (ref 3.5–5.3)
POTASSIUM SERPL-MCNC: 4.1 MMOL/L — SIGNIFICANT CHANGE UP (ref 3.5–5.3)
POTASSIUM SERPL-SCNC: 4.1 MMOL/L — SIGNIFICANT CHANGE UP (ref 3.5–5.3)
POTASSIUM SERPL-SCNC: 4.1 MMOL/L — SIGNIFICANT CHANGE UP (ref 3.5–5.3)
PROT SERPL-MCNC: 7.1 G/DL — SIGNIFICANT CHANGE UP (ref 6–8.3)
PROT SERPL-MCNC: 7.1 G/DL — SIGNIFICANT CHANGE UP (ref 6–8.3)
RBC # BLD: 4.73 M/UL — SIGNIFICANT CHANGE UP (ref 4.2–5.8)
RBC # BLD: 4.73 M/UL — SIGNIFICANT CHANGE UP (ref 4.2–5.8)
RBC # FLD: 14.2 % — SIGNIFICANT CHANGE UP (ref 10.3–14.5)
RBC # FLD: 14.2 % — SIGNIFICANT CHANGE UP (ref 10.3–14.5)
SODIUM SERPL-SCNC: 142 MMOL/L — SIGNIFICANT CHANGE UP (ref 135–145)
SODIUM SERPL-SCNC: 142 MMOL/L — SIGNIFICANT CHANGE UP (ref 135–145)
TROPONIN T, HIGH SENSITIVITY RESULT: 12 NG/L — SIGNIFICANT CHANGE UP (ref 0–51)
TROPONIN T, HIGH SENSITIVITY RESULT: 12 NG/L — SIGNIFICANT CHANGE UP (ref 0–51)
TROPONIN T, HIGH SENSITIVITY RESULT: 13 NG/L — SIGNIFICANT CHANGE UP (ref 0–51)
TROPONIN T, HIGH SENSITIVITY RESULT: 13 NG/L — SIGNIFICANT CHANGE UP (ref 0–51)
WBC # BLD: 10.13 K/UL — SIGNIFICANT CHANGE UP (ref 3.8–10.5)
WBC # BLD: 10.13 K/UL — SIGNIFICANT CHANGE UP (ref 3.8–10.5)
WBC # FLD AUTO: 10.13 K/UL — SIGNIFICANT CHANGE UP (ref 3.8–10.5)
WBC # FLD AUTO: 10.13 K/UL — SIGNIFICANT CHANGE UP (ref 3.8–10.5)

## 2023-11-27 PROCEDURE — 88342 IMHCHEM/IMCYTCHM 1ST ANTB: CPT | Mod: 26

## 2023-11-27 PROCEDURE — 88305 TISSUE EXAM BY PATHOLOGIST: CPT | Mod: 26

## 2023-11-27 PROCEDURE — 43239 EGD BIOPSY SINGLE/MULTIPLE: CPT

## 2023-11-27 PROCEDURE — 99285 EMERGENCY DEPT VISIT HI MDM: CPT | Mod: GC

## 2023-11-27 PROCEDURE — 71046 X-RAY EXAM CHEST 2 VIEWS: CPT | Mod: 26

## 2023-11-27 PROCEDURE — 88341 IMHCHEM/IMCYTCHM EA ADD ANTB: CPT | Mod: 26

## 2023-11-27 PROCEDURE — 88312 SPECIAL STAINS GROUP 1: CPT | Mod: 26

## 2023-11-27 RX ORDER — OXYCODONE HYDROCHLORIDE 5 MG/1
10 TABLET ORAL EVERY 12 HOURS
Refills: 0 | Status: DISCONTINUED | OUTPATIENT
Start: 2023-11-27 | End: 2023-11-27

## 2023-11-27 RX ORDER — GLUCAGON INJECTION, SOLUTION 0.5 MG/.1ML
1 INJECTION, SOLUTION SUBCUTANEOUS ONCE
Refills: 0 | Status: COMPLETED | OUTPATIENT
Start: 2023-11-27 | End: 2023-11-27

## 2023-11-27 RX ORDER — SUCRALFATE 1 G
1 TABLET ORAL
Refills: 0 | Status: DISCONTINUED | OUTPATIENT
Start: 2023-11-27 | End: 2023-11-28

## 2023-11-27 RX ORDER — PANTOPRAZOLE SODIUM 20 MG/1
40 TABLET, DELAYED RELEASE ORAL
Refills: 0 | Status: DISCONTINUED | OUTPATIENT
Start: 2023-11-27 | End: 2023-11-28

## 2023-11-27 RX ORDER — ACETAMINOPHEN 500 MG
650 TABLET ORAL ONCE
Refills: 0 | Status: COMPLETED | OUTPATIENT
Start: 2023-11-27 | End: 2023-11-27

## 2023-11-27 RX ORDER — OXYCODONE HYDROCHLORIDE 5 MG/1
1 TABLET ORAL
Qty: 0 | Refills: 0 | DISCHARGE

## 2023-11-27 RX ORDER — FAMOTIDINE 10 MG/ML
20 INJECTION INTRAVENOUS ONCE
Refills: 0 | Status: COMPLETED | OUTPATIENT
Start: 2023-11-27 | End: 2023-11-27

## 2023-11-27 RX ORDER — FAMOTIDINE 10 MG/ML
40 INJECTION INTRAVENOUS ONCE
Refills: 0 | Status: DISCONTINUED | OUTPATIENT
Start: 2023-11-27 | End: 2023-11-27

## 2023-11-27 RX ORDER — SODIUM CHLORIDE 9 MG/ML
500 INJECTION INTRAMUSCULAR; INTRAVENOUS; SUBCUTANEOUS
Refills: 0 | Status: DISCONTINUED | OUTPATIENT
Start: 2023-11-27 | End: 2023-11-28

## 2023-11-27 RX ORDER — ACETAMINOPHEN 500 MG
1000 TABLET ORAL ONCE
Refills: 0 | Status: COMPLETED | OUTPATIENT
Start: 2023-11-27 | End: 2023-11-27

## 2023-11-27 RX ORDER — OXYCODONE HYDROCHLORIDE 5 MG/1
10 TABLET ORAL THREE TIMES A DAY
Refills: 0 | Status: DISCONTINUED | OUTPATIENT
Start: 2023-11-27 | End: 2023-11-28

## 2023-11-27 RX ADMIN — Medication 1000 MILLIGRAM(S): at 10:26

## 2023-11-27 RX ADMIN — FAMOTIDINE 20 MILLIGRAM(S): 10 INJECTION INTRAVENOUS at 10:00

## 2023-11-27 RX ADMIN — Medication 75 MILLIGRAM(S): at 22:39

## 2023-11-27 RX ADMIN — OXYCODONE HYDROCHLORIDE 10 MILLIGRAM(S): 5 TABLET ORAL at 22:39

## 2023-11-27 RX ADMIN — GLUCAGON INJECTION, SOLUTION 1 MILLIGRAM(S): 0.5 INJECTION, SOLUTION SUBCUTANEOUS at 11:07

## 2023-11-27 RX ADMIN — Medication 1 GRAM(S): at 23:27

## 2023-11-27 RX ADMIN — OXYCODONE HYDROCHLORIDE 10 MILLIGRAM(S): 5 TABLET ORAL at 22:11

## 2023-11-27 RX ADMIN — Medication 650 MILLIGRAM(S): at 22:05

## 2023-11-27 RX ADMIN — Medication 30 MILLILITER(S): at 09:10

## 2023-11-27 RX ADMIN — Medication 650 MILLIGRAM(S): at 21:36

## 2023-11-27 RX ADMIN — Medication 400 MILLIGRAM(S): at 09:12

## 2023-11-27 NOTE — ED ADULT NURSE NOTE - AS PAIN REST
[facility]   Initial Appointment: 1/7/2020 @ 2:00pm   Appointment is home based - coordinator will meet you at your house.        [facility]  Dr Chaparro  [phone]   Monday January 6th at 1:00 p.m.   3 (mild pain)

## 2023-11-27 NOTE — ED ADULT NURSE NOTE - OBJECTIVE STATEMENT
78 year old male pt presented to the ED co chest pain left upper chest wall at around 2100 last night when pt was going to bed, pt is ambulatory A&Ox3 states pain increases with belching, pt denies any shortness of breath, lung field cta abd soft nontender non distended pt denies nausea no vomiting

## 2023-11-27 NOTE — ED PROVIDER NOTE - ATTENDING CONTRIBUTION TO CARE
Attending MD Lopez:  I personally have seen and examined this patient.  Resident note reviewed and agree on plan of care and except where noted.  Please see my MDM for further details.

## 2023-11-27 NOTE — ED PROVIDER NOTE - PHYSICAL EXAMINATION
Const: Awake, alert, no acute distress.  Well appearing.  Moving comfortably on stretcher.  HEENT: NC/AT.  Moist mucous membranes.  No pharyngeal erythema, no exudates.  Eyes: Extraocular movements intact b/l.  Pupils equal, round, and reactive to light b/l.  Conjunctiva pink.  No scleral icterus.  Neck: Neck supple, full ROM without pain.  Cardiac: Regular rate and regular rhythm. S1 S2 present.  Peripheral pulses 2+ and symmetric.  No LE edema or calf tenderness.  No chest wall tenderness.  Resp: Speaking in full sentences. No evidence of respiratory distress.  Good air entry b/l, breath sounds clear to auscultation b/l.  Abd: Non-distended, no overlying skin changes.  Soft, mild epigastric tenderness, no guarding, no rigidity, no rebound tenderness.  No palpable masses.  Normal bowel sounds in all 4 quadrants.  MSK: Spine midline and non-tender. No CVAT.  Skin: Normal coloration.  No rashes, abrasions or lacerations.  Neuro: Awake, alert & oriented x 3.  Moves all extremities spontaneously and symmetrically.  No focal deficits.

## 2023-11-27 NOTE — CONSULT NOTE ADULT - ASSESSMENT
77y/o M pt with PMHx Arthritis, Raynaud's, fibromyalgia, prostate cancer status post radiation, presenting for evaluation of mid-sternal pressure with associated "sticking sensation" onset several hours after eating brisket, potatoes and soft carrots for dinner last night - Discomfort progressed to intermittent left-sided chest pain and burning epigastric pain beginning 10 PM last night.  Patient describes intermittent episodes of burning epigastric pain with burning pain radiating up his middle chest, also belching with associated "taste of meat"; attempted to induce vomiting unsuccessfully with persistent mid sternal pressure sensation and belching; no previous EGD or history of dyspepsia, no previous food impaction.      #r/o food impaction/ food bolus DDX also includes GERD, esophagitis, stricture, Schatzki's ring, dysmotility ,occult lesion  Clinically able to tolerate secretions and no supplemental oxygen requirement  -trial of Glucagon 1 mg IV  -keep NPO  -admit to Medicine with plans for add on EGD today  -Protonix 40 mg IV x 1 dose    Discussed with ED physician  Discussed with pt and spouse; all questions asnwered  will update primary GI ( Dr ABDULKADIR Montoya)    Jayden Garza PA-C  [facility] Teaching GI Service  [facility]  [phone]  Available on TEAMS Mon-Fri 8a-4p  After hours and weekend coverage [phone]   79y/o M pt with PMHx Arthritis, Raynaud's, fibromyalgia, prostate cancer status post radiation, presenting for evaluation of mid-sternal pressure with associated "sticking sensation" onset several hours after eating brisket, potatoes and soft carrots for dinner last night - Discomfort progressed to intermittent left-sided chest pain and burning epigastric pain beginning 10 PM last night.  Patient describes intermittent episodes of burning epigastric pain with burning pain radiating up his middle chest, also belching with associated "taste of meat"; attempted to induce vomiting unsuccessfully with persistent mid sternal pressure sensation and belching; no previous EGD or history of dyspepsia, no previous food impaction.    Troponin negative  EKG w/o change from baseline     #r/o food impaction/ food bolus DDX also includes GERD, esophagitis, stricture, Schatzki's ring, dysmotility ,occult lesion  Clinically able to tolerate secretions and no supplemental oxygen requirement  -trial of Glucagon 1 mg IV  -keep NPO  -admit to Medicine with plans for add on EGD today  -Protonix 40 mg IV x 1 dose    Discussed with ED physician  Discussed with pt and spouse; all questions asnwered  will update primary GI ( Dr ABDULKADIR Montoya)    Jayden Garza PA-C  [facility] Teaching GI Service  [facility]  [phone]  Available on TEAMS Mon-Fri 8a-4p  After hours and weekend coverage [phone]

## 2023-11-27 NOTE — CHART NOTE - NSCHARTNOTEFT_GEN_A_CORE
sp EGD - full report to follow in [place]    no food impaction  +severe esophagitis with ulceration (biopsied)  +gastritis with antral and pyloric channel ulcers; biopsied  normal duodenum    PPI BID +Carafate suspension QAC + QHS  NO NSAIDS  soft diet  outpt follow up with primary GI ( Dr ABDULKADIR Montoya) and repeat EGD in 8 weeks to assess for healing    Jayden Garza PA-C  [facility] Teaching GI Service  [facility]  [phone]  Available on TEAMS Mon-Fri 8a-4p  After hours and weekend coverage [phone]

## 2023-11-27 NOTE — H&P ADULT - NSHPLABSRESULTS_GEN_ALL_CORE
< from: Upper Endoscopy (11.27.23 @ 16:09) >    Impression:          - LA Grade D reflux esophagitis. Biopsied.                       - Z-line irregular, 40 cm from the incisors.                       - Erythematous mucosa in the gastric body. Biopsied.                       - Non-bleeding gastric ulcer with a flat pigmented spot (Carlo Class IIc).                       - Non-bleeding gastric ulcer with an adherent clot (Carlo Class IIb).                       - Normal first portion of the duodenum and second portion of the duodenum.  Recommendation:      - Await pathology results.                       - Use Protonix (pantoprazole) 40 mg PO daily.                       - Use sucralfate suspension 1 gram PO BID daily.         - Repeat upper endoscopy in 8 weeks to check healing.                       - Return to GI office in 2 weeks.                       - Full liquid diet today.                       - No aspirin, ibuprofen, naproxen, or other non-steroidal anti-inflammatory                        drugs.

## 2023-11-27 NOTE — H&P ADULT - HISTORY OF PRESENT ILLNESS
Date of Service, 11-27-23 @ 19:14  CHIEF COMPLAINT:Patient is a 78y old  Male who presents with a chief complaint of     HPI:  79y/o M pt with PMHx Arthritis, Raynaud's, fibromyalgia, prostate cancer status post radiation, presenting for evaluation of intermittent left-sided chest pain and burning epigastric pain beginning 10 PM last night.  Patient describes intermittent episodes of burning epigastric pain with burning pain radiating up his middle chest, also intermittent episodes of left-sided chest pain.  Patient also notes increased burping and passing gas.  Last bowel movement this morning, notes increased straining.  Patient has never had chest pain or heartburn in the past.  Patient denies recent dietary changes, despite Thanksgiving weekend notes not eating increased greasy or fried or unhealthy foods, denies increased alcohol intake this weekend.  Pt did not eat anything since his symptoms began, but he took his medications with a glass of water this morning with no difficulty.  Non-smoker, no drug usage, drinks 1 unit alcohol weekly.  No recent travel, no recent surgeries, no known sick contacts.  Patient takes oxycodone daily for arthritis pain, last dosage 10 mg at 5 AM this morning.  Denies fevers, chills, nausea, vomiting, diarrhea, shortness of breath, cough, palpitations, numbness, tingling, bloody stools, weight loss, dysuria, hematuria, urinary frequency, LOC, dizziness, lightheadedness.    PAST MEDICAL & SURGICAL HISTORY:  Prostate cancer  s/p radiation 2015  Concussion  "I fell backwards on a hardwood floor"-10/2015  Eye abnormality  Anxiety and depression  Hearing loss  MARIAMA (obstructive sleep apnea)  sleep study 8-12-17,no treatment  PND (post-nasal drip)  DJD (degenerative joint disease)  traction in back 15 years ago x 10 days  TMJ (temporomandibular joint disorder)  right pt with clicking x 1 month  Lumbar spinal stenosis  S/P rotator cuff surgery  right repair open 1-2017  History of left hip replacement      MEDICATIONS  (STANDING):  pantoprazole    Tablet 40 milliGRAM(s) Oral two times a day  sodium chloride 0.9%. 500 milliLiter(s) (30 mL/Hr) IV Continuous <Continuous>  sucralfate suspension 1 Gram(s) Oral four times a day  celecoxib 200 mg oral capsule: 1 cap(s) orally every 12 hours  · 	diazePAM 5 mg oral tablet: 1 tab(s) orally every 6 hours, As needed, muscle spasms MDD:4 tabs  · 	HYDROmorphone 2 mg oral tablet: Take 1 tab(s) orally every 4 hours, as needed for moderate pain or 2 tabs orally every 4 hours for severe pain. MDD:10 tabs  · 	Quick Draw Lumbar Corset: Dx: s/p lumbar laminectomy L3-4-5  · 	senna oral tablet: 2 tab(s) orally once a day (at bedtime)  · 	acetaminophen 500 mg oral tablet: 2 tab(s) orally every 8 hours  · 	OxyCONTIN 10 mg oral tablet, extended release: 1 tab(s) orally 5 times a day  · 	Multiple Vitamins oral tablet: 1 tab(s) orally once a day    MEDICATIONS  (PRN):      FAMILY HISTORY:  Family history of CHF (congestive heart failure) (Mother)    Family history of heart attack (Father, Sibling)        SOCIAL HISTORY:    [ ] Non-smoker  [ ] Smoker  [ ] Alcohol    Allergies    Keflex (Rash)    Intolerances    	    REVIEW OF SYSTEMS:  CONSTITUTIONAL: No fever, weight loss, or fatigue  EYES: No eye pain, visual disturbances, or discharge  ENT:  No difficulty hearing, tinnitus, vertigo; No sinus or throat pain  NECK: No pain or stiffness  RESPIRATORY: No cough, wheezing, chills or hemoptysis; No Shortness of Breath  CARDIOVASCULAR: + chest pain, no palpitations, passing out, dizziness, or leg swelling  GASTROINTESTINAL: No abdominal or epigastric pain. No nausea, vomiting, or hematemesis; No diarrhea or constipation. No melena or hematochezia.  GENITOURINARY: No dysuria, frequency, hematuria, or incontinence  NEUROLOGICAL: No headaches, memory loss, loss of strength, numbness, or tremors  SKIN: No itching, burning, rashes, or lesions   LYMPH Nodes: No enlarged glands  ENDOCRINE: No heat or cold intolerance; No hair loss  MUSCULOSKELETAL: No joint pain or swelling; No muscle, back, or extremity pain  PSYCHIATRIC: No depression, anxiety, mood swings, or difficulty sleeping  HEME/LYMPH: No easy bruising, or bleeding gums  ALLERGY AND IMMUNOLOGIC: No hives or eczema	    [x ] All others negative	  [ ] Unable to obtain    PHYSICAL EXAM:  T(C): 37.2 (11-27-23 @ 19:00), Max: 37.2 (11-27-23 @ 19:00)  HR: 54 (11-27-23 @ 19:00) (48 - 69)  BP: 121/67 (11-27-23 @ 19:00) (108/68 - 143/76)  RR: 14 (11-27-23 @ 19:00) (14 - 23)  SpO2: 95% (11-27-23 @ 19:00) (93% - 98%)  Wt(kg): --  I&O's Summary      Appearance: Normal	  HEENT:   Normal oral mucosa, PERRL, EOMI	  Lymphatic: No lymphadenopathy  Cardiovascular: Normal S1 S2, No JVD,+ murmurs, No edema  Respiratory:rhonchi  Psychiatry: A & O x 3, Mood & affect appropriate  Gastrointestinal:  Soft, Non-tender, + BS	  Skin: No rashes, No ecchymoses, No cyanosis	  Neurologic: Non-focal  Extremities: Normal range of motion, No clubbing, cyanosis or edema  Vascular: Peripheral pulses palpable 2+ bilaterally    TELEMETRY: 	    ECG:  	  RADIOLOGY:  OTHER: 	  	  LABS:	 	    CARDIAC MARKERS:                        14.4   10.13 )-----------( 182      ( 27 Nov 2023 08:56 )             43.0     11-27    142  |  106  |  33<H>  ----------------------------<  117<H>  4.1   |  23  |  0.93    Ca    9.3      27 Nov 2023 08:56  Phos  2.7     11-27  Mg     2.2     11-27    TPro  7.1  /  Alb  4.0  /  TBili  0.7  /  DBili  x   /  AST  31  /  ALT  24  /  AlkPhos  72  11-27    proBNP:   Lipid Profile:   HgA1c:   TSH:       PREVIOUS DIAGNOSTIC TESTING:    · EKG Date/Time: 27-Nov-2023 08:05  · Rate: 59  · Interpretation: sinus bradycardiac with sinus arrhythmia  · CA: 158ms  · QRS: 78ms  · QTC: 407ms  · Prior EKG Status: the EKG is unchanged from prior EKG  unchanged from prior  < from: Xray Chest 2 Views PA/Lat (11.27.23 @ 08:59) >  The heart is normal in size.  The lungs are clear. No pleural effusion.  There is no pneumothorax.  No acute bony abnormality.    IMPRESSION:  Clear lungs.    < from: CT Colonography, Diagnosis (12.02.15 @ 17:35) >  IMPRESSION: Markedly limited examination secondary to lack fecal and   fluid tagging and poor distention of the sigmoidcolon. Sigmoid   diverticulosis.      < from: 12 Lead ECG (11.27.23 @ 08:05) >  Diagnosis Line SINUS BRADYCARDIA WITH MARKED SINUS ARRHYTHMIA  OTHERWISE NORMAL ECG  WHEN COMPARED WITH ECG OF 07-NOV-2015 13:46,  NO SIGNIFICANT CHANGE WAS FOUND

## 2023-11-27 NOTE — ED PROVIDER NOTE - CLINICAL SUMMARY MEDICAL DECISION MAKING FREE TEXT BOX
This is a 77y/o gentleman with PMH arthritis, raynauds, fibromyalgia, and prostate CA s/p radiation presenting into the ED for evaluation of intermittent burning epigastric pain radiating up his sternum and L-sided CP since last night, increased belching, no hx similar symptoms, no associated N/V/D, last BM this morning, epigastric tenderness on examination otherwise well appearing, concern for GERD/gastritis vs. ACS vs acute pancreatitis, will check ECG and troponin, CXR, lipase, symptomatic tx with famotidine, maalox, and tylenol, follow up results and reassess. This is a 77y/o gentleman with PMH arthritis, raynauds, fibromyalgia, and prostate CA s/p radiation presenting into the ED for evaluation of intermittent burning epigastric pain radiating up his sternum and L-sided CP since last night, increased belching, no hx similar symptoms, no associated N/V/D, last BM this morning, epigastric tenderness on examination otherwise well appearing, concern for GERD/gastritis vs. ACS vs acute pancreatitis, will check ECG and troponin, CXR, lipase, symptomatic tx with famotidine, maalox, and tylenol, follow up results and reassess.       Attending MD Lopez: 78-year-old male with past medical history for arthritis, Raynaud's, fibromyalgia, on chronic pain medication, CAD status post radiation therapy.  Patient presents with sister for evaluation of epi gastric/lower sternal chest pain radiating upwards into the left.  No associated symptoms other than belching.  Is able to swallow his medication and water.  Did have meat last night for dinner but symptoms started a few hours later.  9 PM.  On exam lower sternal tenderness to palpation but states pain more internal.   Patient denies fever, chills, nausea, vomiting, or diarrhea.   DDX GERD, PUD, ACS, pancreatitis.  Will check EKG, cardiac enzymes, tx GERD and re-eval.  ROCHELLE Montoya GIALLA PMD

## 2023-11-27 NOTE — CONSULT NOTE ADULT - SUBJECTIVE AND OBJECTIVE BOX
Patient is a 78y old  Male who presents with a chief complaint of     HPI:    79y/o M pt with PMHx Arthritis, Raynaud's, fibromyalgia, prostate cancer status post radiation, presenting for evaluation of mid-sternal pressure with associated "sticking sensation" onset several hours after eating brisket, potatoes and soft carrots for dinner last night. Discomfort progressed to intermittent left-sided chest pain and burning epigastric pain beginning 10 PM last night.  Patient describes intermittent episodes of burning epigastric pain with burning pain radiating up his middle chest, also belching with associated "taste of meat"; attempted to induce vomiting unsuccessfully with persistent mid sternal pressure sensation and belching; no previous EGD or history of dyspepsia, no previous food impaction.  Takes narcotic analgesics for fibromyalgia pain.  No drooling or respiratory complaints; mid sternal discomfort improved after IV Pepcid in ED; but still present. No baseline PPI use.  Has not taken any PO solids since dinner last night; able to take meds with sips of water at 5 AM today.    Last bowel movement this morning, notes increased straining.  Patient has never had chest pain or heartburn in the past.  Patient denies recent dietary changes, despite Thanksgiving weekend notes not eating increased greasy or fried or unhealthy foods, denies increased alcohol intake this weekend.  Pt did not eat anything since his symptoms began, but he took his medications with a glass of water this morning with no difficulty.  Non-smoker, no drug usage, drinks 1 unit alcohol weekly.  No recent travel, no recent surgeries, no known sick contacts.  Patient takes oxycodone daily for arthritis pain, last dosage 10 mg at 5 AM this morning.  Denies fevers, chills, nausea, vomiting, diarrhea, shortness of breath, cough, palpitations, numbness, tingling, bloody stools, weight loss, dysuria, hematuria, urinary frequency, LOC, dizziness, lightheadedness.      PAST MEDICAL & SURGICAL HISTORY:  Raynaud's    Fibromyalgia    Prostate cancer  s/p radiation 2015    Concussion  "I fell backwards on a hardwood floor"-10/2015    Eye abnormality  "I had a bleed in the back of the eye"-left, 2014    Anxiety and depression  no medication    Hearing loss  b/l hearing aids    MARIAMA (obstructive sleep apnea)  sleep study 8-12-17,no treatment    PND (post-nasal drip)    DJD (degenerative joint disease)  traction in back 15 years ago x 10 days    TMJ (temporomandibular joint disorder)  right pt with clicking x 1 month    Diverticulitis  treated 2007    Osteoarthritis    Nocturia  x 2-3    Urinary frequency    Lumbar spinal stenosis    S/P rotator cuff surgery  right repair open 1-2017    S/P sinus surgery  1981    S/P carpal tunnel release  bilaal 9/2017    History of left hip replacement  2013    History of right hip replacement  2014    H/O total knee replacement, right  2016          Allergies  Keflex (Rash)        MEDICATIONS  (STANDING):      MEDICATIONS  (PRN):      Social History:    , lives with spouse  occ ETOH  no illicit drugs    Family History   IBD (  ) Yes   ( X ) No  GI Malignancy (  )  Yes    ( X ) No         Advanced Directives: (   X  ) None    (      ) DNR    (     ) DNI    (     ) Health Care Proxy:     Review of Systems:  see HPI- remainder 10 point ROS negative      Vital Signs Last 24 Hrs  T(C): 36.8 (27 Nov 2023 08:12), Max: 36.8 (27 Nov 2023 08:12)  T(F): 98.2 (27 Nov 2023 08:12), Max: 98.2 (27 Nov 2023 08:12)  HR: 61 (27 Nov 2023 08:36) (61 - 69)  BP: 138/84 (27 Nov 2023 08:36) (138/84 - 143/76)  BP(mean): --  RR: 16 (27 Nov 2023 08:36) (16 - 20)  SpO2: 98% (27 Nov 2023 08:36) (98% - 98%)    Parameters below as of 27 Nov 2023 08:36  Patient On (Oxygen Delivery Method): room air        PHYSICAL EXAM:    Constitutional: NAD, well-developed non toxic appearing WM  spouse at bedside. breathing comfortably on room air. no drooling, no stridor  Neck: No LAD, supple no JVD  Respiratory: clear bl no increased WOB  Cardiovascular: S1 and S2, RRR  Gastrointestinal: BS+, soft, +mild epigastric tenderness. no rebound guarding or rigidity  Extremities: No peripheral edema, neg clubbing, cyanosis  Vascular: 2+ peripheral pulses  Neurological: A/O x 3, no focal deficits  Psychiatric: Normal mood, normal affect  Skin: No rashes, anicteric        LABS:                        14.4   10.13 )-----------( 182      ( 27 Nov 2023 08:56 )             43.0     11-27    142  |  106  |  33<H>  ----------------------------<  117<H>  4.1   |  23  |  0.93    Ca    9.3      27 Nov 2023 08:56  Phos  2.7     11-27  Mg     2.2     11-27    TPro  7.1  /  Alb  4.0  /  TBili  0.7  /  DBili  x   /  AST  31  /  ALT  24  /  AlkPhos  72  11-27    Lipase: 24 U/L (11.27.23 @ 08:56)     Troponin T, High Sensitivity (11.27.23 @ 08:56)   Troponin T, High Sensitivity Result: 13:     RADIOLOGY & ADDITIONAL TESTS:    ACC: [id number] EXAM:  XR CHEST PA LAT 2V   ORDERED BY:  KINDRA SAL   PROCEDURE DATE:  11/27/2023        INTERPRETATION:  EXAMINATION: XR CHEST PA AND LATERAL    CLINICAL INDICATION: Chest pain    TECHNIQUE: 2 views; Frontal and lateral views of the chest were obtained.    COMPARISON: 8/14/2012.    FINDINGS:    The heart is normal in size.  The lungs are clear. No pleural effusion.  There is no pneumothorax.  No acute bony abnormality.    IMPRESSION:  Clear lungs.    --- End of Report ---

## 2023-11-27 NOTE — CONSULT NOTE ADULT - NS ATTEND AMEND GEN_ALL_CORE FT
food impaction, for egd today, handles oral secretions    Discussed risks including but not limited to bleeding,infection,drug reaction,perforation requiring surgery,missed lesion, benefits and alternatives of EGD/Colonoscopy with patient including no treatment and patient consents to procedure.    plan egd to be scheduled.

## 2023-11-27 NOTE — ED ADULT NURSE NOTE - NSFALLASSESSNEED_ED_ALL_ED
Jessy Pagand   1/18/2017 10:10 AM   Office Visit    Dept Phone:  [phone]   Encounter #:  [id number]    Provider:  Jonas Gregorio DO   Department:  [facility] PRIMARY CARE                Your Full Care Plan              Where to Get Your Medications      You can get these medications from any pharmacy     Bring a paper prescription for each of these medications     HYDROcodone-acetaminophen  MG per tablet    phentermine 37.5 MG tablet            Your Updated Medication List          This list is accurate as of: 1/18/17 10:55 AM.  Always use your most recent med list.                HYDROcodone-acetaminophen  MG per tablet   Commonly known as:  NORCO   Take 1 tablet by mouth Every 8 (Eight) Hours As Needed for moderate pain (4-6).       phentermine 37.5 MG tablet   Commonly known as:  ADIPEX-P   Take 1 tablet by mouth Every Morning Before Breakfast.               You Were Diagnosed With        Codes Comments    BMI 34.0-34.9,adult    -  Primary ICD-10-CM: Z68.34  ICD-9-CM: V85.34     Bilateral low back pain without sciatica, unspecified chronicity     ICD-10-CM: M54.5  ICD-9-CM: 724.2     Degeneration of intervertebral disc of lumbar region     ICD-10-CM: M51.36  ICD-9-CM: 722.52     Obesity, unspecified obesity severity, unspecified obesity type     ICD-10-CM: E66.9  ICD-9-CM: 278.00       Instructions     None    Patient Instructions History      Upcoming Appointments     Visit Type Date Time Department    OFFICE VISIT 1/18/2017 10:10 AM [facility]    OFFICE VISIT 2/15/2017 10:15 AM [facility]      Study[employer] Signup     Our records indicate that your [facility] [employer] account has been deactivated. If you would like to reactivate your account, please email [email or web address] or call [phone] to talk to our W[employer] staff.             Other Info from Your Visit           Your Appointments     Feb 15, 2017 10:15 AM EST    "  Office Visit with Jonas Gregorio DO   [facility] PRIMARY [facility]ARE (--)    [address] [address] [phone]           Arrive 15 minutes prior to appointment.              Allergies     No Known Allergies      Reason for Visit     Follow-up med refills      Vital Signs     Blood Pressure Pulse Temperature Height Weight Oxygen Saturation    127/58 (BP Location: Right arm, Patient Position: Sitting) 75 98.2 °F (36.8 °C) 69\" (175.3 cm) 236 lb (107 kg) 100%    Body Mass Index Smoking Status                34.85 kg/m2 Current Every Day Smoker          Problems and Diagnoses Noted     Degeneration of lumbar or lumbosacral intervertebral disc    Low back pain    Obesity    Body mass index 34.0-34.9, adult    -  Primary        " no

## 2023-11-27 NOTE — ED PROVIDER NOTE - NSICDXFAMILYHX_GEN_ALL_CORE_FT
FAMILY HISTORY:  Father  Still living? No  Family history of heart attack, Age at diagnosis: Age Unknown    Mother  Still living? No  Family history of CHF (congestive heart failure), Age at diagnosis: Age Unknown    Sibling  Still living? No  Family history of heart attack, Age at diagnosis: Age Unknown

## 2023-11-27 NOTE — PRE PROCEDURE NOTE - PRE PROCEDURE EVALUATION
Attending Physician:                            Procedure: EGD    Indication for Procedure:  ________________________________________________________  PAST MEDICAL & SURGICAL HISTORY:  Prostate cancer  s/p radiation 2015      Concussion  "I fell backwards on a hardwood floor"-10/2015      Eye abnormality  "I had a bleed in the back of the eye"-left, 2014      Anxiety and depression  no medication      Hearing loss  b/l hearing aids      MARIAMA (obstructive sleep apnea)  sleep study 8-12-17,no treatment      PND (post-nasal drip)      DJD (degenerative joint disease)  traction in back 15 years ago x 10 days      TMJ (temporomandibular joint disorder)  right pt with clicking x 1 month      Diverticulitis  treated 2007      Osteoarthritis      Nocturia  x 2-3      Urinary frequency      Lumbar spinal stenosis      S/P rotator cuff surgery  right repair open 1-2017      S/P sinus surgery  1981      S/P carpal tunnel release  bilaal 9/2017      History of left hip replacement  2013      History of right hip replacement  2014      H/O total knee replacement, right  2016        ALLERGIES:  Keflex (Rash)    HOME MEDICATIONS:  Lyrica 150 mg oral capsule: 1 cap(s) orally 3 times a day  Multiple Vitamins oral tablet: 1 tab(s) orally once a day  OxyCONTIN 10 mg oral tablet, extended release: 1 tab(s) orally 3 to 4 times a day    AICD/PPM: [ ] yes   [ ] no    PERTINENT LAB DATA:                        14.4   10.13 )-----------( 182      ( 27 Nov 2023 08:56 )             43.0     11-27    142  |  106  |  33<H>  ----------------------------<  117<H>  4.1   |  23  |  0.93    Ca    9.3      27 Nov 2023 08:56  Phos  2.7     11-27  Mg     2.2     11-27    TPro  7.1  /  Alb  4.0  /  TBili  0.7  /  DBili  x   /  AST  31  /  ALT  24  /  AlkPhos  72  11-27                PHYSICAL EXAMINATION:    Height (cm): 167.6  Weight (kg): 67.1  BMI (kg/m2): 23.9  BSA (m2): 1.76T(C): 36.7  HR: 55  BP: 108/68  RR: 16  SpO2: 98%    Constitutional: NAD    Neck:  No JVD  Respiratory: CTAB/L  Cardiovascular: S1 and S2  Gastrointestinal: BS+, soft, NT/ND  Extremities: No peripheral edema  Neurological: A/O x 3, no focal deficits        COMMENTS:    The patient is a suitable candidate for the planned procedure unless box checked [ ]  No, explain:     Attending Physician:                            Procedure: EGD    Indication for Procedure: food impaction  ________________________________________________________  PAST MEDICAL & SURGICAL HISTORY:  Prostate cancer  s/p radiation 2015      Concussion  "I fell backwards on a hardwood floor"-10/2015      Eye abnormality  "I had a bleed in the back of the eye"-left, 2014      Anxiety and depression  no medication      Hearing loss  b/l hearing aids      MARIAMA (obstructive sleep apnea)  sleep study 8-12-17,no treatment      PND (post-nasal drip)      DJD (degenerative joint disease)  traction in back 15 years ago x 10 days      TMJ (temporomandibular joint disorder)  right pt with clicking x 1 month      Diverticulitis  treated 2007      Osteoarthritis      Nocturia  x 2-3      Urinary frequency      Lumbar spinal stenosis      S/P rotator cuff surgery  right repair open 1-2017      S/P sinus surgery  1981      S/P carpal tunnel release  bilaal 9/2017      History of left hip replacement  2013      History of right hip replacement  2014      H/O total knee replacement, right  2016        ALLERGIES:  Keflex (Rash)    HOME MEDICATIONS:  Lyrica 150 mg oral capsule: 1 cap(s) orally 3 times a day  Multiple Vitamins oral tablet: 1 tab(s) orally once a day  OxyCONTIN 10 mg oral tablet, extended release: 1 tab(s) orally 3 to 4 times a day    AICD/PPM: [ ] yes   [ ] no    PERTINENT LAB DATA:                        14.4   10.13 )-----------( 182      ( 27 Nov 2023 08:56 )             43.0     11-27    142  |  106  |  33<H>  ----------------------------<  117<H>  4.1   |  23  |  0.93    Ca    9.3      27 Nov 2023 08:56  Phos  2.7     11-27  Mg     2.2     11-27    TPro  7.1  /  Alb  4.0  /  TBili  0.7  /  DBili  x   /  AST  31  /  ALT  24  /  AlkPhos  72  11-27                PHYSICAL EXAMINATION:    Height (cm): 167.6  Weight (kg): 67.1  BMI (kg/m2): 23.9  BSA (m2): 1.76T(C): 36.7  HR: 55  BP: 108/68  RR: 16  SpO2: 98%    Constitutional: NAD    Neck:  No JVD  Respiratory: CTAB/L  Cardiovascular: S1 and S2  Gastrointestinal: BS+, soft, NT/ND  Extremities: No peripheral edema  Neurological: A/O x 3, no focal deficits        COMMENTS:    The patient is a suitable candidate for the planned procedure unless box checked [ ]  No, explain:

## 2023-11-27 NOTE — ED PROVIDER NOTE - WR ORDER DATE AND TIME 1
Pt arrived from OR to PACU bay 10. Report received from OR staff. Pt asleep upon arrival, minimally responsive to painful stimuli. Surgical incisions dressings in place to left knee; pt arrives with ACE wrap in place. Pt arrives with simple mask in place infusing 6L oxygen, vitals as charted. 27-Nov-2023 08:50

## 2023-11-27 NOTE — ED PROVIDER NOTE - OBJECTIVE STATEMENT
79y/o M pt with PMHx Arthritis, Raynaud's, fibromyalgia, prostate cancer status post radiation, presenting for evaluation of intermittent left-sided chest pain and burning epigastric pain beginning 10 PM last night.  Patient describes intermittent episodes of burning epigastric pain with burning pain radiating up his middle chest, also intermittent episodes of left-sided chest pain.  Patient also notes increased burping and passing gas.  Last bowel movement this morning, notes increased straining.  Patient has never had chest pain or heartburn in the past.  Patient denies recent dietary changes, despite Thanksgiving weekend notes not eating increased greasy or fried or unhealthy foods, denies increased alcohol intake this weekend.  Non-smoker, no drug usage, drinks 1 unit alcohol weekly.  No recent travel, no recent surgeries, no known sick contacts.  Patient takes oxycodone daily for arthritis pain, last dosage 10 mg at 5 AM this morning.  Denies fevers, chills, nausea, vomiting, diarrhea, shortness of breath, cough, palpitations, numbness, tingling, bloody stools, weight loss, dysuria, hematuria, urinary frequency, LOC, dizziness, lightheadedness. 79y/o M pt with PMHx Arthritis, Raynaud's, fibromyalgia, prostate cancer status post radiation, presenting for evaluation of intermittent left-sided chest pain and burning epigastric pain beginning 10 PM last night.  Patient describes intermittent episodes of burning epigastric pain with burning pain radiating up his middle chest, also intermittent episodes of left-sided chest pain.  Patient also notes increased burping and passing gas.  Last bowel movement this morning, notes increased straining.  Patient has never had chest pain or heartburn in the past.  Patient denies recent dietary changes, despite Thanksgiving weekend notes not eating increased greasy or fried or unhealthy foods, denies increased alcohol intake this weekend.  Pt did not eat anything since his symptoms began, but he took his medications with a glass of water this morning with no difficulty.  Non-smoker, no drug usage, drinks 1 unit alcohol weekly.  No recent travel, no recent surgeries, no known sick contacts.  Patient takes oxycodone daily for arthritis pain, last dosage 10 mg at 5 AM this morning.  Denies fevers, chills, nausea, vomiting, diarrhea, shortness of breath, cough, palpitations, numbness, tingling, bloody stools, weight loss, dysuria, hematuria, urinary frequency, LOC, dizziness, lightheadedness.

## 2023-11-27 NOTE — H&P ADULT - ASSESSMENT
79y/o M pt with PMHx Arthritis, Raynaud's, fibromyalgia, prostate cancer status post radiation, presenting for evaluation of intermittent left-sided chest pain and burning epigastric pain beginning 10 PM last night.  Patient describes intermittent episodes of burning epigastric pain with burning pain radiating up his middle chest, also intermittent episodes of left-sided chest pain.  Patient also notes increased burping and passing gas.  Last bowel movement this morning, notes increased straining.  Patient has never had chest pain or heartburn in the past.  Patient denies recent dietary changes, despite Thanksgiving weekend notes not eating increased greasy or fried or unhealthy foods, denies increased alcohol intake this weekend.  Pt did not eat anything since his symptoms began, but he took his medications with a glass of water this morning with no difficulty.  Non-smoker, no drug usage, drinks 1 unit alcohol weekly.  No recent travel, no recent surgeries, no known sick contacts.  Patient takes oxycodone daily for arthritis pain, last dosage 10 mg at 5 AM this morning.  Denies fevers, chills, nausea, vomiting, diarrhea, shortness of breath, cough, palpitations, numbness, tingling, bloody stools, weight loss, dysuria, hematuria, urinary frequency, LOC, dizziness, lightheadedness.  pot with chest pain and ecgsinus bradycardia  abdominal pain s/p EGD reults noted with PUD and esophagitis  continue iv Protonix  chest pain ?gi, check TTE  clear liquid dietr will advance  cbc , david  continue pain meds  OA ?severe gastritis ?sec to use of Celebrex avoid NSAID

## 2023-11-28 ENCOUNTER — TRANSCRIPTION ENCOUNTER (OUTPATIENT)
Age: 78
End: 2023-11-28

## 2023-11-28 VITALS
TEMPERATURE: 99 F | HEART RATE: 60 BPM | OXYGEN SATURATION: 96 % | RESPIRATION RATE: 18 BRPM | DIASTOLIC BLOOD PRESSURE: 69 MMHG | SYSTOLIC BLOOD PRESSURE: 120 MMHG

## 2023-11-28 LAB
ANION GAP SERPL CALC-SCNC: 10 MMOL/L — SIGNIFICANT CHANGE UP (ref 5–17)
ANION GAP SERPL CALC-SCNC: 10 MMOL/L — SIGNIFICANT CHANGE UP (ref 5–17)
BUN SERPL-MCNC: 29 MG/DL — HIGH (ref 7–23)
BUN SERPL-MCNC: 29 MG/DL — HIGH (ref 7–23)
CALCIUM SERPL-MCNC: 9.1 MG/DL — SIGNIFICANT CHANGE UP (ref 8.4–10.5)
CALCIUM SERPL-MCNC: 9.1 MG/DL — SIGNIFICANT CHANGE UP (ref 8.4–10.5)
CHLORIDE SERPL-SCNC: 109 MMOL/L — HIGH (ref 96–108)
CHLORIDE SERPL-SCNC: 109 MMOL/L — HIGH (ref 96–108)
CHOLEST SERPL-MCNC: 168 MG/DL — SIGNIFICANT CHANGE UP
CHOLEST SERPL-MCNC: 168 MG/DL — SIGNIFICANT CHANGE UP
CO2 SERPL-SCNC: 22 MMOL/L — SIGNIFICANT CHANGE UP (ref 22–31)
CO2 SERPL-SCNC: 22 MMOL/L — SIGNIFICANT CHANGE UP (ref 22–31)
CREAT SERPL-MCNC: 0.76 MG/DL — SIGNIFICANT CHANGE UP (ref 0.5–1.3)
CREAT SERPL-MCNC: 0.76 MG/DL — SIGNIFICANT CHANGE UP (ref 0.5–1.3)
EGFR: 93 ML/MIN/1.73M2 — SIGNIFICANT CHANGE UP
EGFR: 93 ML/MIN/1.73M2 — SIGNIFICANT CHANGE UP
GLUCOSE SERPL-MCNC: 97 MG/DL — SIGNIFICANT CHANGE UP (ref 70–99)
GLUCOSE SERPL-MCNC: 97 MG/DL — SIGNIFICANT CHANGE UP (ref 70–99)
HCT VFR BLD CALC: 40.5 % — SIGNIFICANT CHANGE UP (ref 39–50)
HCT VFR BLD CALC: 40.5 % — SIGNIFICANT CHANGE UP (ref 39–50)
HCV AB S/CO SERPL IA: 0.05 S/CO — SIGNIFICANT CHANGE UP (ref 0–0.99)
HCV AB S/CO SERPL IA: 0.05 S/CO — SIGNIFICANT CHANGE UP (ref 0–0.99)
HCV AB SERPL-IMP: SIGNIFICANT CHANGE UP
HCV AB SERPL-IMP: SIGNIFICANT CHANGE UP
HDLC SERPL-MCNC: 71 MG/DL — SIGNIFICANT CHANGE UP
HDLC SERPL-MCNC: 71 MG/DL — SIGNIFICANT CHANGE UP
HGB BLD-MCNC: 13.7 G/DL — SIGNIFICANT CHANGE UP (ref 13–17)
HGB BLD-MCNC: 13.7 G/DL — SIGNIFICANT CHANGE UP (ref 13–17)
LIPID PNL WITH DIRECT LDL SERPL: 79 MG/DL — SIGNIFICANT CHANGE UP
LIPID PNL WITH DIRECT LDL SERPL: 79 MG/DL — SIGNIFICANT CHANGE UP
MCHC RBC-ENTMCNC: 30.6 PG — SIGNIFICANT CHANGE UP (ref 27–34)
MCHC RBC-ENTMCNC: 30.6 PG — SIGNIFICANT CHANGE UP (ref 27–34)
MCHC RBC-ENTMCNC: 33.8 GM/DL — SIGNIFICANT CHANGE UP (ref 32–36)
MCHC RBC-ENTMCNC: 33.8 GM/DL — SIGNIFICANT CHANGE UP (ref 32–36)
MCV RBC AUTO: 90.6 FL — SIGNIFICANT CHANGE UP (ref 80–100)
MCV RBC AUTO: 90.6 FL — SIGNIFICANT CHANGE UP (ref 80–100)
NON HDL CHOLESTEROL: 98 MG/DL — SIGNIFICANT CHANGE UP
NON HDL CHOLESTEROL: 98 MG/DL — SIGNIFICANT CHANGE UP
NRBC # BLD: 0 /100 WBCS — SIGNIFICANT CHANGE UP (ref 0–0)
NRBC # BLD: 0 /100 WBCS — SIGNIFICANT CHANGE UP (ref 0–0)
PLATELET # BLD AUTO: 162 K/UL — SIGNIFICANT CHANGE UP (ref 150–400)
PLATELET # BLD AUTO: 162 K/UL — SIGNIFICANT CHANGE UP (ref 150–400)
POTASSIUM SERPL-MCNC: 4.5 MMOL/L — SIGNIFICANT CHANGE UP (ref 3.5–5.3)
POTASSIUM SERPL-MCNC: 4.5 MMOL/L — SIGNIFICANT CHANGE UP (ref 3.5–5.3)
POTASSIUM SERPL-SCNC: 4.5 MMOL/L — SIGNIFICANT CHANGE UP (ref 3.5–5.3)
POTASSIUM SERPL-SCNC: 4.5 MMOL/L — SIGNIFICANT CHANGE UP (ref 3.5–5.3)
RBC # BLD: 4.47 M/UL — SIGNIFICANT CHANGE UP (ref 4.2–5.8)
RBC # BLD: 4.47 M/UL — SIGNIFICANT CHANGE UP (ref 4.2–5.8)
RBC # FLD: 14.5 % — SIGNIFICANT CHANGE UP (ref 10.3–14.5)
RBC # FLD: 14.5 % — SIGNIFICANT CHANGE UP (ref 10.3–14.5)
SODIUM SERPL-SCNC: 141 MMOL/L — SIGNIFICANT CHANGE UP (ref 135–145)
SODIUM SERPL-SCNC: 141 MMOL/L — SIGNIFICANT CHANGE UP (ref 135–145)
TRIGL SERPL-MCNC: 101 MG/DL — SIGNIFICANT CHANGE UP
TRIGL SERPL-MCNC: 101 MG/DL — SIGNIFICANT CHANGE UP
TSH SERPL-MCNC: 0.56 UIU/ML — SIGNIFICANT CHANGE UP (ref 0.27–4.2)
TSH SERPL-MCNC: 0.56 UIU/ML — SIGNIFICANT CHANGE UP (ref 0.27–4.2)
WBC # BLD: 8.5 K/UL — SIGNIFICANT CHANGE UP (ref 3.8–10.5)
WBC # BLD: 8.5 K/UL — SIGNIFICANT CHANGE UP (ref 3.8–10.5)
WBC # FLD AUTO: 8.5 K/UL — SIGNIFICANT CHANGE UP (ref 3.8–10.5)
WBC # FLD AUTO: 8.5 K/UL — SIGNIFICANT CHANGE UP (ref 3.8–10.5)

## 2023-11-28 PROCEDURE — 99231 SBSQ HOSP IP/OBS SF/LOW 25: CPT

## 2023-11-28 PROCEDURE — 93356 MYOCRD STRAIN IMG SPCKL TRCK: CPT

## 2023-11-28 PROCEDURE — 93306 TTE W/DOPPLER COMPLETE: CPT | Mod: 26

## 2023-11-28 RX ORDER — ACETAMINOPHEN 500 MG
650 TABLET ORAL ONCE
Refills: 0 | Status: COMPLETED | OUTPATIENT
Start: 2023-11-28 | End: 2023-11-28

## 2023-11-28 RX ORDER — DOCUSATE SODIUM 100 MG
10 CAPSULE ORAL
Qty: 300 | Refills: 0
Start: 2023-11-28 | End: 2023-12-27

## 2023-11-28 RX ORDER — PANTOPRAZOLE SODIUM 20 MG/1
1 TABLET, DELAYED RELEASE ORAL
Qty: 60 | Refills: 0
Start: 2023-11-28 | End: 2023-12-27

## 2023-11-28 RX ORDER — SUCRALFATE 1 G
10 TABLET ORAL
Qty: 560 | Refills: 0
Start: 2023-11-28 | End: 2023-12-11

## 2023-11-28 RX ADMIN — OXYCODONE HYDROCHLORIDE 10 MILLIGRAM(S): 5 TABLET ORAL at 05:20

## 2023-11-28 RX ADMIN — Medication 75 MILLIGRAM(S): at 13:09

## 2023-11-28 RX ADMIN — Medication 75 MILLIGRAM(S): at 05:20

## 2023-11-28 RX ADMIN — Medication 1 GRAM(S): at 13:10

## 2023-11-28 RX ADMIN — Medication 650 MILLIGRAM(S): at 08:50

## 2023-11-28 RX ADMIN — OXYCODONE HYDROCHLORIDE 10 MILLIGRAM(S): 5 TABLET ORAL at 13:09

## 2023-11-28 RX ADMIN — Medication 1 GRAM(S): at 05:20

## 2023-11-28 RX ADMIN — Medication 650 MILLIGRAM(S): at 09:50

## 2023-11-28 RX ADMIN — PANTOPRAZOLE SODIUM 40 MILLIGRAM(S): 20 TABLET, DELAYED RELEASE ORAL at 05:20

## 2023-11-28 NOTE — PROGRESS NOTE ADULT - NS ATTEND AMEND GEN_ALL_CORE FT
78M Raynaud's, fibromyalgia, prostate cx s/p XRT, p/w c/f food impaction. On EGD no bolus but found to have LA D esophagitis and Carlo II gastric ulcers. Started on PPI BID and carafate, will have outpatient follow up with Dr. Montoya. Will need repeat EGD to assess healing in 8 weeks. Normal CBC/BMP, normal VS, well-appearing. Stable for discharge.

## 2023-11-28 NOTE — PROGRESS NOTE ADULT - ASSESSMENT
77y/o M pt with PMHx Arthritis, Raynaud's, fibromyalgia, prostate cancer status post radiation, presenting for evaluation of mid-sternal pressure with associated "sticking sensation" onset several hours after eating brisket, potatoes and soft carrots for dinner last night - Discomfort progressed to intermittent left-sided chest pain and burning epigastric pain beginning 10 PM last night.  Patient describes intermittent episodes of burning epigastric pain with burning pain radiating up his middle chest, also belching with associated "taste of meat"; attempted to induce vomiting unsuccessfully with persistent mid sternal pressure sensation and belching; no previous EGD or history of dyspepsia, no previous food impaction.    Troponin negative  EKG w/o change from baseline     #mid sternal chest and epigastric pain sp suspected passed food bolus  #severe Grade D esophagitis, gastritis and non bleeding gastric ulcers (Carlo Class IIc and IIb)    RECS:  -Carafate 1 gram suspension QAC + QHS x 2 weeks  -Protonix 40 mg PO BIX x 8 weeks  -soft diet; advised to chew well, eat slowly  -outpt follow up with Dr Montoya in 2-3 weeks to review biopsy results;  will be contacting pt to schedule  -repeat EGD in 8 weeks to assess healing  -no NSAIDS    no GI objection to DC planning  Dr Montoya (primary outpt GI) updated  Discussed with pt and Medicine attenidng/NP    Jayden Garza PA-C  [facility] Non Teaching GI Service  [facility]  [phone]  Available on TEAMS Mon-Fri 8a-4p  After hours and weekend coverage [phone]

## 2023-11-28 NOTE — DISCHARGE NOTE NURSING/CASE MANAGEMENT/SOCIAL WORK - NSDCVIVACCINE_GEN_ALL_CORE_FT
Tdap; 19-Nov-2019 22:36; Jenifer Mata (TINO); [employer]; [id number] (Exp. Date: 21-Sep-2021); IntraMuscular; Deltoid Left.; 0.5 milliLiter(s); VIS (VIS Published: 09-May-2013, VIS Presented: 19-Nov-2019);

## 2023-11-28 NOTE — DISCHARGE NOTE NURSING/CASE MANAGEMENT/SOCIAL WORK - PATIENT PORTAL LINK FT
You can access the FollowMyHealth Patient Portal offered by [facility] by registering at the following website: [email or web address]. By joining [facility]’s FollowMyHealth portal, you will also be able to view your health information using other applications (apps) compatible with our system.

## 2023-11-28 NOTE — DISCHARGE NOTE PROVIDER - PROVIDER TOKENS
PROVIDER:[TOKEN:[[id number]]],PROVIDER:[TOKEN:[[id number]]] PROVIDER:[TOKEN:[[id number]]],PROVIDER:[TOKEN:[[id number]]] PROVIDER:[TOKEN:[[id number]]],PROVIDER:[TOKEN:[[id number]]] PROVIDER:[TOKEN:[[id number]]],PROVIDER:[TOKEN:[[id number]]],PROVIDER:[TOKEN:[[id number]]]

## 2023-11-28 NOTE — CHART NOTE - NSCHARTNOTEFT_GEN_A_CORE
Pt without complaints, states he feels good today. Cleared by GI for discharge home. Prontonix bid and carafate AC and HS. Reviewed echo w Dr. Pace who will see patient in office. Discharge home.

## 2023-11-28 NOTE — DISCHARGE NOTE PROVIDER - CARE PROVIDERS DIRECT ADDRESSES
,DirectAddress_Unknown,[email or web address] ,DirectAddress_Unknown,[email or web address] ,[email or web address],[email or web address] ,DirectAddress_Unknown,[email or web address],DirectAddress_Unknown ,DirectAddress_Unknown,[email or web address],DirectAddress_Unknown ,DirectAddress_Unknown,[email or web address],DirectAddress_Unknown

## 2023-11-28 NOTE — DISCHARGE NOTE PROVIDER - HOSPITAL COURSE
HPI:  Date of Service, 11-27-23 @ 19:14  CHIEF COMPLAINT:Patient is a 78y old  Male who presents with a chief complaint of     HPI:  79y/o M pt with PMHx Arthritis, Raynaud's, fibromyalgia, prostate cancer status post radiation, presenting for evaluation of intermittent left-sided chest pain and burning epigastric pain beginning 10 PM last night.  Patient describes intermittent episodes of burning epigastric pain with burning pain radiating up his middle chest, also intermittent episodes of left-sided chest pain.  Patient also notes increased burping and passing gas.  Last bowel movement this morning, notes increased straining.  Patient has never had chest pain or heartburn in the past.  Patient denies recent dietary changes, despite Thanksgiving weekend notes not eating increased greasy or fried or unhealthy foods, denies increased alcohol intake this weekend.  Pt did not eat anything since his symptoms began, but he took his medications with a glass of water this morning with no difficulty.  Non-smoker, no drug usage, drinks 1 unit alcohol weekly.  No recent travel, no recent surgeries, no known sick contacts.  Patient takes oxycodone daily for arthritis pain, last dosage 10 mg at 5 AM this morning.  Denies fevers, chills, nausea, vomiting, diarrhea, shortness of breath, cough, palpitations, numbness, tingling, bloody stools, weight loss, dysuria, hematuria, urinary frequency, LOC, dizziness, lightheadedness.    Hospital Course:      Pt was seen by GI       Follow up   MED changes  CODE status     Discharge Diagnosis             HPI:  Date of Service, 11-27-23 @ 19:14  CHIEF COMPLAINT:Patient is a 78y old  Male who presents with a chief complaint of     HPI:  77y/o M pt with PMHx Arthritis, Raynaud's, fibromyalgia, prostate cancer status post radiation, presenting for evaluation of intermittent left-sided chest pain and burning epigastric pain beginning 10 PM last night.  Patient describes intermittent episodes of burning epigastric pain with burning pain radiating up his middle chest, also intermittent episodes of left-sided chest pain.  Patient also notes increased burping and passing gas.  Last bowel movement this morning, notes increased straining.  Patient has never had chest pain or heartburn in the past.  Patient denies recent dietary changes, despite Thanksgiving weekend notes not eating increased greasy or fried or unhealthy foods, denies increased alcohol intake this weekend.  Pt did not eat anything since his symptoms began, but he took his medications with a glass of water this morning with no difficulty.  Non-smoker, no drug usage, drinks 1 unit alcohol weekly.  No recent travel, no recent surgeries, no known sick contacts.  Patient takes oxycodone daily for arthritis pain, last dosage 10 mg at 5 AM this morning.  Denies fevers, chills, nausea, vomiting, diarrhea, shortness of breath, cough, palpitations, numbness, tingling, bloody stools, weight loss, dysuria, hematuria, urinary frequency, LOC, dizziness, lightheadedness.    Hospital Course:      Pt was seen by GI , EGD performed,  LA Grade D reflux esophagitis. Biopsied.- Z-line irregular, 40 cm from the incisors.                       - Erythematous mucosa in the gastric body. Biopsied.                       - Non-bleeding gastric ulcer with a flat pigmented spot (Carlo Class IIc).                       - Non-bleeding gastric ulcer with an adherent clot (Carlo Class IIb).                       - Normal first portion of the duodenum and second portion of the duodenum.  Recommendation:      - Await pathology results.                       - Use Protonix (pantoprazole) 40 mg PO daily.                       - Use sucralfate suspension 1 gram PO BID daily.                       - Repeat upper endoscopy in 8 weeks to check healing.                       - Return to GI office in 2 weeks.                        - No aspirin, ibuprofen, naproxen, or other non-steroidal anti-inflammatory                        drugs.  Echo performed  Pt cleared for -Carafate 1 gram suspension QAC + QHS x 2 weeks  -Protonix 40 mg PO BIX x 8 weeks  -soft diet; advised to chew well, eat slowly  -outpt follow up with Dr Montoya in 2-3 weeks to review biopsy results;  will be contacting pt to schedule  -repeat EGD in 8 weeks to assess healing  -no NSAIDS                                                                                                                Follow up   MED changes  CODE status     Discharge Diagnosis             HPI:  Date of Service, 11-27-23 @ 19:14  CHIEF COMPLAINT:Patient is a 78y old  Male who presents with a chief complaint of     HPI:  79y/o M pt with PMHx Arthritis, Raynaud's, fibromyalgia, prostate cancer status post radiation, presenting for evaluation of intermittent left-sided chest pain and burning epigastric pain beginning 10 PM last night.  Patient describes intermittent episodes of burning epigastric pain with burning pain radiating up his middle chest, also intermittent episodes of left-sided chest pain.  Patient also notes increased burping and passing gas.  Last bowel movement this morning, notes increased straining.  Patient has never had chest pain or heartburn in the past.  Patient denies recent dietary changes, despite Thanksgiving weekend notes not eating increased greasy or fried or unhealthy foods, denies increased alcohol intake this weekend.  Pt did not eat anything since his symptoms began, but he took his medications with a glass of water this morning with no difficulty.  Non-smoker, no drug usage, drinks 1 unit alcohol weekly.  No recent travel, no recent surgeries, no known sick contacts.  Patient takes oxycodone daily for arthritis pain, last dosage 10 mg at 5 AM this morning.  Denies fevers, chills, nausea, vomiting, diarrhea, shortness of breath, cough, palpitations, numbness, tingling, bloody stools, weight loss, dysuria, hematuria, urinary frequency, LOC, dizziness, lightheadedness.    Hospital Course:      Pt was seen by GI , EGD performed,  LA Grade D reflux esophagitis. Biopsied.- Z-line irregular, 40 cm from the incisors.                       - Erythematous mucosa in the gastric body. Biopsied.                       - Non-bleeding gastric ulcer with a flat pigmented spot (Carlo Class IIc).                       - Non-bleeding gastric ulcer with an adherent clot (Carlo Class IIb).                       - Normal first portion of the duodenum and second portion of the duodenum.  Recommendation:      - Await pathology results.                       - Use Protonix (pantoprazole) 40 mg PO daily.                       - Use sucralfate suspension 1 gram PO BID daily.                       - Repeat upper endoscopy in 8 weeks to check healing.                       - Return to GI office in 2 weeks.                        - No aspirin, ibuprofen, naproxen, or other non-steroidal anti-inflammatory                        drugs.  Echo performed  Pt cleared for -Carafate 1 gram suspension QAC + QHS x 2 weeks  -Protonix 40 mg PO BIX x 8 weeks  -soft diet; advised to chew well, eat slowly  -outpt follow up with Dr Montoya in 2-3 weeks to review biopsy results;  will be contacting pt to schedule  -repeat EGD in 8 weeks to assess healing  -no NSAIDS                                                                                                                Follow up   MED changes  CODE status     Discharge Diagnosis

## 2023-11-28 NOTE — DISCHARGE NOTE NURSING/CASE MANAGEMENT/SOCIAL WORK - NSDCPEFALRISK_GEN_ALL_CORE
For information on Fall & Injury Prevention, visit: [email or web address] OR  [email or web address] OR  https://www.cdc.gov/steadi/patient.html

## 2023-11-28 NOTE — DISCHARGE NOTE PROVIDER - NSDCFUSCHEDAPPT_GEN_ALL_CORE_FT
Ynes Conway  [place] Physician [facility]  ONCPAINMGT [address]  Scheduled Appointment: 12/01/2023    Luz Mccord  [place] Physician [facility]  UROLOGY [address] [facility]  Scheduled Appointment: 01/31/2024     Ynes Conway  [place] Physician [facility]  ONCPAINMGT [address]  Scheduled Appointment: 12/01/2023    Luz Mccord  [place] Physician [facility]  UROLOGY [address] [facility]  Scheduled Appointment: 01/31/2024     Ynes Conway  [place] Physician [facility]  ONCPAINMGT [address]  Scheduled Appointment: 12/01/2023    Luz Mccord  [place] Physician [facility]  UROLOGY [address] [facility]  Scheduled Appointment: 01/31/2024

## 2023-11-28 NOTE — DISCHARGE NOTE PROVIDER - CARE PROVIDER_API CALL
Nelson Russell  Internal Medicine  [address] [place] [address]  Phone: [phone]  Fax: [phone]  Follow Up Time:     Nuria David)  Gastroenterology  [address] [place] [address]  Phone: [phone]  Fax: [phone]  Follow Up Time:    Nelson Russell  Internal Medicine  [address] [place] [address]  Phone: [phone]  Fax: [phone]  Follow Up Time:     Nuria David)  Gastroenterology  [address] [place] [address]  Phone: [phone]  Fax: [phone]  Follow Up Time:    Nelson Russell  Internal Medicine  [address] [place] [address]  Phone: [phone]  Fax: [phone]  Follow Up Time:     Nuria David)  Gastroenterology  [address] [place] [address]  Phone: [phone]  Fax: [phone]  Follow Up Time:    Nelson Russell  Internal Medicine  [address] [place] [address]  Phone: [phone]  Fax: [phone]  Follow Up Time:     Elvie Montoya  Gastroenterology  [address], [address]  [place] [address]  Phone: [phone]  Fax: [phone]  Follow Up Time:     Bucky Pace  Cardiovascular Disease  [address] [place] [address] [address]  [place] [address]  Phone: [phone]  Fax: [phone]  Follow Up Time:    Nelson Russell  Internal Medicine  [address] [place] [address]  Phone: [phone]  Fax: [phone]  Follow Up Time:     Elvie Montoya  Gastroenterology  [address] [facility], [address]  [place] [address]  Phone: [phone]  Fax: [phone]  Follow Up Time:     Bucky Pace  Cardiovascular Disease  [address] [place] [address] [address]  [place] [address]  Phone: [phone]  Fax: [phone]  Follow Up Time:    Nelson Russell  Internal Medicine  [address] [place] [address]  Phone: [phone]  Fax: [phone]  Follow Up Time:     Elvie Montoya  Gastroenterology  [address] [facility], [address]  [place] [address]  Phone: [phone]  Fax: [phone]  Follow Up Time:     Bucky Pace  Cardiovascular Disease  [address] [place] [address] [address]  [place] [address]  Phone: [phone]  Fax: [phone]  Follow Up Time:

## 2023-11-28 NOTE — DISCHARGE NOTE PROVIDER - NSDCCPCAREPLAN_GEN_ALL_CORE_FT
PRINCIPAL DISCHARGE DIAGNOSIS  Diagnosis: Ulcerative esophagitis  Assessment and Plan of Treatment: EGD revealed severe esophagitis with ulcer and gastritis.   Use Protonix (pantoprazole) 40 mg  daily and  sucralfate suspension 1 gram twice a day                       - Repeat upper endoscopy in 8 weeks to check healing.                       - Return to GI office in 2 weeks.  Avoid NSAIDs     PRINCIPAL DISCHARGE DIAGNOSIS  Diagnosis: Ulcerative esophagitis  Assessment and Plan of Treatment: EGD revealed severe esophagitis with ulcer and gastritis.   Use Protonix (pantoprazole) 40 mg twice a day for 8 weeks  Use sucralfate suspension 1 gram twice a day for 2 weeks.                        - Repeat upper endoscopy in 8 weeks to check healing.                        NO NSAIDs  -soft diet; advised to chew well, eat slowly  -outpt follow up with Dr Montoya in 2-3 weeks to review biopsy results;  will be contacting patient to schedule  -repeat EGD in 8 weeks to assess healing        SECONDARY DISCHARGE DIAGNOSES  Diagnosis: Atypical chest pain  Assessment and Plan of Treatment: Likely secondary to severe esophagitis.   Echo performed  [  ]   Follow up with PMD and Dr. Pace     PRINCIPAL DISCHARGE DIAGNOSIS  Diagnosis: Ulcerative esophagitis  Assessment and Plan of Treatment: EGD revealed severe esophagitis with ulcer and gastritis.   Use Protonix (pantoprazole) 40 mg twice a day for 8 weeks  Use sucralfate suspension 1 gram twice a day for 2 weeks.                        - Repeat upper endoscopy in 8 weeks to check healing.                        NO NSAIDs  -soft diet; advised to chew well, eat slowly  -outpt follow up with Dr Montoya in 2-3 weeks to review biopsy results;  will be contacting patient to schedule  -repeat EGD in 8 weeks to assess healing        SECONDARY DISCHARGE DIAGNOSES  Diagnosis: Atypical chest pain  Assessment and Plan of Treatment: Likely secondary to severe esophagitis.   Echo performed  [  ]   Follow up with FRANCES and Dr. Pace    Diagnosis: Aortic valve regurgitation  Assessment and Plan of Treatment: Follow up with PMD and  for managment and surveillance

## 2023-11-28 NOTE — DISCHARGE NOTE PROVIDER - NPI NUMBER (FOR SYSADMIN USE ONLY) :
[[id number]],[[id number] [[id number]],[[id number] [[id number]],[[id number]] [[id number]],[[id number]],[[id number]] [[id number]],[[id number]],[[id number]] [[id number]],[[id number]],[[id number]]

## 2023-11-28 NOTE — DISCHARGE NOTE PROVIDER - NSDCMRMEDTOKEN_GEN_ALL_CORE_FT
Lyrica 150 mg oral capsule: 1 cap(s) orally 3 times a day  Multiple Vitamins oral tablet: 1 tab(s) orally once a day  OxyCONTIN 10 mg oral tablet, extended release: 1 tab(s) orally 3 to 4 times a day   docusate sodium 50 mg/5 mL oral solution: 10 milliliter(s) orally once a day  Lyrica 150 mg oral capsule: 1 cap(s) orally 3 times a day  Multiple Vitamins oral tablet: 1 tab(s) orally once a day  OxyCONTIN 10 mg oral tablet, extended release: 1 tab(s) orally 3 to 4 times a day  pantoprazole 40 mg oral delayed release tablet: 1 tab(s) orally 2 times a day  sucralfate 1 g/10 mL oral suspension: 10 milliliter(s) orally 4 times a day BEFORE  MEALS and BEFORE BEDTIME

## 2023-11-28 NOTE — PROGRESS NOTE ADULT - SUBJECTIVE AND OBJECTIVE BOX
Date of Service: 11-28-23 @ 08:07           CARDIOLOGY     PROGRESS  NOTE   ________________________________________________    CHIEF COMPLAINT:Patient is a 78y old  Male who presents with a chief complaint of   comfortable  	  REVIEW OF SYSTEMS:  CONSTITUTIONAL: No fever, weight loss, or fatigue  EYES: No eye pain, visual disturbances, or discharge  ENT:  No difficulty hearing, tinnitus, vertigo; No sinus or throat pain  NECK: No pain or stiffness  RESPIRATORY: No cough, wheezing, chills or hemoptysis; decrease  Shortness of Breath  CARDIOVASCULAR: No chest pain, palpitations, passing out, dizziness, or leg swelling  GASTROINTESTINAL: No abdominal or epigastric pain. No nausea, vomiting, or hematemesis; No diarrhea or constipation. No melena or hematochezia.  GENITOURINARY: No dysuria, frequency, hematuria, or incontinence  NEUROLOGICAL: No headaches, memory loss, loss of strength, numbness, or tremors  SKIN: No itching, burning, rashes, or lesions   LYMPH Nodes: No enlarged glands  ENDOCRINE: No heat or cold intolerance; No hair loss  MUSCULOSKELETAL: No joint pain or swelling; No muscle, back, or extremity pain  PSYCHIATRIC: No depression, anxiety, mood swings, or difficulty sleeping  HEME/LYMPH: No easy bruising, or bleeding gums  ALLERGY AND IMMUNOLOGIC: No hives or eczema	    [x ] All others negative	  [ ] Unable to obtain    PHYSICAL EXAM:  T(C): 37.4 (11-28-23 @ 04:24), Max: 37.4 (11-28-23 @ 04:24)  HR: 62 (11-28-23 @ 04:24) (48 - 69)  BP: 103/68 (11-28-23 @ 04:24) (103/68 - 143/76)  RR: 18 (11-28-23 @ 04:24) (14 - 23)  SpO2: 97% (11-28-23 @ 04:24) (93% - 98%)  Wt(kg): --  I&O's Summary      Appearance: Normal	  HEENT:   Normal oral mucosa, PERRL, EOMI	  Lymphatic: No lymphadenopathy  Cardiovascular: Normal S1 S2, No JVD, + murmurs, No edema  Respiratory: rhonchi  Psychiatry: A & O x 3, Mood & affect appropriate  Gastrointestinal:  Soft, Non-tender, + BS	  Skin: No rashes, No ecchymoses, No cyanosis	  Neurologic: Non-focal  Extremities: Normal range of motion, No clubbing, cyanosis or edema  Vascular: Peripheral pulses palpable 2+ bilaterally    MEDICATIONS  (STANDING):  oxyCODONE  ER Tablet 10 milliGRAM(s) Oral three times a day  pantoprazole    Tablet 40 milliGRAM(s) Oral two times a day  pregabalin 75 milliGRAM(s) Oral three times a day  sodium chloride 0.9%. 500 milliLiter(s) (30 mL/Hr) IV Continuous <Continuous>  sucralfate suspension 1 Gram(s) Oral four times a day      TELEMETRY: 	    ECG:  	  RADIOLOGY:  OTHER: 	  	  LABS:	 	    CARDIAC MARKERS:                            13.7   8.50  )-----------( 162      ( 28 Nov 2023 07:08 )             40.5     11-27    142  |  106  |  33<H>  ----------------------------<  117<H>  4.1   |  23  |  0.93    Ca    9.3      27 Nov 2023 08:56  Phos  2.7     11-27  Mg     2.2     11-27    TPro  7.1  /  Alb  4.0  /  TBili  0.7  /  DBili  x   /  AST  31  /  ALT  24  /  AlkPhos  72  11-27    proBNP:   Lipid Profile:   HgA1c:   TSH:     < from: 12 Lead ECG (11.27.23 @ 08:05) >  Diagnosis Line SINUS BRADYCARDIA WITH MARKED SINUS ARRHYTHMIA  OTHERWISE NORMAL ECG  WHEN COMPARED WITH ECG OF 07-NOV-2015 13:46,  NO SIGNIFICANT CHANGE WAS FOUND      Assessment and plan  ---------------------------  79y/o M pt with PMHx Arthritis, Raynaud's, fibromyalgia, prostate cancer status post radiation, presenting for evaluation of intermittent left-sided chest pain and burning epigastric pain beginning 10 PM last night.  Patient describes intermittent episodes of burning epigastric pain with burning pain radiating up his middle chest, also intermittent episodes of left-sided chest pain.  Patient also notes increased burping and passing gas.  Last bowel movement this morning, notes increased straining.  Patient has never had chest pain or heartburn in the past.  Patient denies recent dietary changes, despite Thanksgiving weekend notes not eating increased greasy or fried or unhealthy foods, denies increased alcohol intake this weekend.  Pt did not eat anything since his symptoms began, but he took his medications with a glass of water this morning with no difficulty.  Non-smoker, no drug usage, drinks 1 unit alcohol weekly.  No recent travel, no recent surgeries, no known sick contacts.  Patient takes oxycodone daily for arthritis pain, last dosage 10 mg at 5 AM this morning.  Denies fevers, chills, nausea, vomiting, diarrhea, shortness of breath, cough, palpitations, numbness, tingling, bloody stools, weight loss, dysuria, hematuria, urinary frequency, LOC, dizziness, lightheadedness.  pot with chest pain and ecgsinus bradycardia  abdominal pain s/p EGD reults noted with PUD and esophagitis  chest pain ?gi, check TTE  clear liquid dietr will advance  cbc , david  continue pain meds  severe gastritis ?sec to use of Celebrex avoid NSAID  chest pain sec to underlying gi findings, doing better, continue protonix  NO NSAID discussed with pt  hgb stable     	        
  INTERVAL HPI/OVERNIGHT EVENTS:  sp EGD yesterday: no food impaction, +severe esophagitis with ulceration (biopsied), +gastritis with antral and pyloric channel ulcers; biopsied, normal duodenum    feeling "much better" today  tolerating PO diet (soft)  no nausea or vomiting  no c/o odynophagia      eager to go home  no CP or SOB  no fever or chills  no abdominal pain    MEDICATIONS  (STANDING):  oxyCODONE  ER Tablet 10 milliGRAM(s) Oral three times a day  pantoprazole    Tablet 40 milliGRAM(s) Oral two times a day  pregabalin 75 milliGRAM(s) Oral three times a day  sodium chloride 0.9%. 500 milliLiter(s) (30 mL/Hr) IV Continuous <Continuous>  sucralfate suspension 1 Gram(s) Oral four times a day    MEDICATIONS  (PRN):      Allergies  Keflex (Rash)      Review of Systems:  see HPI- remainder 10 point ROS negative    Vital Signs Last 24 Hrs  T(C): 37.4 (28 Nov 2023 04:24), Max: 37.4 (28 Nov 2023 04:24)  T(F): 99.3 (28 Nov 2023 04:24), Max: 99.3 (28 Nov 2023 04:24)  HR: 62 (28 Nov 2023 04:24) (48 - 66)  BP: 103/68 (28 Nov 2023 04:24) (103/68 - 130/82)  BP(mean): 97 (27 Nov 2023 16:05) (97 - 97)  RR: 18 (28 Nov 2023 04:24) (14 - 23)  SpO2: 97% (28 Nov 2023 04:24) (93% - 98%)    Parameters below as of 28 Nov 2023 04:24  Patient On (Oxygen Delivery Method): room air    PHYSICAL EXAM:  Constitutional: NAD, well-developed non toxic appearing WM ambulating in room, completed breakfast.   Neck: No LAD, supple no JVD  Respiratory: clear bl no increased WOB  Cardiovascular: S1 and S2, RRR  Gastrointestinal: BS+, soft, ND NT no rebound guarding or rigidity  Extremities: No peripheral edema, neg clubbing, cyanosis  Vascular: 2+ peripheral pulses  Neurological: A/O x 3, no focal deficits  Psychiatric: Normal mood, normal affect  Skin: No rashes, anicteric      LABS:                        13.7   8.50  )-----------( 162      ( 28 Nov 2023 07:08 )             40.5     11-28    141  |  109<H>  |  29<H>  ----------------------------<  97  4.5   |  22  |  0.76    Ca    9.1      28 Nov 2023 07:10  Phos  2.7     11-27  Mg     2.2     11-27    TPro  7.1  /  Alb  4.0  /  TBili  0.7  /  DBili  x   /  AST  31  /  ALT  24  /  AlkPhos  72  11-27        RADIOLOGY & ADDITIONAL TESTS:

## 2023-11-30 ENCOUNTER — TRANSCRIPTION ENCOUNTER (OUTPATIENT)
Age: 78
End: 2023-11-30

## 2023-11-30 LAB
SURGICAL PATHOLOGY STUDY: SIGNIFICANT CHANGE UP
SURGICAL PATHOLOGY STUDY: SIGNIFICANT CHANGE UP

## 2023-12-01 ENCOUNTER — APPOINTMENT (OUTPATIENT)
Dept: PAIN MANAGEMENT | Facility: CLINIC | Age: 78
End: 2023-12-01
Payer: MEDICARE

## 2023-12-01 VITALS — BODY MASS INDEX: 21.19 KG/M2 | WEIGHT: 135 LBS | HEIGHT: 67 IN

## 2023-12-01 PROCEDURE — 99214 OFFICE O/P EST MOD 30 MIN: CPT | Mod: 95

## 2023-12-06 ENCOUNTER — NON-APPOINTMENT (OUTPATIENT)
Age: 78
End: 2023-12-06

## 2023-12-06 ENCOUNTER — APPOINTMENT (OUTPATIENT)
Dept: ORTHOPEDIC SURGERY | Facility: CLINIC | Age: 78
End: 2023-12-06
Payer: MEDICARE

## 2023-12-06 VITALS
WEIGHT: 140 LBS | HEIGHT: 67 IN | SYSTOLIC BLOOD PRESSURE: 127 MMHG | BODY MASS INDEX: 21.97 KG/M2 | HEART RATE: 55 BPM | DIASTOLIC BLOOD PRESSURE: 67 MMHG

## 2023-12-06 DIAGNOSIS — M51.36 OTHER INTERVERTEBRAL DISC DEGENERATION, LUMBAR REGION: ICD-10-CM

## 2023-12-06 PROCEDURE — 99214 OFFICE O/P EST MOD 30 MIN: CPT

## 2023-12-13 PROCEDURE — 85027 COMPLETE CBC AUTOMATED: CPT

## 2023-12-13 PROCEDURE — 86803 HEPATITIS C AB TEST: CPT

## 2023-12-13 PROCEDURE — 80061 LIPID PANEL: CPT

## 2023-12-13 PROCEDURE — 83690 ASSAY OF LIPASE: CPT

## 2023-12-13 PROCEDURE — 96374 THER/PROPH/DIAG INJ IV PUSH: CPT

## 2023-12-13 PROCEDURE — 88341 IMHCHEM/IMCYTCHM EA ADD ANTB: CPT

## 2023-12-13 PROCEDURE — 85025 COMPLETE CBC W/AUTO DIFF WBC: CPT

## 2023-12-13 PROCEDURE — 80048 BASIC METABOLIC PNL TOTAL CA: CPT

## 2023-12-13 PROCEDURE — 84443 ASSAY THYROID STIM HORMONE: CPT

## 2023-12-13 PROCEDURE — 93356 MYOCRD STRAIN IMG SPCKL TRCK: CPT

## 2023-12-13 PROCEDURE — 84484 ASSAY OF TROPONIN QUANT: CPT

## 2023-12-13 PROCEDURE — 84100 ASSAY OF PHOSPHORUS: CPT

## 2023-12-13 PROCEDURE — 88312 SPECIAL STAINS GROUP 1: CPT

## 2023-12-13 PROCEDURE — 71046 X-RAY EXAM CHEST 2 VIEWS: CPT

## 2023-12-13 PROCEDURE — 83735 ASSAY OF MAGNESIUM: CPT

## 2023-12-13 PROCEDURE — 80053 COMPREHEN METABOLIC PANEL: CPT

## 2023-12-13 PROCEDURE — 96375 TX/PRO/DX INJ NEW DRUG ADDON: CPT

## 2023-12-13 PROCEDURE — 36415 COLL VENOUS BLD VENIPUNCTURE: CPT

## 2023-12-13 PROCEDURE — 93306 TTE W/DOPPLER COMPLETE: CPT

## 2023-12-13 PROCEDURE — 88305 TISSUE EXAM BY PATHOLOGIST: CPT

## 2023-12-13 PROCEDURE — 99285 EMERGENCY DEPT VISIT HI MDM: CPT

## 2023-12-15 ENCOUNTER — APPOINTMENT (OUTPATIENT)
Dept: PAIN MANAGEMENT | Facility: CLINIC | Age: 78
End: 2023-12-15
Payer: MEDICARE

## 2023-12-15 PROCEDURE — 99213 OFFICE O/P EST LOW 20 MIN: CPT | Mod: 95

## 2023-12-15 NOTE — HISTORY OF PRESENT ILLNESS
[Neck] : neck [Lower back] : lower back [Gradual] : gradual [8] : 8 [6] : 6 [Dull/Aching] : dull/aching [Sharp] : sharp [Constant] : constant [Household chores] : household chores [Work] : work [Sleep] : sleep [Social interactions] : social interactions [Nothing helps with pain getting better] : Nothing helps with pain getting better [Sitting] : sitting [Standing] : standing [Walking] : walking [Bending forward] : bending forward [Exercising] : exercising [Stairs] : stairs [Retired] : Work status: retired [Steroid] : Steroid [Home] : at home, [unfilled] , at the time of the visit. [Medical Office: ([facility])___] : at the medical office located in  [Verbal consent obtained from patient] : the patient, [unfilled] [] : Post Surgical Visit: no [FreeTextEntry1] : B/L FEET b/l hands  [FreeTextEntry6] : CAN NOT OPEN AND CLOSE HANDS , CRAMPING  [de-identified] : AS OF [id number] CONTINUING WITH HOME EXERCISES 3-4X WEEK  [TWNoteComboBox1] : 30%

## 2023-12-15 NOTE — ASSESSMENT
[FreeTextEntry1] : Interim history Patient contact the office after obtaining a new MRI of the lumbar spine showing continuation of his ongoing disease state.  Patient's recent been evaluated by his surgeon who is requesting patient get a neurologic evaluation to determine next steps.  The patient is contacting our office to consider whether we should be doing an epidural steroid injection to try to reduce the discomfort. Objective MRI as stated above.  Patient says that his physical status is unchanged. Assessment Lumbar radiculopathy Plan I discussed with the patient the fact that his surgeon has requested a neurologic workup before considering epidural steroid injections.  Have suggested the patient that once he has the workup and he is talk to the surgeon what ever the surgeon would like us to do injection wise as long as it makes medical sense we will be happy to proceed with.  The patient agrees with this plan and will contact the office after his evaluation is done.

## 2024-01-12 ENCOUNTER — APPOINTMENT (OUTPATIENT)
Dept: NEUROLOGY | Facility: CLINIC | Age: 79
End: 2024-01-12
Payer: MEDICARE

## 2024-01-12 VITALS
TEMPERATURE: 98 F | OXYGEN SATURATION: 96 % | DIASTOLIC BLOOD PRESSURE: 75 MMHG | HEART RATE: 73 BPM | SYSTOLIC BLOOD PRESSURE: 127 MMHG | WEIGHT: 137 LBS | BODY MASS INDEX: 22.82 KG/M2 | HEIGHT: 65 IN

## 2024-01-12 DIAGNOSIS — M48.07 SPINAL STENOSIS, LUMBOSACRAL REGION: ICD-10-CM

## 2024-01-12 DIAGNOSIS — M47.817 SPONDYLOSIS W/OUT MYELOPATHY OR RADICULOPATHY, LUMBOSACRAL REGION: ICD-10-CM

## 2024-01-12 PROCEDURE — 99205 OFFICE O/P NEW HI 60 MIN: CPT

## 2024-01-12 RX ORDER — DICLOFENAC SODIUM 75 MG/1
75 TABLET, DELAYED RELEASE ORAL
Qty: 60 | Refills: 0 | Status: COMPLETED | COMMUNITY
Start: 2022-06-29 | End: 2024-01-12

## 2024-01-12 RX ORDER — OXYCODONE HYDROCHLORIDE 10 MG/1
10 TABLET, FILM COATED, EXTENDED RELEASE ORAL
Qty: 120 | Refills: 0 | Status: COMPLETED | COMMUNITY
Start: 2023-06-09 | End: 2024-01-12

## 2024-01-12 RX ORDER — NALOXONE HYDROCHLORIDE 4 MG/.1ML
4 SPRAY NASAL
Qty: 1 | Refills: 0 | Status: COMPLETED | COMMUNITY
Start: 2023-08-08 | End: 2024-01-12

## 2024-01-12 RX ORDER — DULOXETINE HYDROCHLORIDE 30 MG/1
30 CAPSULE, DELAYED RELEASE PELLETS ORAL
Qty: 90 | Refills: 0 | Status: COMPLETED | COMMUNITY
Start: 2021-11-15 | End: 2024-01-12

## 2024-01-12 NOTE — PHYSICAL EXAM
[General Appearance - Alert] : alert [General Appearance - In No Acute Distress] : in no acute distress [Oriented To Time, Place, And Person] : oriented to person, place, and time [Impaired Insight] : insight and judgment were intact [Affect] : the affect was normal [Person] : oriented to person [Place] : oriented to place [Time] : oriented to time [Fluency] : fluency intact [Comprehension] : comprehension intact [Cranial Nerves Optic (II)] : visual acuity intact bilaterally,  visual fields full to confrontation, pupils equal round and reactive to light [Cranial Nerves Oculomotor (III)] : extraocular motion intact [Cranial Nerves Trigeminal (V)] : facial sensation intact symmetrically [Cranial Nerves Facial (VII)] : face symmetrical [Cranial Nerves Vestibulocochlear (VIII)] : hearing was intact bilaterally [Cranial Nerves Glossopharyngeal (IX)] : tongue and palate midline [Cranial Nerves Accessory (XI - Cranial And Spinal)] : head turning and shoulder shrug symmetric [Cranial Nerves Hypoglossal (XII)] : there was no tongue deviation with protrusion [Motor Tone] : muscle tone was normal in all four extremities [Motor Strength] : muscle strength was normal in all four extremities [Involuntary Movements] : no involuntary movements were seen [No Muscle Atrophy] : normal bulk in all four extremities [Sensation Vibration Decrease] : vibration was intact [Proprioception] : proprioception was intact [Tactile Decrease Lateral Leg And Dorsum Of Foot - Left Only] : diminished left lateral leg and dorsum of the foot (L5) [Tactile Decrease Sole Of Foot And Posterior Leg - Left Only] : diminished left sole of the foot and posterior leg (S1) [Pain / Temp Decrease Lateral Leg & Dorsum Of Foot Left Only] : diminished left lateral leg and dorsum of the foot (L5) [Pain / Temp Decrease Sole Of Foot & Posterior Leg Left Only] : diminished left sole of the foot and posterior leg (S1) [Abnormal Walk] : normal gait [2+] : Triceps left 2+ [1+] : Patella left 1+ [0] : Ankle jerk left 0 [Plantar Reflex Right Only] : normal on the right [Plantar Reflex Left Only] : normal on the left [FreeTextEntry6] : except left APBs 4+/5, and left EHL 4+/5

## 2024-01-12 NOTE — HISTORY OF PRESENT ILLNESS
[FreeTextEntry1] : He went to see his foot doctor because he did not feel the bottom of his foot couple months ago. When he woke up the AM, stretching he had pain on the lateral side of his leg, or sometimes on the medial side, or at thigh, and right hip. His foot still feels very funny at the bottom of his right foot, felt like tingling. It has been at least a year. He had laminectomy L3-L5. His symptom has gotten worse. Pain aggravated by movement.  He denies weakness. But if you walked a lot or stairs, he felt both legs are heavy. No bowel/bladder involvement.  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  S/p knee and hip replacement bilateral. His left leg is fine. PMH: prostate cancer s/p radiation, no chemotherapy.  Shoulder right repair rotator cuffs.  Nov 2023 he had EGD for gastric ulcers. He will have another EGD on Jan 31 2024.  Current medications Oxycontin Lyrica 150 mg TID

## 2024-01-12 NOTE — DISCUSSION/SUMMARY
[FreeTextEntry1] : Mr. Guajardo is a 79 yo man presented with numbness and pain on the right foot and right leg. On exam, he had sensory disturbance of L5-S1 dermatome. I think he has L5-S1 mainly S1 radiculopathy. There is no weakness. I will do EMG on LEs to help localized the lesion.  As for pain management, he has his pain doctor help with and do not want to take more oral medication. I spent the time noted on the day of this patient encounter preparing for, providing and documenting the above E/M service and counseling and educate patient on differential, workup, disease course, and treatment/management. Education was provided to the patient during this encounter. All questions and concerns were answered and addressed in detail. The patient verbalized understanding and agreed to plan. Patient was advised to continue to monitor for neurologic symptoms and to notify my office or go to the nearest emergency room if there are any changes. Any orders/referrals and communications were provided as well. side effects of the above medications were discussed in detail including but not limited to applicable black box warning and teratogenicity as appropriate.  Patient was advised to bring previous records to my office.

## 2024-01-16 ENCOUNTER — APPOINTMENT (OUTPATIENT)
Dept: PAIN MANAGEMENT | Facility: CLINIC | Age: 79
End: 2024-01-16
Payer: MEDICARE

## 2024-01-16 ENCOUNTER — APPOINTMENT (OUTPATIENT)
Dept: PAIN MANAGEMENT | Facility: CLINIC | Age: 79
End: 2024-01-16

## 2024-01-16 PROCEDURE — 99213 OFFICE O/P EST LOW 20 MIN: CPT

## 2024-01-16 RX ORDER — OXYCODONE HYDROCHLORIDE 10 MG/1
10 TABLET, FILM COATED, EXTENDED RELEASE ORAL
Qty: 120 | Refills: 0 | Status: DISCONTINUED | OUTPATIENT
Start: 2023-12-01 | End: 2024-01-16

## 2024-01-18 NOTE — HISTORY OF PRESENT ILLNESS
[Neck] : neck [Lower back] : lower back [Gradual] : gradual [8] : 8 [6] : 6 [Dull/Aching] : dull/aching [Radiating] : radiating [Sharp] : sharp [Shooting] : shooting [Constant] : constant [Household chores] : household chores [Work] : work [Sleep] : sleep [Social interactions] : social interactions [Rest] : rest [Meds] : meds [Nothing helps with pain getting better] : Nothing helps with pain getting better [Sitting] : sitting [Standing] : standing [Walking] : walking [Bending forward] : bending forward [Exercising] : exercising [Stairs] : stairs [Retired] : Work status: retired [Steroid] : Steroid [] : Post Surgical Visit: no [FreeTextEntry1] : B/L FEET / HANDS  [FreeTextEntry6] : CAN NOT OPEN AND CLOSE HANDS , CRAMPING /SPASMING  [FreeTextEntry7] : DOWN RIGHT LEG AND FOOT NUMBNESS IN PINKY TOE  [de-identified] : XRAYS OF HAND  [de-identified] : AS OF 2024-01-16 PT CONTINUING WITH HOME EXERCISES 3-4X WEEK  [TWNoteComboBox1] : 30%

## 2024-01-18 NOTE — DISCUSSION/SUMMARY
[Medication Risks Reviewed] : Medication risks reviewed [de-identified] : To follow up with pharmacy. No controlled substances sent on todays visit. Opioid agreement/obtained on chart [place]  reviewed and appropriate. SOAPP-R completed on chart. The patient's medications are documented to the best of their ability. Quality of life and functional ability improved on medications. The patient is showing no aberrant behavior or evidence of diversion. The patient was advised not to use narcotic medication while operating an automobile or heavy machinery due to potential sedation or dizziness. The patient was educated to the risks associated with potential opioid dependence and addiction. Urine toxicology screens as per office protocol. Use of multimodal analgesia used prn. Follow up one month.

## 2024-01-18 NOTE — REASON FOR VISIT
[Follow-Up Visit] : a follow-up pain management visit [Home] : at home, [unfilled] , at the time of the visit. [Medical Office: ([facility])___] : at the medical office located in  [Patient] : the patient [Self] : self [FreeTextEntry2] : MED REFILL

## 2024-01-22 NOTE — ED PROVIDER NOTE - ADDITIONAL HISTORY OBTAINED FROM [facility]
CARDIOLOGY CONSULT    REASON FOR CONSULT: Palpitations    PRIMARY CARE PHYSICIAN:  NELSON MURRY    HISTORY OF PRESENT ILLNESS:  26-year-old male seen for palpitations.  He has no significant past medical history.    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  He does some light activity and denies any exertional chest pain or palpitations.  He will drink alcohol occasionally and uses occasional caffeine.  He denies any energy drinks or other drugs or stimulants.    December 28 he had palpitations in the late morning.  His Apple Watch recorded atrial fibrillation.  EKG tracings were reviewed and this does appear to be A-fib with heart rate in the  range.  He presented to the ED, but converted to sinus but the time he was evaluated.  Labs were normal.  He had 3 beers the night before, but denies any other illness or stressors.    Echo December 2023 showed EF 60%, no valve disease.    PAST MEDICAL HISTORY:  No significant past medical history.    MEDICATIONS:  No current outpatient medications on file.     No current facility-administered medications for this visit.       ALLERGIES:  Allergies   Allergen Reactions    Azithromycin Rash       SOCIAL HISTORY:  Social alcohol use.  Non-smoker.    FAMILY HISTORY:  No known family history of A-fib.    REVIEW OF SYSTEMS:  Constitutional:  No weight loss, fever, chills  HEENT:  Eyes:  No visual loss, blurred vision, double vision or yellow sclerae. No hearing loss, sneezing, congestion, runny nose or sore throat.  Skin:  No rash or itching.  Cardiovascular: per HPI  Respiratory: per HPI  GI:  No anorexia, nausea, vomiting or diarrhea. No abdominal pain or blood.  :  No dysurea, hematuria  Neurologic:  No headache, paralysis, ataxia, numbness or tingling in the extremities. No change in bowel or bladder control.  Musculoskeletal:  No muscle pain  Hematologic:  No bleeding or bruising.  Lymphatics:  No enlarged nodes. No history of  splenectomy.  Endocrine:  No reports of sweating, cold or heat intolerance. No polyuria or polydipsia.  Allergies:  No history of asthma, hives, eczema or rhinitis.    PHYSICAL EXAM:  /72   Pulse 75   Ht 1.829 m (6')   Wt 85 kg (187 lb 6.4 oz)   SpO2 97%   BMI 25.42 kg/m    Constitutional: awake, alert, no distress  Eyes: PERRL, sclera nonicteric  ENT: trachea midline  Respiratory: Lungs clear  Cardiovascular: Regular rate and rhythm, no murmurs  GI: nondistended, nontender, bowel sounds present  Lymph/Hematologic: no lymphadenopathy  Skin: dry, no rash  Musculoskeletal: good muscle tone, strength 5/5 in upper and lower extremities  Neurologic: no focal deficits  Neuropsychiatric: appropriate affact    DATA:  Labs:   December 2023: Potassium 4.3, creatinine 0.9    EKG, December 28, 2023: Sinus rhythm, rate 92    ASSESSMENT:  26-year-old male seen for A-fib.  He appeared to have a single episode of A-fib lasting about 45 minutes.  Apple watch EKG tracings were reviewed, this showed an irregular rhythm with no obvious P waves, likely A-fib.  There was no obvious provoking issue other than 3 beers the night before.  He has had no symptoms since.  Zio monitor result is pending.    At this time would not recommend any additional treatment.  If he had recurrent A-fib, would refer to electrophysiology.    RECOMMENDATIONS:  1.  Single episode atrial fibrillation  -No further workup, if recurrence, then refer to MARY LOU Montgomery MD  Cardiology [facility]  Pager:  [phone]  Text Page  January 31, 2024    Addendum:  7-day Zio monitor reviewed.  This showed sinus rhythm, average heart rate 81, no A-fib, rare ectopy, no symptoms reported.    Juan Jose Montgomery MD  Cardiology - [facility]  Pager: [phone]  Text Page  February 1, 2024         Family

## 2024-01-25 ENCOUNTER — APPOINTMENT (OUTPATIENT)
Dept: NEUROLOGY | Facility: CLINIC | Age: 79
End: 2024-01-25
Payer: MEDICARE

## 2024-01-25 DIAGNOSIS — M54.16 RADICULOPATHY, LUMBAR REGION: ICD-10-CM

## 2024-01-25 PROCEDURE — 95911 NRV CNDJ TEST 9-10 STUDIES: CPT

## 2024-01-25 PROCEDURE — 95886 MUSC TEST DONE W/N TEST COMP: CPT

## 2024-01-31 ENCOUNTER — APPOINTMENT (OUTPATIENT)
Dept: UROLOGY | Facility: CLINIC | Age: 79
End: 2024-01-31
Payer: MEDICARE

## 2024-01-31 PROCEDURE — 99214 OFFICE O/P EST MOD 30 MIN: CPT

## 2024-01-31 PROCEDURE — G2211 COMPLEX E/M VISIT ADD ON: CPT

## 2024-02-01 LAB
APPEARANCE: CLEAR
BACTERIA: NEGATIVE /HPF
BILIRUBIN URINE: NEGATIVE
BLOOD URINE: NEGATIVE
CAST: 0 /LPF
COLOR: YELLOW
EPITHELIAL CELLS: 0 /HPF
GLUCOSE QUALITATIVE U: NEGATIVE MG/DL
KETONES URINE: NEGATIVE MG/DL
LEUKOCYTE ESTERASE URINE: NEGATIVE
MICROSCOPIC-UA: NORMAL
NITRITE URINE: NEGATIVE
PH URINE: 6
PROTEIN URINE: 30 MG/DL
PSA FREE FLD-MCNC: 11 %
PSA FREE SERPL-MCNC: 0.08 NG/ML
PSA SERPL-MCNC: 0.7 NG/ML
RED BLOOD CELLS URINE: 2 /HPF
SPECIFIC GRAVITY URINE: 1.03
UROBILINOGEN URINE: 0.2 MG/DL
WHITE BLOOD CELLS URINE: 0 /HPF

## 2024-02-04 LAB — URINE CYTOLOGY: NORMAL

## 2024-02-06 ENCOUNTER — TRANSCRIPTION ENCOUNTER (OUTPATIENT)
Age: 79
End: 2024-02-06

## 2024-02-06 NOTE — ASU DISCHARGE PLAN (ADULT/PEDIATRIC) - PATIENT EDUCATION MATERIALS PROVIED
August 11, 2020      Javier Crowe NP  [address] [facility] [address]           [facility]  [address] [facility] [address]  Phone: [phone]  Fax: [phone]          Patient: Naima Jeffries   MR Number: [id number]   YOB: 1968   Date of Visit: 7/24/2020       Dear Javier Crowe:    Thank you for referring Naima Jeffries to me for evaluation. Attached you will find relevant portions of my assessment and plan of care.    If you have questions, please do not hesitate to call me. I look forward to following Naima Jeffries along with you.    Sincerely,    Isela Florian MD    Enclosure  CC:  No Recipients    If you would like to receive this communication electronically, please contact [email or web address] or [phone] to request more information on [employer] access.    For providers and/or their staff who would like to refer a patient to [facility], please contact us through our one-stop-shop provider referral line, [facility], at [phone].    If you feel you have received this communication in error or would no longer like to receive these types of communications, please e-mail [email or web address]          Provider pre-printed instructions given

## 2024-02-06 NOTE — ASU PATIENT PROFILE, ADULT - NSICDXPASTSURGICALHX_GEN_ALL_CORE_FT
PAST SURGICAL HISTORY:  H/O total knee replacement, right 2016    History of left hip replacement 2013    History of right hip replacement 2014    S/P carpal tunnel release bilaal 9/2017    S/P rotator cuff surgery right repair open 1-2017    S/P sinus surgery 1981     PAST SURGICAL HISTORY:  H/O laminectomy     H/O total knee replacement, right 2016    History of left hip replacement 2013    History of right hip replacement 2014    S/P carpal tunnel release bilaal 9/2017    S/P rotator cuff surgery right repair open 1-2017    S/P sinus surgery 1981

## 2024-02-08 ENCOUNTER — OUTPATIENT (OUTPATIENT)
Dept: OUTPATIENT SERVICES | Facility: HOSPITAL | Age: 79
LOS: 1 days | End: 2024-02-08
Payer: MEDICARE

## 2024-02-08 ENCOUNTER — APPOINTMENT (OUTPATIENT)
Dept: ORTHOPEDIC SURGERY | Facility: HOSPITAL | Age: 79
End: 2024-02-08
Payer: MEDICARE

## 2024-02-08 VITALS
OXYGEN SATURATION: 98 % | SYSTOLIC BLOOD PRESSURE: 105 MMHG | TEMPERATURE: 97 F | RESPIRATION RATE: 18 BRPM | DIASTOLIC BLOOD PRESSURE: 56 MMHG | HEART RATE: 60 BPM

## 2024-02-08 VITALS
HEIGHT: 65 IN | WEIGHT: 136.91 LBS | DIASTOLIC BLOOD PRESSURE: 62 MMHG | OXYGEN SATURATION: 99 % | HEART RATE: 57 BPM | TEMPERATURE: 98 F | RESPIRATION RATE: 18 BRPM | SYSTOLIC BLOOD PRESSURE: 134 MMHG

## 2024-02-08 DIAGNOSIS — Z98.890 OTHER SPECIFIED POSTPROCEDURAL STATES: Chronic | ICD-10-CM

## 2024-02-08 DIAGNOSIS — Z96.642 PRESENCE OF LEFT ARTIFICIAL HIP JOINT: Chronic | ICD-10-CM

## 2024-02-08 DIAGNOSIS — M54.16 RADICULOPATHY, LUMBAR REGION: ICD-10-CM

## 2024-02-08 DIAGNOSIS — Z96.651 PRESENCE OF RIGHT ARTIFICIAL KNEE JOINT: Chronic | ICD-10-CM

## 2024-02-08 DIAGNOSIS — Z96.641 PRESENCE OF RIGHT ARTIFICIAL HIP JOINT: Chronic | ICD-10-CM

## 2024-02-08 PROCEDURE — 62323 NJX INTERLAMINAR LMBR/SAC: CPT

## 2024-02-14 ENCOUNTER — APPOINTMENT (OUTPATIENT)
Dept: PAIN MANAGEMENT | Facility: CLINIC | Age: 79
End: 2024-02-14
Payer: MEDICARE

## 2024-02-14 PROCEDURE — 99213 OFFICE O/P EST LOW 20 MIN: CPT

## 2024-02-14 RX ORDER — NALOXONE HYDROCHLORIDE 4 MG/.1ML
4 SPRAY NASAL
Qty: 1 | Refills: 0 | Status: ACTIVE | COMMUNITY
Start: 2024-02-14 | End: 1900-01-01

## 2024-02-14 NOTE — DISCUSSION/SUMMARY
[Medication Risks Reviewed] : Medication risks reviewed [de-identified] : Prescriptions renewed. [place] and [place]. SOAPP-R completed on chart. The patient's medications are documented to the best of their ability. Quality of life and functional ability improved on medications. The patient is showing no aberrant behavior or evidence of diversion. The patient was advised not to use narcotic medication while operating an automobile or heavy machinery due to potential sedation or dizziness. The patient was educated to the risks associated with potential opioid dependence and addiction. Urine toxicology screens as per office protocol. Use of multimodal analgesia used prn. Follow up one month.

## 2024-02-14 NOTE — REASON FOR VISIT
[Follow-Up Visit] : a follow-up pain management visit [Home] : at home, [unfilled] , at the time of the visit. [Medical Office: ([facility])___] : at the medical office located in  [Patient] : the patient [Self] : self [FreeTextEntry2] : MED REFILL

## 2024-02-14 NOTE — HISTORY OF PRESENT ILLNESS
[Neck] : neck [Lower back] : lower back [Gradual] : gradual [5] : 5 [Dull/Aching] : dull/aching [Radiating] : radiating [Sharp] : sharp [Shooting] : shooting [Constant] : constant [Household chores] : household chores [Work] : work [Sleep] : sleep [Social interactions] : social interactions [Rest] : rest [Meds] : meds [Nothing helps with pain getting better] : Nothing helps with pain getting better [Sitting] : sitting [Standing] : standing [Walking] : walking [Bending forward] : bending forward [Exercising] : exercising [Stairs] : stairs [Retired] : Work status: retired [Steroid] : Steroid [] : Post Surgical Visit: no [FreeTextEntry1] : B/L FEET / HANDS  [FreeTextEntry6] : CAN NOT OPEN AND CLOSE HANDS , CRAMPING /SPASMING  [FreeTextEntry7] : DOWN RIGHT LEG AND FOOT NUMBNESS IN PINKY TOE  [de-identified] : XRAYS OF HAND  [de-identified] : AS OF 2024-02-14 PT CONTINUING WITH HOME EXERCISES 3-4X WEEK  [de-identified] : 02 /08/24 [de-identified] : LUMBAR [de-identified] : EPIDURAL  [TWNoteComboBox1] : 70%

## 2024-03-06 ENCOUNTER — APPOINTMENT (OUTPATIENT)
Dept: PAIN MANAGEMENT | Facility: CLINIC | Age: 79
End: 2024-03-06
Payer: MEDICARE

## 2024-03-06 DIAGNOSIS — G89.4 CHRONIC PAIN SYNDROME: ICD-10-CM

## 2024-03-06 PROCEDURE — 99213 OFFICE O/P EST LOW 20 MIN: CPT

## 2024-03-06 NOTE — HISTORY OF PRESENT ILLNESS
[Neck] : neck [Lower back] : lower back [Gradual] : gradual [8] : 8 [6] : 6 [Dull/Aching] : dull/aching [Radiating] : radiating [Sharp] : sharp [Shooting] : shooting [Constant] : constant [Household chores] : household chores [Work] : work [Sleep] : sleep [Social interactions] : social interactions [Rest] : rest [Meds] : meds [Nothing helps with pain getting better] : Nothing helps with pain getting better [Sitting] : sitting [Standing] : standing [Walking] : walking [Bending forward] : bending forward [Exercising] : exercising [Stairs] : stairs [Retired] : Work status: retired [Steroid] : Steroid [] : Post Surgical Visit: no [FreeTextEntry1] : B/L FEET / HANDS  [FreeTextEntry6] : CAN NOT OPEN AND CLOSE HANDS , CRAMPING /SPASMING  [FreeTextEntry7] : DOWN RIGHT LEG AND FOOT NUMBNESS IN PINKY TOE  [de-identified] : XRAYS OF HAND  [de-identified] : AS OF 2024-03-06 PT CONTINUING WITH HOME EXERCISES 3-4X WEEK  [de-identified] : 02 /08/24 [de-identified] : LUMBAR [de-identified] : EPIDURAL  [TWNoteComboBox1] : 70%

## 2024-03-06 NOTE — DISCUSSION/SUMMARY
[Medication Risks Reviewed] : Medication risks reviewed [de-identified] : Prescriptions renewed. [place] and [place]. SOAPP-R completed on chart. The patient's medications are documented to the best of their ability. Quality of life and functional ability improved on medications. The patient is showing no aberrant behavior or evidence of diversion. The patient was advised not to use narcotic medication while operating an automobile or heavy machinery due to potential sedation or dizziness. The patient was educated to the risks associated with potential opioid dependence and addiction. Urine toxicology screens as per office protocol. Use of multimodal analgesia used prn. Follow up one month.

## 2024-03-06 NOTE — REASON FOR VISIT
[Follow-Up Visit] : a follow-up pain management visit [Home] : at home, [unfilled] , at the time of the visit. [Medical Office: ([facility])___] : at the medical office located in  [Patient] : the patient [Self] : self [FreeTextEntry2] : MED REFILL

## 2024-04-02 ENCOUNTER — APPOINTMENT (OUTPATIENT)
Dept: PAIN MANAGEMENT | Facility: CLINIC | Age: 79
End: 2024-04-02
Payer: MEDICARE

## 2024-04-02 PROCEDURE — 99213 OFFICE O/P EST LOW 20 MIN: CPT

## 2024-04-02 NOTE — HISTORY OF PRESENT ILLNESS
[Neck] : neck [Lower back] : lower back [Gradual] : gradual [8] : 8 [6] : 6 [Dull/Aching] : dull/aching [Sharp] : sharp [Constant] : constant [Household chores] : household chores [Work] : work [Sleep] : sleep [Social interactions] : social interactions [Nothing helps with pain getting better] : Nothing helps with pain getting better [Sitting] : sitting [Standing] : standing [Walking] : walking [Bending forward] : bending forward [Exercising] : exercising [Stairs] : stairs [Retired] : Work status: retired [Steroid] : Steroid [] : Post Surgical Visit: no [FreeTextEntry1] : B/L FEET b/l hands  [FreeTextEntry6] : CAN NOT OPEN AND CLOSE HANDS , CRAMPING  [de-identified] : AS OF [id number] CONTINUING WITH HOME EXERCISES 3-4X WEEK  [TWNoteComboBox1] : 30%

## 2024-04-02 NOTE — DISCUSSION/SUMMARY
[Medication Risks Reviewed] : Medication risks reviewed [de-identified] : Prescriptions renewed. Opioid agreement/obtained on chart [place]  reviewed and appropriate. SOAPP-R completed on chart. The patient's medications are documented to the best of their ability. Quality of life and functional ability improved on medications. The patient is showing no aberrant behavior or evidence of diversion. The patient was advised not to use narcotic medication while operating an automobile or heavy machinery due to potential sedation or dizziness. The patient was educated to the risks associated with potential opioid dependence and addiction. Urine toxicology screens as per office protocol. Use of multimodal analgesia used prn. Plan to consider scheduling a Caudal SHANIA in May 2024. Follow up one month.

## 2024-04-22 ENCOUNTER — APPOINTMENT (OUTPATIENT)
Dept: PHARMACY | Facility: CLINIC | Age: 79
End: 2024-04-22
Payer: SELF-PAY

## 2024-04-22 PROCEDURE — V5299J: CUSTOM

## 2024-04-22 PROCEDURE — V5267D: CUSTOM

## 2024-04-30 NOTE — PATIENT PROFILE ADULT. - NSALCOHOLPROBLEMSRELYN_GEN_A_CORE_SD
Pt arrived via EMS from home d/t wellness check from friend. He was found on the ground, he states he slid off the couch. He reports sleeping on the couch r/t mid and lower back pain. Back pain is chronic but started about a month ago.   no

## 2024-05-01 ENCOUNTER — APPOINTMENT (OUTPATIENT)
Dept: PAIN MANAGEMENT | Facility: CLINIC | Age: 79
End: 2024-05-01
Payer: MEDICARE

## 2024-05-01 PROCEDURE — 99214 OFFICE O/P EST MOD 30 MIN: CPT

## 2024-05-01 NOTE — DISCUSSION/SUMMARY
[Medication Risks Reviewed] : Medication risks reviewed [de-identified] : Prescriptions renewed. Opioid [place]ained [place]  reviewed and appropriate. SOAPP-R completed on chart. The patient's medications are documented to the best of their ability. Quality of life and functional ability improved on medications. The patient is showing no aberrant behavior or evidence of diversion. The patient was advised not to use narcotic medication while operating an automobile or heavy machinery due to potential sedation or dizziness. The patient was educated to the risks associated with potential opioid dependence and addiction. Urine toxicology screens as per office protocol. Use of multimodal analgesia used prn. Please consider this a formal request for a Caudal procedure as the patient has stated a greater than 60% sustained pain relief for at least three months.  Spoke to patient about interventional options. Explained risks, benefits and alternatives including but not limited to the risk of infection, bleeding, spinal headache, nerve injury and increased pain. States understanding of above and willing to proceed. He willcall to schedule, aware per Medicare guidelines eligible after May 8,2024.   Follow up one month.

## 2024-05-01 NOTE — HISTORY OF PRESENT ILLNESS
[Neck] : neck [Lower back] : lower back [Gradual] : gradual [8] : 8 [6] : 6 [Dull/Aching] : dull/aching [Sharp] : sharp [Constant] : constant [Household chores] : household chores [Work] : work [Sleep] : sleep [Social interactions] : social interactions [Nothing helps with pain getting better] : Nothing helps with pain getting better [Sitting] : sitting [Standing] : standing [Walking] : walking [Bending forward] : bending forward [Exercising] : exercising [Stairs] : stairs [Retired] : Work status: retired [Steroid] : Steroid [] : Post Surgical Visit: no [FreeTextEntry1] : B/L FEET b/l hands  [FreeTextEntry6] : CAN NOT OPEN AND CLOSE HANDS , CRAMPING  [de-identified] : AS OF [id number] CONTINUING WITH HOME EXERCISES 3-4X WEEK  [TWNoteComboBox1] : 30%

## 2024-05-01 NOTE — REASON FOR VISIT
[Follow-Up Visit] : a follow-up pain management visit [Home] : at home, [unfilled] , at the time of the visit. [Medical Office: ([facility])___] : at the medical office located in  [Patient] : the patient [Self] : self

## 2024-05-14 ENCOUNTER — TRANSCRIPTION ENCOUNTER (OUTPATIENT)
Age: 79
End: 2024-05-14

## 2024-05-14 NOTE — ASU DISCHARGE PLAN (ADULT/PEDIATRIC) - NS MD DC FALL RISK RISK
For information on Fall & Injury Prevention, visit: [email or web address] OR  [email or web address] OR  https://www.cdc.gov/steadi/patient.html

## 2024-05-14 NOTE — ASU PATIENT PROFILE, ADULT - NSICDXPASTSURGICALHX_GEN_ALL_CORE_FT
PAST SURGICAL HISTORY:  H/O laminectomy     H/O total knee replacement, right 2016    History of left hip replacement 2013    History of right hip replacement 2014    S/P carpal tunnel release bilaal 9/2017    S/P rotator cuff surgery right repair open 1-2017    S/P sinus surgery 1981

## 2024-05-16 ENCOUNTER — APPOINTMENT (OUTPATIENT)
Dept: ORTHOPEDIC SURGERY | Facility: HOSPITAL | Age: 79
End: 2024-05-16
Payer: MEDICARE

## 2024-05-16 ENCOUNTER — OUTPATIENT (OUTPATIENT)
Dept: OUTPATIENT SERVICES | Facility: HOSPITAL | Age: 79
LOS: 1 days | End: 2024-05-16
Payer: MEDICARE

## 2024-05-16 VITALS
RESPIRATION RATE: 17 BRPM | SYSTOLIC BLOOD PRESSURE: 111 MMHG | DIASTOLIC BLOOD PRESSURE: 70 MMHG | OXYGEN SATURATION: 98 %

## 2024-05-16 VITALS
SYSTOLIC BLOOD PRESSURE: 126 MMHG | WEIGHT: 145.06 LBS | RESPIRATION RATE: 16 BRPM | DIASTOLIC BLOOD PRESSURE: 67 MMHG | TEMPERATURE: 98 F | HEART RATE: 60 BPM | HEIGHT: 61 IN | OXYGEN SATURATION: 100 %

## 2024-05-16 DIAGNOSIS — Z96.651 PRESENCE OF RIGHT ARTIFICIAL KNEE JOINT: Chronic | ICD-10-CM

## 2024-05-16 DIAGNOSIS — Z96.642 PRESENCE OF LEFT ARTIFICIAL HIP JOINT: Chronic | ICD-10-CM

## 2024-05-16 DIAGNOSIS — Z96.641 PRESENCE OF RIGHT ARTIFICIAL HIP JOINT: Chronic | ICD-10-CM

## 2024-05-16 DIAGNOSIS — M54.16 RADICULOPATHY, LUMBAR REGION: ICD-10-CM

## 2024-05-16 DIAGNOSIS — Z98.890 OTHER SPECIFIED POSTPROCEDURAL STATES: Chronic | ICD-10-CM

## 2024-05-16 PROCEDURE — 62323 NJX INTERLAMINAR LMBR/SAC: CPT

## 2024-05-16 RX ORDER — LANOLIN ALCOHOL/MO/W.PET/CERES
1 CREAM (GRAM) TOPICAL
Refills: 0 | DISCHARGE

## 2024-05-16 RX ORDER — OXYCODONE HYDROCHLORIDE 5 MG/1
1 TABLET ORAL
Refills: 0 | DISCHARGE

## 2024-06-04 ENCOUNTER — APPOINTMENT (OUTPATIENT)
Dept: PAIN MANAGEMENT | Facility: CLINIC | Age: 79
End: 2024-06-04
Payer: MEDICARE

## 2024-06-04 DIAGNOSIS — Z98.890 OTHER SPECIFIED POSTPROCEDURAL STATES: ICD-10-CM

## 2024-06-04 DIAGNOSIS — M54.12 RADICULOPATHY, CERVICAL REGION: ICD-10-CM

## 2024-06-04 DIAGNOSIS — M48.07 SPINAL STENOSIS, LUMBOSACRAL REGION: ICD-10-CM

## 2024-06-04 PROCEDURE — 99214 OFFICE O/P EST MOD 30 MIN: CPT

## 2024-06-04 RX ORDER — OXYCODONE HYDROCHLORIDE 10 MG/1
10 TABLET, FILM COATED, EXTENDED RELEASE ORAL
Qty: 120 | Refills: 0 | Status: ACTIVE | COMMUNITY
Start: 2024-01-17 | End: 1900-01-01

## 2024-06-04 NOTE — REASON FOR VISIT
[Follow-Up Visit] : a follow-up pain management visit [Home] : at home, [unfilled] , at the time of the visit. [Medical Office: ([facility])___] : at the medical office located in  [Patient] : the patient [Self] : self [FreeTextEntry2] : MED REFILL

## 2024-06-04 NOTE — DISCUSSION/SUMMARY
[Medication Risks Reviewed] : Medication risks reviewed [de-identified] : Prescriptions renewed. [place] and [place]. SOAPP-R completed on chart. The patient's medications are documented to the best of their ability. Quality of life and functional ability improved on medications. The patient is showing no aberrant behavior or evidence of diversion. The patient was advised not to use narcotic medication while operating an automobile or heavy machinery due to potential sedation or dizziness. The patient was educated to the risks associated with potential opioid dependence and addiction. Urine toxicology screens as per office protocol. Use of multimodal analgesia used prn. Follow up one month.

## 2024-06-04 NOTE — HISTORY OF PRESENT ILLNESS
[Neck] : neck [Lower back] : lower back [Gradual] : gradual [8] : 8 [5] : 5 [Dull/Aching] : dull/aching [Sharp] : sharp [Constant] : constant [Household chores] : household chores [Work] : work [Sleep] : sleep [Social interactions] : social interactions [Nothing helps with pain getting better] : Nothing helps with pain getting better [Sitting] : sitting [Standing] : standing [Walking] : walking [Bending forward] : bending forward [Exercising] : exercising [Stairs] : stairs [Retired] : Work status: retired [Steroid] : Steroid [] : Post Surgical Visit: no [FreeTextEntry1] : B/L FEET b/l hands  [FreeTextEntry6] : CAN NOT OPEN AND CLOSE HANDS , CRAMPING  [de-identified] : AS OF [id number] CONTINUING WITH HOME EXERCISES 3-4X WEEK  [TWNoteComboBox1] : 30%

## 2024-06-11 ENCOUNTER — APPOINTMENT (OUTPATIENT)
Dept: OTOLARYNGOLOGY | Facility: CLINIC | Age: 79
End: 2024-06-11
Payer: MEDICARE

## 2024-06-11 VITALS
HEIGHT: 65.5 IN | WEIGHT: 140 LBS | SYSTOLIC BLOOD PRESSURE: 149 MMHG | DIASTOLIC BLOOD PRESSURE: 85 MMHG | HEART RATE: 58 BPM | BODY MASS INDEX: 23.04 KG/M2

## 2024-06-11 DIAGNOSIS — H90.3 SENSORINEURAL HEARING LOSS, BILATERAL: ICD-10-CM

## 2024-06-11 PROCEDURE — 99213 OFFICE O/P EST LOW 20 MIN: CPT

## 2024-06-11 PROCEDURE — 92567 TYMPANOMETRY: CPT

## 2024-06-11 PROCEDURE — 92504 EAR MICROSCOPY EXAMINATION: CPT

## 2024-06-11 PROCEDURE — 92557 COMPREHENSIVE HEARING TEST: CPT

## 2024-06-11 RX ORDER — PREGABALIN 150 MG/1
150 CAPSULE ORAL
Refills: 0 | Status: ACTIVE | COMMUNITY

## 2024-06-11 NOTE — PHYSICAL EXAM
[Binocular Microscopic Exam] : Binocular microscopic exam was performed [Normal] : the left mastoid was normal [Hearing Loss Right Only] : normal [Hearing Loss Left Only] : normal

## 2024-06-11 NOTE — DATA REVIEWED
[de-identified] : An audiogram was ordered and performed including tympanometry, pure tones and speech, for patient's complaint of continued hearing loss and no recent audiogram I have independently reviewed the patient's audiogram from today and my findings include efrem SNHL, symmetric tones, SDS worsened on L (60s)

## 2024-06-11 NOTE — HISTORY OF PRESENT ILLNESS
[de-identified] : 79 year old man presents for follow up for hearing loss. History of bilateral SNHL-using bilateral hearing aids inconsistently.  Continues to report missing parts of conversations.  States difficulty hearing certain voices (male or female).  Continues to report intermittent dizziness-lightheaded sensation-worse in the morning. States using vivarin caffeine improves dizziness.

## 2024-07-03 ENCOUNTER — APPOINTMENT (OUTPATIENT)
Dept: PHARMACY | Facility: CLINIC | Age: 79
End: 2024-07-03
Payer: SELF-PAY

## 2024-07-03 ENCOUNTER — APPOINTMENT (OUTPATIENT)
Dept: PAIN MANAGEMENT | Facility: CLINIC | Age: 79
End: 2024-07-03
Payer: MEDICARE

## 2024-07-03 DIAGNOSIS — Z98.890 OTHER SPECIFIED POSTPROCEDURAL STATES: ICD-10-CM

## 2024-07-03 PROCEDURE — 99213 OFFICE O/P EST LOW 20 MIN: CPT

## 2024-07-03 PROCEDURE — V5010 ASSESSMENT FOR HEARING AID: CPT | Mod: NC

## 2024-07-18 ENCOUNTER — OUTPATIENT (OUTPATIENT)
Dept: OUTPATIENT SERVICES | Facility: HOSPITAL | Age: 79
LOS: 1 days | End: 2024-07-18
Payer: MEDICARE

## 2024-07-18 ENCOUNTER — APPOINTMENT (OUTPATIENT)
Dept: CT IMAGING | Facility: CLINIC | Age: 79
End: 2024-07-18
Payer: MEDICARE

## 2024-07-18 DIAGNOSIS — R10.9 UNSPECIFIED ABDOMINAL PAIN: ICD-10-CM

## 2024-07-18 DIAGNOSIS — Z98.890 OTHER SPECIFIED POSTPROCEDURAL STATES: Chronic | ICD-10-CM

## 2024-07-18 DIAGNOSIS — Z96.642 PRESENCE OF LEFT ARTIFICIAL HIP JOINT: Chronic | ICD-10-CM

## 2024-07-18 DIAGNOSIS — Z96.651 PRESENCE OF RIGHT ARTIFICIAL KNEE JOINT: Chronic | ICD-10-CM

## 2024-07-18 DIAGNOSIS — Z96.641 PRESENCE OF RIGHT ARTIFICIAL HIP JOINT: Chronic | ICD-10-CM

## 2024-07-18 PROCEDURE — 74177 CT ABD & PELVIS W/CONTRAST: CPT | Mod: MH

## 2024-07-18 PROCEDURE — 74177 CT ABD & PELVIS W/CONTRAST: CPT | Mod: 26,MH

## 2024-07-25 ENCOUNTER — APPOINTMENT (OUTPATIENT)
Dept: PHARMACY | Facility: CLINIC | Age: 79
End: 2024-07-25

## 2024-07-25 PROCEDURE — V5261F: CUSTOM

## 2024-07-25 NOTE — HISTORY OF PRESENT ILLNESS
[FreeTextEntry1] : 79 year old male with a longstanding history of hearing loss and hearing aid use. Most recent hearing test results show mild to severe SNHL, AU. Patient has been medically cleared for new hearing aids. He reports that he does not hear well, and he never heard well, from his widex JUSTINE hearing aids that he got about 8 years ago.

## 2024-08-07 ENCOUNTER — APPOINTMENT (OUTPATIENT)
Dept: PAIN MANAGEMENT | Facility: CLINIC | Age: 79
End: 2024-08-07

## 2024-08-07 PROCEDURE — 99213 OFFICE O/P EST LOW 20 MIN: CPT

## 2024-08-07 NOTE — HISTORY OF PRESENT ILLNESS
[Neck] : neck [Lower back] : lower back [Gradual] : gradual [7] : 7 [5] : 5 [Dull/Aching] : dull/aching [Sharp] : sharp [Constant] : constant [Household chores] : household chores [Work] : work [Sleep] : sleep [Social interactions] : social interactions [Nothing helps with pain getting better] : Nothing helps with pain getting better [Sitting] : sitting [Standing] : standing [Walking] : walking [Bending forward] : bending forward [Exercising] : exercising [Stairs] : stairs [Retired] : Work status: retired [Steroid] : Steroid [] : Post Surgical Visit: no [FreeTextEntry6] : CAN NOT OPEN AND CLOSE HANDS , CRAMPING  [FreeTextEntry1] : B/L FEET b/l hands  [de-identified] : AS OF [id number] CONTINUING WITH HOME EXERCISES 3-4X WEEK  [TWNoteComboBox1] : 30%

## 2024-08-07 NOTE — REASON FOR VISIT
[Home] : at home, [unfilled] , at the time of the visit. [Medical Office: ([facility])___] : at the medical office located in  [Patient] : the patient [Self] : self

## 2024-08-12 ENCOUNTER — APPOINTMENT (OUTPATIENT)
Dept: PHARMACY | Facility: CLINIC | Age: 79
End: 2024-08-12
Payer: SELF-PAY

## 2024-08-12 PROCEDURE — V5299A: CUSTOM

## 2024-09-03 ENCOUNTER — APPOINTMENT (OUTPATIENT)
Dept: PAIN MANAGEMENT | Facility: CLINIC | Age: 79
End: 2024-09-03
Payer: MEDICARE

## 2024-09-03 VITALS — WEIGHT: 145 LBS | HEIGHT: 65 IN | BODY MASS INDEX: 24.16 KG/M2

## 2024-09-03 DIAGNOSIS — Z98.890 OTHER SPECIFIED POSTPROCEDURAL STATES: ICD-10-CM

## 2024-09-03 DIAGNOSIS — M54.12 RADICULOPATHY, CERVICAL REGION: ICD-10-CM

## 2024-09-03 PROCEDURE — 99213 OFFICE O/P EST LOW 20 MIN: CPT

## 2024-09-03 NOTE — HISTORY OF PRESENT ILLNESS
[Neck] : neck [Lower back] : lower back [Gradual] : gradual [7] : 7 [5] : 5 [Dull/Aching] : dull/aching [Sharp] : sharp [Constant] : constant [Household chores] : household chores [Work] : work [Sleep] : sleep [Social interactions] : social interactions [Nothing helps with pain getting better] : Nothing helps with pain getting better [Sitting] : sitting [Standing] : standing [Walking] : walking [Bending forward] : bending forward [Exercising] : exercising [Stairs] : stairs [Retired] : Work status: retired [Steroid] : Steroid [] : Post Surgical Visit: no [FreeTextEntry1] : B/L FEET b/l hands  [FreeTextEntry6] : CAN NOT OPEN AND CLOSE HANDS , CRAMPING  [de-identified] : AS OF [id number] CONTINUING WITH HOME EXERCISES 3-4X WEEK  [TWNoteComboBox1] : 30%

## 2024-09-03 NOTE — DISCUSSION/SUMMARY
[Medication Risks Reviewed] : Medication risks reviewed [de-identified] : Prescriptions renewed. [place] and [place]. SOAPP-R completed on chart. The patient's medications are documented to the best of their ability. Quality of life and functional ability improved on medications. The patient is showing no aberrant behavior or evidence of diversion. The patient was advised not to use narcotic medication while operating an automobile or heavy machinery due to potential sedation or dizziness. The patient was educated to the risks associated with potential opioid dependence and addiction. Urine toxicology screens as per office protocol. Use of multimodal analgesia used prn. Follow up one month.

## 2024-10-09 ENCOUNTER — APPOINTMENT (OUTPATIENT)
Dept: PAIN MANAGEMENT | Facility: CLINIC | Age: 79
End: 2024-10-09
Payer: MEDICARE

## 2024-10-09 DIAGNOSIS — M54.12 RADICULOPATHY, CERVICAL REGION: ICD-10-CM

## 2024-10-09 DIAGNOSIS — Z98.890 OTHER SPECIFIED POSTPROCEDURAL STATES: ICD-10-CM

## 2024-10-09 PROCEDURE — 99212 OFFICE O/P EST SF 10 MIN: CPT

## 2024-10-23 ENCOUNTER — APPOINTMENT (OUTPATIENT)
Dept: PHARMACY | Facility: CLINIC | Age: 79
End: 2024-10-23
Payer: SELF-PAY

## 2024-10-23 PROCEDURE — V5299A: CUSTOM

## 2024-11-06 ENCOUNTER — APPOINTMENT (OUTPATIENT)
Dept: PAIN MANAGEMENT | Facility: CLINIC | Age: 79
End: 2024-11-06
Payer: MEDICARE

## 2024-11-06 DIAGNOSIS — Z98.890 OTHER SPECIFIED POSTPROCEDURAL STATES: ICD-10-CM

## 2024-11-06 DIAGNOSIS — M54.12 RADICULOPATHY, CERVICAL REGION: ICD-10-CM

## 2024-11-06 PROCEDURE — 99212 OFFICE O/P EST SF 10 MIN: CPT

## 2024-12-02 NOTE — PATIENT PROFILE ADULT. - NS PRO PASSIVE SMOKE EXP
Neurological:      Mental Status: She is alert and oriented to person, place, and time.      Coordination: Coordination normal.   Psychiatric:         Behavior: Behavior normal.             Wt Readings from Last 3 Encounters:   12/02/24 45.8 kg (101 lb)   11/26/24 43.5 kg (96 lb)   11/18/24 44 kg (97 lb)     BP Readings from Last 3 Encounters:   12/02/24 (!) 98/52   11/26/24 135/81   11/20/24 (!) 150/57     Pulse Readings from Last 3 Encounters:   12/02/24 63   11/26/24 73   11/20/24 74     Body mass index is 17.34 kg/m².    ECHO:      11/20/24    Left Ventricle Normal left ventricular systolic function with a visually estimated EF of 55 - 60%. Left ventricle size is normal. Normal wall thickness. Normal wall motion. Indeterminate diastolic function.   Left Atrium Left atrium is moderately dilated.   Right Ventricle Right ventricle size is normal. Lead present in the right ventricle. Normal systolic function.   Right Atrium Right atrium is moderately dilated.   Aortic Valve Appropriately positioned transcatheter bioprosthetic valve. AV mean gradient is 6 mmHg. Mild paravalvular regurgitation. No stenosis. AV mean gradient is 6 mmHg. AV peak gradient is 11 mmHg. AV peak velocity is 1.7 m/s. AV area by continuity VTI is 1.7 cm2.   Mitral Valve Moderate annular calcification. Mildly thickened leaflets. Moderately calcified leaflets. Mild regurgitation. No stenosis noted. MV mean gradient is 4 mmHg.   Tricuspid Valve Valve structure is normal. Moderate to severe regurgitation. The estimated RVSP is 54 mmHg. No stenosis noted.   Pulmonic Valve The pulmonic valve visualization is suboptimal but appears to be functioning normally. No regurgitation. No stenosis noted.   Aorta Ao ascending diameter is 3.2 cm.   IVC/Hepatic Veins IVC diameter is greater than 21 mm and decreases greater than 50% during inspiration; therefore the estimated right atrial pressure is intermediate (~8 mmHg).   Pericardium The pericardium is 
No

## 2024-12-04 ENCOUNTER — APPOINTMENT (OUTPATIENT)
Dept: PAIN MANAGEMENT | Facility: CLINIC | Age: 79
End: 2024-12-04
Payer: MEDICARE

## 2024-12-04 DIAGNOSIS — Z98.890 OTHER SPECIFIED POSTPROCEDURAL STATES: ICD-10-CM

## 2024-12-04 PROCEDURE — 99212 OFFICE O/P EST SF 10 MIN: CPT

## 2024-12-19 ENCOUNTER — APPOINTMENT (OUTPATIENT)
Dept: CT IMAGING | Facility: CLINIC | Age: 79
End: 2024-12-19

## 2024-12-19 ENCOUNTER — OUTPATIENT (OUTPATIENT)
Dept: OUTPATIENT SERVICES | Facility: HOSPITAL | Age: 79
LOS: 1 days | End: 2024-12-19
Payer: MEDICARE

## 2024-12-19 DIAGNOSIS — Z98.890 OTHER SPECIFIED POSTPROCEDURAL STATES: Chronic | ICD-10-CM

## 2024-12-19 DIAGNOSIS — Z00.00 ENCOUNTER FOR GENERAL ADULT MEDICAL EXAMINATION WITHOUT ABNORMAL FINDINGS: ICD-10-CM

## 2024-12-19 DIAGNOSIS — Z96.642 PRESENCE OF LEFT ARTIFICIAL HIP JOINT: Chronic | ICD-10-CM

## 2024-12-19 DIAGNOSIS — Z96.651 PRESENCE OF RIGHT ARTIFICIAL KNEE JOINT: Chronic | ICD-10-CM

## 2024-12-19 DIAGNOSIS — Z96.641 PRESENCE OF RIGHT ARTIFICIAL HIP JOINT: Chronic | ICD-10-CM

## 2024-12-19 PROCEDURE — 74263 CT COLONOGRAPHY SCREENING: CPT

## 2024-12-19 PROCEDURE — 74263 CT COLONOGRAPHY SCREENING: CPT | Mod: 26

## 2024-12-19 PROCEDURE — 74261 CT COLONOGRAPHY DX: CPT

## 2025-01-03 ENCOUNTER — APPOINTMENT (OUTPATIENT)
Dept: PHARMACY | Facility: CLINIC | Age: 80
End: 2025-01-03
Payer: SELF-PAY

## 2025-01-03 PROCEDURE — V5299A: CUSTOM

## 2025-01-07 ENCOUNTER — APPOINTMENT (OUTPATIENT)
Dept: PAIN MANAGEMENT | Facility: CLINIC | Age: 80
End: 2025-01-07
Payer: MEDICARE

## 2025-01-07 VITALS — HEIGHT: 65 IN | BODY MASS INDEX: 24.32 KG/M2 | WEIGHT: 146 LBS

## 2025-01-07 DIAGNOSIS — M54.12 RADICULOPATHY, CERVICAL REGION: ICD-10-CM

## 2025-01-07 DIAGNOSIS — Z98.890 OTHER SPECIFIED POSTPROCEDURAL STATES: ICD-10-CM

## 2025-01-07 PROCEDURE — 99213 OFFICE O/P EST LOW 20 MIN: CPT

## 2025-02-05 ENCOUNTER — APPOINTMENT (OUTPATIENT)
Dept: PAIN MANAGEMENT | Facility: CLINIC | Age: 80
End: 2025-02-05
Payer: MEDICARE

## 2025-02-05 ENCOUNTER — APPOINTMENT (OUTPATIENT)
Dept: UROLOGY | Facility: CLINIC | Age: 80
End: 2025-02-05
Payer: MEDICARE

## 2025-02-05 DIAGNOSIS — Z98.890 OTHER SPECIFIED POSTPROCEDURAL STATES: ICD-10-CM

## 2025-02-05 DIAGNOSIS — M54.12 RADICULOPATHY, CERVICAL REGION: ICD-10-CM

## 2025-02-05 DIAGNOSIS — C61 MALIGNANT NEOPLASM OF PROSTATE: ICD-10-CM

## 2025-02-05 PROCEDURE — G2211 COMPLEX E/M VISIT ADD ON: CPT

## 2025-02-05 PROCEDURE — 99214 OFFICE O/P EST MOD 30 MIN: CPT

## 2025-02-05 PROCEDURE — 99212 OFFICE O/P EST SF 10 MIN: CPT | Mod: 93

## 2025-02-06 LAB
APPEARANCE: CLEAR
BACTERIA: NEGATIVE /HPF
BILIRUBIN URINE: NEGATIVE
BLOOD URINE: NEGATIVE
CAST: 0 /LPF
COLOR: NORMAL
EPITHELIAL CELLS: 1 /HPF
GLUCOSE QUALITATIVE U: NEGATIVE MG/DL
KETONES URINE: NEGATIVE MG/DL
LEUKOCYTE ESTERASE URINE: NEGATIVE
MICROSCOPIC-UA: NORMAL
NITRITE URINE: NEGATIVE
PH URINE: 5.5
PROTEIN URINE: NORMAL MG/DL
PSA FREE FLD-MCNC: 10 %
PSA FREE SERPL-MCNC: 0.08 NG/ML
PSA SERPL-MCNC: 0.81 NG/ML
RED BLOOD CELLS URINE: 1 /HPF
SPECIFIC GRAVITY URINE: 1.02
URINE CYTOLOGY: NORMAL
UROBILINOGEN URINE: 0.2 MG/DL
WHITE BLOOD CELLS URINE: 0 /HPF

## 2025-02-12 ENCOUNTER — INPATIENT (INPATIENT)
Facility: HOSPITAL | Age: 80
LOS: 1 days | Discharge: HOME | DRG: 313 | End: 2025-02-14
Attending: INTERNAL MEDICINE | Admitting: INTERNAL MEDICINE
Payer: MEDICARE

## 2025-02-12 ENCOUNTER — RESULT REVIEW (OUTPATIENT)
Age: 80
End: 2025-02-12

## 2025-02-12 VITALS
RESPIRATION RATE: 20 BRPM | TEMPERATURE: 97 F | DIASTOLIC BLOOD PRESSURE: 73 MMHG | SYSTOLIC BLOOD PRESSURE: 158 MMHG | HEART RATE: 62 BPM | OXYGEN SATURATION: 98 %

## 2025-02-12 DIAGNOSIS — Z29.9 ENCOUNTER FOR PROPHYLACTIC MEASURES, UNSPECIFIED: ICD-10-CM

## 2025-02-12 DIAGNOSIS — Z96.642 PRESENCE OF LEFT ARTIFICIAL HIP JOINT: Chronic | ICD-10-CM

## 2025-02-12 DIAGNOSIS — Z98.890 OTHER SPECIFIED POSTPROCEDURAL STATES: Chronic | ICD-10-CM

## 2025-02-12 DIAGNOSIS — R07.9 CHEST PAIN, UNSPECIFIED: ICD-10-CM

## 2025-02-12 DIAGNOSIS — Z96.651 PRESENCE OF RIGHT ARTIFICIAL KNEE JOINT: Chronic | ICD-10-CM

## 2025-02-12 DIAGNOSIS — D47.2 MONOCLONAL GAMMOPATHY: ICD-10-CM

## 2025-02-12 DIAGNOSIS — Z96.641 PRESENCE OF RIGHT ARTIFICIAL HIP JOINT: Chronic | ICD-10-CM

## 2025-02-12 DIAGNOSIS — Z87.898 PERSONAL HISTORY OF OTHER SPECIFIED CONDITIONS: ICD-10-CM

## 2025-02-12 DIAGNOSIS — R03.0 ELEVATED BLOOD-PRESSURE READING, WITHOUT DIAGNOSIS OF HYPERTENSION: ICD-10-CM

## 2025-02-12 LAB
ALBUMIN SERPL ELPH-MCNC: 4.1 G/DL — SIGNIFICANT CHANGE UP (ref 3.3–5)
ALP SERPL-CCNC: 87 U/L — SIGNIFICANT CHANGE UP (ref 40–120)
ALT FLD-CCNC: 15 U/L — SIGNIFICANT CHANGE UP (ref 10–45)
ANION GAP SERPL CALC-SCNC: 11 MMOL/L — SIGNIFICANT CHANGE UP (ref 5–17)
ANISOCYTOSIS BLD QL: SLIGHT — SIGNIFICANT CHANGE UP
APPEARANCE UR: CLEAR — SIGNIFICANT CHANGE UP
APTT BLD: 31.3 SEC — SIGNIFICANT CHANGE UP (ref 24.5–35.6)
AST SERPL-CCNC: 21 U/L — SIGNIFICANT CHANGE UP (ref 10–40)
BASOPHILS # BLD AUTO: 0.06 K/UL — SIGNIFICANT CHANGE UP (ref 0–0.2)
BASOPHILS NFR BLD AUTO: 1 % — SIGNIFICANT CHANGE UP (ref 0–2)
BILIRUB SERPL-MCNC: 0.5 MG/DL — SIGNIFICANT CHANGE UP (ref 0.2–1.2)
BILIRUB UR-MCNC: NEGATIVE — SIGNIFICANT CHANGE UP
BUN SERPL-MCNC: 32 MG/DL — HIGH (ref 7–23)
CALCIUM SERPL-MCNC: 9 MG/DL — SIGNIFICANT CHANGE UP (ref 8.4–10.5)
CHLORIDE SERPL-SCNC: 108 MMOL/L — SIGNIFICANT CHANGE UP (ref 96–108)
CO2 SERPL-SCNC: 22 MMOL/L — SIGNIFICANT CHANGE UP (ref 22–31)
COLOR SPEC: YELLOW — SIGNIFICANT CHANGE UP
CREAT SERPL-MCNC: 0.75 MG/DL — SIGNIFICANT CHANGE UP (ref 0.5–1.3)
DIFF PNL FLD: NEGATIVE — SIGNIFICANT CHANGE UP
EGFR: 92 ML/MIN/1.73M2 — SIGNIFICANT CHANGE UP
ELLIPTOCYTES BLD QL SMEAR: SLIGHT — SIGNIFICANT CHANGE UP
EOSINOPHIL # BLD AUTO: 0.06 K/UL — SIGNIFICANT CHANGE UP (ref 0–0.5)
EOSINOPHIL NFR BLD AUTO: 1 % — SIGNIFICANT CHANGE UP (ref 0–6)
FLUAV AG NPH QL: SIGNIFICANT CHANGE UP
FLUBV AG NPH QL: SIGNIFICANT CHANGE UP
GLUCOSE SERPL-MCNC: 106 MG/DL — HIGH (ref 70–99)
GLUCOSE UR QL: NEGATIVE MG/DL — SIGNIFICANT CHANGE UP
HCT VFR BLD CALC: 42.9 % — SIGNIFICANT CHANGE UP (ref 39–50)
HGB BLD-MCNC: 14.1 G/DL — SIGNIFICANT CHANGE UP (ref 13–17)
INR BLD: 1 RATIO — SIGNIFICANT CHANGE UP (ref 0.85–1.16)
KETONES UR-MCNC: NEGATIVE MG/DL — SIGNIFICANT CHANGE UP
LEUKOCYTE ESTERASE UR-ACNC: NEGATIVE — SIGNIFICANT CHANGE UP
LIDOCAIN IGE QN: 26 U/L — SIGNIFICANT CHANGE UP (ref 7–60)
LYMPHOCYTES # BLD AUTO: 0.99 K/UL — LOW (ref 1–3.3)
LYMPHOCYTES # BLD AUTO: 16.3 % — SIGNIFICANT CHANGE UP (ref 13–44)
MACROCYTES BLD QL: SLIGHT — SIGNIFICANT CHANGE UP
MAGNESIUM SERPL-MCNC: 2.2 MG/DL — SIGNIFICANT CHANGE UP (ref 1.6–2.6)
MANUAL SMEAR VERIFICATION: SIGNIFICANT CHANGE UP
MCHC RBC-ENTMCNC: 30.3 PG — SIGNIFICANT CHANGE UP (ref 27–34)
MCHC RBC-ENTMCNC: 32.9 G/DL — SIGNIFICANT CHANGE UP (ref 32–36)
MCV RBC AUTO: 92.1 FL — SIGNIFICANT CHANGE UP (ref 80–100)
MONOCYTES # BLD AUTO: 0.68 K/UL — SIGNIFICANT CHANGE UP (ref 0–0.9)
MONOCYTES NFR BLD AUTO: 11.2 % — SIGNIFICANT CHANGE UP (ref 2–14)
MYELOCYTES NFR BLD: 1 % — HIGH (ref 0–0)
NEUTROPHILS # BLD AUTO: 4.21 K/UL — SIGNIFICANT CHANGE UP (ref 1.8–7.4)
NEUTROPHILS NFR BLD AUTO: 67.4 % — SIGNIFICANT CHANGE UP (ref 43–77)
NEUTS BAND # BLD: 2.1 % — SIGNIFICANT CHANGE UP (ref 0–8)
NEUTS BAND NFR BLD: 2.1 % — SIGNIFICANT CHANGE UP (ref 0–8)
NITRITE UR-MCNC: NEGATIVE — SIGNIFICANT CHANGE UP
NT-PROBNP SERPL-SCNC: 109 PG/ML — SIGNIFICANT CHANGE UP (ref 0–300)
OVALOCYTES BLD QL SMEAR: SLIGHT — SIGNIFICANT CHANGE UP
PH UR: 5.5 — SIGNIFICANT CHANGE UP (ref 5–8)
PLAT MORPH BLD: NORMAL — SIGNIFICANT CHANGE UP
PLATELET # BLD AUTO: 167 K/UL — SIGNIFICANT CHANGE UP (ref 150–400)
POIKILOCYTOSIS BLD QL AUTO: SLIGHT — SIGNIFICANT CHANGE UP
POLYCHROMASIA BLD QL SMEAR: SLIGHT — SIGNIFICANT CHANGE UP
POTASSIUM SERPL-MCNC: 4.3 MMOL/L — SIGNIFICANT CHANGE UP (ref 3.5–5.3)
POTASSIUM SERPL-SCNC: 4.3 MMOL/L — SIGNIFICANT CHANGE UP (ref 3.5–5.3)
PROT SERPL-MCNC: 7.4 G/DL — SIGNIFICANT CHANGE UP (ref 6–8.3)
PROT UR-MCNC: NEGATIVE MG/DL — SIGNIFICANT CHANGE UP
PROTHROM AB SERPL-ACNC: 11.4 SEC — SIGNIFICANT CHANGE UP (ref 9.9–13.4)
RBC # BLD: 4.66 M/UL — SIGNIFICANT CHANGE UP (ref 4.2–5.8)
RBC # FLD: 22.9 % — HIGH (ref 10.3–14.5)
RBC BLD AUTO: ABNORMAL
RSV RNA NPH QL NAA+NON-PROBE: SIGNIFICANT CHANGE UP
SARS-COV-2 RNA SPEC QL NAA+PROBE: SIGNIFICANT CHANGE UP
SODIUM SERPL-SCNC: 141 MMOL/L — SIGNIFICANT CHANGE UP (ref 135–145)
SP GR SPEC: 1.01 — SIGNIFICANT CHANGE UP (ref 1–1.03)
TROPONIN T, HIGH SENSITIVITY RESULT: 10 NG/L — SIGNIFICANT CHANGE UP (ref 0–51)
TROPONIN T, HIGH SENSITIVITY RESULT: 9 NG/L — SIGNIFICANT CHANGE UP (ref 0–51)
UROBILINOGEN FLD QL: 0.2 MG/DL — SIGNIFICANT CHANGE UP (ref 0.2–1)
WBC # BLD: 6.06 K/UL — SIGNIFICANT CHANGE UP (ref 3.8–10.5)
WBC # FLD AUTO: 6.06 K/UL — SIGNIFICANT CHANGE UP (ref 3.8–10.5)

## 2025-02-12 PROCEDURE — 99223 1ST HOSP IP/OBS HIGH 75: CPT

## 2025-02-12 PROCEDURE — 71046 X-RAY EXAM CHEST 2 VIEWS: CPT | Mod: 26

## 2025-02-12 PROCEDURE — 93356 MYOCRD STRAIN IMG SPCKL TRCK: CPT

## 2025-02-12 PROCEDURE — 93018 CV STRESS TEST I&R ONLY: CPT

## 2025-02-12 PROCEDURE — 93016 CV STRESS TEST SUPVJ ONLY: CPT

## 2025-02-12 PROCEDURE — 99223 1ST HOSP IP/OBS HIGH 75: CPT | Mod: FS

## 2025-02-12 PROCEDURE — 93010 ELECTROCARDIOGRAM REPORT: CPT

## 2025-02-12 PROCEDURE — 93306 TTE W/DOPPLER COMPLETE: CPT | Mod: 26

## 2025-02-12 PROCEDURE — 78452 HT MUSCLE IMAGE SPECT MULT: CPT | Mod: 26

## 2025-02-12 RX ORDER — OXYCODONE HYDROCHLORIDE 30 MG/1
10 TABLET ORAL THREE TIMES A DAY
Refills: 0 | Status: DISCONTINUED | OUTPATIENT
Start: 2025-02-12 | End: 2025-02-12

## 2025-02-12 RX ORDER — PANTOPRAZOLE 20 MG/1
40 TABLET, DELAYED RELEASE ORAL
Refills: 0 | Status: DISCONTINUED | OUTPATIENT
Start: 2025-02-12 | End: 2025-02-14

## 2025-02-12 RX ORDER — ACETAMINOPHEN 160 MG/5ML
1000 SUSPENSION ORAL ONCE
Refills: 0 | Status: COMPLETED | OUTPATIENT
Start: 2025-02-12 | End: 2025-02-12

## 2025-02-12 RX ORDER — ACETAMINOPHEN 160 MG/5ML
975 SUSPENSION ORAL ONCE
Refills: 0 | Status: DISCONTINUED | OUTPATIENT
Start: 2025-02-12 | End: 2025-02-12

## 2025-02-12 RX ORDER — FAMOTIDINE 10 MG/ML
20 INJECTION INTRAVENOUS ONCE
Refills: 0 | Status: COMPLETED | OUTPATIENT
Start: 2025-02-12 | End: 2025-02-12

## 2025-02-12 RX ORDER — MECOBAL/LEVOMEFOLAT CA/B6 PHOS 2-3-35 MG
1 TABLET ORAL DAILY
Refills: 0 | Status: DISCONTINUED | OUTPATIENT
Start: 2025-02-12 | End: 2025-02-14

## 2025-02-12 RX ORDER — OXYCODONE HYDROCHLORIDE 30 MG/1
10 TABLET ORAL THREE TIMES A DAY
Refills: 0 | Status: DISCONTINUED | OUTPATIENT
Start: 2025-02-12 | End: 2025-02-14

## 2025-02-12 RX ORDER — FAMOTIDINE 10 MG/ML
20 INJECTION INTRAVENOUS
Refills: 0 | Status: DISCONTINUED | OUTPATIENT
Start: 2025-02-12 | End: 2025-02-12

## 2025-02-12 RX ORDER — PANTOPRAZOLE 20 MG/1
40 TABLET, DELAYED RELEASE ORAL
Refills: 0 | Status: DISCONTINUED | OUTPATIENT
Start: 2025-02-12 | End: 2025-02-12

## 2025-02-12 RX ORDER — MAGNESIUM, ALUMINUM HYDROXIDE 200-225/5
30 SUSPENSION, ORAL (FINAL DOSE FORM) ORAL ONCE
Refills: 0 | Status: COMPLETED | OUTPATIENT
Start: 2025-02-12 | End: 2025-02-12

## 2025-02-12 RX ORDER — MAGNESIUM, ALUMINUM HYDROXIDE 200-225/5
30 SUSPENSION, ORAL (FINAL DOSE FORM) ORAL EVERY 4 HOURS
Refills: 0 | Status: DISCONTINUED | OUTPATIENT
Start: 2025-02-12 | End: 2025-02-14

## 2025-02-12 RX ORDER — PREGABALIN CAPSULES, CV 225 MG/1
150 CAPSULE ORAL
Refills: 0 | Status: DISCONTINUED | OUTPATIENT
Start: 2025-02-12 | End: 2025-02-14

## 2025-02-12 RX ORDER — HEPARIN SODIUM,PORCINE 10000/ML
5000 VIAL (ML) INJECTION EVERY 8 HOURS
Refills: 0 | Status: DISCONTINUED | OUTPATIENT
Start: 2025-02-12 | End: 2025-02-14

## 2025-02-12 RX ORDER — PREGABALIN CAPSULES, CV 225 MG/1
150 CAPSULE ORAL
Refills: 0 | Status: DISCONTINUED | OUTPATIENT
Start: 2025-02-12 | End: 2025-02-12

## 2025-02-12 RX ORDER — SUCRALFATE 1 G/10ML
1 SUSPENSION ORAL
Refills: 0 | Status: DISCONTINUED | OUTPATIENT
Start: 2025-02-12 | End: 2025-02-12

## 2025-02-12 RX ORDER — ACETAMINOPHEN 160 MG/5ML
650 SUSPENSION ORAL EVERY 6 HOURS
Refills: 0 | Status: DISCONTINUED | OUTPATIENT
Start: 2025-02-12 | End: 2025-02-14

## 2025-02-12 RX ORDER — ACETAMINOPHEN, DIPHENHYDRAMINE HCL, PHENYLEPHRINE HCL 325; 25; 5 MG/1; MG/1; MG/1
10 TABLET ORAL AT BEDTIME
Refills: 0 | Status: DISCONTINUED | OUTPATIENT
Start: 2025-02-12 | End: 2025-02-14

## 2025-02-12 RX ADMIN — OXYCODONE HYDROCHLORIDE 10 MILLIGRAM(S): 30 TABLET ORAL at 22:07

## 2025-02-12 RX ADMIN — OXYCODONE HYDROCHLORIDE 10 MILLIGRAM(S): 30 TABLET ORAL at 23:08

## 2025-02-12 RX ADMIN — PREGABALIN CAPSULES, CV 150 MILLIGRAM(S): 225 CAPSULE ORAL at 14:23

## 2025-02-12 RX ADMIN — Medication 30 MILLILITER(S): at 06:57

## 2025-02-12 RX ADMIN — SUCRALFATE 1 GRAM(S): 1 SUSPENSION ORAL at 19:30

## 2025-02-12 RX ADMIN — FAMOTIDINE 20 MILLIGRAM(S): 10 INJECTION INTRAVENOUS at 06:58

## 2025-02-12 RX ADMIN — OXYCODONE HYDROCHLORIDE 10 MILLIGRAM(S): 30 TABLET ORAL at 17:23

## 2025-02-12 RX ADMIN — ACETAMINOPHEN 400 MILLIGRAM(S): 160 SUSPENSION ORAL at 19:30

## 2025-02-12 NOTE — H&P ADULT - HISTORY OF PRESENT ILLNESS
80M PMH arthritis, Raynaud's, FM, prostate CA (s/p radiation 2015), anxiety, depression, concussion, diverticulitis, [place] b/l (hearing aids), MARIAMA (w/o tx), ALEJANDRA,  admit 11/2023 for evaluation of chest pain presumed 2/2 esophagitis, gastric ulcers visualized on EGD (dc'd for GI f/u)     HR 50s-60s, BP 110s-150s/60s-80,     BUN 32 (SCr 0.75, GFR 92); HST 10 -> 9, proBNP 109    CXR   Clear lungs    TTE    1. Left ventricular cavity is normal in size. Left ventricular wall thickness is normal. Left ventricular systolic function is normal with an ejection fraction of 61 % by Mascorro's method of disks. There are no regional wall motion abnormalities seen.   2. Normal left ventricular diastolic function, with normal left ventricular filling pressure.   3. Normal right ventricular cavity size and normal right ventricular systolic function.   4. Normal left and right atrial size.   5. Trileaflet aortic valve with normal systolic excursion. There is mild calcification of the aortic valve leaflets.   6. Mild aortic regurgitation.   7. Prolapse of the posterior mitral leaflet.   8. Mild to moderate mitral regurgitation.   9. No pericardial effusion seen.  10. Compared to the transthoracic echocardiogram performed on 11/28/2023, Slight worsening of the mitral valve regurgitation    Nuclear Pharmacologic Stress    1. Myocardial Perfusion: Abnormal.   2. Perfusion Findings: There is a large-sized, moderate, partially reversible defect in the mid to basal anterior wall consistent with infarct with mild to moderate [place]-infarct ischemia. There is a large-sized, moderate to severe, fixed defect in the inferolateral wall consistent with infarct.   3. Hypokinesis of the inferolateral and mid to basal anterior walls.   4. The post stress left ventricular EF is 56 %. The stress end diastolic volume is 87 ml and systolic volume is 38 ml.   5. Stress electrocardiogram: No significant ischemic ST segment changes beyond baseline abnormalities.    s/p ofirmev 1g, maalox 30mL, pepcid 20mg IV, oxycodone ER 10mg, pregabalin 150mg, carafate 1g     Evaluated in ED by heme/onc Dr. 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  80M PMH arthritis, Raynaud's, FM, prostate CA (s/p radiation 2015), anxiety, depression, concussion, diverticulitis, [place] b/l (hearing aids), MARIAMA (w/o tx), ALEJANDRA,  admit 11/2023 for evaluation of chest pain presumed 2/2 esophagitis, gastric ulcers visualized on EGD (dc'd for GI f/u), p/w CP (initially to CDU, then admit to inpatient floor). AOx4, reports baseline health (which does not include CP) when approximately 02:00 2/12 AM he developed mid sternal CP which caused him to awake, of "burning" quality similar to prior CP prompting hospital admission 2023 though less severe in the current instance, w/o radiation, intermittently occuring for short periods prompting ED presentation. Denies exertional worsening, or associated sx such as HA, lightheadedness, dizziness, visual disturbance, diaphoresis, palpitations, cough, SOB, abd pain, n/v/d, melena, hematochezia, dysuria, hematuria, or other complaint. Denies recent travel, prolonged immobility, sick contact. Currently upon my evaluation, he is asymptomatic (which he attributes temporally to maalox, pepcid use in ED).     HR 50s-60s, BP 110s-150s/60s-80,     BUN 32 (SCr 0.75, GFR 92); HST 10 -> 9, proBNP 109    CXR   Clear lungs    TTE    1. Left ventricular cavity is normal in size. Left ventricular wall thickness is normal. Left ventricular systolic function is normal with an ejection fraction of 61 % by Mascorro's method of disks. There are no regional wall motion abnormalities seen.   2. Normal left ventricular diastolic function, with normal left ventricular filling pressure.   3. Normal right ventricular cavity size and normal right ventricular systolic function.   4. Normal left and right atrial size.   5. Trileaflet aortic valve with normal systolic excursion. There is mild calcification of the aortic valve leaflets.   6. Mild aortic regurgitation.   7. Prolapse of the posterior mitral leaflet.   8. Mild to moderate mitral regurgitation.   9. No pericardial effusion seen.  10. Compared to the transthoracic echocardiogram performed on 11/28/2023, Slight worsening of the mitral valve regurgitation    Nuclear Pharmacologic Stress    1. Myocardial Perfusion: Abnormal.   2. Perfusion Findings: There is a large-sized, moderate, partially reversible defect in the mid to basal anterior wall consistent with infarct with mild to moderate [place]-infarct ischemia. There is a large-sized, moderate to severe, fixed defect in the inferolateral wall consistent with infarct.   3. Hypokinesis of the inferolateral and mid to basal anterior walls.   4. The post stress left ventricular EF is 56 %. The stress end diastolic volume is 87 ml and systolic volume is 38 ml.   5. Stress electrocardiogram: No significant ischemic ST segment changes beyond baseline abnormalities.    s/p ofirmev 1g, maalox 30mL, pepcid 20mg IV, oxycodone ER 10mg, pregabalin 150mg, carafate 1g     Evaluated in ED by heme/onc Dr. 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

## 2025-02-12 NOTE — H&P ADULT - PROBLEM SELECTOR PLAN 5
- BUN elevation on admit, repeat BMP in AM to evalaute  - HSQ VTE PPx  - regular diet  - fall, aspiration precaution  - disposition pending course   - outpatient f/u re MARIAMA (previously documented as w/o tx)

## 2025-02-12 NOTE — ED CDU PROVIDER DISPOSITION NOTE - CLINICAL COURSE
79 y/o male hx chronic pain, prostate CA, peptic ulcer disease presents to the ED for evaluation of chest pain that woke him from sleep at 2 am,  pain midsternal and burning. HD stable, on arrival given GI cocktail which improved symptoms. patient with no cardiologist. he was placed in CDU for tele, echo and stress. 81 y/o male hx chronic pain, prostate CA, peptic ulcer disease presents to the ED for evaluation of chest pain that woke him from sleep at 2 am,  pain midsternal and burning. HD stable, on arrival given GI cocktail which improved symptoms. patient with no cardiologist. he was placed in CDU for tele, echo and stress. echo was with normal EF, found to have some mitral regurg. seen by andra who recommended outpatient follow up since he missed his scheduled bx today. 81 y/o male hx chronic pain, prostate CA, peptic ulcer disease presents to the ED for evaluation of chest pain that woke him from sleep at 2 am,  pain midsternal and burning. HD stable, on arrival given GI cocktail which improved symptoms. patient with no cardiologist. he was placed in CDU for tele, echo and stress. echo was with normal EF, found to have some mitral regurg. seen by andra who recommended outpatient follow up since he missed his scheduled bx today. stress was abnormal with evidence of ischemia - spoke with unattached cardiology who recommended admission for cardiac catheterization. c/d/w Dr. Gunderson

## 2025-02-12 NOTE — CONSULT NOTE ADULT - TIME BILLING
- Review of records, telemetry, vital signs and daily labs.   - General and cardiovascular physical examination.  - Generation of cardiovascular treatment plan and completion of note .  - Coordination of care.      Patient was seen and examined by me on 2/12/25 ,interim events noted,labs and radiology studies reviewed.  Yon Peña MD,FACC.  [address] [place], [place] [id number].  [phone]  Availabe to call or text on [employer].

## 2025-02-12 NOTE — ED ADULT NURSE REASSESSMENT NOTE - NS ED NURSE REASSESS COMMENT FT1
pt received in ORG 61 via wheelchair from [place]. pt placed in hospital bed resting comfortably, aware of plan. pt ok with plan of care and awaiting testing.

## 2025-02-12 NOTE — ED CDU PROVIDER INITIAL DAY NOTE - OBJECTIVE STATEMENT
79 y/o male, PMHx Arthritis, peptic ulcer disease, Raynaud's, fibromyalgia, prostate cancer status post radiation, presenting for chest pain that started this morning. states pain woke him up from his sleep. states pain is mid-sternal, intermittent, burning in nature.  states feels similar to previous ulcer. patient denies SOB. denies f/n/v/d, HA, dizziness, abdominal pain, urinary symptoms, LOC, numbness, tingling, dizziness. reports similar thing occurred 4 years ago and he had a scope revealing PUD, on ppi now. no cardiologist. of note patient has been following with heme for leukopenia and is planned for bone marrow bx. he is also scheduled for an endoscopy next week.  the patients symptoms are much improved since receiving the GI cocktail

## 2025-02-12 NOTE — PATIENT PROFILE ADULT - SURGICAL SITE INCISION
Bedside and Verbal shift change report given to Lynda Andersonaura [id number] oncoming nurse) by Tracy Santos RN (offgoing nurse). Report included the following information SBAR, Kardex, MAR and Recent Results. SITUATION:    Code Status: Prior   Reason for Admission: Ischemic Rest Pain of Left [address] day: 0   Problem List:       Hospital Problems  Date Reviewed: 8/17/2017    None          BACKGROUND:    Past Medical History:   Past Medical History:   Diagnosis Date    Acute blood loss as cause of postoperative anemia 4/4/2017    Anticoagulated on Coumadin     Aortoiliac occlusive disease ([address] 1/25/2017    Atherosclerosis of native artery of both lower extremities with intermittent claudication ([address] 1/25/2017    Benign hypertensive heart disease with systolic congestive heart failure, NYHA class 2 ([address] 1/26/2015    Carotid artery disease (HCC)     Chronic anemia     Chronic atrial fibrillation (HCC)     Chronic systolic heart failure (HCC)     Chronic ulcer of right foot ([address] 4/4/2017    Chronic ulcer of right heel ([address] 8/8/2017    Coronary artery disease involving native coronary artery of native heart 3/15/2017    Successful stenting of Cx (Xience LESLEY) and RCA (Xience LESLEY) to 0% by Dr. Lizeth Pinto on 3/15/17.     DDD (degenerative disc disease), lumbar 1/26/2015    Dyslipidemia     Erectile dysfunction 7/5/2016    Euthyroid sick syndrome 4/6/2017    Hereditary peripheral neuropathy 11/15/2016    History of cardioversion 4/18/2017    S/P Synchronized external cardioversion (4/18/2017 - Dr. Shiraz Ramey)    History of vitamin D deficiency 4/22/2017    Vitamin D 25-Hydroxy (4/22/2017) = 12.0; Vitamin D 25-Hydroxy (5/26/2017) = 38.7    Infection of vascular bypass graft ([address] 7/24/2017    Left lower extremity    Ischemic cardiomyopathy     Peripheral artery disease ([address] 1/25/2017    Spinal stenosis of lumbar region with radiculopathy 5/4/2015    Dr. Callie Matthews         Patient taking anticoagulants no     ASSESSMENT:    Changes in Assessment Throughout Shift: NO     Patient has Central Line: no Reasons if yes: NO   Patient has Jiang Cath: no Reasons if yes: NO      Last Vitals:     Vitals:    08/17/17 1615   BP: 130/66   Pulse: 72   Resp: 18   Temp: 99.5 °F (37.5 °C)   SpO2: 97%        IV and DRAINS (will only show if present)         WOUND (if present)   Wound Type:  none, SURGICAL INCISION   Dressing present Dressing Present : No   Wound Concerns/Notes:  none     PAIN    Pain Assessment    Pain Intensity 1: 5 (08/17/17 1740)              Patient Stated Pain Goal: 0  o Interventions for Pain:  none, MEDS  o Intervention effective: yes  o Time of last intervention: SEE MAR   o Reassessment Completed: yes      Last 3 Weights: There were no vitals filed for this visit. Weight change:      INTAKE/OUPUT    Current Shift:      Last three shifts: 08/16 0701 - 08/17 1900  In: -   Out: 300 [Urine:300]     LAB RESULTS     Recent Labs      08/17/17   0647  08/15/17   0605   WBC   --   11.9   HGB  9.7*  9.9*   HCT  29.8*  29.8*   PLT   --   491*        Recent Labs      08/17/17   0647  08/16/17   0620  08/15/17   0605   INR  1.2  1.6*  1.9*       RECOMMENDATIONS AND DISCHARGE PLANNING     1. Pending tests/procedures/ Plan of Care or Other Needs: LABS     2. Discharge plan for patient and Needs/Barriers: TO BE DETERMINE    3. Estimated Discharge Date: 8/22/17 Posted on Whiteboard in [address] [place] [address] Room: yes      4. The patient's care plan was reviewed with the oncoming nurse. \"HEALS\" SAFETY CHECK      Fall Risk    Total Score: 4    Safety Measures: Safety Measures: Bed in low position, Call light within reach, Fall prevention (comment), Gripper socks    A safety check occurred in the patient's room between off going nurse and oncoming nurse listed above.     The safety check included the below items  Area Items   H  High Alert Medications - Verify all high alert medication drips (heparin, PCA, etc.)   E  Equipment - Suction is set up for ALL patients (with cailin)  - Red plugs utilized for all equipment (IV pumps, etc.)  - WOWs wiped down at end of shift.  - Room stocked with oxygen, suction, and other unit-specific supplies   A  Alarms - Bed alarm is set for fall risk patients  - Ensure chair alarm is in place and activated if patient is up in a chair   L  Lines - Check IV for any infiltration  - Jiang bag is empty if patient has a Jiang   - Tubing and IV bags are labeled   S  Safety   - Room is clean, patient is clean, and equipment is clean. - Hallways are clear from equipment besides carts. - Fall bracelet on for fall risk patients  - Ensure room is clear and free of clutter  - Suction is set up for ALL patients (with cailin)  - Hallways are clear from equipment besides carts.    - Isolation precautions followed, supplies available outside room, sign posted     Susi Browne RN no

## 2025-02-12 NOTE — ED CDU PROVIDER INITIAL DAY NOTE - PROGRESS NOTE DETAILS
reassesed the patient, updated on stress results, discussed with Dr. Peña, he would rec patient stay for cath admit to his service. patient amenable. d/w Dr. Gunderson

## 2025-02-12 NOTE — ED ADULT NURSE NOTE - NS ED NURSE PATIENT LEFT UNIT TIME
Obinna Shelby (:  1965) is a 56 y.o. male,New patient, here for evaluation of the following chief complaint(s):  New Patient         ASSESSMENT/PLAN:  1. Well adult exam  -     PSA Screening; Future  -     Lipid Panel; Future  -     Comprehensive Metabolic Panel; Future  -     CBC; Future  -     TSH; Future  2. FH: diabetes mellitus-discussed role of exercise to avoid this  3. Elevated BP without diagnosis of hypertension-dash diet and work on weight loss  4. Overweight-start med to help with cravings  No etoh with this med  Appt in 6 weeks   Consider weight watchers programs  -     buPROPion (WELLBUTRIN XL) 150 MG extended release tablet; Take 1 tablet by mouth every morning, Disp-30 tablet, R-3Normal  5. SOB (shortness of breath)-gradual onset  No tobacco history  No cough  -     Transthoracic echocardiogram (TTE) complete with contrast, bubble, strain, and 3D PRN; Future    Advised colonoscopy  No follow-ups on file. Subjective   SUBJECTIVE/OBJECTIVE:  GISEL  Last seen by me over 5 years ago comes in to get reestablished as a patient and for general exam.    His main concern is struggle with weight currently BMI is 42 and he notes that he has cravings particularly at night and tends to graze until his wife get gets home for dinner around 8. Would like to try to lose weight to help with general health. Has never tried any formal weight loss programs. Would be interested in medications to help with the cravings. Identifies evening snacking is an obstacle. Family history of diabetes son has type I brother has type II. He denies polyuria or polydipsia urinates once at night. Elevated blood pressure in my office has never had a diagnosis of hypertension. Denies headaches chest pain or dizzy spells. Notes some dyspnea on exertion which has gradually increased over the last year. He associates this with deconditioning.   Currently working for [employer] fairly sedentary
19:59

## 2025-02-12 NOTE — H&P ADULT - NSHPADDITIONALINFOADULT_GEN_ALL_CORE
Urgent medical problems addressed overnight, comprehensive care to be assumed by day colleagues during course of admission.    Please refer to detailed radiology reports, provide to patient upon discharge for presentation to PCP at which time abnormal findings not addressed inpatient can be followed up.    Case assigned to me overnight by hospitalist in charge Dr. Lynn. Care to be assumed in AM by primary team Dr. Peña.

## 2025-02-12 NOTE — H&P ADULT - PROBLEM SELECTOR PLAN 1
reportedly similar and less severe compared with 11/2023 presentation, resolved after GI cocktail in ED  HST 10 -> 9, proBNP 109, CXR w/o acute cardiopulmonary path  EKG with TWI present prior, TTE with worsened MVR, nuclear stress abnormal (defects c/f infarcts with [place] infarct ischemia, hypokinesis inferolateral and mid to basal anterior walls)  ED discussed with cardiology Dr. Peña who recommended admission for cardiac cath  - monitor telemetry  - appreciate cardiology recommendations (c/s note not yet uploaded)  - stat EKG, [place], and contact cardiology if any recurrent CP  - c/w home PPI and maalox prn. Would coordinate re outpatient capsule study timing (planned prior as outpatient) w/ Dr. Montoya in AM to inform of admit and plan moving forward.

## 2025-02-12 NOTE — PATIENT PROFILE ADULT - FALL HARM RISK - UNIVERSAL INTERVENTIONS
Bed in lowest position, wheels locked, appropriate side rails in place/Call bell, personal items and telephone in reach/Instruct patient to call for assistance before getting out of bed or chair/Non-slip footwear when patient is out of bed/[place] to call system/Physically safe environment - no spills, clutter or unnecessary equipment/Purposeful Proactive Rounding/Room/bathroom lighting operational, light cord in reach

## 2025-02-12 NOTE — ED CDU PROVIDER INITIAL DAY NOTE - ATTENDING APP SHARED VISIT CONTRIBUTION OF CARE
Garett Belle MD (Attending Physician):    I performed a history and physical exam of the patient and discussed their management with the ASHLEY. I have reviewed the ASHLEY note and agree with the documented findings and plan of care, except as noted. This was a shared visit with an ASHLEY. I reviewed and verified the documentation and independently performed my own history/exam/medical decision making. My medical decision making and observations are found below. Please refer to any progress notes for updates on clinical course.    HPI:  81 y/o male with PMHx of Arthritis, Raynaud's, fibromyalgia, prostate cancer status post radiation, presenting for chest pain that started this morning. States pain woke him up from his sleep. States pain is mid-sternal, intermittent, burning in nature. States it feels similar to previous ulcer pain. Patient denies SOB, fever, n/v/d, HA, dizziness, abdominal pain, urinary symptoms, LOC, numbness, tingling, dizziness, leg swelling, calf pain. No prior hx of DVT/PE. Does not think CP is exertional or pleuritic. Says CP is non-radiating. Non-smoker. Does not follow with a cardiologist. Has not had stress test or echo in many years.    PE:  GEN - NAD, well appearing, A&Ox3  HEAD - NC/AT  EYES - PERRL, EOMI  ENT - Airway patent, mucous membranes moist  PULMONARY - Normal wob, CTA b/l, symmetric breath sounds, no W/R/R, satting 98% on RA  CARDIAC - +S1S2, RRR, no M/G/R, no JVD  CHEST - B/l chest wall NT  ABDOMEN - +BS, ND, NT, soft, no guarding, no rebound, no masses, no rigidity   - No CVA TTP b/l  EXTREMITIES - FROM, symmetric pulses, no edema. B/l calves NT.  SKIN - No rash or bruising  NEUROLOGIC - Alert, speech clear, moving all extremities spontaneously, no focal deficits  PSYCH - Normal mood/affect, normal insight    MDM:  In ED, ekg, cxr, and labs (including troponin x2) were non-actionable. Had shared decision making with pt, who would like to stay in the CDU for provocative cardiac testing (echo and stress test), as pt has not had echo and stress test in many years. Currently doesn't have a cardiologist.

## 2025-02-12 NOTE — CONSULT NOTE ADULT - SUBJECTIVE AND OBJECTIVE BOX
Chief Complaint:  Patient is a 80y old  Male who presents with a chief complaint of chest pain.    HPI: Patient known. He had anemia and that was due to iron deficiency and it got better with iron. labs were notable for a monoclonal protein. 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 He called to cancel and he was in the ER so coming to see patient. He thinks that the pain is due to indigestion/ulcer as he has that before. The pain is now better. He otherwise feels fine.     Medications:  oxyCODONE  ER Tablet 10 milliGRAM(s) Oral three times a day  pantoprazole    Tablet 40 milliGRAM(s) Oral before breakfast  pregabalin 150 milliGRAM(s) Oral two times a day        Allergies:  Keflex (Rash)    FAMILY HISTORY:  Family history of CHF (congestive heart failure) (Mother)    Family history of heart attack (Father, Sibling)      PAST MEDICAL & SURGICAL HISTORY:  Prostate cancer  s/p radiation 2015      Concussion  "I fell backwards on a hardwood floor"-10/2015      Eye abnormality  "I had a bleed in the back of the eye"-left, 2014      Anxiety and depression  no medication      Hearing loss  b/l hearing aids      MARIAMA (obstructive sleep apnea)  sleep study 8-12-17,no treatment      PND (post-nasal drip)      DJD (degenerative joint disease)  traction in back 15 years ago x 10 days      TMJ (temporomandibular joint disorder)  right pt with clicking x 1 month      Diverticulitis  treated 2007      Osteoarthritis      Nocturia  x 2-3      Urinary frequency      Lumbar spinal stenosis      S/P rotator cuff surgery  right repair open 1-2017      S/P sinus surgery  1981      S/P carpal tunnel release  bilaal 9/2017      History of left hip replacement  2013      History of right hip replacement  2014      H/O total knee replacement, right  2016      H/O laminectomy    REVIEW OF SYSTEMS      General: Appetite okay and not losing weight. Fatigue is better. No fever or chills or sweats    Skin/Breast: No rash, itch, bruising  	  ENMT:	Has hearing loss. No nosebleeds    Respiratory and Thorax: No cough, SOB, wheezing  	  Cardiovascular:	No palpitations    Gastrointestinal:	No abd pain, N/V/D    Genitourinary:	No dysuria or hematuria    Musculoskeletal:	 No back pain, leg pain, swelling    Neurological:	No HA or dizziness    Vitals:  Vital Signs Last 24 Hrs  T(C): 36.3 (12 Feb 2025 06:07), Max: 36.3 (12 Feb 2025 06:07)  T(F): 97.4 (12 Feb 2025 06:07), Max: 97.4 (12 Feb 2025 06:07)  HR: 62 (12 Feb 2025 06:07) (62 - 62)  BP: 158/73 (12 Feb 2025 06:07) (158/73 - 158/73)  BP(mean): --  RR: 20 (12 Feb 2025 06:07) (20 - 20)  SpO2: 98% (12 Feb 2025 06:07) (98% - 98%)        Pex:  alert NAD  EOMI anicteric sclera  Neck No LNA  Cv s1 S2 RRR  Lungs clear B/L  abd soft NT ND +BS  No LE edema or tenderness    Labs:                        14.1   6.06  )-----------( 167      ( 12 Feb 2025 07:07 )             42.9     CBC Full  -  ( 12 Feb 2025 07:07 )  WBC Count : 6.06 K/uL  RBC Count : 4.66 M/uL  Hemoglobin : 14.1 g/dL  Hematocrit : 42.9 %  Platelet Count - Automated : 167 K/uL  Mean Cell Volume : 92.1 fl  Mean Cell Hemoglobin : 30.3 pg  Mean Cell Hemoglobin Concentration : 32.9 g/dL  Auto Neutrophil # : x  Auto Lymphocyte # : x  Auto Monocyte # : x  Auto Eosinophil # : x  Auto Basophil # : x  Auto Neutrophil % : x  Auto Lymphocyte % : x  Auto Monocyte % : x  Auto Eosinophil % : x  Auto Basophil % : x    02-12    141  |  108  |  32[H]  ----------------------------<  106[H]  4.3   |  22  |  0.75    Ca    9.0      12 Feb 2025 07:07  Mg     2.2     02-12    TPro  7.4  /  Alb  4.1  /  TBili  0.5  /  DBili  x   /  AST  21  /  ALT  15  /  AlkPhos  87  02-12    PT/INR - ( 12 Feb 2025 07:07 )   PT: 11.4 sec;   INR: 1.00 ratio         PTT - ( 12 Feb 2025 07:07 )  PTT:31.3 sec  [id number]
PATIENT SEEN AND EXAMINED BY ILIANA DARNELL M.D. ON :- 25  DATE OF SERVICE:    25         Interim events noted,Labs ,Radiological studies and Cardiology tests reviewed .      Patient is a 80y old  Male who presents with a chief complaint of CP (2025 20:57)      HPI:  80M PMH arthritis, Raynaud's, FM, prostate CA (s/p radiation ), anxiety, depression, concussion, diverticulitis, [place] b/l (hearing aids), MARIAMA (w/o tx), ALEJANDRA,  admit 2023 for evaluation of chest pain presumed 2/2 esophagitis, gastric ulcers visualized on EGD (dc'd for GI f/u), p/w CP (initially to CDU, then admit to inpatient floor). AOx4, reports baseline health (which does not include CP) when approximately 02:00 2/12 AM he developed mid sternal CP which caused him to awake, of "burning" quality similar to prior CP prompting hospital admission  though less severe in the current instance, w/o radiation, intermittently occuring for short periods prompting ED presentation. Denies exertional worsening, or associated sx such as HA, lightheadedness, dizziness, visual disturbance, diaphoresis, palpitations, cough, SOB, abd pain, n/v/d, melena, hematochezia, dysuria, hematuria, or other complaint. Denies recent travel, prolonged immobility, sick contact. Currently upon my evaluation, he is asymptomatic (which he attributes temporally to maalox, pepcid use in ED).     HR 50s-60s, BP 110s-150s/60s-80,     BUN 32 (SCr 0.75, GFR 92); HST 10 -> 9, proBNP 109    CXR   Clear lungs    TTE    1. Left ventricular cavity is normal in size. Left ventricular wall thickness is normal. Left ventricular systolic function is normal with an ejection fraction of 61 % by Mascorro's method of disks. There are no regional wall motion abnormalities seen.   2. Normal left ventricular diastolic function, with normal left ventricular filling pressure.   3. Normal right ventricular cavity size and normal right ventricular systolic function.   4. Normal left and right atrial size.   5. Trileaflet aortic valve with normal systolic excursion. There is mild calcification of the aortic valve leaflets.   6. Mild aortic regurgitation.   7. Prolapse of the posterior mitral leaflet.   8. Mild to moderate mitral regurgitation.   9. No pericardial effusion seen.  10. Compared to the transthoracic echocardiogram performed on 2023, Slight worsening of the mitral valve regurgitation    Nuclear Pharmacologic Stress    1. Myocardial Perfusion: Abnormal.   2. Perfusion Findings: There is a large-sized, moderate, partially reversible defect in the mid to basal anterior wall consistent with infarct with mild to moderate [place]-infarct ischemia. There is a large-sized, moderate to severe, fixed defect in the inferolateral wall consistent with infarct.   3. Hypokinesis of the inferolateral and mid to basal anterior walls.   4. The post stress left ventricular EF is 56 %. The stress end diastolic volume is 87 ml and systolic volume is 38 ml.   5. Stress electrocardiogram: No significant ischemic ST segment changes beyond baseline abnormalities.    s/p ofirmev 1g, maalox 30mL, pepcid 20mg IV, oxycodone ER 10mg, pregabalin 150mg, carafate 1g     Evaluated in ED by heme/onc Dr. 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  (2025 20:57)      PAST MEDICAL & SURGICAL HISTORY:  Prostate cancer  s/p radiation       Concussion  "I fell backwards on a hardwood floor"-10/2015      Eye abnormality  "I had a bleed in the back of the eye"-left,       Anxiety and depression  no medication      Hearing loss  b/l hearing aids      MARIAMA (obstructive sleep apnea)  sleep study 17,no treatment      PND (post-nasal drip)      DJD (degenerative joint disease)  traction in back 15 years ago x 10 days      TMJ (temporomandibular joint disorder)  right pt with clicking x 1 month      Diverticulitis  treated       Osteoarthritis      Nocturia  x 2-3      Urinary frequency      Lumbar spinal stenosis      S/P rotator cuff surgery  right repair open -      S/P sinus surgery  1981      S/P carpal tunnel release  bilaal 2017      History of left hip replacement  2013      History of right hip replacement  2014      H/O total knee replacement, right  2016      H/O laminectomy          PREVIOUS DIAGNOSTIC TESTING:      ECHO  FINDINGS:    STRESS  FINDINGS:    CATHETERIZATION  FINDINGS:    MEDICATIONS  (STANDING):  heparin   Injectable 5000 Unit(s) SubCutaneous every 8 hours  melatonin 10 milliGRAM(s) Oral at bedtime  multivitamin 1 Tablet(s) Oral daily  oxyCODONE  ER Tablet 10 milliGRAM(s) Oral three times a day  pantoprazole    Tablet 40 milliGRAM(s) Oral two times a day  pregabalin 150 milliGRAM(s) Oral two times a day    MEDICATIONS  (PRN):  acetaminophen     Tablet .. 650 milliGRAM(s) Oral every 6 hours PRN Temp greater or equal to 38C (100.4F), Mild Pain (1 - 3)  aluminum hydroxide/magnesium hydroxide/simethicone Suspension 30 milliLiter(s) Oral every 4 hours PRN Dyspepsia      FAMILY HISTORY:  Family history of CHF (congestive heart failure) (Mother)    Family history of heart attack (Father, Sibling)        SOCIAL HISTORY:    CIGARETTES:    ALCOHOL:    REVIEW OF SYSTEMS:  CONSTITUTIONAL: No fever, weight loss, or fatigue  EYES: No eye pain, visual disturbances, or discharge  ENMT:  No difficulty hearing, tinnitus, vertigo; No sinus or throat pain  NECK: No pain or stiffness  RESPIRATORY: No cough, wheezing, chills or hemoptysis; No shortness of breath  CARDIOVASCULAR: No chest pain, palpitations, dizziness, or leg swelling  GASTROINTESTINAL: No abdominal or epigastric pain. No nausea, vomiting, or hematemesis; No diarrhea or constipation. No melena or hematochezia.  GENITOURINARY: No dysuria, frequency, hematuria, or incontinence  NEUROLOGICAL: No headaches, memory loss, loss of strength, numbness, or tremors  SKIN: No itching, burning, rashes, or lesions   LYMPH NODES: No enlarged glands  ENDOCRINE: No heat or cold intolerance; No hair loss  MUSCULOSKELETAL: No joint pain or swelling; No muscle, back, or extremity pain  PSYCHIATRIC: No depression, anxiety, mood swings, or difficulty sleeping  HEME/LYMPH: No easy bruising, or bleeding gums  ALLERY AND IMMUNOLOGIC: No hives or eczema    Vital Signs Last 24 Hrs  T(C): 36.8 (2025 22:37), Max: 36.8 (2025 14:36)  T(F): 98.2 (2025 22:37), Max: 98.3 (2025 19:51)  HR: 48 (2025 22:37) (48 - 64)  BP: 137/75 (2025 22:37) (114/69 - 158/73)  BP(mean): --  RR: 18 (2025 22:37) (17 - 20)  SpO2: 95% (2025 22:37) (95% - 99%)    Parameters below as of 2025 22:37  Patient On (Oxygen Delivery Method): room air          PHYSICAL EXAM:  GENERAL: NAD, well-groomed, well-developed  HEAD:  Atraumatic, Normocephalic  EYES: EOMI, PERRLA, conjunctiva and sclera clear  ENMT: No tonsillar erythema, exudates, or enlargement; Moist mucous membranes, Good dentition, No lesions  NECK: Supple, No JVD, Normal thyroid  NERVOUS SYSTEM:  Alert & Oriented X3, Good concentration; Motor Strength 5/5 B/L upper and lower extremities; DTRs 2+ intact and symmetric  CHEST/LUNG: Clear to percussion bilaterally; No rales, rhonchi, wheezing, or rubs  HEART: Regular rate and rhythm; No murmurs, rubs, or gallops  ABDOMEN: Soft, Nontender, Nondistended; Bowel sounds present  EXTREMITIES:  2+ Peripheral Pulses, No clubbing, cyanosis, or edema  LYMPH: No lymphadenopathy noted  SKIN: No rashes or lesions      INTERPRETATION OF TELEMETRY:    ECG:    CHADSVASC:     LABS:                        14.1   6.06  )-----------( 167      ( 2025 07:07 )             42.9     02-12    141  |  108  |  32[H]  ----------------------------<  106[H]  4.3   |  22  |  0.75    Ca    9.0      2025 07:07  Mg     2.2     02-12    TPro  7.4  /  Alb  4.1  /  TBili  0.5  /  DBili  x   /  AST  21  /  ALT  15  /  AlkPhos  87  02-12        PT/INR - ( 2025 07:07 )   PT: 11.4 sec;   INR: 1.00 ratio         PTT - ( 2025 07:07 )  PTT:31.3 sec  Urinalysis Basic - ( 2025 14:36 )    Color: Yellow / Appearance: Clear / S.007 / pH: x  Gluc: x / Ketone: Negative mg/dL  / Bili: Negative / Urobili: 0.2 mg/dL   Blood: x / Protein: Negative mg/dL / Nitrite: Negative   Leuk Esterase: Negative / RBC: x / WBC x   Sq Epi: x / Non Sq Epi: x / Bacteria: x      Lipid Panel:   I&O's Summary      RADIOLOGY & ADDITIONAL STUDIES:

## 2025-02-12 NOTE — ED ADULT NURSE NOTE - OBJECTIVE STATEMENT
80YM PMHX of arthritis, Raynaud's fibromyalgia, prostate cancer s/p radiation presents to the ED c/o midsternal CP since 3am. upon assessment, pt is A&OX4 oriented to self, place, time, situation. satting 98% on rm air. Afebrile orally. placed on CM (NSR). respirations even and unlabored. abdomen soft, nondistended. skin intact. MD Bajwa at bedside to assess. 18G IV inserted Right AC. Patient undressed and placed into gown, call bell in hand and side rails up for safety. warm blanket provided, vital signs stable, pt in no acute distress. updated on plan of care. comfort and safety maintained.

## 2025-02-12 NOTE — ED PROVIDER NOTE - ATTENDING APP SHARED VISIT CONTRIBUTION OF CARE
Garett Belle MD (Attending Physician):    I performed a history and physical exam of the patient and discussed their management with the ASHLEY. I have reviewed the ASHLEY note and agree with the documented findings and plan of care, except as noted. This was a shared visit with an ASHLEY. I reviewed and verified the documentation and independently performed my own history/exam/medical decision making. My medical decision making and observations are found below. Please refer to any progress notes for updates on clinical course.    HPI:  79 y/o male with PMHx of Arthritis, Raynaud's, fibromyalgia, prostate cancer status post radiation, presenting for chest pain that started this morning. States pain woke him up from his sleep. States pain is mid-sternal, intermittent, burning in nature. States it feels similar to previous ulcer pain. Patient denies SOB, fever, n/v/d, HA, dizziness, abdominal pain, urinary symptoms, LOC, numbness, tingling, dizziness, leg swelling, calf pain. No prior hx of DVT/PE. Does not think CP is exertional or pleuritic. Says CP is non-radiating. Non-smoker. Does not follow with a cardiologist. Has not had stress test or echo in many years.    PE:  GEN - NAD, well appearing, A&Ox3  HEAD - NC/AT  EYES - PERRL, EOMI  ENT - Airway patent, mucous membranes moist  PULMONARY - Normal wob, CTA b/l, symmetric breath sounds, no W/R/R, satting 98% on RA  CARDIAC - +S1S2, RRR, no M/G/R, no JVD  CHEST - B/l chest wall NT  ABDOMEN - +BS, ND, NT, soft, no guarding, no rebound, no masses, no rigidity   - No CVA TTP b/l  EXTREMITIES - FROM, symmetric pulses, no edema. B/l calves NT.  SKIN - No rash or bruising  NEUROLOGIC - Alert, speech clear, moving all extremities spontaneously, no focal deficits  PSYCH - Normal mood/affect, normal insight    MDM:  DDx includes, but not limited to: ACS, PTX, PNA, viral syndrome, gastritis, PUD, pancreatitis. Low suspicion for PE or aortic dissection. ekg, cxr, labs including troponin, pepcid, maalox, keep on cardiac monitor. Triage ekg shows NSR without any obvious STEMI or other ischemic changes. Dispo pending w/u.

## 2025-02-12 NOTE — ED PROVIDER NOTE - PROGRESS NOTE DETAILS
Pt signed out to me by overnight team pending labs and re-assessment, if pt opts to stay plan for echo and stress test in CDU. Pt's initial labs unremarkable, pending repeat trop. Pt re-assessed, reports pain improved after pepcid and maalox. Does not follow with a cardiologist and has not had cardiac testing in many years, pt amenable to CDU for cardiac testing. - RADHA ArnoldC

## 2025-02-12 NOTE — CONSULT NOTE ADULT - ASSESSMENT
80-year-old male who had anemia. It was due to iron deficiency. He got iron and it is better. He follows with GI and plan is for a capsule study as out-pt.    He has a monoclonal protein. The clinical picture is more consistent with an MGUS (no cytopenias and normal calcium and creatinine.) Out-pt management.    He had last week developed acute neutropenia and there was no obvious cause. He was scheduled for a bone marrow biopsy today in the office, but is here with CP. WBC is now back to normal and ANC is normal (some left shift with myelocytes.) May forego doing doing the bone marrow biopsy and monitor if the WBC stays okay.    Out-pt f/u in 2-3 weeks to re assess. care here per medicine and cardiology.    Patient understands and agrees with the plan. 
· Completed VTE Risk Assessment(s)	Medical Assessment Completed on: 12-Feb-2025 21:09  · Completed VTE Risk Assessment(s)	Refer to the Assessment tab to view/cancel completed assessment.     Assessment:  · Assessment	  80M PMH arthritis, Raynaud's, FM, prostate CA (s/p radiation 2015), anxiety, depression, concussion, diverticulitis, [place] b/l (hearing aids), MARIAMA (w/o tx), ALEJANDRA,  admit 11/2023 for evaluation of chest pain presumed 2/2 esophagitis, gastric ulcers visualized on EGD (dc'd for GI f/u), p/w non-exertional CP, found to have abnormal nuclear stress, admit for further evaluation by cardiology Dr. Yon Peña.          Problem/Plan - 1:  ·  Problem: Acute chest pain.   EKG with TWI present prior, TTE with worsened MVR, nuclear stress abnormal (defects c/f infarcts with [place] infarct ischemia, hypokinesis inferolateral and mid to basal anterior walls)  -  cardiac cath  - monitor telemetry       Problem/Plan - 2:  ·  Problem: Elevated systolic blood pressure reading without diagnosis of hypertension.   ·  Plan: presume pain contribution, currently improved  - monitor BP re need for antihypertensive addition.     Problem/Plan - 3:  ·  Problem: MGUS (monoclonal gammopathy of unknown significance).   ·  Plan: - followed by Dr. Ruddy Jimenez who saw patient in ED, recommending continued outpatient f/u.     Problem/Plan - 4:  ·  Problem: History of chronic pain.   ·  Plan: - c/w home oxycontin, pregabalin, outpatient f/u (followed by pain mgt).     Problem/Plan - 5:  ·  Problem: Need for prophylactic measure.   ·  Plan: - BUN elevation on admit, repeat BMP in AM to evalaute  - HSQ VTE PPx  - regular diet  - fall, aspiration precaution  - disposition pending course   - outpatient f/u re MARIAMA (previously documented as w/o tx).

## 2025-02-12 NOTE — ED CDU PROVIDER DISPOSITION NOTE - NSFOLLOWUPINSTRUCTIONS_ED_ALL_ED_FT
Follow up with your Primary Care Physician within the next 2-3 days  Bring a copy of your test results with you to your appointment  Continue your current medication regimen  Return to the Emergency Room if you experience new or worsening symptoms    Chest Pain    Chest pain can be caused by many different conditions which may or may not be dangerous. Causes include heartburn, lung infections, heart attack, blood clot in lungs, skin infections, strain or damage to muscle, cartilage, or bones, etc. In addition to a history and physical examination, an electrocardiogram (ECG) or other lab tests may have been performed to determine the cause of your chest pain. Follow up with your primary care provider or with a cardiologist as instructed.     SEEK IMMEDIATE MEDICAL CARE IF YOU HAVE ANY OF THE FOLLOWING SYMPTOMS: worsening chest pain, coughing up blood, unexplained back/neck/jaw pain, severe abdominal pain, dizziness or lightheadedness, fainting, shortness of breath, sweaty or clammy skin, vomiting, or racing heart beat. These symptoms may represent a serious problem that is an emergency. Do not wait to see if the symptoms will go away. Get medical help right away. Call 911 and do not drive yourself to the hospital.

## 2025-02-12 NOTE — H&P ADULT - ASSESSMENT
80M PMH arthritis, Raynaud's, FM, prostate CA (s/p radiation 2015), anxiety, depression, concussion, diverticulitis, [place] b/l (hearing aids), MARIAMA (w/o tx), ALEJANDRA,  admit 11/2023 for evaluation of chest pain presumed 2/2 esophagitis, gastric ulcers visualized on EGD (dc'd for GI f/u), p/w non-exertional CP, found to have abnormal nuclear stress, admit for further evaluation by cardiology Dr. Yon Peña.

## 2025-02-12 NOTE — PATIENT PROFILE ADULT - FUNCTIONAL ASSESSMENT - DAILY ACTIVITY 5.
Tracking nutrition- KELSY Dorman [facility]    Exercise 30 minutes 5 times weekly    Flonase daily    May take tylenol or Ibuprofen for right arm soreness    Return for fasting cholesterol in 6 months. May try Red Yeast Rice as well as exercise and nutrition changes. 4 = No assist / stand by assistance

## 2025-02-12 NOTE — H&P ADULT - NSHPLABSRESULTS_GEN_ALL_CORE
< from: TTE W or WO Ultrasound Enhancing Agent (02.12.25 @ 10:35) >    CONCLUSIONS:      1. Left ventricular cavity is normal in size. Left ventricular wall thickness is normal. Left ventricular systolic function is normal with an ejection fraction of 61 % by Mascorro's method of disks. There are no regional wall motion abnormalities seen.   2. Normal left ventricular diastolic function, with normal left ventricular filling pressure.   3. Normal right ventricular cavity size and normal right ventricular systolic function.   4. Normal left and right atrial size.   5. Trileaflet aortic valve with normal systolic excursion. There is mild calcification of the aortic valve leaflets.   6. Mild aortic regurgitation.   7. Prolapse of the posterior mitral leaflet.   8. Mild to moderate mitral regurgitation.   9. No pericardial effusion seen.  10. Compared to the transthoracic echocardiogram performed on 11/28/2023, Slight worsening of the mitral valve regurgitation.    < end of copied text >    < from: Nuclear Stress Test-Pharmacologic.. (02.12.25 @ 10:54) >    Conclusions:   1. Myocardial Perfusion: Abnormal.   2. Perfusion Findings: There is a large-sized, moderate, partially reversible defect in the mid to basal anterior wall consistent with infarct with mild to moderate bro[place]-infarct ischemia. There is a large-sized, moderate to severe, fixed defect in the inferolateral wall consistent with infarct.   3. Hypokinesis of the inferolateral and mid to basal anterior walls.   4. Thepost stress left ventricular EF is 56 %. The stress end diastolic volume is 87 ml and systolic volume is 38 ml.   5. Stress electrocardiogram: No significant ischemic ST segment changes beyond baseline abnormalities.    < end of copied text >    EKG not available in MUSE or commented upon by ED team < from: TTE W or WO Ultrasound Enhancing Agent (02.12.25 @ 10:35) >    CONCLUSIONS:      1. Left ventricular cavity is normal in size. Left ventricular wall thickness is normal. Left ventricular systolic function is normal with an ejection fraction of 61 % by Mascorro's method of disks. There are no regional wall motion abnormalities seen.   2. Normal left ventricular diastolic function, with normal left ventricular filling pressure.   3. Normal right ventricular cavity size and normal right ventricular systolic function.   4. Normal left and right atrial size.   5. Trileaflet aortic valve with normal systolic excursion. There is mild calcification of the aortic valve leaflets.   6. Mild aortic regurgitation.   7. Prolapse of the posterior mitral leaflet.   8. Mild to moderate mitral regurgitation.   9. No pericardial effusion seen.  10. Compared to the transthoracic echocardiogram performed on 11/28/2023, Slight worsening of the mitral valve regurgitation.    < end of copied text >    < from: Nuclear Stress Test-Pharmacologic.. (02.12.25 @ 10:54) >    Conclusions:   1. Myocardial Perfusion: Abnormal.   2. Perfusion Findings: There is a large-sized, moderate, partially reversible defect in the mid to basal anterior wall consistent with infarct with mild to moderate [place]-infarct ischemia. There is a large-sized, moderate to severe, fixed defect in the inferolateral wall consistent with infarct.   3. Hypokinesis of the inferolateral and mid to basal anterior walls.   4. Thepost stress left ventricular EF is 56 %. The stress end diastolic volume is 87 ml and systolic volume is 38 ml.   5. Stress electrocardiogram: No significant ischemic ST segment changes beyond baseline abnormalities.    < end of copied text >    EKG 2/12/25 06:16:42 SR with ectopy @ 67BPM, TWI III, aVR (present 11/27/23)

## 2025-02-12 NOTE — H&P ADULT - NSHPPHYSICALEXAM_GEN_ALL_CORE
PHYSICAL EXAM:  GENERAL: NAD, well-groomed, well-developed, pleasant to interview  HEAD: Atraumatic, Normocephalic  EYES: PERRL, conjunctiva and sclera clear  ENMT: No tonsillar erythema, exudates, or enlargement; Moist mucous membranes  NECK: Supple w/o JVD  NERVOUS SYSTEM: Alert & Oriented X4, Good concentration; Motor Strength 5/5 B/L upper and lower extremities. Sensation equal/intact b/l UE and LE  CHEST/LUNG: Clear to auscultation bilaterally; No rales, rhonchi, wheezing, or rubs  HEART: Regular rate and rhythm; No murmurs, rubs, or gallops  ABDOMEN: Soft, Nontender, Nondistended; Bowel sounds present. No guarding, rebound tenderness, or rigidity.  EXTREMITIES: 2+ Peripheral Pulses, No edema/warmth/erythema/tenderness to palpation b/l LE

## 2025-02-12 NOTE — ED PROVIDER NOTE - OBJECTIVE STATEMENT
81 y/o male, PMHx Arthritis, Raynaud's, fibromyalgia, prostate cancer status post radiation, presenting for chest pain that started this morning. states pain woke him up from his sleep. states pain is mid-sternal, intermittent, burning in nature.  states feels similar to previous ulcer. patient denies SOB. denies f/n/v/d, HA, dizziness, abdominal pain, urinary symptoms, LOC, numbness, tingling, dizziness.

## 2025-02-13 ENCOUNTER — TRANSCRIPTION ENCOUNTER (OUTPATIENT)
Age: 80
End: 2025-02-13

## 2025-02-13 LAB
ANION GAP SERPL CALC-SCNC: 10 MMOL/L — SIGNIFICANT CHANGE UP (ref 5–17)
BUN SERPL-MCNC: 25 MG/DL — HIGH (ref 7–23)
CALCIUM SERPL-MCNC: 8.8 MG/DL — SIGNIFICANT CHANGE UP (ref 8.4–10.5)
CHLORIDE SERPL-SCNC: 108 MMOL/L — SIGNIFICANT CHANGE UP (ref 96–108)
CO2 SERPL-SCNC: 22 MMOL/L — SIGNIFICANT CHANGE UP (ref 22–31)
CREAT SERPL-MCNC: 0.84 MG/DL — SIGNIFICANT CHANGE UP (ref 0.5–1.3)
CULTURE RESULTS: SIGNIFICANT CHANGE UP
EGFR: 89 ML/MIN/1.73M2 — SIGNIFICANT CHANGE UP
GLUCOSE SERPL-MCNC: 77 MG/DL — SIGNIFICANT CHANGE UP (ref 70–99)
HCT VFR BLD CALC: 41.2 % — SIGNIFICANT CHANGE UP (ref 39–50)
HGB BLD-MCNC: 13.3 G/DL — SIGNIFICANT CHANGE UP (ref 13–17)
MAGNESIUM SERPL-MCNC: 2.3 MG/DL — SIGNIFICANT CHANGE UP (ref 1.6–2.6)
MCHC RBC-ENTMCNC: 29.8 PG — SIGNIFICANT CHANGE UP (ref 27–34)
MCHC RBC-ENTMCNC: 32.3 G/DL — SIGNIFICANT CHANGE UP (ref 32–36)
MCV RBC AUTO: 92.2 FL — SIGNIFICANT CHANGE UP (ref 80–100)
NRBC BLD AUTO-RTO: 0 /100 WBCS — SIGNIFICANT CHANGE UP (ref 0–0)
PHOSPHATE SERPL-MCNC: 0.7 MG/DL — CRITICAL LOW (ref 2.5–4.5)
PLATELET # BLD AUTO: 154 K/UL — SIGNIFICANT CHANGE UP (ref 150–400)
POTASSIUM SERPL-MCNC: 4.6 MMOL/L — SIGNIFICANT CHANGE UP (ref 3.5–5.3)
POTASSIUM SERPL-SCNC: 4.6 MMOL/L — SIGNIFICANT CHANGE UP (ref 3.5–5.3)
RBC # BLD: 4.47 M/UL — SIGNIFICANT CHANGE UP (ref 4.2–5.8)
RBC # FLD: 22.6 % — HIGH (ref 10.3–14.5)
SODIUM SERPL-SCNC: 140 MMOL/L — SIGNIFICANT CHANGE UP (ref 135–145)
SPECIMEN SOURCE: SIGNIFICANT CHANGE UP
WBC # BLD: 4.62 K/UL — SIGNIFICANT CHANGE UP (ref 3.8–10.5)
WBC # FLD AUTO: 4.62 K/UL — SIGNIFICANT CHANGE UP (ref 3.8–10.5)

## 2025-02-13 PROCEDURE — 92928 PRQ TCAT PLMT NTRAC ST 1 LES: CPT | Mod: RC

## 2025-02-13 PROCEDURE — 99152 MOD SED SAME PHYS/QHP 5/>YRS: CPT

## 2025-02-13 PROCEDURE — 93454 CORONARY ARTERY ANGIO S&I: CPT | Mod: 26,59

## 2025-02-13 PROCEDURE — 93010 ELECTROCARDIOGRAM REPORT: CPT

## 2025-02-13 RX ORDER — SODIUM PHOSPHATE, DIBASIC, ANHYDROUS, POTASSIUM PHOSPHATE, MONOBASIC, AND SODIUM PHOSPHATE, MONOBASIC, MONOHYDRATE 852; 155; 130 MG/1; MG/1; MG/1
1 TABLET, COATED ORAL ONCE
Refills: 0 | Status: COMPLETED | OUTPATIENT
Start: 2025-02-13 | End: 2025-02-13

## 2025-02-13 RX ORDER — ASPIRIN 81 MG/1
81 TABLET, COATED ORAL DAILY
Refills: 0 | Status: DISCONTINUED | OUTPATIENT
Start: 2025-02-14 | End: 2025-02-14

## 2025-02-13 RX ORDER — ATORVASTATIN CALCIUM 80 MG/1
40 TABLET, FILM COATED ORAL AT BEDTIME
Refills: 0 | Status: DISCONTINUED | OUTPATIENT
Start: 2025-02-13 | End: 2025-02-14

## 2025-02-13 RX ORDER — BACTERIOSTATIC SODIUM CHLORIDE 0.9 %
1000 VIAL (ML) INJECTION
Refills: 0 | Status: DISCONTINUED | OUTPATIENT
Start: 2025-02-13 | End: 2025-02-14

## 2025-02-13 RX ORDER — ACETAMINOPHEN 160 MG/5ML
650 SUSPENSION ORAL ONCE
Refills: 0 | Status: COMPLETED | OUTPATIENT
Start: 2025-02-13 | End: 2025-02-13

## 2025-02-13 RX ADMIN — OXYCODONE HYDROCHLORIDE 10 MILLIGRAM(S): 30 TABLET ORAL at 21:57

## 2025-02-13 RX ADMIN — ACETAMINOPHEN, DIPHENHYDRAMINE HCL, PHENYLEPHRINE HCL 10 MILLIGRAM(S): 325; 25; 5 TABLET ORAL at 21:58

## 2025-02-13 RX ADMIN — SODIUM PHOSPHATE, DIBASIC, ANHYDROUS, POTASSIUM PHOSPHATE, MONOBASIC, AND SODIUM PHOSPHATE, MONOBASIC, MONOHYDRATE 1 PACKET(S): 852; 155; 130 TABLET, COATED ORAL at 08:38

## 2025-02-13 RX ADMIN — OXYCODONE HYDROCHLORIDE 10 MILLIGRAM(S): 30 TABLET ORAL at 17:28

## 2025-02-13 RX ADMIN — PANTOPRAZOLE 40 MILLIGRAM(S): 20 TABLET, DELAYED RELEASE ORAL at 17:27

## 2025-02-13 RX ADMIN — PANTOPRAZOLE 40 MILLIGRAM(S): 20 TABLET, DELAYED RELEASE ORAL at 05:21

## 2025-02-13 RX ADMIN — OXYCODONE HYDROCHLORIDE 10 MILLIGRAM(S): 30 TABLET ORAL at 05:21

## 2025-02-13 RX ADMIN — OXYCODONE HYDROCHLORIDE 10 MILLIGRAM(S): 30 TABLET ORAL at 18:28

## 2025-02-13 RX ADMIN — ATORVASTATIN CALCIUM 40 MILLIGRAM(S): 80 TABLET, FILM COATED ORAL at 21:57

## 2025-02-13 RX ADMIN — Medication 5000 UNIT(S): at 05:21

## 2025-02-13 RX ADMIN — Medication 1 TABLET(S): at 17:28

## 2025-02-13 RX ADMIN — PREGABALIN CAPSULES, CV 150 MILLIGRAM(S): 225 CAPSULE ORAL at 06:13

## 2025-02-13 RX ADMIN — ACETAMINOPHEN 650 MILLIGRAM(S): 160 SUSPENSION ORAL at 10:26

## 2025-02-13 RX ADMIN — OXYCODONE HYDROCHLORIDE 10 MILLIGRAM(S): 30 TABLET ORAL at 23:00

## 2025-02-13 RX ADMIN — PREGABALIN CAPSULES, CV 150 MILLIGRAM(S): 225 CAPSULE ORAL at 17:27

## 2025-02-13 RX ADMIN — ACETAMINOPHEN 650 MILLIGRAM(S): 160 SUSPENSION ORAL at 11:20

## 2025-02-13 RX ADMIN — Medication 5000 UNIT(S): at 21:58

## 2025-02-13 NOTE — DISCHARGE NOTE PROVIDER - PROVIDER TOKENS
PROVIDER:[TOKEN:[[id number]],FOLLOWUP:[1 week]],PROVIDER:[TOKEN:[[id number]],FOLLOWUP:[1 week],ESTABLISHEDPATIENT:[T]] PROVIDER:[TOKEN:[[id number]],FOLLOWUP:[1 week]],PROVIDER:[TOKEN:[[id number]],FOLLOWUP:[1 week],ESTABLISHEDPATIENT:[T]],PROVIDER:[TOKEN:[[id number]],FOLLOWUP:[2 weeks],ESTABLISHEDPATIENT:[T]]

## 2025-02-13 NOTE — CHART NOTE - NSCHARTNOTEFT_GEN_A_CORE
Confidential Drug Utilization Report  Search Terms: khoa mota, 1945Search Date: 02/12/2025 22:27:08 PM  The Drug Utilization Report below displays all of the controlled substance prescriptions, if any, that your patient has filled in the last twelve months. The information displayed on this report is compiled from pharmacy submissions to the Department, and accurately reflects the information as submitted by the pharmacies.    This report was requested by: Rajesh Medina | Reference #: [id number]    You have not added a LIZY number. Keeping your LIZY number(s) up to date on the My LIZY # tab will enable the separation of your prescriptions from others in the search results.    Practitioner Count: 2  Pharmacy Count: 2  Current Opioid Prescriptions: 1  Current Benzodiazepine Prescriptions: 0  Current Stimulant Prescriptions: 0      Patient Demographic Information (PDI)       PDI	First Name	Last Name	Birth Date	Gender	Street Address	[facility] Code  A	Khoa Mota	1945	Male	[address] [facility]	[id number]    Prescription Information      PDI Filter:    PDI	Current Rx	Drug Type	Rx Written	Rx Dispensed	Drug	Quantity	Days Supply	Prescriber Name	Prescriber LIZY #	Payment Method	Dispenser  A	Y	O	02/05/2025	02/07/2025	oxycontin er 10 mg tablet	90	30	TrimYnes benavides NP	[id number]	Medicare	[id number]  A	N	O	01/07/2025	01/08/2025	oxycontin er 10 mg tablet	90	30	TrimYnes benavides NP	[id number]	Medicare	[id number]  A	N	O	12/04/2024	12/08/2024	oxycontin er 10 mg tablet	90	30	TrimarcYnes gonzales NP	[id number]	Medicare	[id number]  A	N		11/20/2024	12/18/2024	pregabalin 150 mg capsule	90	30	Clementine Jacobs)	[id number]	Medicare	[facility]  A	N	O	11/06/2024	11/08/2024	oxycontin er 10 mg tablet	120	30	Ynes Conway NP	[id number]	Medicare	[id number]  A	N	O	10/09/2024	10/09/2024	oxycontin er 10 mg tablet	120	30	TrimYnes benavides NP	[id number]	Medicare	[id number]  A	N	O	09/03/2024	09/09/2024	oxycontin er 10 mg tablet	120	30	Ynes Conway NP	[id number]	Medicare	[id number]  A	N		08/27/2024	11/20/2024	pregabalin 150 mg capsule	90	30	Clementine Jacobs)	[id number]	Medicare	[facility]	N		08/27/2024	10/04/2024	pregabalin 150 mg capsule	90	30	Clementine Jacobs)	[id number]	Medicare	[facility]	N		08/27/2024	08/28/2024	pregabalin 150 mg capsule	90	30	Clementine Jacobs)	[id number]	Medicare	[facility]	N	O	08/07/2024	08/09/2024	oxycontin er 10 mg tablet	120	30	Ynes Conway NP	[id number]	Medicare	[id number]  A	N	O	07/03/2024	07/10/2024	oxycontin er 10 mg tablet	120	30	Robert Conwayna NP	[id number]	Medicare	[id number]  A	N	O	06/04/2024	06/10/2024	oxycontin er 10 mg tablet	120	30	Man Ynes NP	[id number]	Medicare	[id number]  A	N	O	05/01/2024	05/11/2024	oxycontin er 10 mg tablet	120	30	TrimRobert benavidesna NP	[id number]	Medicare	[id number]  A	N	O	04/04/2024	04/11/2024	oxycontin er 10 mg tablet	120	30	Ynes Conway NP	[id number]	Medicare	[id number]  A	N		03/26/2024	07/06/2024	pregabalin 150 mg capsule	90	30	Clementine Jacobs)	[id number]	Medicare	[facility]	N		03/26/2024	05/24/2024	pregabalin 150 mg capsule	90	30	Clementine Jacobs)	[id number]	Medicare	[facility]	N		03/26/2024	04/25/2024	pregabalin 150 mg capsule	90	30	Clementine Jacobs)	[id number]	Medicare	[facility]  A	N	O	03/06/2024	03/12/2024	oxycontin er 10 mg tablet	120	30	Ynes Conway NP	[id number]	Medicare	[id number]  A	N	O	02/14/2024	02/18/2024	oxycontin er 10 mg tablet	93	23	Ynes Conway NP	[id number]	Medicare	[id number]  A	N		12/22/2023	03/26/2024	pregabalin 150 mg capsule	90	30	Clementine Jacobs)	[id number]	[facility]  A	N		12/22/2023	02/24/2024	pregabalin 150 mg capsule	90	30	Clementine Jacobs)	[id number]	[facility]    * - Details of Drug Type : O = Opioid, B = Benzodiazepine, S = Stimulant    * - Drugs marked with an asterisk are compound drugs. If the compound drug is made up of more than one controlled substance, then each controlled substance will be a separate row in the table
Cardiac Rehab (Post PCI):              *Education on benefits of Cardiac Rehab provided to patient: Yes         *Referral and Prescription Given for Cardiac Rehab : Yes         *Pt given list of locations & instructed to contact their insurance company to review list of participating providers         *Pt instructed to bring Cardiac Rehab prescription with them to Cardiology Follow up appointment for assistance with enrollment: Yes         *Pt discharged with copies detail cardiovascular history, medications, testing/treatments

## 2025-02-13 NOTE — PROGRESS NOTE ADULT - SUBJECTIVE AND OBJECTIVE BOX
NANCIE BARAHONA  MRN-[id number]    Patient is a 80y old  Male who presents with a chief complaint of CP (12 Feb 2025 20:57)      Review of System  REVIEW OF SYSTEMS      General:	Denies fatigue, fevers, chills, sweats, decreased appetite.    Skin/Breast: denies pruritis, rash  	  Ophthalmologic: no change in vision or blurring  	  HEENT	Denies dry mouth, oral sores, dysphagia,  change in hearing.    Respiratory and Thorax:  cough, sob, wheeze, hemoptysis  	  Cardiovascular:	no cp , palp, orthopnea    Gastrointestinal:	no n/v/d constipation    Genitourinary:	no dysuria of frequency, no hematuria, no flank pain    Musculoskeletal:	no bone or joint pain. no muscle aches.     Neurological:	no change in sensory or motor function. no headache. no weakness.     Psychiatric:	no depression, no anxiety, insomnia.     Hematology/Lymphatics:	no bleeding or bruising        Current Meds  MEDICATIONS  (STANDING):  heparin   Injectable 5000 Unit(s) SubCutaneous every 8 hours  melatonin 10 milliGRAM(s) Oral at bedtime  multivitamin 1 Tablet(s) Oral daily  oxyCODONE  ER Tablet 10 milliGRAM(s) Oral three times a day  pantoprazole    Tablet 40 milliGRAM(s) Oral two times a day  pregabalin 150 milliGRAM(s) Oral two times a day    MEDICATIONS  (PRN):  acetaminophen     Tablet .. 650 milliGRAM(s) Oral every 6 hours PRN Temp greater or equal to 38C (100.4F), Mild Pain (1 - 3)  aluminum hydroxide/magnesium hydroxide/simethicone Suspension 30 milliLiter(s) Oral every 4 hours PRN Dyspepsia      Vitals  Vital Signs Last 24 Hrs  T(C): 36.8 (12 Feb 2025 22:37), Max: 36.8 (12 Feb 2025 14:36)  T(F): 98.2 (12 Feb 2025 22:37), Max: 98.3 (12 Feb 2025 19:51)  HR: 48 (12 Feb 2025 22:37) (48 - 64)  BP: 137/75 (12 Feb 2025 22:37) (114/69 - 158/73)  BP(mean): --  RR: 18 (12 Feb 2025 22:37) (17 - 20)  SpO2: 95% (12 Feb 2025 22:37) (95% - 99%)    Parameters below as of 12 Feb 2025 22:37  Patient On (Oxygen Delivery Method): room air        Physical Exam  PHYSICAL EXAM:      Constitutional: NAD    Eyes: PERRLA EOMI, anicteric sclera    Heent :No oral sores, no pharyngeal injection. moist mucosa.    Neck: supple, no jvd, no LAD    Respiratory: CTA b/l     Cardiovascular: s1s2, no m/g/r    Gastrointestinal: soft, nt, nd, + BS    Extremities: no c/c/e    Neurological:A&O x 3 moves all ext.    Skin: no rash on exposed skin    Lymph Nodes: no lymphadenopathy.              Lab  CBC Full  -  ( 12 Feb 2025 07:07 )  WBC Count : 6.06 K/uL  RBC Count : 4.66 M/uL  Hemoglobin : 14.1 g/dL  Hematocrit : 42.9 %  Platelet Count - Automated : 167 K/uL  Mean Cell Volume : 92.1 fl  Mean Cell Hemoglobin : 30.3 pg  Mean Cell Hemoglobin Concentration : 32.9 g/dL  Auto Neutrophil # : x  Auto Lymphocyte # : x  Auto Monocyte # : x  Auto Eosinophil # : x  Auto Basophil # : x  Auto Neutrophil % : x  Auto Lymphocyte % : x  Auto Monocyte % : x  Auto Eosinophil % : x  Auto Basophil % : x    02-12    141  |  108  |  32[H]  ----------------------------<  106[H]  4.3   |  22  |  0.75    Ca    9.0      12 Feb 2025 07:07  Mg     2.2     02-12    TPro  7.4  /  Alb  4.1  /  TBili  0.5  /  DBili  x   /  AST  21  /  ALT  15  /  AlkPhos  87  02-12    PT/INR - ( 12 Feb 2025 07:07 )   PT: 11.4 sec;   INR: 1.00 ratio         PTT - ( 12 Feb 2025 07:07 )  PTT:31.3 sec    Rad:    Assessment/Plan   NANCIE BARAHONA  MRN-[id number]    Patient is a 80y old  Male who presents with a chief complaint of CP (12 Feb 2025 20:57)    Review of System    resting comfortably  no events overnight as per RN    Current Meds  MEDICATIONS  (STANDING):  heparin   Injectable 5000 Unit(s) SubCutaneous every 8 hours  melatonin 10 milliGRAM(s) Oral at bedtime  multivitamin 1 Tablet(s) Oral daily  oxyCODONE  ER Tablet 10 milliGRAM(s) Oral three times a day  pantoprazole    Tablet 40 milliGRAM(s) Oral two times a day  pregabalin 150 milliGRAM(s) Oral two times a day    MEDICATIONS  (PRN):  acetaminophen     Tablet .. 650 milliGRAM(s) Oral every 6 hours PRN Temp greater or equal to 38C (100.4F), Mild Pain (1 - 3)  aluminum hydroxide/magnesium hydroxide/simethicone Suspension 30 milliLiter(s) Oral every 4 hours PRN Dyspepsia      Vitals  Vital Signs Last 24 Hrs  T(C): 36.8 (12 Feb 2025 22:37), Max: 36.8 (12 Feb 2025 14:36)  T(F): 98.2 (12 Feb 2025 22:37), Max: 98.3 (12 Feb 2025 19:51)  HR: 48 (12 Feb 2025 22:37) (48 - 64)  BP: 137/75 (12 Feb 2025 22:37) (114/69 - 158/73)  BP(mean): --  RR: 18 (12 Feb 2025 22:37) (17 - 20)  SpO2: 95% (12 Feb 2025 22:37) (95% - 99%)    Parameters below as of 12 Feb 2025 22:37  Patient On (Oxygen Delivery Method): room air      PHYSICAL EXAM:     NAD    Lab  CBC Full  -  ( 12 Feb 2025 07:07 )  WBC Count : 6.06 K/uL  RBC Count : 4.66 M/uL  Hemoglobin : 14.1 g/dL  Hematocrit : 42.9 %  Platelet Count - Automated : 167 K/uL  Mean Cell Volume : 92.1 fl  Mean Cell Hemoglobin : 30.3 pg  Mean Cell Hemoglobin Concentration : 32.9 g/dL  Auto Neutrophil # : x  Auto Lymphocyte # : x  Auto Monocyte # : x  Auto Eosinophil # : x  Auto Basophil # : x  Auto Neutrophil % : x  Auto Lymphocyte % : x  Auto Monocyte % : x  Auto Eosinophil % : x  Auto Basophil % : x    02-12    141  |  108  |  32[H]  ----------------------------<  106[H]  4.3   |  22  |  0.75    Ca    9.0      12 Feb 2025 07:07  Mg     2.2     02-12    TPro  7.4  /  Alb  4.1  /  TBili  0.5  /  DBili  x   /  AST  21  /  ALT  15  /  AlkPhos  87  02-12    PT/INR - ( 12 Feb 2025 07:07 )   PT: 11.4 sec;   INR: 1.00 ratio         PTT - ( 12 Feb 2025 07:07 )  PTT:31.3 sec    Rad:    Assessment/Plan

## 2025-02-13 NOTE — DISCHARGE NOTE PROVIDER - NSDCMRMEDTOKEN_GEN_ALL_CORE_FT
Lyrica 150 mg oral capsule: 1 cap(s) orally 2 times a day  Melatonin 10 mg oral tablet: 1 tab(s) orally once a day  Multiple Vitamins oral tablet: 1 tab(s) orally once a day  OxyCONTIN 10 mg oral tablet, extended release: 1 tab(s) orally 3 times a day  pantoprazole 40 mg oral delayed release tablet: 1 tab(s) orally 2 times a day   Cardiac Rehab: 2-3 times a week for 12 weeks  Lyrica 150 mg oral capsule: 1 cap(s) orally 2 times a day  Melatonin 10 mg oral tablet: 1 tab(s) orally once a day  Multiple Vitamins oral tablet: 1 tab(s) orally once a day  OxyCONTIN 10 mg oral tablet, extended release: 1 tab(s) orally 3 times a day  pantoprazole 40 mg oral delayed release tablet: 1 tab(s) orally 2 times a day   aspirin 81 mg oral delayed release tablet: 1 tab(s) orally once a day  atorvastatin 40 mg oral tablet: 1 tab(s) orally once a day (at bedtime)  Cardiac Rehab: 2-3 times a week for 12 weeks  clopidogrel 75 mg oral tablet: 1 tab(s) orally once a day  Lyrica 150 mg oral capsule: 1 cap(s) orally 2 times a day  Melatonin 10 mg oral tablet: 1 tab(s) orally once a day  Multiple Vitamins oral tablet: 1 tab(s) orally once a day  OxyCONTIN 10 mg oral tablet, extended release: 1 tab(s) orally 3 times a day  pantoprazole 40 mg oral delayed release tablet: 1 tab(s) orally 2 times a day

## 2025-02-13 NOTE — DISCHARGE NOTE PROVIDER - CARE PROVIDERS DIRECT ADDRESSES
,DirectAddress_Unknown,[email or web address] ,[email or web address],[email or web address]

## 2025-02-13 NOTE — DISCHARGE NOTE PROVIDER - NSDCFUADDINST_GEN_ALL_CORE_FT
Please see preprinted discharge instruction sheet.    We have provided you with a prescription for cardiac rehab which is medically supervised exercise program for your heart and has been shown to improve the quantity and quality of life of people with heart disease like yours. You should attend cardiac rehab 3 times per week for 12 weeks. We have provided you with a list of nearby facilities. Please call your insurance carrier to determine which of these facilities are covered under your plan. Please bring this prescription with you to your follow up appointment with your cardiologist who can then further assist you to enroll into a cardiac rehab program.

## 2025-02-13 NOTE — DISCHARGE NOTE PROVIDER - NSDCCPCAREPLAN_GEN_ALL_CORE_FT
PRINCIPAL DISCHARGE DIAGNOSIS  Diagnosis: Chest pain  Assessment and Plan of Treatment:   HOME CARE INSTRUCTIONS  For the next few days, avoid physical activities that bring on chest pain. Continue physical activities as directed.  Do not smoke.  Avoid drinking alcohol.   Only take over-the-counter or prescription medicine for pain, discomfort, or fever as directed by your caregiver.  Follow your caregiver's suggestions for further testing if your chest pain does not go away.  Keep any follow-up appointments you made. If you do not go to an appointment, you could develop lasting (chronic) problems with pain. If there is any problem keeping an appointment, you must call to reschedule.   SEEK MEDICAL CARE IF:  You think you are having problems from the medicine you are taking. Read your medicine instructions carefully.  Your chest pain does not go away, even after treatment.  You develop a rash with blisters on your chest.  SEEK IMMEDIATE MEDICAL CARE IF:  You have increased chest pain or pain that spreads to your arm, neck, jaw, back, or abdomen.   You develop shortness of breath, an increasing cough, or you are coughing up blood.  You have severe back or abdominal pain, feel nauseous, or vomit.  You develop severe weakness, fainting, or chills.  You have a fever.  THIS IS AN EMERGENCY. Do not wait to see if the pain will go away. Get medical help at once. Call your local emergency services (_____________________). Do not drive yourself to the hospital.       PRINCIPAL DISCHARGE DIAGNOSIS  Diagnosis: Chest pain  Assessment and Plan of Treatment: HOME CARE INSTRUCTIONS  For the next few days, avoid physical activities that bring on chest pain. Continue physical activities as directed.  Do not smoke.  Avoid drinking alcohol.   Only take over-the-counter or prescription medicine for pain, discomfort, or fever as directed by your caregiver.  Follow your caregiver's suggestions for further testing if your chest pain does not go away.  Keep any follow-up appointments you made. If you do not go to an appointment, you could develop lasting (chronic) problems with pain. If there is any problem keeping an appointment, you must call to reschedule.   SEEK MEDICAL CARE IF:  You think you are having problems from the medicine you are taking. Read your medicine instructions carefully.  Your chest pain does not go away, even after treatment.  You develop a rash with blisters on your chest.  SEEK IMMEDIATE MEDICAL CARE IF:  You have increased chest pain or pain that spreads to your arm, neck, jaw, back, or abdomen.   You develop shortness of breath, an increasing cough, or you are coughing up blood.  You have severe back or abdominal pain, feel nauseous, or vomit.  You develop severe weakness, fainting, or chills.  You have a fever.  THIS IS AN EMERGENCY. Do not wait to see if the pain will go away. Get medical help at once. Call your local emergency services Do not drive yourself to the hospital.  You should have a follow up appointment to get another procedure to another vessel of your heart, called your LAD, in 2 weeks. Please follow up closely with your cardiology team.  Please ensure you do not miss any doses of your aspirin and plavix, as these medications ensure that your stent stays open. You should also continue taking your atorvastatin, this is an anti-cholesterol medication.

## 2025-02-13 NOTE — DISCHARGE NOTE PROVIDER - HOSPITAL COURSE
HPI:  80M PMH arthritis, Raynaud's, FM, prostate CA (s/p radiation 2015), anxiety, depression, concussion, diverticulitis, [place] b/l (hearing aids), MARIAMA (w/o tx), ALEJANDRA,  admit 11/2023 for evaluation of chest pain presumed 2/2 esophagitis, gastric ulcers visualized on EGD (dc'd for GI f/u), p/w CP (initially to CDU, then admit to inpatient floor). AOx4, reports baseline health (which does not include CP) when approximately 02:00 2/12 AM he developed mid sternal CP which caused him to awake, of "burning" quality similar to prior CP prompting hospital admission 2023 though less severe in the current instance, w/o radiation, intermittently occuring for short periods prompting ED presentation. Denies exertional worsening, or associated sx such as HA, lightheadedness, dizziness, visual disturbance, diaphoresis, palpitations, cough, SOB, abd pain, n/v/d, melena, hematochezia, dysuria, hematuria, or other complaint. Denies recent travel, prolonged immobility, sick contact. Currently upon my evaluation, he is asymptomatic (which he attributes temporally to maalox, pepcid use in ED).     HR 50s-60s, BP 110s-150s/60s-80,     BUN 32 (SCr 0.75, GFR 92); HST 10 -> 9, proBNP 109    CXR   Clear lungs    TTE    1. Left ventricular cavity is normal in size. Left ventricular wall thickness is normal. Left ventricular systolic function is normal with an ejection fraction of 61 % by Mascorro's method of disks. There are no regional wall motion abnormalities seen.   2. Normal left ventricular diastolic function, with normal left ventricular filling pressure.   3. Normal right ventricular cavity size and normal right ventricular systolic function.   4. Normal left and right atrial size.   5. Trileaflet aortic valve with normal systolic excursion. There is mild calcification of the aortic valve leaflets.   6. Mild aortic regurgitation.   7. Prolapse of the posterior mitral leaflet.   8. Mild to moderate mitral regurgitation.   9. No pericardial effusion seen.  10. Compared to the transthoracic echocardiogram performed on 11/28/2023, Slight worsening of the mitral valve regurgitation    Nuclear Pharmacologic Stress    1. Myocardial Perfusion: Abnormal.   2. Perfusion Findings: There is a large-sized, moderate, partially reversible defect in the mid to basal anterior wall consistent with infarct with mild to moderate [place]-infarct ischemia. There is a large-sized, moderate to severe, fixed defect in the inferolateral wall consistent with infarct.   3. Hypokinesis of the inferolateral and mid to basal anterior walls.   4. The post stress left ventricular EF is 56 %. The stress end diastolic volume is 87 ml and systolic volume is 38 ml.   5. Stress electrocardiogram: No significant ischemic ST segment changes beyond baseline abnormalities.    s/p ofirmev 1g, maalox 30mL, pepcid 20mg IV, oxycodone ER 10mg, pregabalin 150mg, carafate 1g     Evaluated in ED by heme/onc Dr. 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  (12 Feb 2025 20:57)    Hospital Course:      Important Medication Changes and Reason:    Active or Pending Issues Requiring Follow-up:    Advanced Directives:   [ ] Full code  [ ] DNR  [ ] Hospice    Discharge Diagnoses:         HPI:  80M PMH arthritis, Raynaud's, FM, prostate CA (s/p radiation 2015), anxiety, depression, concussion, diverticulitis, [place] b/l (hearing aids), MARIAMA (w/o tx), ALEJANDRA,  admit 11/2023 for evaluation of chest pain presumed 2/2 esophagitis, gastric ulcers visualized on EGD (dc'd for GI f/u), p/w CP (initially to CDU, then admit to inpatient floor). AOx4, reports baseline health (which does not include CP) when approximately 02:00 2/12 AM he developed mid sternal CP which caused him to awake, of "burning" quality similar to prior CP prompting hospital admission 2023 though less severe in the current instance, w/o radiation, intermittently occuring for short periods prompting ED presentation. Denies exertional worsening, or associated sx such as HA, lightheadedness, dizziness, visual disturbance, diaphoresis, palpitations, cough, SOB, abd pain, n/v/d, melena, hematochezia, dysuria, hematuria, or other complaint. Denies recent travel, prolonged immobility, sick contact. Currently upon my evaluation, he is asymptomatic (which he attributes temporally to maalox, pepcid use in ED).     HR 50s-60s, BP 110s-150s/60s-80,     BUN 32 (SCr 0.75, GFR 92); HST 10 -> 9, proBNP 109    CXR   Clear lungs    TTE    1. Left ventricular cavity is normal in size. Left ventricular wall thickness is normal. Left ventricular systolic function is normal with an ejection fraction of 61 % by Mascorro's method of disks. There are no regional wall motion abnormalities seen.   2. Normal left ventricular diastolic function, with normal left ventricular filling pressure.   3. Normal right ventricular cavity size and normal right ventricular systolic function.   4. Normal left and right atrial size.   5. Trileaflet aortic valve with normal systolic excursion. There is mild calcification of the aortic valve leaflets.   6. Mild aortic regurgitation.   7. Prolapse of the posterior mitral leaflet.   8. Mild to moderate mitral regurgitation.   9. No pericardial effusion seen.  10. Compared to the transthoracic echocardiogram performed on 11/28/2023, Slight worsening of the mitral valve regurgitation    Nuclear Pharmacologic Stress    1. Myocardial Perfusion: Abnormal.   2. Perfusion Findings: There is a large-sized, moderate, partially reversible defect in the mid to basal anterior wall consistent with infarct with mild to moderate [place]-infarct ischemia. There is a large-sized, moderate to severe, fixed defect in the inferolateral wall consistent with infarct.   3. Hypokinesis of the inferolateral and mid to basal anterior walls.   4. The post stress left ventricular EF is 56 %. The stress end diastolic volume is 87 ml and systolic volume is 38 ml.   5. Stress electrocardiogram: No significant ischemic ST segment changes beyond baseline abnormalities.    s/p ofirmev 1g, maalox 30mL, pepcid 20mg IV, oxycodone ER 10mg, pregabalin 150mg, carafate 1g     Evaluated in ED by heme/onc Dr. 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  (12 Feb 2025 20:57)    Hospital Course:  Patient admitted for chest pain. Troponins negative x 2. EKG with TWI present prior, TTE with worsened MVR. Nuclear stress abnormal (defects c/f infarcts with [place] infarct ischemia, hypokinesis inferolateral and mid to basal anterior walls)    Pending cardiac cath       Important Medication Changes and Reason:    Active or Pending Issues Requiring Follow-up:    Advanced Directives:   [ ] Full code  [ ] DNR  [ ] Hospice    Discharge Diagnoses:  chest pain       HPI:  80M PMH arthritis, Raynaud's, FM, prostate CA (s/p radiation 2015), anxiety, depression, concussion, diverticulitis, [place] b/l (hearing aids), MARIAMA (w/o tx), ALEJANDRA,  admit 11/2023 for evaluation of chest pain presumed 2/2 esophagitis, gastric ulcers visualized on EGD (dc'd for GI f/u), p/w CP (initially to CDU, then admit to inpatient floor). AOx4, reports baseline health (which does not include CP) when approximately 02:00 2/12 AM he developed mid sternal CP which caused him to awake, of "burning" quality similar to prior CP prompting hospital admission 2023 though less severe in the current instance, w/o radiation, intermittently occuring for short periods prompting ED presentation. Denies exertional worsening, or associated sx such as HA, lightheadedness, dizziness, visual disturbance, diaphoresis, palpitations, cough, SOB, abd pain, n/v/d, melena, hematochezia, dysuria, hematuria, or other complaint. Denies recent travel, prolonged immobility, sick contact. Currently upon my evaluation, he is asymptomatic (which he attributes temporally to maalox, pepcid use in ED).     HR 50s-60s, BP 110s-150s/60s-80,     BUN 32 (SCr 0.75, GFR 92); HST 10 -> 9, proBNP 109    CXR   Clear lungs    TTE    1. Left ventricular cavity is normal in size. Left ventricular wall thickness is normal. Left ventricular systolic function is normal with an ejection fraction of 61 % by Mascorro's method of disks. There are no regional wall motion abnormalities seen.   2. Normal left ventricular diastolic function, with normal left ventricular filling pressure.   3. Normal right ventricular cavity size and normal right ventricular systolic function.   4. Normal left and right atrial size.   5. Trileaflet aortic valve with normal systolic excursion. There is mild calcification of the aortic valve leaflets.   6. Mild aortic regurgitation.   7. Prolapse of the posterior mitral leaflet.   8. Mild to moderate mitral regurgitation.   9. No pericardial effusion seen.  10. Compared to the transthoracic echocardiogram performed on 11/28/2023, Slight worsening of the mitral valve regurgitation    Nuclear Pharmacologic Stress    1. Myocardial Perfusion: Abnormal.   2. Perfusion Findings: There is a large-sized, moderate, partially reversible defect in the mid to basal anterior wall consistent with infarct with mild to moderate [place]-infarct ischemia. There is a large-sized, moderate to severe, fixed defect in the inferolateral wall consistent with infarct.   3. Hypokinesis of the inferolateral and mid to basal anterior walls.   4. The post stress left ventricular EF is 56 %. The stress end diastolic volume is 87 ml and systolic volume is 38 ml.   5. Stress electrocardiogram: No significant ischemic ST segment changes beyond baseline abnormalities.    s/p ofirmev 1g, maalox 30mL, pepcid 20mg IV, oxycodone ER 10mg, pregabalin 150mg, carafate 1g     Evaluated in ED by heme/onc Dr. 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  (12 Feb 2025 20:57)    Hospital Course:  Patient admitted for chest pain. Troponins negative x 2. EKG with TWI present prior, TTE with worsened MVR. Nuclear stress abnormal (defects c/f infarcts with [place] infarct ischemia, hypokinesis inferolateral and mid to basal anterior walls)    2/13: s/p PCI/BENJAMIN x 1 CX and PCI/BENJAMIN to the RCA via RRA due off at 1535.  Continue DAPT with ASA and Plavix.  Will start Atorvastatin 40mg.  Pt will return in a few weeks for staged PCI of his LAD.  Cardiac Rehab Referral given but should not be pursued until after his staged PCI.         Important Medication Changes and Reason:    Active or Pending Issues Requiring Follow-up:    Advanced Directives:   [ ] Full code  [ ] DNR  [ ] Hospice    Discharge Diagnoses:  chest pain       HPI:  80M PMH arthritis, Raynaud's, FM, prostate CA (s/p radiation 2015), anxiety, depression, concussion, diverticulitis, [place] b/l (hearing aids), MARIAMA (w/o tx), ALEJANDRA,  admit 11/2023 for evaluation of chest pain presumed 2/2 esophagitis, gastric ulcers visualized on EGD (dc'd for GI f/u), p/w CP (initially to CDU, then admit to inpatient floor). AOx4, reports baseline health (which does not include CP) when approximately 02:00 2/12 AM he developed mid sternal CP which caused him to awake, of "burning" quality similar to prior CP prompting hospital admission 2023 though less severe in the current instance, w/o radiation, intermittently occuring for short periods prompting ED presentation. Denies exertional worsening, or associated sx such as HA, lightheadedness, dizziness, visual disturbance, diaphoresis, palpitations, cough, SOB, abd pain, n/v/d, melena, hematochezia, dysuria, hematuria, or other complaint. Denies recent travel, prolonged immobility, sick contact. Currently upon my evaluation, he is asymptomatic (which he attributes temporally to maalox, pepcid use in ED).     HR 50s-60s, BP 110s-150s/60s-80,     BUN 32 (SCr 0.75, GFR 92); HST 10 -> 9, proBNP 109    CXR   Clear lungs    TTE    1. Left ventricular cavity is normal in size. Left ventricular wall thickness is normal. Left ventricular systolic function is normal with an ejection fraction of 61 % by Mascorro's method of disks. There are no regional wall motion abnormalities seen.   2. Normal left ventricular diastolic function, with normal left ventricular filling pressure.   3. Normal right ventricular cavity size and normal right ventricular systolic function.   4. Normal left and right atrial size.   5. Trileaflet aortic valve with normal systolic excursion. There is mild calcification of the aortic valve leaflets.   6. Mild aortic regurgitation.   7. Prolapse of the posterior mitral leaflet.   8. Mild to moderate mitral regurgitation.   9. No pericardial effusion seen.  10. Compared to the transthoracic echocardiogram performed on 11/28/2023, Slight worsening of the mitral valve regurgitation    Nuclear Pharmacologic Stress    1. Myocardial Perfusion: Abnormal.   2. Perfusion Findings: There is a large-sized, moderate, partially reversible defect in the mid to basal anterior wall consistent with infarct with mild to moderate [place]-infarct ischemia. There is a large-sized, moderate to severe, fixed defect in the inferolateral wall consistent with infarct.   3. Hypokinesis of the inferolateral and mid to basal anterior walls.   4. The post stress left ventricular EF is 56 %. The stress end diastolic volume is 87 ml and systolic volume is 38 ml.   5. Stress electrocardiogram: No significant ischemic ST segment changes beyond baseline abnormalities.    s/p ofirmev 1g, maalox 30mL, pepcid 20mg IV, oxycodone ER 10mg, pregabalin 150mg, carafate 1g     Evaluated in ED by heme/onc Dr. 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  (12 Feb 2025 20:57)    Hospital Course:  Patient admitted for chest pain. Troponins negative x 2. EKG with TWI present prior, TTE with worsened MVR. Nuclear stress abnormal (defects c/f infarcts with [place] infarct ischemia, hypokinesis inferolateral and mid to basal anterior walls)    2/13: s/p PCI/BENJAMIN x 1 CX and PCI/BENJAMIN to the RCA via RRA due off at 1535.  Continue DAPT with ASA and Plavix.  Will start Atorvastatin 40mg.  Pt will return in a few weeks for staged PCI of his LAD.  Cardiac Rehab Referral given but should not be pursued until after his staged PCI.         Important Medication Changes and Reason:  aspirin and plavix - for PCI  atorvastatin 40 mg daily     Active or Pending Issues Requiring Follow-up:  [ ] f/u with cardiology for staged PCI of LAD  [ ] f/u with PCP for age appropriate screening and routine care    Advanced Directives:   [ ] Full code  [ ] DNR  [ ] Hospice    Discharge Diagnoses:  chest pain       HPI:  80M PMH arthritis, Raynaud's, FM, prostate CA (s/p radiation 2015), anxiety, depression, concussion, diverticulitis, [place] b/l (hearing aids), MARIAMA (w/o tx), ALEJANDRA,  admit 11/2023 for evaluation of chest pain presumed 2/2 esophagitis, gastric ulcers visualized on EGD (dc'd for GI f/u), p/w CP (initially to CDU, then admit to inpatient floor). AOx4, reports baseline health (which does not include CP) when approximately 02:00 2/12 AM he developed mid sternal CP which caused him to awake, of "burning" quality similar to prior CP prompting hospital admission 2023 though less severe in the current instance, w/o radiation, intermittently occuring for short periods prompting ED presentation. Denies exertional worsening, or associated sx such as HA, lightheadedness, dizziness, visual disturbance, diaphoresis, palpitations, cough, SOB, abd pain, n/v/d, melena, hematochezia, dysuria, hematuria, or other complaint. Denies recent travel, prolonged immobility, sick contact. Currently upon my evaluation, he is asymptomatic (which he attributes temporally to maalox, pepcid use in ED).     HR 50s-60s, BP 110s-150s/60s-80,     BUN 32 (SCr 0.75, GFR 92); HST 10 -> 9, proBNP 109    CXR   Clear lungs    TTE    1. Left ventricular cavity is normal in size. Left ventricular wall thickness is normal. Left ventricular systolic function is normal with an ejection fraction of 61 % by Mascorro's method of disks. There are no regional wall motion abnormalities seen.   2. Normal left ventricular diastolic function, with normal left ventricular filling pressure.   3. Normal right ventricular cavity size and normal right ventricular systolic function.   4. Normal left and right atrial size.   5. Trileaflet aortic valve with normal systolic excursion. There is mild calcification of the aortic valve leaflets.   6. Mild aortic regurgitation.   7. Prolapse of the posterior mitral leaflet.   8. Mild to moderate mitral regurgitation.   9. No pericardial effusion seen.  10. Compared to the transthoracic echocardiogram performed on 11/28/2023, Slight worsening of the mitral valve regurgitation    Nuclear Pharmacologic Stress    1. Myocardial Perfusion: Abnormal.   2. Perfusion Findings: There is a large-sized, moderate, partially reversible defect in the mid to basal anterior wall consistent with infarct with mild to moderate [place]-infarct ischemia. There is a large-sized, moderate to severe, fixed defect in the inferolateral wall consistent with infarct.   3. Hypokinesis of the inferolateral and mid to basal anterior walls.   4. The post stress left ventricular EF is 56 %. The stress end diastolic volume is 87 ml and systolic volume is 38 ml.   5. Stress electrocardiogram: No significant ischemic ST segment changes beyond baseline abnormalities.    s/p ofirmev 1g, maalox 30mL, pepcid 20mg IV, oxycodone ER 10mg, pregabalin 150mg, carafate 1g     Evaluated in ED by heme/onc Dr. 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  (12 Feb 2025 20:57)    Hospital Course:  Patient admitted for chest pain. Troponins negative x 2. EKG with TWI present prior, TTE with worsened MVR. Nuclear stress abnormal (defects c/f infarcts with [place] infarct ischemia, hypokinesis inferolateral and mid to basal anterior walls)    2/13: s/p PCI/BENJAMIN x 1 CX and PCI/BENJAMIN to the RCA via RRA due off at 1535.  Continue DAPT with ASA and Plavix.  Will start Atorvastatin 40mg.  Pt will return in a few weeks for staged PCI of his LAD.  Cardiac Rehab Referral given but should not be pursued until after his staged PCI.         Important Medication Changes and Reason:  aspirin and plavix - for PCI  atorvastatin 40 mg daily     Active or Pending Issues Requiring Follow-up:  [ ] f/u with cardiology for staged PCI of LAD  [ ] f/u with PCP for age appropriate screening and routine care        Discharge Diagnoses:  chest pain

## 2025-02-13 NOTE — PROGRESS NOTE ADULT - ASSESSMENT
80-year-old male who had anemia. It was due to iron deficiency. He got iron and it is better. He follows with GI and plan is for a capsule study as out-pt.    He has a monoclonal protein. The clinical picture is more consistent with an MGUS (no cytopenias and normal calcium and creatinine.) Out-pt management.    He had last week developed acute neutropenia and there was no obvious cause. He was scheduled for a bone marrow biopsy today in the office, but is here with CP. WBC is now back to normal and ANC is normal (some left shift with myelocytes.) May forego doing doing the bone marrow biopsy and monitor if the WBC stays okay.    Out-pt f/u in 2-3 weeks to re assess. care here per medicine and cardiology.    Patient understands and agrees with the plan.  80-year-old male who had anemia. It was due to iron deficiency. He got iron and it is better. He follows with GI and plan is for a capsule study as out-pt.    He has a monoclonal protein. The clinical picture is more consistent with an MGUS (no cytopenias and normal calcium and creatinine.) Out-pt management.    He had last week developed acute neutropenia and there was no obvious cause. He was scheduled for a bone marrow biopsy today in the office, but is here with CP. WBC is now back to normal and ANC is normal (some left shift with myelocytes.) May forego doing doing the bone marrow biopsy and monitor if the WBC stays okay.    Out-pt f/u in 2-3 weeks to re assess. care here per medicine and cardiology.    outpt f/u with Dr. Jimenez

## 2025-02-13 NOTE — PROGRESS NOTE ADULT - SUBJECTIVE AND OBJECTIVE BOX
MR#[id number]  PATIENT NAME:KESHAV BARAHONA    DATE OF SERVICE: 02-13-25 @ 06:47  Patient was seen and examined by Yon Peña MD on    02-13-25 @ 06:47 .  Interim events noted.Consultant notes ,Labs,Telemetry reviewed by me       HOSPITAL COURSE: HPI:  80M PMH arthritis, Raynaud's, FM, prostate CA (s/p radiation 2015), anxiety, depression, concussion, diverticulitis, [place] b/l (hearing aids), MARIAMA (w/o tx), ALEJANDRA,  admit 11/2023 for evaluation of chest pain presumed 2/2 esophagitis, gastric ulcers visualized on EGD (dc'd for GI f/u), p/w CP (initially to CDU, then admit to inpatient floor). AOx4, reports baseline health (which does not include CP) when approximately 02:00 2/12 AM he developed mid sternal CP which caused him to awake, of "burning" quality similar to prior CP prompting hospital admission 2023 though less severe in the current instance, w/o radiation, intermittently occuring for short periods prompting ED presentation. Denies exertional worsening, or associated sx such as HA, lightheadedness, dizziness, visual disturbance, diaphoresis, palpitations, cough, SOB, abd pain, n/v/d, melena, hematochezia, dysuria, hematuria, or other complaint. Denies recent travel, prolonged immobility, sick contact. Currently upon my evaluation, he is asymptomatic (which he attributes temporally to maalox, pepcid use in ED).     HR 50s-60s, BP 110s-150s/60s-80,     BUN 32 (SCr 0.75, GFR 92); HST 10 -> 9, proBNP 109    CXR   Clear lungs    TTE  Left ventricular cavity is normal in size. Left ventricular wall thickness is normal. Left ventricular systolic function is normal with an ejection fraction of 61 % by Mascorro's method of disks. There are no regional wall motion abnormalities seen.     Nuclear Pharmacologic Stress  Myocardial Perfusion: Abnormal.   Perfusion Findings: There is a large-sized, moderate, partially reversible defect in the mid to basal anterior wall consistent with infarct with mild to moderate [place]-infarct ischemia. There is a large-sized, moderate to severe, fixed defect in the inferolateral wall consistent with infarct.  Hypokinesis of the inferolateral and mid to basal anterior walls.  The post stress left ventricular EF is 56 %. The stress end diastolic volume is 87 ml and systolic volume is 38 ml.     s/p ofirmev 1g, maalox 30mL, pepcid 20mg IV, oxycodone ER 10mg, pregabalin 150mg, carafate 1g     Evaluated in ED by heme/onc Dr. 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  (12 Feb 2025 20:57)      INTERIM EVENTS:Patient seen at bedside ,interim events noted.  Awake alert no dyspnea chest discomfort  Schedule for Cardiac Cath today Dr Linares    [facility] -reviewed admission note, no change since admission  HEART FAILURE: Acute[ ]Chronic[ ] Systolic[ ] Diastolic[ ] Combined Systolic and Diastolic[ ]  CAD[ ] CABG[ ] PCI[ ]  DEVICES[ ] PPM[ ] ICD[ ] ILR[ ]  ATRIAL FIBRILLATION[ ] Paroxysmal[ ] Permanent[ ] CHADS2-[  ]  ROLANDO[ ] CKD1[ ] CKD2[ ] CKD3[ ] CKD4[ ] ESRD[ ]  COPD[ ] HTN[x ]   DM[ ] Type1[ ] Type 2[ ]   CVA[ ] Paresis[ ]    AMBULATION: Assisted[ ] Cane/walker[ ] Independent[ ]    MEDICATIONS  (STANDING):  heparin   Injectable 5000 Unit(s) SubCutaneous every 8 hours  melatonin 10 milliGRAM(s) Oral at bedtime  multivitamin 1 Tablet(s) Oral daily  oxyCODONE  ER Tablet 10 milliGRAM(s) Oral three times a day  pantoprazole    Tablet 40 milliGRAM(s) Oral two times a day  pregabalin 150 milliGRAM(s) Oral two times a day    MEDICATIONS  (PRN):  acetaminophen     Tablet .. 650 milliGRAM(s) Oral every 6 hours PRN Temp greater or equal to 38C (100.4F), Mild Pain (1 - 3)  aluminum hydroxide/magnesium hydroxide/simethicone Suspension 30 milliLiter(s) Oral every 4 hours PRN Dyspepsia            REVIEW OF SYSTEMS:  Constitutional: [ ] fever, [ ]weight loss,  [x ]fatigue [ ]weight gain  Eyes: [ ] visual changes  Respiratory: [ ]shortness of breath;  [ ] cough, [ ]wheezing, [ ]chills, [ ]hemoptysis  Cardiovascular: [x ] chest pain, [ ]palpitations, [ ]dizziness,  [ ]leg swelling[ ]orthopnea[ ]PND  Gastrointestinal: [ ] abdominal pain, [ ]nausea, [ ]vomiting,  [ ]diarrhea [ ]Constipation [ ]Melena  Genitourinary: [ ] dysuria, [ ] hematuria [ ]Morales  Neurologic: [ ] headaches [ ] tremors[ ]weakness [ ]Paralysis Right[ ] Left[ ]  Skin: [ ] itching, [ ]burning, [ ] rashes  Endocrine: [ ] heat or cold intolerance  Musculoskeletal: [ ] joint pain or swelling; [ ] muscle, back, or extremity pain  Psychiatric: [ ] depression, [ ]anxiety, [ ]mood swings, or [ ]difficulty sleeping  Hematologic: [ ] easy bruising, [ ] bleeding gums    [ ] All remaining systems negative except as per above.   [ ]Unable to obtain.  [x] No change in ROS since admission      Vital Signs Last 24 Hrs  T(C): 36.8 (13 Feb 2025 04:24), Max: 36.8 (12 Feb 2025 14:36)  T(F): 98.2 (13 Feb 2025 04:24), Max: 98.3 (12 Feb 2025 19:51)  HR: 54 (13 Feb 2025 04:24) (48 - 64)  BP: 138/76 (13 Feb 2025 04:24) (114/69 - 143/73)  RR: 18 (13 Feb 2025 04:24) (17 - 18)  SpO2: 98% (13 Feb 2025 04:24) (95% - 99%)    Parameters below as of 13 Feb 2025 04:24  Patient On (Oxygen Delivery Method): room air      I&O's Summary      PHYSICAL EXAM:  General: No acute distress BMI-27  HEENT: EOMI, PERRL  Neck: Supple, [ ] JVD  Lungs: Equal air entry bilaterally; [ ] rales [ ] wheezing [ ] rhonchi  Heart: Regular rate and rhythm; [x ] murmur   2/6 [ x] systolic [ ] diastolic [ ] radiation[ ] rubs [ ]  gallops  Abdomen: Nontender, bowel sounds present  Extremities: No clubbing, cyanosis, [ ] edema [ ]Pulses  equal and intact  Nervous system:  Alert & Oriented X3, no focal deficits  Psychiatric: Normal affect  Skin: No rashes or lesions    LABS:  02-12    141  |  108  |  32[H]  ----------------------------<  106[H]  4.3   |  22  |  0.75    Ca    9.0      12 Feb 2025 07:07  Mg     2.2     02-12    TPro  7.4  /  Alb  4.1  /  TBili  0.5  /  DBili  x   /  AST  21  /  ALT  15  /  AlkPhos  87  02-12    Creatinine Trend: 0.75<--                        13.3   4.62  )-----------( 154      ( 13 Feb 2025 06:28 )             41.2     PT/INR - ( 12 Feb 2025 07:07 )   PT: 11.4 sec;   INR: 1.00 ratio         PTT - ( 12 Feb 2025 07:07 )  PTT:31.3 sec        TTE    1. Left ventricular cavity is normal in size. Left ventricular wall thickness is normal. Left ventricular systolic function is normal with an ejection fraction of 61 % by Mascorro's method of disks. There are no regional wall motion abnormalities seen.   2. Normal left ventricular diastolic function, with normal left ventricular filling pressure.   3. Normal right ventricular cavity size and normal right ventricular systolic function.   4. Normal left and right atrial size.   5. Trileaflet aortic valve with normal systolic excursion. There is mild calcification of the aortic valve leaflets.   6. Mild aortic regurgitation.   7. Prolapse of the posterior mitral leaflet.   8. Mild to moderate mitral regurgitation.   9. No pericardial effusion seen.  10. Compared to the transthoracic echocardiogram performed on 11/28/2023, Slight worsening of the mitral valve regurgitation    Nuclear Pharmacologic Stress    1. Myocardial Perfusion: Abnormal.   2. Perfusion Findings: There is a large-sized, moderate, partially reversible defect in the mid to basal anterior wall consistent with infarct with mild to moderate [place]-infarct ischemia. There is a large-sized, moderate to severe, fixed defect in the inferolateral wall consistent with infarct.   3. Hypokinesis of the inferolateral and mid to basal anterior walls.   4. The post stress left ventricular EF is 56 %. The stress end diastolic volume is 87 ml and systolic volume is 38 ml.   5. Stress electrocardiogram: No significant ischemic ST segment changes beyond baseline abnormalities.    12 Lead ECG  SINUS BRADYCARDIA WITH MARKED SINUS ARRHYTHMIA  OTHERWISE NORMAL ECG    Xray Chest 2 Views PA/Lat (02.12.25 @ 07:18) >    IMPRESSION:  Clear lungs.

## 2025-02-13 NOTE — PROGRESS NOTE ADULT - ASSESSMENT
80M PMH arthritis, Raynaud's, FM, prostate CA (s/p radiation 2015), anxiety, depression, concussion, diverticulitis, [place] b/l (hearing aids), MARIAMA (w/o tx), ALEJANDRA,  admit 11/2023 for evaluation of chest pain presumed 2/2 esophagitis, gastric ulcers visualized on EGD (dc'd for GI f/u), p/w non-exertional CP, found to have abnormal nuclear stress, .      # CHEST PAIN  No evidence for recent cardiac injury  No troponin elevation  EKG with TWI present prior, TTE with worsened MVR, nuclear stress abnormal (defects c/f infarcts with [place] infarct ischemia, hypokinesis inferolateral and mid to basal anterior walls)  Schedule for cardiac cath Dr Linares      #  MGUS (monoclonal gammopathy of unknown significance).   ·  Plan: - followed by Dr. Ruddy Jimenez who saw patient in ED, recommending continued outpatient f/u.        # History of chronic pain.   ·  Plan: - c/w home oxycontin, pregabalin, outpatient f/u (followed by pain mgt).      #  Elevated systolic blood pressure reading without diagnosis of hypertension.  BP: 138/76 (13 Feb 2025 04:24) (114/69 - 143/73)  Monitor

## 2025-02-13 NOTE — DISCHARGE NOTE PROVIDER - CARE PROVIDER_API CALL
Yon Peña  Cardiology  [address] [place] [address]  Phone: [phone]  Fax: [phone]  Follow Up Time: 1 week    Ruddy Jimenez  Hematology  [address] [facility] [facility], [address]  [place] [address]  Phone: [phone]  Fax: [phone]  Established Patient  Follow Up Time: 1 week   Yon Peña  Cardiology  [address] [place] [address]  Phone: [phone]  Fax: [phone]  Follow Up Time: 1 week    Ruddy Jimenez  Hematology  [address] [place] [address], [address]  [place] [address]  Phone: [phone]  Fax: [phone]  Established Patient  Follow Up Time: 1 week    Feliz Linares  Interventional Cardiology  [address] [place] [id number]  Phone: [phone]  Fax: [phone]  Established Patient  Follow Up Time: 2 weeks

## 2025-02-14 ENCOUNTER — TRANSCRIPTION ENCOUNTER (OUTPATIENT)
Age: 80
End: 2025-02-14

## 2025-02-14 VITALS
RESPIRATION RATE: 18 BRPM | DIASTOLIC BLOOD PRESSURE: 73 MMHG | OXYGEN SATURATION: 97 % | HEART RATE: 56 BPM | TEMPERATURE: 98 F | SYSTOLIC BLOOD PRESSURE: 129 MMHG

## 2025-02-14 PROCEDURE — 99285 EMERGENCY DEPT VISIT HI MDM: CPT

## 2025-02-14 PROCEDURE — 93356 MYOCRD STRAIN IMG SPCKL TRCK: CPT

## 2025-02-14 PROCEDURE — C9600: CPT | Mod: LC

## 2025-02-14 PROCEDURE — 83690 ASSAY OF LIPASE: CPT

## 2025-02-14 PROCEDURE — 87086 URINE CULTURE/COLONY COUNT: CPT

## 2025-02-14 PROCEDURE — C1874: CPT

## 2025-02-14 PROCEDURE — 87637 SARSCOV2&INF A&B&RSV AMP PRB: CPT

## 2025-02-14 PROCEDURE — 85025 COMPLETE CBC W/AUTO DIFF WBC: CPT

## 2025-02-14 PROCEDURE — 83880 ASSAY OF NATRIURETIC PEPTIDE: CPT

## 2025-02-14 PROCEDURE — 80053 COMPREHEN METABOLIC PANEL: CPT

## 2025-02-14 PROCEDURE — 93306 TTE W/DOPPLER COMPLETE: CPT

## 2025-02-14 PROCEDURE — 96374 THER/PROPH/DIAG INJ IV PUSH: CPT

## 2025-02-14 PROCEDURE — 83735 ASSAY OF MAGNESIUM: CPT

## 2025-02-14 PROCEDURE — 36415 COLL VENOUS BLD VENIPUNCTURE: CPT

## 2025-02-14 PROCEDURE — 84100 ASSAY OF PHOSPHORUS: CPT

## 2025-02-14 PROCEDURE — 81003 URINALYSIS AUTO W/O SCOPE: CPT

## 2025-02-14 PROCEDURE — 84484 ASSAY OF TROPONIN QUANT: CPT

## 2025-02-14 PROCEDURE — G0378: CPT

## 2025-02-14 PROCEDURE — 71046 X-RAY EXAM CHEST 2 VIEWS: CPT

## 2025-02-14 PROCEDURE — 85730 THROMBOPLASTIN TIME PARTIAL: CPT

## 2025-02-14 PROCEDURE — 99232 SBSQ HOSP IP/OBS MODERATE 35: CPT

## 2025-02-14 PROCEDURE — 93017 CV STRESS TEST TRACING ONLY: CPT

## 2025-02-14 PROCEDURE — 85610 PROTHROMBIN TIME: CPT

## 2025-02-14 PROCEDURE — C1887: CPT

## 2025-02-14 PROCEDURE — 93454 CORONARY ARTERY ANGIO S&I: CPT | Mod: 59

## 2025-02-14 PROCEDURE — 85027 COMPLETE CBC AUTOMATED: CPT

## 2025-02-14 PROCEDURE — 80048 BASIC METABOLIC PNL TOTAL CA: CPT

## 2025-02-14 PROCEDURE — C1769: CPT

## 2025-02-14 PROCEDURE — 93005 ELECTROCARDIOGRAM TRACING: CPT

## 2025-02-14 PROCEDURE — C1725: CPT

## 2025-02-14 PROCEDURE — 78452 HT MUSCLE IMAGE SPECT MULT: CPT | Mod: MC

## 2025-02-14 PROCEDURE — C1894: CPT

## 2025-02-14 PROCEDURE — A9500: CPT

## 2025-02-14 RX ORDER — ATORVASTATIN CALCIUM 80 MG/1
1 TABLET, FILM COATED ORAL
Qty: 30 | Refills: 0
Start: 2025-02-14 | End: 2025-03-15

## 2025-02-14 RX ORDER — ASPIRIN 81 MG/1
1 TABLET, COATED ORAL
Qty: 30 | Refills: 0
Start: 2025-02-14 | End: 2025-03-15

## 2025-02-14 RX ADMIN — OXYCODONE HYDROCHLORIDE 10 MILLIGRAM(S): 30 TABLET ORAL at 13:00

## 2025-02-14 RX ADMIN — Medication 75 MILLIGRAM(S): at 11:08

## 2025-02-14 RX ADMIN — ASPIRIN 81 MILLIGRAM(S): 81 TABLET, COATED ORAL at 11:08

## 2025-02-14 RX ADMIN — OXYCODONE HYDROCHLORIDE 10 MILLIGRAM(S): 30 TABLET ORAL at 05:22

## 2025-02-14 RX ADMIN — OXYCODONE HYDROCHLORIDE 10 MILLIGRAM(S): 30 TABLET ORAL at 14:00

## 2025-02-14 RX ADMIN — Medication 5000 UNIT(S): at 13:01

## 2025-02-14 RX ADMIN — Medication 1 TABLET(S): at 11:08

## 2025-02-14 RX ADMIN — PREGABALIN CAPSULES, CV 150 MILLIGRAM(S): 225 CAPSULE ORAL at 05:22

## 2025-02-14 RX ADMIN — Medication 5000 UNIT(S): at 05:23

## 2025-02-14 RX ADMIN — PANTOPRAZOLE 40 MILLIGRAM(S): 20 TABLET, DELAYED RELEASE ORAL at 05:58

## 2025-02-14 RX ADMIN — OXYCODONE HYDROCHLORIDE 10 MILLIGRAM(S): 30 TABLET ORAL at 06:48

## 2025-02-14 NOTE — DISCHARGE NOTE NURSING/CASE MANAGEMENT/SOCIAL WORK - PATIENT PORTAL LINK FT
You can access the FollowMyHealth Patient Portal offered by [facility] by registering at the following website: [email or web address]. By joining [employer]’s FollowMyHealth portal, you will also be able to view your health information using other applications (apps) compatible with our system.

## 2025-02-14 NOTE — DISCHARGE NOTE NURSING/CASE MANAGEMENT/SOCIAL WORK - NSDCVIVACCINE_GEN_ALL_CORE_FT
Tdap; 19-Nov-2019 22:36; Jenifer Mata (TINO); [employer]; [id number] (Exp. Date: 21-Sep-2021); IntraMuscular; Deltoid Left.; 0.5 milliLiter(s); VIS (VIS Published: 09-May-2013, VIS Presented: 19-Nov-2019);

## 2025-02-14 NOTE — PROGRESS NOTE ADULT - ASSESSMENT
80-year-old male who had anemia. It was due to iron deficiency. He got iron and it is better. He follows with GI and plan is for a capsule study as out-pt.    He has a monoclonal protein. The clinical picture is more consistent with an MGUS (no cytopenias and normal calcium and creatinine.) Out-pt management.    He had last week developed acute neutropenia and there was no obvious cause. He was scheduled for a bone marrow biopsy today in the office, but is here with CP. WBC is now back to normal and ANC is normal (some left shift with myelocytes.) May forego doing the bone marrow biopsy and monitor if the WBC stays okay.    Out-pt f/u in 2-3 weeks to re assess. care here per medicine and cardiology.    outpt f/u with Dr. Jimenez

## 2025-02-14 NOTE — DISCHARGE NOTE NURSING/CASE MANAGEMENT/SOCIAL WORK - FINANCIAL ASSISTANCE
[facility] provides services at a reduced cost to those who are determined to be eligible through [facility]’s financial assistance program. Information regarding [facility]’s financial assistance program can be found by going to [email or web address] or by calling [phone].

## 2025-02-14 NOTE — PROGRESS NOTE ADULT - SUBJECTIVE AND OBJECTIVE BOX
MR#[id number]  PATIENT NAME:KESHAV BARAHONA    DATE OF SERVICE: 02-14-25 @ 06:07  Patient was seen and examined by Yon Peña MD on    02-14-25 @ 06:07 .  Interim events noted.Consultant notes ,Labs,Telemetry reviewed by me       HOSPITAL COURSE: HPI:  80M PMH arthritis, Raynaud's, FM, prostate CA (s/p radiation 2015), anxiety, depression, concussion, diverticulitis, [place] b/l (hearing aids), MARIAMA (w/o tx), ALEJANDRA,  admit 11/2023 for evaluation of chest pain presumed 2/2 esophagitis, gastric ulcers visualized on EGD (dc'd for GI f/u), p/w CP (initially to CDU, then admit to inpatient floor). AOx4, reports baseline health (which does not include CP) when approximately 02:00 2/12 AM he developed mid sternal CP which caused him to awake, of "burning" quality similar to prior CP prompting hospital admission 2023 though less severe in the current instance, w/o radiation, intermittently occuring for short periods prompting ED presentation. Denies exertional worsening, or associated sx such as HA, lightheadedness, dizziness, visual disturbance, diaphoresis, palpitations, cough, SOB, abd pain, n/v/d, melena, hematochezia, dysuria, hematuria, or other complaint. Denies recent travel, prolonged immobility, sick contact. Currently upon my evaluation, he is asymptomatic (which he attributes temporally to maalox, pepcid use in ED).     HR 50s-60s, BP 110s-150s/60s-80,     BUN 32 (SCr 0.75, GFR 92); HST 10 -> 9, proBNP 109    CXR   Clear lungs  TTE  Left ventricular cavity is normal in size. Left ventricular wall thickness is normal. Left ventricular systolic function is normal with an ejection fraction of 61 % by Mascorro's method of disks. There are no regional wall motion abnormalities seen.     Nuclear Pharmacologic Stress  Myocardial Perfusion: Abnormal.   Perfusion Findings: There is a large-sized, moderate, partially reversible defect in the mid to basal anterior wall consistent with infarct with mild to moderate [place]-infarct ischemia. There is a large-sized, moderate to severe, fixed defect in the inferolateral wall consistent with infarct.  Hypokinesis of the inferolateral and mid to basal anterior walls.  The post stress left ventricular EF is 56 %. The stress end diastolic volume is 87 ml and systolic volume is 38 ml.     s/p ofirmev 1g, maalox 30mL, pepcid 20mg IV, oxycodone ER 10mg, pregabalin 150mg, carafate 1g     Evaluated in ED by heme/onc Dr. 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  (12 Feb 2025 20:57)    INTERIM EVENTS:Patient seen at bedside ,interim events noted.  Awake alert had [facility] s/p BENJAMIN LCx and RCA for staged PCI-LAD in 2 weeks  No further chest pain     PMH -reviewed admission note, no change since admission  HEART FAILURE: Acute[ ]Chronic[ ] Systolic[ ] Diastolic[ ] Combined Systolic and Diastolic[ ]  CAD[ ] CABG[ ] PCI[ ]  DEVICES[ ] PPM[ ] ICD[ ] ILR[ ]  ATRIAL FIBRILLATION[ ] Paroxysmal[ ] Permanent[ ] CHADS2-[  ]  ROLANDO[ ] CKD1[ ] CKD2[ ] CKD3[ ] CKD4[ ] ESRD[ ]  COPD[ ] HTN[ ]   DM[ ] Type1[ ] Type 2[ ]   CVA[ ] Paresis[ ]    AMBULATION: Assisted[ ] Cane/walker[ ] Independent[x ]    MEDICATIONS  (STANDING):  aspirin enteric coated 81 milliGRAM(s) Oral daily  atorvastatin 40 milliGRAM(s) Oral at bedtime  clopidogrel Tablet 75 milliGRAM(s) Oral daily  heparin   Injectable 5000 Unit(s) SubCutaneous every 8 hours  melatonin 10 milliGRAM(s) Oral at bedtime  multivitamin 1 Tablet(s) Oral daily  oxyCODONE  ER Tablet 10 milliGRAM(s) Oral three times a day  pantoprazole    Tablet 40 milliGRAM(s) Oral two times a day  pregabalin 150 milliGRAM(s) Oral two times a day  sodium chloride 0.9%. 1000 milliLiter(s) (75 mL/Hr) IV Continuous <Continuous>    MEDICATIONS  (PRN):  acetaminophen     Tablet .. 650 milliGRAM(s) Oral every 6 hours PRN Temp greater or equal to 38C (100.4F), Mild Pain (1 - 3)  aluminum hydroxide/magnesium hydroxide/simethicone Suspension 30 milliLiter(s) Oral every 4 hours PRN Dyspepsia            REVIEW OF SYSTEMS:  Constitutional: [ ] fever, [ ]weight loss,  [ ]fatigue [ ]weight gain  Eyes: [ ] visual changes  Respiratory: [ ]shortness of breath;  [ ] cough, [ ]wheezing, [ ]chills, [ ]hemoptysis  Cardiovascular: [ ] chest pain, [ ]palpitations, [ ]dizziness,  [ ]leg swelling[ ]orthopnea[ ]PND  Gastrointestinal: [ ] abdominal pain, [ ]nausea, [ ]vomiting,  [ ]diarrhea [ ]Constipation [ ]Melena  Genitourinary: [ ] dysuria, [ ] hematuria [ ]Morales  Neurologic: [ ] headaches [ ] tremors[ ]weakness [ ]Paralysis Right[ ] Left[ ]  Skin: [ ] itching, [ ]burning, [ ] rashes  Endocrine: [ ] heat or cold intolerance  Musculoskeletal: [ ] joint pain or swelling; [ ] muscle, back, or extremity pain  Psychiatric: [ ] depression, [ ]anxiety, [ ]mood swings, or [ ]difficulty sleeping  Hematologic: [ ] easy bruising, [ ] bleeding gums    [ ] All remaining systems negative except as per above.   [ ]Unable to obtain.  [x] No change in ROS since admission      Vital Signs Last 24 Hrs  T(C): 36.8 (14 Feb 2025 05:12), Max: 36.9 (13 Feb 2025 11:50)  T(F): 98.3 (14 Feb 2025 05:12), Max: 98.4 (13 Feb 2025 11:50)  HR: 62 (14 Feb 2025 05:12) (48 - 76)  BP: 123/77 (14 Feb 2025 05:12) (118/64 - 146/71)  BP(mean): 94 (13 Feb 2025 14:05) (94 - 94)  RR: 18 (14 Feb 2025 05:12) (18 - 18)  SpO2: 97% (14 Feb 2025 05:12) (95% - 100%)    Parameters below as of 14 Feb 2025 05:12  Patient On (Oxygen Delivery Method): room air      I&O's Summary    13 Feb 2025 07:01  -  14 Feb 2025 06:07  --------------------------------------------------------  IN: 240 mL / OUT: 350 mL / NET: -110 mL        PHYSICAL EXAM:  General: No acute distress BMI-24  HEENT: EOMI, PERRL  Neck: Supple, [ ] JVD  Lungs: Equal air entry bilaterally; [ ] rales [ ] wheezing [ ] rhonchi  Heart: Regular rate and rhythm; [x ] murmur   2/6 [ x] systolic [ ] diastolic [ ] radiation[ ] rubs [ ]  gallops  Abdomen: Nontender, bowel sounds present  Extremities: No clubbing, cyanosis, [ ] edema [ ]Pulses  equal and intact  Nervous system:  Alert & Oriented X3, no focal deficits  Psychiatric: Normal affect  Skin: No rashes or lesions    LABS:  02-13    140  |  108  |  25[H]  ----------------------------<  77  4.6   |  22  |  0.84    Ca    8.8      13 Feb 2025 06:28  Phos  0.7     02-13  Mg     2.3     02-13    TPro  7.4  /  Alb  4.1  /  TBili  0.5  /  DBili  x   /  AST  21  /  ALT  15  /  AlkPhos  87  02-12    Creatinine Trend: 0.84<--, 0.75<--                        13.3   4.62  )-----------( 154      ( 13 Feb 2025 06:28 )             41.2     PT/INR - ( 12 Feb 2025 07:07 )   PT: 11.4 sec;   INR: 1.00 ratio         PTT - ( 12 Feb 2025 07:07 )  PTT:31.3 sec        TTE    1. Left ventricular cavity is normal in size. Left ventricular wall thickness is normal. Left ventricular systolic function is normal with an ejection fraction of 61 % by Mascorro's method of disks. There are no regional wall motion abnormalities seen.   2. Normal left ventricular diastolic function, with normal left ventricular filling pressure.   3. Normal right ventricular cavity size and normal right ventricular systolic function.   4. Normal left and right atrial size.   5. Trileaflet aortic valve with normal systolic excursion. There is mild calcification of the aortic valve leaflets.   6. Mild aortic regurgitation.   7. Prolapse of the posterior mitral leaflet.   8. Mild to moderate mitral regurgitation.   9. No pericardial effusion seen.  10. Compared to the transthoracic echocardiogram performed on 11/28/2023, Slight worsening of the mitral valve regurgitation    Nuclear Pharmacologic Stress    1. Myocardial Perfusion: Abnormal.   2. Perfusion Findings: There is a large-sized, moderate, partially reversible defect in the mid to basal anterior wall consistent with infarct with mild to moderate [place]-infarct ischemia. There is a large-sized, moderate to severe, fixed defect in the inferolateral wall consistent with infarct.   3. Hypokinesis of the inferolateral and mid to basal anterior walls.   4. The post stress left ventricular EF is 56 %. The stress end diastolic volume is 87 ml and systolic volume is 38 ml.   5. Stress electrocardiogram: No significant ischemic ST segment changes beyond baseline abnormalities.    12 Lead ECG  SINUS BRADYCARDIA WITH MARKED SINUS ARRHYTHMIA  OTHERWISE NORMAL ECG    Xray Chest 2 Views PA/Lat (02.12.25 @ 07:18) >    IMPRESSION:  Clear lungs.

## 2025-02-14 NOTE — DISCHARGE NOTE NURSING/CASE MANAGEMENT/SOCIAL WORK - NSDCPEFALRISK_GEN_ALL_CORE
For information on Fall & Injury Prevention, visit: [email or web address] OR  [email or web address] OR  https://www.cdc.gov/steadi/patient.html

## 2025-02-14 NOTE — DISCHARGE NOTE NURSING/CASE MANAGEMENT/SOCIAL WORK - NSDCFUADDAPPT_GEN_ALL_CORE_FT
You will return in a few weeks for staged PCI of his LAD as indicated by Structural heart and your cardiologist

## 2025-02-14 NOTE — PROGRESS NOTE ADULT - SUBJECTIVE AND OBJECTIVE BOX
NANCIE BARAHONA  MRN-[id number]    Patient is a 80y old  Male who presents with a chief complaint of CP (13 Feb 2025 13:40)      Review of System  REVIEW OF SYSTEMS      General:	Denies fatigue, fevers, chills, sweats, decreased appetite.    Skin/Breast: denies pruritis, rash  	  Ophthalmologic: no change in vision or blurring  	  HEENT	Denies dry mouth, oral sores, dysphagia,  change in hearing.    Respiratory and Thorax:  cough, sob, wheeze, hemoptysis  	  Cardiovascular:	no cp , palp, orthopnea    Gastrointestinal:	no n/v/d constipation    Genitourinary:	no dysuria of frequency, no hematuria, no flank pain    Musculoskeletal:	no bone or joint pain. no muscle aches.     Neurological:	no change in sensory or motor function. no headache. no weakness.     Psychiatric:	no depression, no anxiety, insomnia.     Hematology/Lymphatics:	no bleeding or bruising        Current Meds  MEDICATIONS  (STANDING):  aspirin enteric coated 81 milliGRAM(s) Oral daily  atorvastatin 40 milliGRAM(s) Oral at bedtime  clopidogrel Tablet 75 milliGRAM(s) Oral daily  heparin   Injectable 5000 Unit(s) SubCutaneous every 8 hours  melatonin 10 milliGRAM(s) Oral at bedtime  multivitamin 1 Tablet(s) Oral daily  oxyCODONE  ER Tablet 10 milliGRAM(s) Oral three times a day  pantoprazole    Tablet 40 milliGRAM(s) Oral two times a day  pregabalin 150 milliGRAM(s) Oral two times a day  sodium chloride 0.9%. 1000 milliLiter(s) (75 mL/Hr) IV Continuous <Continuous>    MEDICATIONS  (PRN):  acetaminophen     Tablet .. 650 milliGRAM(s) Oral every 6 hours PRN Temp greater or equal to 38C (100.4F), Mild Pain (1 - 3)  aluminum hydroxide/magnesium hydroxide/simethicone Suspension 30 milliLiter(s) Oral every 4 hours PRN Dyspepsia      Vitals  Vital Signs Last 24 Hrs  T(C): 36.5 (13 Feb 2025 19:30), Max: 36.9 (13 Feb 2025 11:50)  T(F): 97.7 (13 Feb 2025 19:30), Max: 98.4 (13 Feb 2025 11:50)  HR: 62 (13 Feb 2025 19:30) (48 - 76)  BP: 128/76 (13 Feb 2025 19:30) (118/64 - 146/71)  BP(mean): 94 (13 Feb 2025 14:05) (94 - 94)  RR: 18 (13 Feb 2025 19:30) (18 - 18)  SpO2: 95% (13 Feb 2025 19:30) (95% - 100%)    Parameters below as of 13 Feb 2025 19:30  Patient On (Oxygen Delivery Method): room air        Physical Exam  PHYSICAL EXAM:      Constitutional: NAD    Eyes: PERRLA EOMI, anicteric sclera    Heent :No oral sores, no pharyngeal injection. moist mucosa.    Neck: supple, no jvd, no LAD    Respiratory: CTA b/l     Cardiovascular: s1s2, no m/g/r    Gastrointestinal: soft, nt, nd, + BS    Extremities: no c/c/e    Neurological:A&O x 3 moves all ext.    Skin: no rash on exposed skin    Lymph Nodes: no lymphadenopathy.              Lab  CBC Full  -  ( 13 Feb 2025 06:28 )  WBC Count : 4.62 K/uL  RBC Count : 4.47 M/uL  Hemoglobin : 13.3 g/dL  Hematocrit : 41.2 %  Platelet Count - Automated : 154 K/uL  Mean Cell Volume : 92.2 fl  Mean Cell Hemoglobin : 29.8 pg  Mean Cell Hemoglobin Concentration : 32.3 g/dL  Auto Neutrophil # : x  Auto Lymphocyte # : x  Auto Monocyte # : x  Auto Eosinophil # : x  Auto Basophil # : x  Auto Neutrophil % : x  Auto Lymphocyte % : x  Auto Monocyte % : x  Auto Eosinophil % : x  Auto Basophil % : x    02-13    140  |  108  |  25[H]  ----------------------------<  77  4.6   |  22  |  0.84    Ca    8.8      13 Feb 2025 06:28  Phos  0.7     02-13  Mg     2.3     02-13    TPro  7.4  /  Alb  4.1  /  TBili  0.5  /  DBili  x   /  AST  21  /  ALT  15  /  AlkPhos  87  02-12    PT/INR - ( 12 Feb 2025 07:07 )   PT: 11.4 sec;   INR: 1.00 ratio         PTT - ( 12 Feb 2025 07:07 )  PTT:31.3 sec    Rad:    Assessment/Plan   NANCIE BARAHONA  MRN-[id number]    Patient is a 80y old  Male who presents with a chief complaint of CP (13 Feb 2025 13:40)      Review of System    Without complaints.  No new events overnight as per rn.     General:	Denies fatigue, fevers, chills, sweats, decreased appetite.    Skin/Breast: denies pruritis, rash  	  Ophthalmologic: no change in vision or blurring  	  HEENT	Denies dry mouth, oral sores, dysphagia,  change in hearing.    Respiratory and Thorax:  no cough, sob, wheeze, hemoptysis  	  Cardiovascular:	no cp , palp, orthopnea    Gastrointestinal:	no n/v/d constipation    Genitourinary:	no dysuria of frequency, no hematuria, no flank pain    Musculoskeletal:	no bone or joint pain. no muscle aches.     Neurological:	no change in sensory or motor function. no headache. no weakness.     Psychiatric:	no depression, no anxiety, insomnia.     Hematology/Lymphatics:	no bleeding or bruising    Current Meds  MEDICATIONS  (STANDING):  aspirin enteric coated 81 milliGRAM(s) Oral daily  atorvastatin 40 milliGRAM(s) Oral at bedtime  clopidogrel Tablet 75 milliGRAM(s) Oral daily  heparin   Injectable 5000 Unit(s) SubCutaneous every 8 hours  melatonin 10 milliGRAM(s) Oral at bedtime  multivitamin 1 Tablet(s) Oral daily  oxyCODONE  ER Tablet 10 milliGRAM(s) Oral three times a day  pantoprazole    Tablet 40 milliGRAM(s) Oral two times a day  pregabalin 150 milliGRAM(s) Oral two times a day  sodium chloride 0.9%. 1000 milliLiter(s) (75 mL/Hr) IV Continuous <Continuous>    MEDICATIONS  (PRN):  acetaminophen     Tablet .. 650 milliGRAM(s) Oral every 6 hours PRN Temp greater or equal to 38C (100.4F), Mild Pain (1 - 3)  aluminum hydroxide/magnesium hydroxide/simethicone Suspension 30 milliLiter(s) Oral every 4 hours PRN Dyspepsia    Vital Signs Last 24 Hrs  T(C): 36.5 (13 Feb 2025 19:30), Max: 36.9 (13 Feb 2025 11:50)  T(F): 97.7 (13 Feb 2025 19:30), Max: 98.4 (13 Feb 2025 11:50)  HR: 62 (13 Feb 2025 19:30) (48 - 76)  BP: 128/76 (13 Feb 2025 19:30) (118/64 - 146/71)  BP(mean): 94 (13 Feb 2025 14:05) (94 - 94)  RR: 18 (13 Feb 2025 19:30) (18 - 18)  SpO2: 95% (13 Feb 2025 19:30) (95% - 100%)    Parameters below as of 13 Feb 2025 19:30  Patient On (Oxygen Delivery Method): room air      PHYSICAL EXAM:    Constitutional: NAD    Eyes: PERRLA EOMI, anicteric sclera    Heent :No oral sores, no pharyngeal injection. moist mucosa.    Neck: supple, no jvd, no LAD    Respiratory: CTA b/l     Cardiovascular: s1s2, no m/g/r    Gastrointestinal: soft, nt, nd, + BS    Extremities: no c/c/e    Neurological:A&O x 3 moves all ext.    Skin: no rash on exposed skin    Lymph Nodes: no lymphadenopathy.    Lab  CBC Full  -  ( 13 Feb 2025 06:28 )  WBC Count : 4.62 K/uL  RBC Count : 4.47 M/uL  Hemoglobin : 13.3 g/dL  Hematocrit : 41.2 %  Platelet Count - Automated : 154 K/uL  Mean Cell Volume : 92.2 fl  Mean Cell Hemoglobin : 29.8 pg  Mean Cell Hemoglobin Concentration : 32.3 g/dL  Auto Neutrophil # : x  Auto Lymphocyte # : x  Auto Monocyte # : x  Auto Eosinophil # : x  Auto Basophil # : x  Auto Neutrophil % : x  Auto Lymphocyte % : x  Auto Monocyte % : x  Auto Eosinophil % : x  Auto Basophil % : x    02-13    140  |  108  |  25[H]  ----------------------------<  77  4.6   |  22  |  0.84    Ca    8.8      13 Feb 2025 06:28  Phos  0.7     02-13  Mg     2.3     02-13    TPro  7.4  /  Alb  4.1  /  TBili  0.5  /  DBili  x   /  AST  21  /  ALT  15  /  AlkPhos  87  02-12    PT/INR - ( 12 Feb 2025 07:07 )   PT: 11.4 sec;   INR: 1.00 ratio         PTT - ( 12 Feb 2025 07:07 )  PTT:31.3 sec    Rad:    Assessment/Plan

## 2025-02-14 NOTE — PROGRESS NOTE ADULT - ASSESSMENT
Assessment/Plan:   HPI:  80M PMH arthritis, Raynaud's, FM, prostate CA (s/p radiation 2015), anxiety, depression, concussion, diverticulitis, [place] b/l (hearing aids), MARIAMA (w/o tx), ALEJANDRA,  admit 11/2023 for evaluation of chest pain presumed 2/2 esophagitis, gastric ulcers visualized on EGD (dc'd for GI f/u), p/w CP (initially to CDU, then admit to inpatient floor). AOx4, reports baseline health (which does not include CP) when approximately 02:00 2/12 AM he developed mid sternal CP which caused him to awake, of "burning" quality similar to prior CP prompting hospital admission 2023 though less severe in the current instance, w/o radiation, intermittently occuring for short periods prompting ED presentation. Denies exertional worsening, or associated sx such as HA, lightheadedness, dizziness, visual disturbance, diaphoresis, palpitations, cough, SOB, abd pain, n/v/d, melena, hematochezia, dysuria, hematuria, or other complaint. Denies recent travel, prolonged immobility, sick contact.   # CAD  - s/p BENJAMIN LCx, BENJAMIN RCA via RRA 2/13/25 without complication  - Radial  site stable.   - Continue DAPT (aspirin 81mg and clopidogrel 75mg)  - Plan for staged PCI of LAD as outpatient  - Pt. requesting to followup with Dr. Linares (contact information provided to pt). Notified Dr. Linares of pt. request.     #HLD  - Continue statin    #Encounter for cardiac rehabilitation  -reviewed with patient benefits of cardiac rehabilitation program post stent placement  -reviewed with patient prescription provided to be brought to outpatient followup to discuss plan with outpatient cardiac rehab program  -reviewed with patient not to self-enroll until followup with cardiologist  -pt. not to enroll in cardiac rehab until after staged PCI      - Recommend a heart healthy diet which includes a variety of fruits and vegetables, whole grains, low fat dairy products, legumes and skinless poultry and fish; food prepared with little or no salt and minimize processed foods  - Avoid using NSAIDs  (Aleve, Motrin, ibuprofen, naproxen) while on DAPT, please utilize Tylenol for pain control (not to exceed 4gm in 24 hours)  - Patient aware to take DAPT  as prescribed and DO NOT STOP taking without consulting cardiologist first or STENT/s WILL CLOSE  - Reviewed and reinforced with patient:  site complications ( eg: bleeding, excruciating pain at the procedural site, large swelling ball size-  extremity numbness, tingling, temperature change), or CHEST PAIN; pt aware that if any of those occur he/she must call cardiologist IMMEDIATELY or 911 or go to nearest emergency room   - Reviewed and reinforced a heart healthy diet, Smoking Cessation  - Prescription for cardiac rehab placed in pt. chart with location information after reviewed with pt.   - Patient verbalizes understanding of ALL OF THE ABOVE, and gives positive feedback   -please make sure DAPT is prescribed to pt's preferred pharmacy on dc   -f/u appt in 2 weeks post dc with outpt cardiologist  - Keep Mg >2 K>4   - cont to monitor on tele  - all other care as per primary     Darlene Lema [facility]  Invasive Cardiology    Please check [email or web address] password [email or web address] for cardiology service schedule and contact information via TEAMS.

## 2025-02-14 NOTE — PROGRESS NOTE ADULT - ASSESSMENT
80M PMH arthritis, Raynaud's, FM, prostate CA (s/p radiation 2015), anxiety, depression, concussion, diverticulitis, [place] b/l (hearing aids), MARIAMA (w/o tx), ALEJANDRA,  admit 11/2023 for evaluation of chest pain presumed 2/2 esophagitis, gastric ulcers visualized on EGD (dc'd for GI f/u), p/w non-exertional CP, found to have abnormal nuclear stress, .      # CHEST PAIN-ACS  No evidence for recent cardiac injury  No troponin elevation  EKG with TWI present prior, TTE with worsened MVR, nuclear stress abnormal (defects c/f infarcts with [place] infarct ischemia, hypokinesis inferolateral and mid to basal anterior walls)  s/p C BENJAMIN RCA and LCx   For staged PCI-LAD in 2 weeks  Continue DAPT and statin      #  MGUS (monoclonal gammopathy of unknown significance).   ·  Plan: - followed by Dr. Ruddy Jimenez who saw patient in ED, recommending continued outpatient f/u.        # History of chronic pain.   ·  Plan: - c/w home oxycontin, pregabalin, outpatient f/u (followed by pain mgt).      #  Elevated systolic blood pressure reading without diagnosis of hypertension.  BP: 123/77 (14 Feb 2025 05:12) (118/64 - 146/71)  Monitor

## 2025-02-14 NOTE — PROGRESS NOTE ADULT - TIME BILLING
- Review of records, telemetry, vital signs and daily labs.   - General and cardiovascular physical examination.  - Generation of cardiovascular treatment plan and completion of note .  - Coordination of care.      Patient was seen and examined by me on  02/14/2025 ,interim events noted,labs and radiology studies reviewed.  Yon Peña MD,FACC.  [address] [place], [place] [id number].  [phone]  Availabe to call or text on [employer] TEAMS.
- Review of records, telemetry, vital signs and daily labs.   - General and cardiovascular physical examination.  - Generation of cardiovascular treatment plan and completion of note .  - Coordination of care.      Patient was seen and examined by me on 02/13/2025 ,interim events noted,labs and radiology studies reviewed.  Yon Peña MD,FACC.  [address] [place], [place] [id number].  [phone]  Availabe to call or text on [employer] TEAMS.

## 2025-02-14 NOTE — PROGRESS NOTE ADULT - SUBJECTIVE AND OBJECTIVE BOX
Interventional Cardiology Post Cath Progress Note:                Subjective:   Patient feels well- no current complaints- Denies chest pain, shortness of breath. Denies pain, numbness, tingling or swelling around (wrist) access site      MEDICATIONS  (STANDING):  aspirin enteric coated 81 milliGRAM(s) Oral daily  atorvastatin 40 milliGRAM(s) Oral at bedtime  clopidogrel Tablet 75 milliGRAM(s) Oral daily  heparin   Injectable 5000 Unit(s) SubCutaneous every 8 hours  melatonin 10 milliGRAM(s) Oral at bedtime  multivitamin 1 Tablet(s) Oral daily  oxyCODONE  ER Tablet 10 milliGRAM(s) Oral three times a day  pantoprazole    Tablet 40 milliGRAM(s) Oral two times a day  pregabalin 150 milliGRAM(s) Oral two times a day  sodium chloride 0.9%. 1000 milliLiter(s) (75 mL/Hr) IV Continuous <Continuous>    MEDICATIONS  (PRN):  acetaminophen     Tablet .. 650 milliGRAM(s) Oral every 6 hours PRN Temp greater or equal to 38C (100.4F), Mild Pain (1 - 3)  aluminum hydroxide/magnesium hydroxide/simethicone Suspension 30 milliLiter(s) Oral every 4 hours PRN Dyspepsia      Objective:  Vital Signs Last 24 Hrs  T(C): 36.8 (14 Feb 2025 05:12), Max: 36.9 (13 Feb 2025 11:50)  T(F): 98.3 (14 Feb 2025 05:12), Max: 98.4 (13 Feb 2025 11:50)  HR: 62 (14 Feb 2025 05:12) (48 - 76)  BP: 123/77 (14 Feb 2025 05:12) (118/64 - 146/71)  BP(mean): 94 (13 Feb 2025 14:05) (94 - 94)  RR: 18 (14 Feb 2025 05:12) (18 - 18)  SpO2: 97% (14 Feb 2025 05:12) (95% - 100%)    Parameters below as of 14 Feb 2025 05:12  Patient On (Oxygen Delivery Method): room air        02-13-25 @ 07:01  -  02-14-25 @ 07:00  --------------------------------------------------------  IN: 240 mL / OUT: 350 mL / NET: -110 mL                          13.3   4.62  )-----------( 154      ( 13 Feb 2025 06:28 )             41.2     02-13    140  |  108  |  25[H]  ----------------------------<  77  4.6   |  22  |  0.84    Ca    8.8      13 Feb 2025 06:28  Phos  0.7     02-13  Mg     2.3     02-13        Urinalysis Basic - ( 13 Feb 2025 06:28 )    Color: x / Appearance: x / SG: x / pH: x  Gluc: 77 mg/dL / Ketone: x  / Bili: x / Urobili: x   Blood: x / Protein: x / Nitrite: x   Leuk Esterase: x / RBC: x / WBC x   Sq Epi: x / Non Sq Epi: x / Bacteria: x      Physical Exam:  No apparent distress, alert and oriented times three, appropriate affect  JVD is not elevated, supple  Clear to auscultation with no wheezing, rhonchi or crackles  Regular rate and rhythm with no murmur, rub or gallop  Positive bowel sounds, soft, non-tender, non-distended, no masses/guarding or rebound tenderness  Right Upper Extremity: Soft, non tender, no bleeding or hematoma, clean/dry/intact- RUE +2 palpable radial pulse, -bruit

## 2025-02-14 NOTE — DISCHARGE NOTE NURSING/CASE MANAGEMENT/SOCIAL WORK - FLU SEASON?
PC with Taina at [facility]. Informed of letter for patient to add to her appeal. Request to fax to [phone]. Faxed as requested. PC to patient to inform of letter and how she would like to obtain. Katy SUMMERS   Spoke with patient, and request to have mailed to her home. Sent as per request. CMM,Rn    Yes...

## 2025-02-27 ENCOUNTER — INPATIENT (INPATIENT)
Facility: HOSPITAL | Age: 80
LOS: 0 days | Discharge: HOME | DRG: 303 | End: 2025-02-28
Attending: INTERNAL MEDICINE | Admitting: INTERNAL MEDICINE
Payer: MEDICARE

## 2025-02-27 ENCOUNTER — TRANSCRIPTION ENCOUNTER (OUTPATIENT)
Age: 80
End: 2025-02-27

## 2025-02-27 VITALS
HEART RATE: 60 BPM | SYSTOLIC BLOOD PRESSURE: 123 MMHG | DIASTOLIC BLOOD PRESSURE: 75 MMHG | OXYGEN SATURATION: 98 % | HEIGHT: 65 IN | TEMPERATURE: 97 F | WEIGHT: 139.99 LBS | RESPIRATION RATE: 15 BRPM

## 2025-02-27 DIAGNOSIS — Z96.642 PRESENCE OF LEFT ARTIFICIAL HIP JOINT: Chronic | ICD-10-CM

## 2025-02-27 DIAGNOSIS — I25.10 ATHEROSCLEROTIC HEART DISEASE OF NATIVE CORONARY ARTERY WITHOUT ANGINA PECTORIS: ICD-10-CM

## 2025-02-27 DIAGNOSIS — Z98.890 OTHER SPECIFIED POSTPROCEDURAL STATES: Chronic | ICD-10-CM

## 2025-02-27 DIAGNOSIS — Z96.641 PRESENCE OF RIGHT ARTIFICIAL HIP JOINT: Chronic | ICD-10-CM

## 2025-02-27 DIAGNOSIS — Z96.651 PRESENCE OF RIGHT ARTIFICIAL KNEE JOINT: Chronic | ICD-10-CM

## 2025-02-27 LAB
ANION GAP SERPL CALC-SCNC: 9 MMOL/L — SIGNIFICANT CHANGE UP (ref 5–17)
BUN SERPL-MCNC: 24 MG/DL — HIGH (ref 7–23)
CALCIUM SERPL-MCNC: 8.9 MG/DL — SIGNIFICANT CHANGE UP (ref 8.4–10.5)
CHLORIDE SERPL-SCNC: 109 MMOL/L — HIGH (ref 96–108)
CO2 SERPL-SCNC: 23 MMOL/L — SIGNIFICANT CHANGE UP (ref 22–31)
CREAT SERPL-MCNC: 0.69 MG/DL — SIGNIFICANT CHANGE UP (ref 0.5–1.3)
EGFR: 95 ML/MIN/1.73M2 — SIGNIFICANT CHANGE UP
GLUCOSE SERPL-MCNC: 96 MG/DL — SIGNIFICANT CHANGE UP (ref 70–99)
HCT VFR BLD CALC: 39.6 % — SIGNIFICANT CHANGE UP (ref 39–50)
HGB BLD-MCNC: 13 G/DL — SIGNIFICANT CHANGE UP (ref 13–17)
MCHC RBC-ENTMCNC: 30 PG — SIGNIFICANT CHANGE UP (ref 27–34)
MCHC RBC-ENTMCNC: 32.8 G/DL — SIGNIFICANT CHANGE UP (ref 32–36)
MCV RBC AUTO: 91.5 FL — SIGNIFICANT CHANGE UP (ref 80–100)
NRBC BLD AUTO-RTO: 0 /100 WBCS — SIGNIFICANT CHANGE UP (ref 0–0)
PLATELET # BLD AUTO: 168 K/UL — SIGNIFICANT CHANGE UP (ref 150–400)
POTASSIUM SERPL-MCNC: 4.1 MMOL/L — SIGNIFICANT CHANGE UP (ref 3.5–5.3)
POTASSIUM SERPL-SCNC: 4.1 MMOL/L — SIGNIFICANT CHANGE UP (ref 3.5–5.3)
RBC # BLD: 4.33 M/UL — SIGNIFICANT CHANGE UP (ref 4.2–5.8)
RBC # FLD: 18.9 % — HIGH (ref 10.3–14.5)
SODIUM SERPL-SCNC: 141 MMOL/L — SIGNIFICANT CHANGE UP (ref 135–145)
WBC # BLD: 6.35 K/UL — SIGNIFICANT CHANGE UP (ref 3.8–10.5)
WBC # FLD AUTO: 6.35 K/UL — SIGNIFICANT CHANGE UP (ref 3.8–10.5)

## 2025-02-27 PROCEDURE — 99152 MOD SED SAME PHYS/QHP 5/>YRS: CPT

## 2025-02-27 PROCEDURE — 93010 ELECTROCARDIOGRAM REPORT: CPT

## 2025-02-27 PROCEDURE — 92928 PRQ TCAT PLMT NTRAC ST 1 LES: CPT | Mod: LD

## 2025-02-27 PROCEDURE — 93010 ELECTROCARDIOGRAM REPORT: CPT | Mod: 77

## 2025-02-27 PROCEDURE — 92921: CPT | Mod: LD

## 2025-02-27 RX ORDER — ATORVASTATIN CALCIUM 80 MG/1
40 TABLET, FILM COATED ORAL AT BEDTIME
Refills: 0 | Status: DISCONTINUED | OUTPATIENT
Start: 2025-02-27 | End: 2025-02-28

## 2025-02-27 RX ORDER — FENTANYL CITRATE-0.9 % NACL/PF 100MCG/2ML
25 SYRINGE (ML) INTRAVENOUS ONCE
Refills: 0 | Status: DISCONTINUED | OUTPATIENT
Start: 2025-02-27 | End: 2025-02-27

## 2025-02-27 RX ORDER — PREGABALIN CAPSULES, CV 225 MG/1
1 CAPSULE ORAL
Refills: 0 | DISCHARGE

## 2025-02-27 RX ORDER — MELATONIN 5 MG
5 TABLET ORAL AT BEDTIME
Refills: 0 | Status: DISCONTINUED | OUTPATIENT
Start: 2025-02-27 | End: 2025-02-28

## 2025-02-27 RX ORDER — B1/B2/B3/B5/B6/B12/VIT C/FOLIC 500-0.5 MG
1 TABLET ORAL DAILY
Refills: 0 | Status: DISCONTINUED | OUTPATIENT
Start: 2025-02-27 | End: 2025-02-28

## 2025-02-27 RX ORDER — CLOPIDOGREL BISULFATE 75 MG/1
75 TABLET, FILM COATED ORAL DAILY
Refills: 0 | Status: DISCONTINUED | OUTPATIENT
Start: 2025-02-27 | End: 2025-02-28

## 2025-02-27 RX ORDER — PREGABALIN 50 MG/1
100 CAPSULE ORAL
Refills: 0 | Status: DISCONTINUED | OUTPATIENT
Start: 2025-02-27 | End: 2025-02-28

## 2025-02-27 RX ORDER — ASPIRIN 325 MG
81 TABLET ORAL DAILY
Refills: 0 | Status: DISCONTINUED | OUTPATIENT
Start: 2025-02-27 | End: 2025-02-28

## 2025-02-27 RX ORDER — OXYCODONE HYDROCHLORIDE 30 MG/1
10 TABLET ORAL EVERY 8 HOURS
Refills: 0 | Status: DISCONTINUED | OUTPATIENT
Start: 2025-02-27 | End: 2025-02-28

## 2025-02-27 RX ADMIN — OXYCODONE HYDROCHLORIDE 10 MILLIGRAM(S): 30 TABLET ORAL at 15:16

## 2025-02-27 RX ADMIN — Medication 75 MILLILITER(S): at 10:36

## 2025-02-27 RX ADMIN — OXYCODONE HYDROCHLORIDE 10 MILLIGRAM(S): 30 TABLET ORAL at 21:06

## 2025-02-27 RX ADMIN — OXYCODONE HYDROCHLORIDE 10 MILLIGRAM(S): 30 TABLET ORAL at 16:16

## 2025-02-27 RX ADMIN — Medication 25 MICROGRAM(S): at 13:34

## 2025-02-27 RX ADMIN — Medication 5 MILLIGRAM(S): at 21:07

## 2025-02-27 RX ADMIN — ATORVASTATIN CALCIUM 40 MILLIGRAM(S): 80 TABLET, FILM COATED ORAL at 21:06

## 2025-02-27 RX ADMIN — Medication 25 MICROGRAM(S): at 14:34

## 2025-02-27 RX ADMIN — Medication 500 MILLILITER(S): at 10:36

## 2025-02-27 RX ADMIN — Medication 100 MILLILITER(S): at 14:46

## 2025-02-27 RX ADMIN — CLOPIDOGREL BISULFATE 75 MILLIGRAM(S): 75 TABLET, FILM COATED ORAL at 10:34

## 2025-02-27 RX ADMIN — PREGABALIN 100 MILLIGRAM(S): 50 CAPSULE ORAL at 18:17

## 2025-02-27 NOTE — ASU PATIENT PROFILE, ADULT - FALL HARM RISK - UNIVERSAL INTERVENTIONS
Bed in lowest position, wheels locked, appropriate side rails in place/Call bell, personal items and telephone in reach/Instruct patient to call for assistance before getting out of bed or chair/Non-slip footwear when patient is out of bed/[place] to call system/Physically safe environment - no spills, clutter or unnecessary equipment/Purposeful Proactive Rounding/Room/bathroom lighting operational, light cord in reach

## 2025-02-27 NOTE — ASU PREOP CHECKLIST - HEIGHT IN FEET
Name: DEBORA VANESSA      Call Status:     Attempt 1, Answered       Used     Used:     Yes     if yes, what language?:     Tamazight      Fall Risk - Issues    Fall Risk - Issues List:     Are You Worried About Falling?, Do You Use a Cane or Walker to Get Around?    Comments:     Patient states she had a fall couple of weeks ago. Since then, she is ambulating with a cane and is feeling much stronger. She lives alone in her home. Patient has TCM visit this afternoon.      Fall Risk - Actions Taken    Contact PCP:     Yes     RN Completed STEADI assessment in EPIC:     Yes     Comments:     Patient will discuss her concerns and possibly an order for PT with her PCP at her appointment this afternoon. Patient was also advised to keep her cell phone with her at all times in case of another fall. Patient verbalized understanding. STEADI score = 10.    5 Rifampin Counseling: I discussed with the patient the risks of rifampin including but not limited to liver damage, kidney damage, red-orange body fluids, nausea/vomiting and severe allergy.

## 2025-02-27 NOTE — PATIENT PROFILE ADULT - FALL HARM RISK - UNIVERSAL INTERVENTIONS
The patient is a 3y2m Female complaining of fever. Bed in lowest position, wheels locked, appropriate side rails in place/Call bell, personal items and telephone in reach/Instruct patient to call for assistance before getting out of bed or chair/Non-slip footwear when patient is out of bed/[place] to call system/Physically safe environment - no spills, clutter or unnecessary equipment/Purposeful Proactive Rounding/Room/bathroom lighting operational, light cord in reach

## 2025-02-27 NOTE — ASU DISCHARGE PLAN (ADULT/PEDIATRIC) - FINANCIAL ASSISTANCE
[facility] provides services at a reduced cost to those who are determined to be eligible through [facility]’s financial assistance program. Information regarding [facility]’s financial assistance program can be found by going to [email or web address] or by calling [phone].

## 2025-02-27 NOTE — ASU DISCHARGE PLAN (ADULT/PEDIATRIC) - C. MAKE IMPORTANT PERSONAL OR BUSINESS DECISIONS
Returned patients phone call in regards to rescheduling appointment with Dr. Savage. Patient is now rescheduled. Patient was offered sooner appointment at [place] and [place] location, patient declienrichard. Patient verbalized understanding of upcoming appointment Date, Time and Location.   
Statement Selected

## 2025-02-27 NOTE — PATIENT PROFILE ADULT - STATED REASON FOR ADMISSION
Eyes with no visual disturbances.  Ears clean and dry and no hearing difficulties. Nose with pink mucosa and no drainage.  Mouth mucous membranes moist and pink.  No tenderness or swelling to throat or neck. chest pain

## 2025-02-27 NOTE — ASU DISCHARGE PLAN (ADULT/PEDIATRIC) - NS MD DC FALL RISK RISK
For information on Fall & Injury Prevention, visit: [email or web address] OR  [email or web address] OR  https://www.cdc.gov/steadi/patient.html

## 2025-02-27 NOTE — CHART NOTE - NSCHARTNOTEFT_GEN_A_CORE
Pt arrived in [facility] s/p BENJAMIN x 1 mLAD via right radial artery, while still in lab but RRA access removed pt experienced chest pain and ekg changes: RFA accessed and POBA to D1. Pt continues to experience chest pain here in [facility], but it has decreased from "14/10 on the table to 5/10". Pt still appears uncomfortable, EKG without ischemic changes. Will medicate with Fentanyl 25 mg IV x 1. VSS. Post procedure IV fluids ordered. Cont ASA/PLAVIX.    Cardiac Rehab Post PCI:              *Education on benefits of Cardiac Rehab provided to patient: Yes         *Referral and Prescription Given for Cardiac Rehab : Yes         *Pt given list of locations & instructed to contact their insurance company to review list of participating providers         *Pt instructed to bring Cardiac Rehab prescription with them to Cardiology Follow up appointment for assistance with enrollment: Yes         *Pt discharged with copies detail cardiovascular history, medications, testing/treatments      Will consider discharge in 6 hours if chest pain resolves.     Paulo Dominique, NP Pt arrived in [facility] s/p BENJAMIN x 1 mLAD via right radial artery, while still in lab but RRA access removed pt experienced chest pain and ekg changes: RFA accessed and POBA to D1. Pt continues to experience chest pain here in [facility], but it has decreased from "14/10 on the table to 5/10". Pt still appears uncomfortable, EKG without ischemic changes. Will medicate with Fentanyl 25 mg IV x 1. VSS. Post procedure IV fluids ordered. Cont ASA/PLAVIX.    Cardiac Rehab Post PCI script given last admission to patient.     Will consider discharge in 6 hours if chest pain resolves.     Paulo Dominique, NP

## 2025-02-27 NOTE — ASU DISCHARGE PLAN (ADULT/PEDIATRIC) - CARE PROVIDER_API CALL
Feliz Linares  Interventional Cardiology  [address] [place] [address], [address] [place] [id number]  Phone: [phone]  Fax: [phone]  Follow Up Time: 2 weeks

## 2025-02-27 NOTE — H&P CARDIOLOGY - HISTORY OF PRESENT ILLNESS
80M who underwent PCI on 2/13 to RCA and LCx while admitted for monitoring due to CP and returns today for planned staged PCI to LAD with Dr Linares.  Patient with PMH arthritis, Raynaud's, fibromyalgia,  prostate CA (s/p radiation 2015), anxiety, depression, concussion, diverticulitis, [place] [employer] b/[employer] (hearing aids), MARIAMA (w/o tx), ALEJANDRA,  admited 11/2023 for evaluation of chest pain presumed 2/2 esophagitis, gastric ulcers visualized on EGD (dc'd for GI f/u), Presented to ED on 2/12/24 w/CP (initially to CDU, then admited to inpatient floor). AOx4, reports baseline health (which does not include CP) when approximately 02:00 2/12 AM he developed mid sternal CP which caused him to awake, of "burning" quality similar to prior CP prompting hospital admission 2023 though less severe in the current instance, w/o radiation, intermittently occurring for short periods prompting ED presentation. Denies exertional worsening, or associated sx such as HA, lightheadedness, dizziness, visual disturbance, diaphoresis, palpitations, cough, SOB, abd pain, n/v/d, melena, hematochezia, dysuria, hematuria, or other complaint. Denies recent travel, prolonged immobility, sick contact. Currently upon my evaluation, he is asymptomatic (which he attributes temporally to maalox, pepcid use in ED).     HR 50s-60s, BP 110s-150s/60s-80,     BUN 32 (SCr 0.75, GFR 92); HST 10 -> 9, proBNP 109    < from: Cardiac Catheterization (02.13.25 @ 12:37) >  Procedures Performed   Procedures:               1.    Arterial Access - Right Radial     2.    Diagnostic Coronary Angiography   3.    PCI: BENJAMIN   4.    PCI: BENJAMIN     Indications:               Unstable angina   Abnormal stress test   Suspected coronary artery disease   ACS greater than 24 hours     PCI Status:             elective     Conclusions:   Right dominant coronary anatomy with severe RCA, LAD and LCx stenosis  (low SYNTAX score)    Successful PCI with BENJAMIN to RCA stenosis    Successful PCI with BENJAMIN to LCx      Will stage PCI to LAD in a few weeks  DAPT for at least 6 months    Acute complication:    No complications       < end of copied text >    CXR   Clear lungs    TTE    1. Left ventricular cavity is normal in size. Left ventricular wall thickness is normal. Left ventricular systolic function is normal with an ejection fraction of 61 % by Mascorro's method of disks. There are no regional wall motion abnormalities seen.   2. Normal left ventricular diastolic function, with normal left ventricular filling pressure.   3. Normal right ventricular cavity size and normal right ventricular systolic function.   4. Normal left and right atrial size.   5. Trileaflet aortic valve with normal systolic excursion. There is mild calcification of the aortic valve leaflets.   6. Mild aortic regurgitation.   7. Prolapse of the posterior mitral leaflet.   8. Mild to moderate mitral regurgitation.   9. No pericardial effusion seen.  10. Compared to the transthoracic echocardiogram performed on 11/28/2023, Slight worsening of the mitral valve regurgitation    Nuclear Pharmacologic Stress    1. Myocardial Perfusion: Abnormal.   2. Perfusion Findings: There is a large-sized, moderate, partially reversible defect in the mid to basal anterior wall consistent with infarct with mild to moderate [place]-infarct ischemia. There is a large-sized, moderate to severe, fixed defect in the inferolateral wall consistent with infarct.   3. Hypokinesis of the inferolateral and mid to basal anterior walls.   4. The post stress left ventricular EF is 56 %. The stress end diastolic volume is 87 ml and systolic volume is 38 ml.   5. Stress electrocardiogram: No significant ischemic ST segment changes beyond baseline abnormalities.    s/p ofirmev 1g, maalox 30mL, pepcid 20mg IV, oxycodone ER 10mg, pregabalin 150mg, carafate 1g     Evaluated in ED by heme/onc Dr. 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

## 2025-02-27 NOTE — ASU DISCHARGE PLAN (ADULT/PEDIATRIC) - PROCEDURE
drug eluting stent to mid left anterior descending artery and balloon angioplasty only to 1st diagonal branch

## 2025-02-27 NOTE — PATIENT PROFILE ADULT - NSPROPTRIGHTNOTIFY_GEN_A_NUR
Req for check rent completed, sent for approval by [facility] [place]. sup declines

## 2025-02-28 ENCOUNTER — TRANSCRIPTION ENCOUNTER (OUTPATIENT)
Age: 80
End: 2025-02-28

## 2025-02-28 VITALS
DIASTOLIC BLOOD PRESSURE: 71 MMHG | TEMPERATURE: 98 F | OXYGEN SATURATION: 97 % | RESPIRATION RATE: 17 BRPM | HEART RATE: 68 BPM | SYSTOLIC BLOOD PRESSURE: 113 MMHG

## 2025-02-28 LAB
ANION GAP SERPL CALC-SCNC: 10 MMOL/L — SIGNIFICANT CHANGE UP (ref 5–17)
BUN SERPL-MCNC: 19 MG/DL — SIGNIFICANT CHANGE UP (ref 7–23)
CALCIUM SERPL-MCNC: 8.9 MG/DL — SIGNIFICANT CHANGE UP (ref 8.4–10.5)
CHLORIDE SERPL-SCNC: 104 MMOL/L — SIGNIFICANT CHANGE UP (ref 96–108)
CO2 SERPL-SCNC: 25 MMOL/L — SIGNIFICANT CHANGE UP (ref 22–31)
CREAT SERPL-MCNC: 0.77 MG/DL — SIGNIFICANT CHANGE UP (ref 0.5–1.3)
EGFR: 92 ML/MIN/1.73M2 — SIGNIFICANT CHANGE UP
GLUCOSE SERPL-MCNC: 128 MG/DL — HIGH (ref 70–99)
HCT VFR BLD CALC: 40.7 % — SIGNIFICANT CHANGE UP (ref 39–50)
HGB BLD-MCNC: 13.5 G/DL — SIGNIFICANT CHANGE UP (ref 13–17)
MAGNESIUM SERPL-MCNC: 2 MG/DL — SIGNIFICANT CHANGE UP (ref 1.6–2.6)
MCHC RBC-ENTMCNC: 30.1 PG — SIGNIFICANT CHANGE UP (ref 27–34)
MCHC RBC-ENTMCNC: 33.2 G/DL — SIGNIFICANT CHANGE UP (ref 32–36)
MCV RBC AUTO: 90.8 FL — SIGNIFICANT CHANGE UP (ref 80–100)
NRBC BLD AUTO-RTO: 0 /100 WBCS — SIGNIFICANT CHANGE UP (ref 0–0)
PHOSPHATE SERPL-MCNC: 1.4 MG/DL — LOW (ref 2.5–4.5)
PLATELET # BLD AUTO: 186 K/UL — SIGNIFICANT CHANGE UP (ref 150–400)
POTASSIUM SERPL-MCNC: 4 MMOL/L — SIGNIFICANT CHANGE UP (ref 3.5–5.3)
POTASSIUM SERPL-SCNC: 4 MMOL/L — SIGNIFICANT CHANGE UP (ref 3.5–5.3)
RBC # BLD: 4.48 M/UL — SIGNIFICANT CHANGE UP (ref 4.2–5.8)
RBC # FLD: 18.6 % — HIGH (ref 10.3–14.5)
SODIUM SERPL-SCNC: 139 MMOL/L — SIGNIFICANT CHANGE UP (ref 135–145)
WBC # BLD: 9.1 K/UL — SIGNIFICANT CHANGE UP (ref 3.8–10.5)
WBC # FLD AUTO: 9.1 K/UL — SIGNIFICANT CHANGE UP (ref 3.8–10.5)

## 2025-02-28 PROCEDURE — 84100 ASSAY OF PHOSPHORUS: CPT

## 2025-02-28 PROCEDURE — 99232 SBSQ HOSP IP/OBS MODERATE 35: CPT

## 2025-02-28 PROCEDURE — 80048 BASIC METABOLIC PNL TOTAL CA: CPT

## 2025-02-28 PROCEDURE — 92929: CPT

## 2025-02-28 PROCEDURE — 92928 PRQ TCAT PLMT NTRAC ST 1 LES: CPT

## 2025-02-28 PROCEDURE — 85027 COMPLETE CBC AUTOMATED: CPT

## 2025-02-28 PROCEDURE — C1760: CPT

## 2025-02-28 PROCEDURE — C1769: CPT

## 2025-02-28 PROCEDURE — 92921: CPT | Mod: LD

## 2025-02-28 PROCEDURE — 83735 ASSAY OF MAGNESIUM: CPT

## 2025-02-28 PROCEDURE — C1894: CPT

## 2025-02-28 PROCEDURE — C1725: CPT

## 2025-02-28 PROCEDURE — C1874: CPT

## 2025-02-28 PROCEDURE — 93461 R&L HRT ART/VENTRICLE ANGIO: CPT

## 2025-02-28 PROCEDURE — C1887: CPT

## 2025-02-28 PROCEDURE — C9600: CPT | Mod: LD

## 2025-02-28 PROCEDURE — 93005 ELECTROCARDIOGRAM TRACING: CPT

## 2025-02-28 PROCEDURE — 92978 ENDOLUMINL IVUS OCT C 1ST: CPT

## 2025-02-28 RX ORDER — SOD PHOS DI, MONO/K PHOS MONO 250 MG
1 TABLET ORAL ONCE
Refills: 0 | Status: COMPLETED | OUTPATIENT
Start: 2025-02-28 | End: 2025-02-28

## 2025-02-28 RX ORDER — CARVEDILOL 3.12 MG/1
3.12 TABLET, FILM COATED ORAL EVERY 12 HOURS
Refills: 0 | Status: DISCONTINUED | OUTPATIENT
Start: 2025-02-28 | End: 2025-02-28

## 2025-02-28 RX ORDER — CARVEDILOL 3.12 MG/1
1 TABLET, FILM COATED ORAL
Qty: 60 | Refills: 1
Start: 2025-02-28 | End: 2025-04-28

## 2025-02-28 RX ORDER — MAGNESIUM SULFATE 500 MG/ML
1 SYRINGE (ML) INJECTION ONCE
Refills: 0 | Status: COMPLETED | OUTPATIENT
Start: 2025-02-28 | End: 2025-02-28

## 2025-02-28 RX ORDER — CLOPIDOGREL BISULFATE 75 MG/1
1 TABLET, FILM COATED ORAL
Qty: 90 | Refills: 3
Start: 2025-02-28 | End: 2026-02-22

## 2025-02-28 RX ADMIN — CLOPIDOGREL BISULFATE 75 MILLIGRAM(S): 75 TABLET, FILM COATED ORAL at 05:02

## 2025-02-28 RX ADMIN — Medication 1 TABLET(S): at 13:51

## 2025-02-28 RX ADMIN — PREGABALIN 100 MILLIGRAM(S): 50 CAPSULE ORAL at 05:58

## 2025-02-28 RX ADMIN — OXYCODONE HYDROCHLORIDE 10 MILLIGRAM(S): 30 TABLET ORAL at 05:09

## 2025-02-28 RX ADMIN — Medication 1 PACKET(S): at 05:02

## 2025-02-28 RX ADMIN — OXYCODONE HYDROCHLORIDE 10 MILLIGRAM(S): 30 TABLET ORAL at 13:50

## 2025-02-28 RX ADMIN — Medication 100 GRAM(S): at 05:57

## 2025-02-28 RX ADMIN — OXYCODONE HYDROCHLORIDE 10 MILLIGRAM(S): 30 TABLET ORAL at 05:58

## 2025-02-28 RX ADMIN — CARVEDILOL 3.12 MILLIGRAM(S): 3.12 TABLET, FILM COATED ORAL at 05:03

## 2025-02-28 RX ADMIN — Medication 81 MILLIGRAM(S): at 05:02

## 2025-02-28 RX ADMIN — OXYCODONE HYDROCHLORIDE 10 MILLIGRAM(S): 30 TABLET ORAL at 14:50

## 2025-02-28 RX ADMIN — Medication 40 MILLIGRAM(S): at 05:58

## 2025-02-28 NOTE — DISCHARGE NOTE PROVIDER - NSDCCPTREATMENT_GEN_ALL_CORE_FT
PRINCIPAL PROCEDURE  Procedure: Left heart cardiac cath  Findings and Treatment: 2/27 s/p BENJAMIN x1 to mLAD via RRA

## 2025-02-28 NOTE — PROGRESS NOTE ADULT - ASSESSMENT
HPI: 80M who underwent PCI on 2/13 to RCA and LCx while admitted for monitoring due to CP and returns today for planned staged PCI to LAD with Dr Linares.  Patient with PMH arthritis, Raynaud's, fibromyalgia,  prostate CA (s/p radiation 2015), anxiety, depression, concussion, diverticulitis, [place] b/l (hearing aids), MARIAMA (w/o tx), ALEJANDRA,  admitted 11/2023 for evaluation of chest pain presumed 2/2 esophagitis, gastric ulcers visualized on EGD (dc'd for GI f/u), Presented to ED on 2/12/24 w/CP (initially to CDU, then admited to inpatient floor). AOx4, reports baseline health (which does not include CP) when approximately 02:00 2/12 AM he developed mid sternal CP which caused him to awake, of "burning" quality similar to prior CP prompting hospital admission 2023 though less severe in the current instance, w/o radiation, intermittently occurring for short periods prompting ED presentation. Denies exertional worsening, or associated sx such as HA, lightheadedness, dizziness, visual disturbance, diaphoresis, palpitations, cough, SOB, abd pain, n/v/d, melena, hematochezia, dysuria, hematuria, or other complaint. Denies recent travel, prolonged immobility, sick contact. Currently upon my evaluation, he is asymptomatic (which he attributes temporally to maalox, pepcid use in ED).     # CAD  2/27 s/p BENJAMIN x1 to mLAD via RRA, POBA to D1 via RFA  Right radial and right groin site without hematoma/bleeding  Currently chest pain free overnight  Continue Aspirin 81 mg daily, Plavix 75 mg daily  Continue Carvedilol 3.125 mg BID  Continue Atorvastatin 40 mg daily  Monitor telemetry  Keep Mg >2 K >4  Follow up appt in 2 weeks post discharge with outpatient cardiologist  Anticipate discharge in AM if site and condition remain stable    # HLD  Continue Atorvastatin 40 mg daily  DASH diet    Dom Reyes [facility]  Invasive Cardiology  Ext [phone]

## 2025-02-28 NOTE — DISCHARGE NOTE PROVIDER - NSDCMRMEDTOKEN_GEN_ALL_CORE_FT
aspirin 81 mg oral delayed release tablet: 1 tab(s) orally once a day  atorvastatin 40 mg oral tablet: 1 tab(s) orally once a day (at bedtime)  Cardiac Rehab: 2-3 times a week for 12 weeks  clopidogrel 75 mg oral tablet: 1 tab(s) orally once a day  Lyrica 100 mg oral capsule: 1 cap(s) orally 2 times a day  Melatonin 10 mg oral tablet: 1 tab(s) orally once a day  Multiple Vitamins oral tablet: 1 tab(s) orally once a day  OxyCONTIN 10 mg oral tablet, extended release: 1 tab(s) orally 3 times a day  pantoprazole 40 mg oral delayed release tablet: 1 tab(s) orally 2 times a day   aspirin 81 mg oral delayed release tablet: 1 tab(s) orally once a day  atorvastatin 40 mg oral tablet: 1 tab(s) orally once a day (at bedtime)  Cardiac Rehab: 2-3 times a week for 12 weeks  carvedilol 3.125 mg oral tablet: 1 tab(s) orally every 12 hours MDD: 2  clopidogrel 75 mg oral tablet: 1 tab(s) orally once a day  Lyrica 100 mg oral capsule: 1 cap(s) orally 2 times a day  Melatonin 10 mg oral tablet: 1 tab(s) orally once a day  Multiple Vitamins oral tablet: 1 tab(s) orally once a day  OxyCONTIN 10 mg oral tablet, extended release: 1 tab(s) orally 3 times a day  pantoprazole 40 mg oral delayed release tablet: 1 tab(s) orally 2 times a day

## 2025-02-28 NOTE — DISCHARGE NOTE PROVIDER - NSDCCPCAREPLAN_GEN_ALL_CORE_FT
PRINCIPAL DISCHARGE DIAGNOSIS  Diagnosis: CAD (coronary artery disease)  Assessment and Plan of Treatment: You have a history of coronary artery disease. Your disease is stable and you do not exhibit any symptoms such as chest pain or shortness of breath on exertion. Please continue to take your prescribed medication. Maintain a low fat and low sugar diet. Please follow up with your primary care physician/Cardiologist routinely to monitor your lipid profile, blood pressure, and blood sugar.

## 2025-02-28 NOTE — DISCHARGE NOTE PROVIDER - HOSPITAL COURSE
80 year old male underwent a LHC on 2/27 s/p BENJAMIN x1 to mLAD via RRA, POBA to D1 via RFA (view catheterization report for full details). Post procedure, radial band/femoral sheath was removed according to protocol without complication. Patient remained hemodynamically stable, neurovascularly intact and chest pain free. Patient voided and ambulated and had no EKG changes post procedure.  He remained overnight for observation and pain control.  The remainder of his hospitalization was uneventful.  He was provided education regarding DAPT/statin therapy, as well as post procedure wound care instructions.  He will follow up with his private cardiologist in 2 weeks and advised to return to ER with any concerning symptoms.

## 2025-02-28 NOTE — DISCHARGE NOTE PROVIDER - PROVIDER TOKENS
PROVIDER:[TOKEN:[[id number]],FOLLOWUP:[2 weeks]] PROVIDER:[TOKEN:[[id number]],FOLLOWUP:[2 weeks]],PROVIDER:[TOKEN:[[id number]],FOLLOWUP:[2 weeks]]

## 2025-02-28 NOTE — DISCHARGE NOTE PROVIDER - NSDCQMPCI_CARD_ALL_CORE
"ASSESSMENT AND PLAN:  Evaristo Lindsey 68 y.o. male is seen here on 05/09/19 for follow-up of a syndrome with features of systemic inflammatory process, myalgias, arthralgias headache acute phase response exquisite lead responsive to prednisone, but intermittent, was asymptomatic and when last seen here in October 2018 with normal sed rate and CRP, serologically no evidence of autoimmunity.  He continues to do well.  He has not had recurrence.  He is asymptomatic.  The exact characterization of that syndrome remains unknown.  During his first visit we had talked about periodic fevers.  As he is doing so well, without any anti-inflammatories or prednisone he will return for follow-up here on as-needed basis.  Diagnoses and all orders for this visit:    Polymyalgia (H)      HISTORY OF PRESENTING ILLNESS:  Evaristo Lindsey, 68 y.o., male is here for follow-up of visit here in October 2018 which was for evaluation of pain.  He reports that it was in the end of August around August 27 that he started feeling unwell.  This includes pain and stiffness across his shoulders, upper and lower extremities torso.  It feels to him as if he has been \"hit by a truck\".  Typically begins with the headache.  This comes on fairly quickly.  He was seen at his primary physician's office.  His sed rate and CRP were elevated, sed rate was 43.  He was given a course of prednisone.  Within 24 hours he felt back to normal self.  As he ran out of the prednisone his symptoms began to repeat reappeared.  By about a week or so after he finished prednisone he was back to where he started.  He was given another course of prednisone this time on 9/14/2018 for the next 4-5 days and once again he responded as briskly as he had done in the past.  Today he feels rather well and himself.  He did not observe swelling in any of the joints of the hands or wrists the elbows knees ankles during this time.  Interestingly he noted that the cycle of symptoms "
Results reviewed, no further treatment needed.
seems to have happened periodically.  This he feels is gone on over the past several years may be even 8-9 years.  He thought this typically happens in the fall season.  His wife thought that she recalls him having symptoms like this in other types of other times of the year such as in the spring or even summer.  A question of whether this may be Lyme disease has been entertained in the past.  The headaches also disappeared there is no history of double vision there is been no history suggestive of jaw claudication.  Now he noted pain level to be 0.  There is no stiffness in the mornings he can do whatever he wants to do.  During these episodes he feels feverish at times he has been noticed to have night sweats.  He would check his temperature which would be normal however.  He is not a smoker does not take alcohol.  He had cholecystectomy in 2013 approximately.  He has sleep apnea.  There is no personal family history of psoriasis ulcerative colitis Crohn's disease.  There is no family history of lupus or rheumatoid arthritis.  He has family history of gout in his father and brother he and he himself has been diagnosed to have gout recently started on allopurinol and colchicine and finding them helpful.    Further historical information and ADL limitations as noted in the multidimensional health assessment questionnaire attached in the EMR.   ALLERGIES:Patient has no known allergies.    PAST MEDICAL/ACTIVE PROBLEMS/MEDICATION/ FAMILY HISTORY/SOCIAL DATA:  The patient has a family history of  Past Medical History:   Diagnosis Date     Cholelithiasis      Hyperlipidemia      Impaired fasting glucose      Kidney stone     30 yrs ago     Prehypertension      Sleep apnea     CPAP     Social History     Tobacco Use   Smoking Status Never Smoker   Smokeless Tobacco Never Used     Patient Active Problem List   Diagnosis     Acrochordon     Impaired Fasting Glucose     MANUEL (obstructive sleep apnea)     Hyperlipidemia     
Obstructive sleep apnea     Polymyalgia (H)     Polyarthralgia     Current Outpatient Medications   Medication Sig Dispense Refill     allopurinol (ZYLOPRIM) 100 MG tablet Take 2 tablets (200 mg total) by mouth daily. 180 tablet 1     aspirin 81 MG EC tablet Take 81 mg by mouth daily.       atorvastatin (LIPITOR) 10 MG tablet Take 1 tablet (10 mg total) by mouth daily. 90 tablet 3     colchicine 0.6 mg tablet Take 1 tablet (0.6 mg total) by mouth daily. 30 tablet 5     FOLIC ACID/MULTIVITS-MIN/LUT (CENTRUM SILVER ORAL) Take 1 tablet by mouth daily.        naproxen (NAPROSYN) 250 MG tablet Initially, take 3 tabs (750 mg) x 1 dose by mouth for pain then 250 mg every 8 hours as needed until attack resolves. 12 tablet 0     No current facility-administered medications for this visit.      DETAILED EXAMINATION  05/09/19  :  Vitals:    05/09/19 1133   BP: 128/88   Pulse: 64   Weight: (!) 255 lb (115.7 kg)     Alert oriented. Head including the face is examined for malar rash, heliotropes, scarring, lupus pernio. Eyes examined for redness such as in episcleritis/scleritis, periorbital lesions.   Neck/ Face examined for parotid gland swelling, range of motion of neck.  Left upper and lower and right upper and lower extremities examined for tenderness, swelling, warmth of the appendicular joints, range of motion, edema, rash.  Some of the important findings included: He does not have evidence of synovitis in any of the palpable joints of the upper extremities.  No significant deformities of the digits.  No JLT effusion or warmth of the knees.           LAB / IMAGING DATA:  ALT   Date Value Ref Range Status   10/10/2018 18 0 - 45 U/L Final   09/13/2018 19 0 - 45 U/L Final   07/10/2018 20 0 - 45 U/L Final     Albumin   Date Value Ref Range Status   10/10/2018 4.0 3.5 - 5.0 g/dL Final   09/13/2018 3.9 3.5 - 5.0 g/dL Final   07/10/2018 4.3 3.5 - 5.0 g/dL Final     Creatinine   Date Value Ref Range Status   10/10/2018 1.06 0.70 - 
1.30 mg/dL Final   09/13/2018 1.19 0.70 - 1.30 mg/dL Final   07/10/2018 1.11 0.70 - 1.30 mg/dL Final       WBC   Date Value Ref Range Status   10/10/2018 5.3 4.0 - 11.0 thou/uL Final   10/02/2018 5.1 4.0 - 11.0 thou/uL Final   09/21/2015 5.7 4.0 - 11.0 thou/uL Final     Hemoglobin   Date Value Ref Range Status   10/10/2018 14.4 14.0 - 18.0 g/dL Final   10/02/2018 14.1 14.0 - 18.0 g/dL Final   09/13/2018 15.2 14.0 - 18.0 g/dL Final     Platelets   Date Value Ref Range Status   10/10/2018 271 140 - 440 thou/uL Final   10/02/2018 221 140 - 440 thou/uL Final   09/13/2018 255 140 - 440 thou/uL Final       Lab Results   Component Value Date    RF <15.0 09/04/2018    SEDRATE 14 10/10/2018          
Yes...

## 2025-02-28 NOTE — PROVIDER CONTACT NOTE (OTHER) - ASSESSMENT
AO4, Pt asymptomatic, no complaints of SOB, chest pain, dizziness, or palpitations.
A&O x4.  Patient asleep and asymptomatic.  Denies CP, palpitations, SOB, discomfort
A&O x4.  Patient asleep and asymptomatic.  Denies CP, palpitations, SOB, discomfort.  VS at 06:00: 121/61, HR 61.  Carvedilol and Potassium Phosphate Powder already given.

## 2025-02-28 NOTE — DISCHARGE NOTE PROVIDER - CARE PROVIDER_API CALL
Yon Peña.  Cardiology  [address] [place] [address]  Phone: [phone]  Fax: [phone]  Follow Up Time: 2 weeks   Yon Peña  Cardiology  [address] [place] [address]  Phone: [phone]  Fax: [phone]  Follow Up Time: 2 weeks    Feliz Linares  Interventional Cardiology  [address] [place] [address]  Phone: [phone]  Fax: [phone]  Follow Up Time: 2 weeks

## 2025-02-28 NOTE — DISCHARGE NOTE PROVIDER - CARE PROVIDERS DIRECT ADDRESSES
,DirectAddress_Unknown ,DirectAddress_Unknown,[email or web address]

## 2025-02-28 NOTE — PROGRESS NOTE ADULT - SUBJECTIVE AND OBJECTIVE BOX
[facility] INVASIVE CARDIOLOGY- (Anthony, Batsheva, Brayden, Milagros, Anna, Shiela, Alphonse, Chance, Librado)   CARDIAC CATH LAB, ACP TEAM   [phone]    CHIEF COMPLAINT: Patient is a 80y old male who presents with a chief complaint of chest pain    HPI: 80M who underwent PCI on 2/13 to RCA and LCx while admitted for monitoring due to CP and returns today for planned staged PCI to LAD with Dr Linares.  Patient with PMH arthritis, Raynaud's, fibromyalgia,  prostate CA (s/p radiation 2015), anxiety, depression, concussion, diverticulitis, [place] b/l (hearing aids), MARIAMA (w/o tx), ALEJANDRA,  admited 11/2023 for evaluation of chest pain presumed 2/2 esophagitis, gastric ulcers visualized on EGD (dc'd for GI f/u), Presented to ED on 2/12/24 w/CP (initially to CDU, then admited to inpatient floor). AOx4, reports baseline health (which does not include CP) when approximately 02:00 2/12 AM he developed mid sternal CP which caused him to awake, of "burning" quality similar to prior CP prompting hospital admission 2023 though less severe in the current instance, w/o radiation, intermittently occurring for short periods prompting ED presentation. Denies exertional worsening, or associated sx such as HA, lightheadedness, dizziness, visual disturbance, diaphoresis, palpitations, cough, SOB, abd pain, n/v/d, melena, hematochezia, dysuria, hematuria, or other complaint. Denies recent travel, prolonged immobility, sick contact. Currently upon my evaluation, he is asymptomatic (which he attributes temporally to maalox, pepcid use in ED).     Subjective/Observations: Patient seen and examined.  Denies chest pain, dyspena, dizziness, palpitations, N&V, HA, upper/lower extremity pain, numbness/tingling    Review of Systems all WNL except below indicated:    Constitutional: [ ] Fever [ ] Chills [ ] Fatigue [ ] Weight change   HEENT: [ ] Blurred vision [ ] Eye Pain [ ] Headache [ ] Runny nose [ ] Sore Throat   Respiratory: [ ] Cough [ ] Wheezing [ ] Shortness of breath  Cardiovascular: [ ] Chest Pain [ ] Palpitations [ ] MOORE [ ] PND [ ] Orthopnea  Gastrointestinal: [ ] Abdominal Pain [ ] Diarrhea [ ] Constipation [ ] Hemorrhoids [ ] Nausea [ ] Vomiting  Genitourinary: [ ] Nocturia [ ] Dysuria [ ] Incontinence  Extremities: [ ] Swelling [ ] Joint Pain  Neurologic: [ ] Focal deficit [ ] Paresthesias [ ] Syncope  Lymphatic: [ ] Swelling [ ] Lymphadenopathy   Skin: [ ] Rash [ ] Ecchymoses [ ] Wounds [ ] Lesions  Psychiatry: [ ] Depression [ ] Suicidal/Homicidal Ideation [ ] Anxiety [ ] Sleep Disturbances  [ ] 10 point review of systems is otherwise negative except as mentioned above            [ ]Unable to obtain    PAST MEDICAL & SURGICAL HISTORY:  Prostate cancer  s/p radiation 2015    Concussion  "I fell backwards on a hardwood floor"-10/2015    Eye abnormality  "I had a bleed in the back of the eye"-left, 2014    Anxiety and depression  no medication    Hearing loss  b/l hearing aids    MARIAMA (obstructive sleep apnea)  sleep study 8-12-17,no treatment    PND (post-nasal drip)    DJD (degenerative joint disease)  traction in back 15 years ago x 10 days    TMJ (temporomandibular joint disorder)  right pt with clicking x 1 month    Diverticulitis  treated 2007    Osteoarthritis    Nocturia  x 2-3    Urinary frequency    Lumbar spinal stenosis    S/P rotator cuff surgery  right repair open 1-2017    S/P sinus surgery  1981    S/P carpal tunnel release  bilaal 9/2017    History of left hip replacement  2013    History of right hip replacement  2014    H/O total knee replacement, right  2016    H/O laminectomy    MEDICATIONS  (STANDING):  aspirin enteric coated 81 milliGRAM(s) Oral daily  atorvastatin 40 milliGRAM(s) Oral at bedtime  carvedilol 3.125 milliGRAM(s) Oral every 12 hours  clopidogrel Tablet 75 milliGRAM(s) Oral daily  melatonin 5 milliGRAM(s) Oral at bedtime  multivitamin 1 Tablet(s) Oral daily  oxyCODONE  ER Tablet 10 milliGRAM(s) Oral every 8 hours  pantoprazole    Tablet 40 milliGRAM(s) Oral before breakfast  pregabalin 100 milliGRAM(s) Oral two times a day  sodium chloride 0.9%. 1000 milliLiter(s) (100 mL/Hr) IV Continuous <Continuous>    MEDICATIONS  (PRN):    Allergies    Keflex (Rash)    Intolerances    Vital Signs Last 24 Hrs  T(C): 36.7 (27 Feb 2025 21:17), Max: 36.7 (27 Feb 2025 21:17)  T(F): 98 (27 Feb 2025 21:17), Max: 98 (27 Feb 2025 21:17)  HR: 55 (27 Feb 2025 21:17) (54 - 66)  BP: 122/66 (27 Feb 2025 21:17) (122/66 - 149/79)  BP(mean): 89 (27 Feb 2025 21:17) (89 - 107)  RR: 17 (27 Feb 2025 21:17) (14 - 17)  SpO2: 96% (27 Feb 2025 21:17) (92% - 99%)    Parameters below as of 27 Feb 2025 21:17  Patient On (Oxygen Delivery Method): room air    I&O's Summary    27 Feb 2025 07:01  -  28 Feb 2025 00:43  --------------------------------------------------------  IN: 120 mL / OUT: 700 mL / NET: -580 mL      Weight (kg): 66.2 (02-27 @ 09:56)    FOCUSED PHYSICAL EXAM:  Pulmonary: Non-labored, breath sounds are clear bilaterally, No wheezing, rales or rhonchi  Cardiovascular: Regular, S1 and S2, No murmurs, rubs, gallops or clicks  Cath site: Right radial stable w/o bleeding or hematoma, soft, + pulses     LABS: All Labs Reviewed:                        13.0   6.35  )-----------( 168      ( 27 Feb 2025 10:09 )             39.6     27 Feb 2025 10:09    141    |  109    |  24     ----------------------------<  96     4.1     |  23     |  0.69     Ca    8.9        27 Feb 2025 10:09    RESULTS:    ECHO:   1. Left ventricular cavity is normal in size. Left ventricular wall thickness is normal. Left ventricular systolic function is normal with an ejection fraction of 61 % by Mascorro's method of disks. There are no regional wall motion abnormalities seen.   2. Normal left ventricular diastolic function, with normal left ventricular filling pressure.   3. Normal right ventricular cavity size and normal right ventricular systolic function.   4. Normal left and right atrial size.   5. Trileaflet aortic valve with normal systolic excursion. There is mild calcification of the aortic valve leaflets.   6. Mild aortic regurgitation.   7. Prolapse of the posterior mitral leaflet.   8. Mild to moderate mitral regurgitation.   9. No pericardial effusion seen.  10. Compared to the transthoracic echocardiogram performed on 11/28/2023, Slight worsening of the mitral valve regurgitation    STRESS TEST:  Nuclear Pharmacologic Stress    1. Myocardial Perfusion: Abnormal.   2. Perfusion Findings: There is a large-sized, moderate, partially reversible defect in the mid to basal anterior wall consistent with infarct with mild to moderate [place]-infarct ischemia. There is a large-sized, moderate to severe, fixed defect in the inferolateral wall consistent with infarct.   3. Hypokinesis of the inferolateral and mid to basal anterior walls.   4. The post stress left ventricular EF is 56 %. The stress end diastolic volume is 87 ml and systolic volume is 38 ml.   5. Stress electrocardiogram: No significant ischemic ST segment changes beyond baseline abnormalities.    CATH REPORT:  Study Date:     02/13/2025   Cath Lab Report    Diagnostic Cardiologist:       Feliz Linares MD   Conclusions:   Right dominant coronary anatomy with severe RCA, LAD and LCx stenosis  (low SYNTAX score)  Successful PCI with BENJAMIN to RCA stenosis    Successful PCI with BENJAMIN to LCx    Will stage PCI to LAD in a few weeks  DAPT for at least 6 months    Acute complication:    No complications

## 2025-02-28 NOTE — DISCHARGE NOTE NURSING/CASE MANAGEMENT/SOCIAL WORK - PATIENT PORTAL LINK FT
You can access the FollowMyHealth Patient Portal offered by [facility] by registering at the following website: [email or web address]. By joining [employer]s FollowMyHealth portal, you will also be able to view your health information using other applications (apps) compatible with our system.

## 2025-02-28 NOTE — PROVIDER CONTACT NOTE (OTHER) - REASON
Patient had 12 beats WCT followed by 5 beats AIVR
Patient had 4 beats WCT on tele
Patient had 5 beats WCT

## 2025-02-28 NOTE — DISCHARGE NOTE NURSING/CASE MANAGEMENT/SOCIAL WORK - NSDCVIVACCINE_GEN_ALL_CORE_FT
Tdap; 19-Nov-2019 22:36; Jenifer Mata (TINO); [employer]; [id number] (Exp. Date: 21-Sep-2021); IntraMuscular; Deltoid Left.; 0.5 milliLiter(s); VIS (VIS Published: 09-May-2013, VIS Presented: 19-Nov-2019);

## 2025-02-28 NOTE — PROVIDER CONTACT NOTE (OTHER) - SITUATION
Patient had 4 beats WCT on tele
Patient had 12 beats WCT followed by 5 beats AIVR
Patient had 5 beats WCT

## 2025-02-28 NOTE — DISCHARGE NOTE NURSING/CASE MANAGEMENT/SOCIAL WORK - FINANCIAL ASSISTANCE
[facility] provides services at a reduced cost to those who are determined to be eligible through [facility]’s financial assistance program. Information regarding [facility]’s financial assistance program can be found by going to [email or web address] or by calling [phone].

## 2025-03-07 ENCOUNTER — APPOINTMENT (OUTPATIENT)
Dept: PHARMACY | Facility: CLINIC | Age: 80
End: 2025-03-07
Payer: SELF-PAY

## 2025-03-07 PROCEDURE — V5299A: CUSTOM

## 2025-03-17 ENCOUNTER — NON-APPOINTMENT (OUTPATIENT)
Age: 80
End: 2025-03-17

## 2025-03-17 ENCOUNTER — APPOINTMENT (OUTPATIENT)
Dept: CARDIOLOGY | Facility: CLINIC | Age: 80
End: 2025-03-17

## 2025-03-17 VITALS
BODY MASS INDEX: 24.32 KG/M2 | WEIGHT: 146 LBS | OXYGEN SATURATION: 100 % | HEIGHT: 65 IN | SYSTOLIC BLOOD PRESSURE: 135 MMHG | DIASTOLIC BLOOD PRESSURE: 81 MMHG | HEART RATE: 67 BPM

## 2025-03-17 DIAGNOSIS — Z95.5 PRESENCE OF CORONARY ANGIOPLASTY IMPLANT AND GRAFT: ICD-10-CM

## 2025-03-17 DIAGNOSIS — G47.00 INSOMNIA, UNSPECIFIED: ICD-10-CM

## 2025-03-17 DIAGNOSIS — I25.10 ATHEROSCLEROTIC HEART DISEASE OF NATIVE CORONARY ARTERY W/OUT ANGINA PECTORIS: ICD-10-CM

## 2025-03-17 PROCEDURE — 93000 ELECTROCARDIOGRAM COMPLETE: CPT

## 2025-03-17 PROCEDURE — G2211 COMPLEX E/M VISIT ADD ON: CPT

## 2025-03-17 PROCEDURE — 99214 OFFICE O/P EST MOD 30 MIN: CPT

## 2025-03-17 RX ORDER — ASPIRIN 81 MG/1
81 TABLET ORAL
Qty: 90 | Refills: 3 | Status: ACTIVE | COMMUNITY

## 2025-03-17 RX ORDER — ATORVASTATIN CALCIUM 40 MG/1
40 TABLET, FILM COATED ORAL
Qty: 90 | Refills: 1 | Status: ACTIVE | COMMUNITY
Start: 1900-01-01 | End: 1900-01-01

## 2025-03-17 RX ORDER — PANTOPRAZOLE 40 MG/1
40 TABLET, DELAYED RELEASE ORAL
Qty: 30 | Refills: 1 | Status: ACTIVE | COMMUNITY

## 2025-03-17 RX ORDER — CLOPIDOGREL BISULFATE 75 MG/1
75 TABLET, FILM COATED ORAL
Qty: 90 | Refills: 3 | Status: ACTIVE | COMMUNITY
Start: 1900-01-01 | End: 1900-01-01

## 2025-03-17 RX ORDER — CARVEDILOL 3.12 MG/1
3.12 TABLET, FILM COATED ORAL
Refills: 0 | Status: ACTIVE | COMMUNITY

## 2025-03-17 RX ORDER — ELECTROLYTES/DEXTROSE
10 SOLUTION, ORAL ORAL
Refills: 0 | Status: ACTIVE | COMMUNITY

## 2025-03-22 ENCOUNTER — INPATIENT (INPATIENT)
Facility: HOSPITAL | Age: 80
LOS: 2 days | Discharge: HOME HEALTH | DRG: 871 | End: 2025-03-25
Attending: INTERNAL MEDICINE | Admitting: INTERNAL MEDICINE
Payer: MEDICARE

## 2025-03-22 VITALS
SYSTOLIC BLOOD PRESSURE: 97 MMHG | OXYGEN SATURATION: 95 % | WEIGHT: 145.06 LBS | RESPIRATION RATE: 22 BRPM | TEMPERATURE: 99 F | HEART RATE: 91 BPM | HEIGHT: 65 IN | DIASTOLIC BLOOD PRESSURE: 60 MMHG

## 2025-03-22 DIAGNOSIS — Z96.642 PRESENCE OF LEFT ARTIFICIAL HIP JOINT: Chronic | ICD-10-CM

## 2025-03-22 DIAGNOSIS — I25.10 ATHEROSCLEROTIC HEART DISEASE OF NATIVE CORONARY ARTERY WITHOUT ANGINA PECTORIS: ICD-10-CM

## 2025-03-22 DIAGNOSIS — J18.9 PNEUMONIA, UNSPECIFIED ORGANISM: ICD-10-CM

## 2025-03-22 DIAGNOSIS — Z98.890 OTHER SPECIFIED POSTPROCEDURAL STATES: Chronic | ICD-10-CM

## 2025-03-22 DIAGNOSIS — R74.8 ABNORMAL LEVELS OF OTHER SERUM ENZYMES: ICD-10-CM

## 2025-03-22 DIAGNOSIS — Z29.9 ENCOUNTER FOR PROPHYLACTIC MEASURES, UNSPECIFIED: ICD-10-CM

## 2025-03-22 DIAGNOSIS — Z96.641 PRESENCE OF RIGHT ARTIFICIAL HIP JOINT: Chronic | ICD-10-CM

## 2025-03-22 DIAGNOSIS — Z96.651 PRESENCE OF RIGHT ARTIFICIAL KNEE JOINT: Chronic | ICD-10-CM

## 2025-03-22 LAB
ALBUMIN SERPL ELPH-MCNC: 3.5 G/DL — SIGNIFICANT CHANGE UP (ref 3.3–5)
ALP SERPL-CCNC: 114 U/L — SIGNIFICANT CHANGE UP (ref 40–120)
ALT FLD-CCNC: 49 U/L — HIGH (ref 10–45)
ANION GAP SERPL CALC-SCNC: 13 MMOL/L — SIGNIFICANT CHANGE UP (ref 5–17)
APTT BLD: 30.2 SEC — SIGNIFICANT CHANGE UP (ref 24.5–35.6)
AST SERPL-CCNC: 56 U/L — HIGH (ref 10–40)
BASOPHILS # BLD AUTO: 0 K/UL — SIGNIFICANT CHANGE UP (ref 0–0.2)
BASOPHILS NFR BLD AUTO: 0 % — SIGNIFICANT CHANGE UP (ref 0–2)
BILIRUB SERPL-MCNC: 1 MG/DL — SIGNIFICANT CHANGE UP (ref 0.2–1.2)
BUN SERPL-MCNC: 20 MG/DL — SIGNIFICANT CHANGE UP (ref 7–23)
CALCIUM SERPL-MCNC: 9.2 MG/DL — SIGNIFICANT CHANGE UP (ref 8.4–10.5)
CHLORIDE SERPL-SCNC: 101 MMOL/L — SIGNIFICANT CHANGE UP (ref 96–108)
CO2 SERPL-SCNC: 24 MMOL/L — SIGNIFICANT CHANGE UP (ref 22–31)
CREAT SERPL-MCNC: 0.89 MG/DL — SIGNIFICANT CHANGE UP (ref 0.5–1.3)
DACRYOCYTES BLD QL SMEAR: SLIGHT — SIGNIFICANT CHANGE UP
EGFR: 88 ML/MIN/1.73M2 — SIGNIFICANT CHANGE UP
EGFR: 88 ML/MIN/1.73M2 — SIGNIFICANT CHANGE UP
ELLIPTOCYTES BLD QL SMEAR: SLIGHT — SIGNIFICANT CHANGE UP
EOSINOPHIL # BLD AUTO: 0.2 K/UL — SIGNIFICANT CHANGE UP (ref 0–0.5)
EOSINOPHIL NFR BLD AUTO: 1.7 % — SIGNIFICANT CHANGE UP (ref 0–6)
FLUAV AG NPH QL: SIGNIFICANT CHANGE UP
FLUBV AG NPH QL: SIGNIFICANT CHANGE UP
GLUCOSE SERPL-MCNC: 133 MG/DL — HIGH (ref 70–99)
HCT VFR BLD CALC: 39.2 % — SIGNIFICANT CHANGE UP (ref 39–50)
HGB BLD-MCNC: 13 G/DL — SIGNIFICANT CHANGE UP (ref 13–17)
INR BLD: 1.33 RATIO — HIGH (ref 0.85–1.16)
LYMPHOCYTES # BLD AUTO: 0.62 K/UL — LOW (ref 1–3.3)
LYMPHOCYTES # BLD AUTO: 5.2 % — LOW (ref 13–44)
MAGNESIUM SERPL-MCNC: 2 MG/DL — SIGNIFICANT CHANGE UP (ref 1.6–2.6)
MANUAL SMEAR VERIFICATION: SIGNIFICANT CHANGE UP
MCHC RBC-ENTMCNC: 30.7 PG — SIGNIFICANT CHANGE UP (ref 27–34)
MCHC RBC-ENTMCNC: 33.2 G/DL — SIGNIFICANT CHANGE UP (ref 32–36)
MCV RBC AUTO: 92.7 FL — SIGNIFICANT CHANGE UP (ref 80–100)
MONOCYTES # BLD AUTO: 0.42 K/UL — SIGNIFICANT CHANGE UP (ref 0–0.9)
MONOCYTES NFR BLD AUTO: 3.5 % — SIGNIFICANT CHANGE UP (ref 2–14)
NEUTROPHILS # BLD AUTO: 10.76 K/UL — HIGH (ref 1.8–7.4)
NEUTROPHILS NFR BLD AUTO: 89.6 % — HIGH (ref 43–77)
NT-PROBNP SERPL-SCNC: 1651 PG/ML — HIGH (ref 0–300)
PLAT MORPH BLD: NORMAL — SIGNIFICANT CHANGE UP
PLATELET # BLD AUTO: 139 K/UL — LOW (ref 150–400)
POIKILOCYTOSIS BLD QL AUTO: SLIGHT — SIGNIFICANT CHANGE UP
POTASSIUM SERPL-MCNC: 4.3 MMOL/L — SIGNIFICANT CHANGE UP (ref 3.5–5.3)
POTASSIUM SERPL-SCNC: 4.3 MMOL/L — SIGNIFICANT CHANGE UP (ref 3.5–5.3)
PROT SERPL-MCNC: 7.1 G/DL — SIGNIFICANT CHANGE UP (ref 6–8.3)
PROTHROM AB SERPL-ACNC: 15.1 SEC — HIGH (ref 9.9–13.4)
RBC # BLD: 4.23 M/UL — SIGNIFICANT CHANGE UP (ref 4.2–5.8)
RBC # FLD: 16.1 % — HIGH (ref 10.3–14.5)
RBC BLD AUTO: ABNORMAL
RSV RNA NPH QL NAA+NON-PROBE: SIGNIFICANT CHANGE UP
SARS-COV-2 RNA SPEC QL NAA+PROBE: SIGNIFICANT CHANGE UP
SODIUM SERPL-SCNC: 138 MMOL/L — SIGNIFICANT CHANGE UP (ref 135–145)
SOURCE RESPIRATORY: SIGNIFICANT CHANGE UP
TROPONIN T, HIGH SENSITIVITY RESULT: 24 NG/L — SIGNIFICANT CHANGE UP (ref 0–51)
TROPONIN T, HIGH SENSITIVITY RESULT: 28 NG/L — SIGNIFICANT CHANGE UP (ref 0–51)
WBC # BLD: 12.01 K/UL — HIGH (ref 3.8–10.5)
WBC # FLD AUTO: 12.01 K/UL — HIGH (ref 3.8–10.5)

## 2025-03-22 PROCEDURE — 93010 ELECTROCARDIOGRAM REPORT: CPT

## 2025-03-22 PROCEDURE — 99285 EMERGENCY DEPT VISIT HI MDM: CPT | Mod: GC

## 2025-03-22 PROCEDURE — 71046 X-RAY EXAM CHEST 2 VIEWS: CPT | Mod: 26

## 2025-03-22 PROCEDURE — 93970 EXTREMITY STUDY: CPT | Mod: 26

## 2025-03-22 PROCEDURE — 71275 CT ANGIOGRAPHY CHEST: CPT | Mod: 26

## 2025-03-22 PROCEDURE — 99223 1ST HOSP IP/OBS HIGH 75: CPT

## 2025-03-22 RX ORDER — PREGABALIN 75 MG/1
1 CAPSULE ORAL
Refills: 0 | DISCHARGE

## 2025-03-22 RX ORDER — ACETYLCYSTEINE 200 MG/ML
4 INHALANT RESPIRATORY (INHALATION) ONCE
Refills: 0 | Status: COMPLETED | OUTPATIENT
Start: 2025-03-22 | End: 2025-03-22

## 2025-03-22 RX ORDER — OXYCODONE HYDROCHLORIDE 30 MG/1
1 TABLET ORAL
Refills: 0 | DISCHARGE

## 2025-03-22 RX ORDER — ATORVASTATIN CALCIUM 80 MG/1
1 TABLET, FILM COATED ORAL
Refills: 0 | DISCHARGE

## 2025-03-22 RX ORDER — TRAMADOL HYDROCHLORIDE AND ACETAMINOPHEN 37.5; 325 MG/1; MG/1
1 TABLET ORAL EVERY 6 HOURS
Refills: 0 | Status: DISCONTINUED | OUTPATIENT
Start: 2025-03-22 | End: 2025-03-25

## 2025-03-22 RX ORDER — PIPERACILLIN-TAZO-DEXTROSE,ISO 3.375G/5
3.38 IV SOLUTION, PIGGYBACK PREMIX FROZEN(ML) INTRAVENOUS ONCE
Refills: 0 | Status: COMPLETED | OUTPATIENT
Start: 2025-03-22 | End: 2025-03-22

## 2025-03-22 RX ORDER — AZITHROMYCIN 250 MG
500 CAPSULE ORAL DAILY
Refills: 0 | Status: COMPLETED | OUTPATIENT
Start: 2025-03-22 | End: 2025-03-24

## 2025-03-22 RX ORDER — CARVEDILOL 3.12 MG/1
3.12 TABLET, FILM COATED ORAL EVERY 12 HOURS
Refills: 0 | Status: DISCONTINUED | OUTPATIENT
Start: 2025-03-22 | End: 2025-03-23

## 2025-03-22 RX ORDER — ATORVASTATIN CALCIUM 80 MG/1
40 TABLET, FILM COATED ORAL AT BEDTIME
Refills: 0 | Status: DISCONTINUED | OUTPATIENT
Start: 2025-03-22 | End: 2025-03-25

## 2025-03-22 RX ORDER — ASPIRIN 325 MG
1 TABLET ORAL
Refills: 0 | DISCHARGE

## 2025-03-22 RX ORDER — MAGNESIUM, ALUMINUM HYDROXIDE 200-200 MG
30 TABLET,CHEWABLE ORAL EVERY 4 HOURS
Refills: 0 | Status: DISCONTINUED | OUTPATIENT
Start: 2025-03-22 | End: 2025-03-25

## 2025-03-22 RX ORDER — MELATONIN 5 MG
3 TABLET ORAL AT BEDTIME
Refills: 0 | Status: DISCONTINUED | OUTPATIENT
Start: 2025-03-22 | End: 2025-03-25

## 2025-03-22 RX ORDER — ACETAMINOPHEN 500 MG/5ML
650 LIQUID (ML) ORAL EVERY 6 HOURS
Refills: 0 | Status: DISCONTINUED | OUTPATIENT
Start: 2025-03-22 | End: 2025-03-25

## 2025-03-22 RX ORDER — ENOXAPARIN SODIUM 100 MG/ML
40 INJECTION SUBCUTANEOUS EVERY 24 HOURS
Refills: 0 | Status: DISCONTINUED | OUTPATIENT
Start: 2025-03-22 | End: 2025-03-25

## 2025-03-22 RX ORDER — ASPIRIN 325 MG
81 TABLET ORAL DAILY
Refills: 0 | Status: DISCONTINUED | OUTPATIENT
Start: 2025-03-22 | End: 2025-03-25

## 2025-03-22 RX ORDER — CLOPIDOGREL BISULFATE 75 MG/1
75 TABLET, FILM COATED ORAL DAILY
Refills: 0 | Status: DISCONTINUED | OUTPATIENT
Start: 2025-03-22 | End: 2025-03-25

## 2025-03-22 RX ORDER — ALBUTEROL SULFATE 2.5 MG/3ML
2.5 VIAL, NEBULIZER (ML) INHALATION ONCE
Refills: 0 | Status: COMPLETED | OUTPATIENT
Start: 2025-03-22 | End: 2025-03-22

## 2025-03-22 RX ORDER — PREGABALIN 50 MG/1
1 CAPSULE ORAL
Refills: 0 | DISCHARGE

## 2025-03-22 RX ORDER — OXYCODONE HYDROCHLORIDE 30 MG/1
10 TABLET ORAL THREE TIMES A DAY
Refills: 0 | Status: DISCONTINUED | OUTPATIENT
Start: 2025-03-22 | End: 2025-03-25

## 2025-03-22 RX ORDER — CLOPIDOGREL BISULFATE 75 MG/1
1 TABLET, FILM COATED ORAL
Refills: 0 | DISCHARGE

## 2025-03-22 RX ORDER — PIPERACILLIN-TAZO-DEXTROSE,ISO 3.375G/5
3.38 IV SOLUTION, PIGGYBACK PREMIX FROZEN(ML) INTRAVENOUS EVERY 8 HOURS
Refills: 0 | Status: DISCONTINUED | OUTPATIENT
Start: 2025-03-22 | End: 2025-03-25

## 2025-03-22 RX ORDER — ONDANSETRON HCL/PF 4 MG/2 ML
4 VIAL (ML) INJECTION EVERY 8 HOURS
Refills: 0 | Status: DISCONTINUED | OUTPATIENT
Start: 2025-03-22 | End: 2025-03-25

## 2025-03-22 RX ORDER — PREGABALIN 75 MG/1
100 CAPSULE ORAL THREE TIMES A DAY
Refills: 0 | Status: DISCONTINUED | OUTPATIENT
Start: 2025-03-22 | End: 2025-03-25

## 2025-03-22 RX ADMIN — OXYCODONE HYDROCHLORIDE 10 MILLIGRAM(S): 30 TABLET ORAL at 18:10

## 2025-03-22 RX ADMIN — Medication 2.5 MILLIGRAM(S): at 16:13

## 2025-03-22 RX ADMIN — OXYCODONE HYDROCHLORIDE 10 MILLIGRAM(S): 30 TABLET ORAL at 22:07

## 2025-03-22 RX ADMIN — Medication 25 GRAM(S): at 21:38

## 2025-03-22 RX ADMIN — Medication 40 MILLIGRAM(S): at 21:42

## 2025-03-22 RX ADMIN — ENOXAPARIN SODIUM 40 MILLIGRAM(S): 100 INJECTION SUBCUTANEOUS at 11:57

## 2025-03-22 RX ADMIN — CLOPIDOGREL BISULFATE 75 MILLIGRAM(S): 75 TABLET, FILM COATED ORAL at 21:36

## 2025-03-22 RX ADMIN — Medication 500 MILLIGRAM(S): at 11:56

## 2025-03-22 RX ADMIN — Medication 200 GRAM(S): at 09:39

## 2025-03-22 RX ADMIN — OXYCODONE HYDROCHLORIDE 10 MILLIGRAM(S): 30 TABLET ORAL at 21:37

## 2025-03-22 RX ADMIN — ATORVASTATIN CALCIUM 40 MILLIGRAM(S): 80 TABLET, FILM COATED ORAL at 21:36

## 2025-03-22 RX ADMIN — PREGABALIN 100 MILLIGRAM(S): 75 CAPSULE ORAL at 13:49

## 2025-03-22 RX ADMIN — Medication 3.38 GRAM(S): at 12:22

## 2025-03-22 RX ADMIN — Medication 25 GRAM(S): at 13:49

## 2025-03-22 RX ADMIN — Medication 81 MILLIGRAM(S): at 21:36

## 2025-03-22 RX ADMIN — OXYCODONE HYDROCHLORIDE 10 MILLIGRAM(S): 30 TABLET ORAL at 13:49

## 2025-03-22 RX ADMIN — ACETYLCYSTEINE 4 MILLILITER(S): 200 INHALANT RESPIRATORY (INHALATION) at 16:13

## 2025-03-22 RX ADMIN — PREGABALIN 100 MILLIGRAM(S): 75 CAPSULE ORAL at 21:37

## 2025-03-22 NOTE — ED PROVIDER NOTE - OBJECTIVE STATEMENT
80 male with hypercholesterolemia, osteoarthritis, Raynaud's, fibromyalgia, ischemic heart disease status post PCI–February 12 to RCA and LCx, February 27 to mid LAD and D1, patient on aspirin/plavix.  Patient now presents for right-sided chest pain.  Pain is constant, fluctuates in intensity, intensifies on deep breathing, is similar to prior presentations for chest pain.  Denies diaphoresis, neck stiffness, URI symptoms, PND/orthopnea, nausea/vomiting, changes in appetite, abdominal pain.  Also reports slight chronic left-sided calf pain. Never smoker

## 2025-03-22 NOTE — ED ADULT TRIAGE NOTE - PATIENT ON (OXYGEN DELIVERY METHOD)
Initial Treatment Date: 07-  Occupation:  for [employer]  Referred by: Lance Brooks MD  Primary Care Physician: Lance Brooks MD  Date informed consent signed:  07-  Visit Number of Current Episode: 10 (visits 10 and Expires 09-)  Length of Visit: 15 min.    Subjective:   Shaan is a 43 year old male, , who presents today for continued treatment of mid and lower back pain.  The lower back pain is less frequent mild, achy, tight and achy with less nerve complaints radiating into the anterior right thigh terminating above the knee.  Overall moderate to significant improvement and fluctuates between almost no pain and transiently and occasionally moderate pain.  Pain level at best now 1/10 which is much of the time.  Overall at a good stable plateau.    Objective findings   Mild palpatory tenderness is noted over the lumbar and bilateral sacroiliac joints at the PSIS level.  Mild grade joint restrictions are noted at the following levels: L1-S1 and at the bilateral sacroiliac joints. Muscle hypertonicity is graded at 2 or mild and is contributing to restricted mobility in the involved areas. These areas include the hamstrings, anterior pelvic and gluteal musculature as well as the thoracolumbar paraspinals.  Oswestry lower back pain questionnaire graded at 6% is considered a mild category rating.    07/12/2021 x-ray lumbar spine:  Mild degenerative disc narrowing L5-S1.    Assessment:  Lumbar facet pain, right lumbar radiculopathy, strain lumbar and SI joint dysfunction both.  Patient has all but met short-term and functional goals typically only very mild pain with very mild disability index rating.  Oswestry low back pain questionnaire started at 54% and is now 6% improving from severe to mild rating.  5% is considered a no disability rating.  Verbal pain scale originally started at 8/10 is now 1/10 but he does admit it fluctuates with some activities  moderately but only transiently typically resolves on its own to very mild.  Overall feeling very good and at a stable plateau.    Complicating Factors/Co-morbidities:   BMI over 38 and possible inguinal at or abdominal hernia    Working Diagnoses:      1. Lumbar facet joint pain    2. Right lumbar radiculopathy    3. Strain of lumbar paraspinal muscle, subsequent encounter    4. Sacroiliac joint dysfunction of both sides      Treatment today:   All joint restrictions in the lumbar and bilateral sacroiliac joints were treated today and the exact levels are listed in the objective section. These areas were adjusted today utilizing a gentle diversified technique to correct aberrant joint function and reduce scar tissue formation. This procedure was done without incident at the site(s) of restriction.    Therapeutic exercises including post isometric relaxation stretching was performed to the following areas: hamstrings, gluteal musculature, anterior pelvic musculature as well as the thoracic and lumbar paraspinals to improve range of motion, strength, balance and coordination to the involved sites for 8-15 minutes. Patient tolerated procedure well.    Plan:  Patient was treated for all remaining joint fixations and muscle hypertonicity but released from active care at a stable plateau point as all short-term and functional goals have significantly improved.     Patient Instruction/Education:   Patient advised to utilize ice or cold compresses over the involved regions at home to help reduce pain and inflammation as needed. Patient stated understanding of, and was in agreement with, the discussed instructions.    Other treatment options discussed with patient:  Further recommendations will be guided, in part, by the outcome of care. No further recommendations or referrals will be needed unless patient fails to adequately respond to conservative care.    Discharged from care:  Yes 09/09/21  Duration of treatment days:   41  Visit Count:  10  Acute/Chronic: Acute   Index Rating Used:  Oswestry Disability Index   Initial functional disability percentage:  54  Discharge functional disability percentage:  6  Primary Diagnosis:  Facet mediated low back pain  Complicating Factors:  Increased BMI/obesity  Initial pain ratin with 10 being worst.  Discharge pain ratin with 10 being worst.  Number of episodes in current year for this condition:  1               room air

## 2025-03-22 NOTE — ED PROVIDER NOTE - IV ALTEPLASE EXCL REL HIDDEN
Last saw Dr. Ruiz 4/7/18.  Cancelled 5/4/18 appointment.    Call name and provide number to call to reschedule.      
show

## 2025-03-22 NOTE — ED PROVIDER NOTE - NS ED MD TWO NIGHTS YN
Internal Medicine Progress note       Patient: Efrain Persaud Date:2021     : 1960 Attending: Isidro Morales MD   60 year old male 2021          CC:      Recent Events/Subjective:     PICC placed today, no complications  Up at bedside, eating dinner  Denies pain, shortness of breath, nausea, or vomiting  Sternal Cx with Citrobacter  On IV vancomycin/cefepime             Vital Last Value 24 Hour Range   Temperature 98.5 °F (36.9 °C) Temp  Min: 98.3 °F (36.8 °C)  Max: 98.5 °F (36.9 °C)   Pulse 85 Pulse  Min: 73  Max: 85   Respiratory (!) 22 Resp  Min: 18  Max: 22   Blood Pressure (!) 148/85 BP  Min: 136/65  Max: 148/85   Arterial BP   No data recorded   O2 Sat 1 L/min NA   Pulse Oximetry 97 % SpO2  Min: 95 %  Max: 97 %     Vital Today Admitted   Weight 111.4 kg Weight: 113.1 kg   BMI N/A BMI (Calculated): 37.34          Last I/O 3 shifts  I/O last 3 completed shifts:  In: 1085 [P.O.:240; I.V.:845]  Out: -     Physical Exam:      General appearance: NAD, resting comfortably, awake, alert  Head: Normocephalic,  Atraumatic, no obvious abnormalities   Neck: supple, full ROM, trachea midline  Lungs: normal efforts, symmetrical expansion, no wheezes, rhonchi, rales   Heart: RRR and S1, S2 normal  Sternal incision with purulent drainage  Abdomen: soft, NT/ND, normal BT    Extremities: no edema/cyanosis, warm  Pulses: 2+ and symmetric radial, dorsalis pedis   Skin: WDI, no rashes   Neurologic: AAOx3, Roxana, no focal motor weakness       Labs  Recent Labs   Lab 08/15/21  0641 21  0408 21  0749 21  0522   SODIUM 139 139  --   --    POTASSIUM 4.4 4.5 4.1 3.9   CHLORIDE 109* 110*  --   --    CO2 23 22  --   --    ANIONGAP 11 12  --   --    BUN 17 18  --   --    CREATININE 0.98 1.07  1.14  --  0.72   GLUCOSE 101* 88  --   --    CALCIUM 8.8 8.5  --   --          Medications/Infusions:       Scheduled:   • sodium chloride (PF)  10 mL Injection Q7 Days   • vancomycin (VANCOCIN) IVPB  1,000 mg  Intravenous 2 times per day   • cefepime (MAXIPIME) IVPB  2,000 mg Intravenous 3 times per day   • insulin lispro   Subcutaneous TID AC   • [Held by provider] aspirin  81 mg Oral Daily   • atorvastatin  40 mg Oral Daily   • [Held by provider] clopidogrel  75 mg Oral Daily   • glimepiride  4 mg Oral QAM AC   • insulin glargine  30 Units Subcutaneous 2 times per day   • losartan  100 mg Oral Daily   • metoPROLOL tartrate  100 mg Oral BID   • pantoprazole  40 mg Oral BID   • pregabalin  50 mg Oral QHS   • sertraline  100 mg Oral Daily   • doxazosin  4 mg Oral Nightly   • sodium chloride (PF)  2 mL Intracatheter 2 times per day   • heparin (porcine)  5,000 Units Subcutaneous 3 times per day   • Magnesium Standard Replacement Protocol   Does not apply See Admin Instructions   • Potassium Standard Replacement Protocol   Does not apply See Admin Instructions   • VANCOMYCIN - PHARMACIST MONITORED   Does not apply See Admin Instructions       Continuous Infusions:   .         Radiology/Imaging: reviewed              Assessment:     Postop sternal wound infection s/p debridement/partial sternal rewiring, vac placement ( 7/9/2021)   CAD s/p  CABG ( 6/11/2021)   Diabetes type 2  Morbid obesity  Hyperlipidemia.    Mild cognitive deficit   CARLOTTA on CPAP      Plan & Recommendations:     Plastic surgery recs noted-likely myocutaneous pec flaps  Supportive cares.  Blood sugars controlled.  DVT prophylaxis.  Continue antihypertensives, optimize BP  Follow labs              Yes

## 2025-03-22 NOTE — ED PROVIDER NOTE - PHYSICAL EXAMINATION
PHYSICAL EXAM:  GENERAL: NAD, lying in bed comfortably  HEAD:  Atraumatic, Normocephalic  EYES: EOMI, PERRLA, conjunctiva and sclera clear  ENT: No erythema/pallor/petechiae/lesions  NECK: Supple, No JVD  LUNG: CTA b/l; no r/r/w  HEART: RRR, +S1/S2; No m/r/g  ABDOMEN: soft, NT/ND; BS audible   EXTREMITIES:  2+ Peripheral Pulses, brisk cap refill. No clubbing, cyanosis, or edema  NERVOUS SYSTEM:  AAOx3, speech clear. No sensory/motor deficits   SKIN: No rashes or lesions

## 2025-03-22 NOTE — ED PROVIDER NOTE - PROGRESS NOTE DETAILS
Annita Ernst DO (Attending): XR showing pnuemonia. Pending CTA and rpt trop. Abx and blood cultures ordered. Plan for admission due to pneumonia with hypoxia. Rios SUMNER: d/w Dr. Peña [facility], will admit under his service

## 2025-03-22 NOTE — H&P ADULT - HISTORY OF PRESENT ILLNESS
81 y/o M with PMH of  osteoarthritis, Raynaud's, fibromyalgia, ischemic heart disease status post PCI–February 12 to RCA and LCx, February 27 to mid LAD and D1, patient on aspirin/plavix presenting for right-sided chest pain. Pain is characterized as constant, fluctuates in intensity, intensifies on deep breathing. Denies diaphoresis, leg swelling. Reports green sputum 3 days ago. Per ED also reports slight chronic left-sided calf pain.    In the ED pt is afebrile   WBC 12 PT 15 INR 1.33 AST ALT 56 ALT 49  pro- BNP 1651  RVP negative   CTA showing multifocal PNA and distal airways impaction. No pulmonary embolus. Trace pleural effusions. Multivessel coronary artery calcifications versus stenting  LE Doppler negative for DVT

## 2025-03-22 NOTE — H&P ADULT - ASSESSMENT
81 y/o M with PMH of  osteoarthritis, Raynaud's, fibromyalgia, ischemic heart disease status post PCI–February 12 to RCA and LCx, February 27 to mid LAD and D1, patient on aspirin/plavix presenting for right-sided chest pain. Pt admitted for multifocal PNA as below

## 2025-03-22 NOTE — H&P ADULT - PROBLEM SELECTOR PLAN 1
- CTA showing multifocal PNA and distal airways impaction. No pulmonary embolus. Trace pleural effusions. Multivessel coronary artery calcifications versus stenting  - LE Doppler negative for DVT   - limited RVP negative   - Zosyn x1 in Ed   - pro- BNP 1600s  - EKG reviewed sinus with PAC HR 81 Qtc 427  - pt does not appear volume overloaded, anterior chest pain appears more pleuritic in nature  - if cardiac concern arises would reach out to cardiology   - continue with Zosyn and Azithro, MRSA swab pending   - legionella, mycoplasma, strep pneumo pending, sputum cx pending, BC pending   - Supplemental O2 as needed - CTA showing multifocal PNA and distal airways impaction. No pulmonary embolus. Trace pleural effusions. Multivessel coronary artery calcifications versus stenting  - LE Doppler negative for DVT   - limited RVP negative   - Zosyn x1 in Ed   - pro- BNP 1600s, troponin negative x1 at 28 repeat pending  - EKG reviewed sinus with PAC HR 81 Qtc 427  - pt does not appear volume overloaded, anterior chest pain appears more pleuritic in nature  - if cardiac concern arises would reach out to cardiology   - continue with Zosyn and Azithro, MRSA swab pending   - legionella, mycoplasma, strep pneumo pending, sputum cx pending, BC pending   - Supplemental O2 as needed

## 2025-03-22 NOTE — H&P ADULT - NSHPLABSRESULTS_GEN_ALL_CORE
13.0   12.01 )-----------( 139      ( 22 Mar 2025 08:34 )             39.2       03-22    138  |  101  |  20  ----------------------------<  133[H]  4.3   |  24  |  0.89    Ca    9.2      22 Mar 2025 08:34  Mg     2.0     03-22    TPro  7.1  /  Alb  3.5  /  TBili  1.0  /  DBili  x   /  AST  56[H]  /  ALT  49[H]  /  AlkPhos  114  03-22      PT/INR - ( 22 Mar 2025 08:34 )   PT: 15.1 sec;   INR: 1.33 ratio         PTT - ( 22 Mar 2025 08:34 )  PTT:30.2 sec            Troponin T, High Sensitivity Result: 24 ng/L (03-22-25 @ 11:50)  Troponin T, High Sensitivity Result: 28 ng/L (03-22-25 @ 08:34)

## 2025-03-22 NOTE — H&P ADULT - NSHPPHYSICALEXAM_GEN_ALL_CORE
Vital Signs Last 24 Hrs  T(C): 37 (22 Mar 2025 12:02), Max: 37.2 (22 Mar 2025 07:48)  T(F): 98.6 (22 Mar 2025 12:02), Max: 98.9 (22 Mar 2025 07:48)  HR: 75 (22 Mar 2025 12:02) (75 - 91)  BP: 124/65 (22 Mar 2025 12:02) (97/60 - 124/65)  BP(mean): 85 (22 Mar 2025 12:02) (85 - 86)  RR: 22 (22 Mar 2025 12:02) (20 - 22)  SpO2: 95% (22 Mar 2025 12:02) (92% - 95%)    Parameters below as of 22 Mar 2025 12:02  Patient On (Oxygen Delivery Method): room air        CONSTITUTIONAL: Well-groomed, in no apparent distress  RESPIRATORY: Breathing comfortably; no dullness to percussion; lungs CTA without wheeze/rhonchi/rales, noted pleuritic anterior CP  CARDIOVASCULAR: +S1S2, RRR  GASTROINTESTINAL: No palpable masses or tenderness, +BS throughout,  SKIN: No rashes or ulcers noted  PSYCHIATRIC: A+O x 3

## 2025-03-22 NOTE — H&P ADULT - VTE RISK ASSESSMENT
TRANSITIONAL CARE MANAGEMENT - HOSPITAL DISCHARGE FOLLOW-UP    Contacted Ms. Martínez regarding follow-up for fever/congestion  after hospital discharge. She was discharged from the hospital on 11/29/22. Review of the After Visit Summary from the recent hospitalization indicates that the patient needs to follow up with PCP.    She feels that she is doing well at home.   Her diet concern is low sodium. Overall, the patient is eating well.   Ambulation: staying the same  Fever: is not present  Pain: none  Activities of Daily Living (global): Self-care   Patient states that she does have sufficient family support. She feels that she is able to ask for assistance when needed.    Additional patient/family concerns: None .    Discharge medications were verified with the patient. She is fully compliant with the medication regimen prescribed at the time of discharge. She reports that she is not experiencing any medication side effects.    Upon discharge, the patient was to receive no additional services. These services have not been initiated. none.     Advance Directive:   The patient has the following documents:  No Advance Directives on file. Patient offered documents.    Patient has an appointment on 12/5/22 with Paloma Starks NP. Ms. Martínez was reminded about the importance of keeping this appointment.      VTE Assessment already completed for this visit

## 2025-03-22 NOTE — ED ADULT TRIAGE NOTE - CHIEF COMPLAINT QUOTE
cardiac stents on 2/12/25 and again on 2/25/25, patient endorses chest pain, presents to triage clutching his chest, patient states he has had several days of right sided non radiating chest pain and some SOB, patient states the pain woke him up from sleep today  Denies n/v

## 2025-03-22 NOTE — ED ADULT NURSE NOTE - OBJECTIVE STATEMENT
cardiac stents on 2/12/25 and again on 2/25/25, patient endorses chest pain, presents to triage clutching his chest, patient states he has had several days of right sided non radiating chest pain and some SOB, patient states the pain woke him up from sleep today  Denies n/v, patient with h/o Anxiety and depression no medication, Concussion "I fell backwards on a hardwood floor"-10/2015, Diverticulitis treated 2007  DJD (degenerative joint disease) traction in back 15 years ago x 10 days, Eye abnormality "I had a bleed in the back of the eye"-left, 2014, Hearing loss b/l hearing aids, Lumbar spinal stenosis Nocturia x 2-3, MARIAMA (obstructive sleep apnea) sleep study 8-12-17,no treatment, Osteoarthritis PND (post-nasal drip) Prostate cancer s/p radiation 2015, TMJ (temporomandibular joint disorder) right pt with clicking x 1 month, Urinary frequency Received a 80M aaox4 ambulatory s/p cardiac stents on 2/13/25 and again on 2/27/25, patient endorses chest pain, presents to triage clutching his chest, patient states he has had several days(3) of right sided non radiating chest pain and some  headache, patient states the pain woke him up from sleep today  Denies n/v, chills or fever, sob or diaphoresis, took aspirin and Plavix and oxycodone today this morning PTA in the ED. Patient with h/o CAD with stents x2  Anxiety and depression  Diverticulitis DJD  Hearing loss b/l hearing aids, Lumbar spinal stenosis  MARIAMA (obstructive sleep apnea) sleep study 8-12-17,no treatment, Osteoarthritis PND (post-nasal drip) Prostate cancer s/p radiation 2015, TMJ (temporomandibular joint disorder) right pt with clicking x 1 month, Urinary frequency.  Rt Ac 18g IV access inserted, labs drawn pending orders from MD. VS WDL. Offered pain meds but reports he took some pain meds PTA in the ED.

## 2025-03-22 NOTE — CONSULT NOTE ADULT - TIME BILLING
- Review of records, telemetry, vital signs and daily labs.   - General and cardiovascular physical examination.  - Generation of cardiovascular treatment plan and completion of note .  - Coordination of care.      Patient was seen and examined by me on 3/22/25 ,interim events noted,labs and radiology studies reviewed.  Yon Peña MD,FACC.  [address] [place], [place] [id number].  [phone]  Availabe to call or text on [employer] TE[employer].

## 2025-03-22 NOTE — ED PROVIDER NOTE - ATTENDING CONTRIBUTION TO CARE
I have personally performed a face to face medical and diagnostic evaluation of the patient. I have discussed with and reviewed the Resident's and/or ACP's and/or Medical/PA/NP student's note and agree with the History, ROS, Physical Exam and MDM unless otherwise indicated. A brief summary of my personal evaluation and impression can be found below.     80-year-old male past medical history hyperlipidemia, osteoarthritis, Raynaud's, fibromyalgia, ischemic heart disease status post PCI February 12 and February 27, now on aspirin and Plavix presents emergency department a few days of worsening right sided pleuritic chest pain, worse with deep breath, nonradiating associated with cough productive of green mucus, no night sweats or fevers.  Endorses intermittent left calf pain. and mild shortness of breath.  Patient noted to be hypoxic 90 % on RA, improved with 2L NC.      physical exam shows regular rate and rhythm, lungs clear to auscultation.  No pitting edema lower extremities bilaterally, distal pulses intact.  Abdomen soft and nontender.  Chest wall nontender.  Moving all extremities.  Well-appearing, nontoxic.  No tachypnea or increased work of breathing.      Bedside POCUS showing good heart squeeze, IVC within normal size and with good respiratory variation.  B-lines and possible subpleural consolidation noted at the right.      given history and physical differential includes is not limited to pneumonia, pleural effusion, PE, viral syndrome, flu, COVID, DVT, ACS.  Will plan for EKG, x-ray, CT, ultrasound, labs, aspirin, reassess    Annita Ernst DO (Attending):  EKG showing sinus rhythm with PACs, heart rate 81, NM interval 152, QRS 80, QTc 427 T wave inversion noted in lead aVL no acute ischemic changes. I have personally performed a face to face medical and diagnostic evaluation of the patient. I have discussed with and reviewed the Resident's and/or ACP's and/or Medical/PA/NP student's note and agree with the History, ROS, Physical Exam and MDM unless otherwise indicated. A brief summary of my personal evaluation and impression can be found below.     80-year-old male past medical history hyperlipidemia, osteoarthritis, Raynaud's, fibromyalgia, ischemic heart disease status post PCI February 12 and February 27, now on aspirin and Plavix presents emergency department a few days of worsening right sided pleuritic chest pain, worse with deep breath, nonradiating associated with cough productive of green mucus, no night sweats or fevers.  Endorses intermittent left calf pain. and mild shortness of breath.  Patient noted to be hypoxic 88% on RA, improved with 2L NC.      physical exam shows regular rate and rhythm, lungs clear to auscultation.  No pitting edema lower extremities bilaterally, distal pulses intact.  Abdomen soft and nontender.  Chest wall nontender.  Moving all extremities.  Well-appearing, nontoxic.  No tachypnea or increased work of breathing.      Bedside POCUS showing good heart squeeze, IVC within normal size and with good respiratory variation.  B-lines and possible subpleural consolidation noted at the right.      given history and physical differential includes is not limited to pneumonia, pleural effusion, PE, viral syndrome, flu, COVID, DVT, ACS.  Will plan for EKG, x-ray, CT, ultrasound, labs, aspirin, reassess    Annita Ernst DO (Attending):  EKG showing sinus rhythm with PACs, heart rate 81, GA interval 152, QRS 80, QTc 427 T wave inversion noted in lead aVL no acute ischemic changes.

## 2025-03-22 NOTE — H&P ADULT - PROBLEM SELECTOR PLAN 5
DVT ppx: Lovenox 40 mg qd  Diet: Regular   Dispo pending clinical improvement     d/w pt and sister at bedside

## 2025-03-22 NOTE — H&P ADULT - TIME BILLING
- Ordering, reviewing, and interpreting labs, testing, and imaging.  - Independently obtaining a review of systems and performing a physical exam  - Reviewing historical documentation  - Counselling and educating patient and family regarding interpretation of aforementioned items and plan of care.

## 2025-03-22 NOTE — H&P ADULT - NSHPREVIEWOFSYSTEMS_GEN_ALL_CORE
Review of Systems:   CONSTITUTIONAL: No fever, weight loss  RESPIRATORY: + cough with sputum production  CARDIOVASCULAR: No palpitations, dizziness, or leg swelling  GASTROINTESTINAL: No abdominal or epigastric pain. No nausea, vomiting

## 2025-03-22 NOTE — ED PROVIDER NOTE - CARE PLAN
Principal Discharge DX:	Chest pain, unspecified   1 Principal Discharge DX:	Pneumonia  Secondary Diagnosis:	Hypoxia

## 2025-03-22 NOTE — CHART NOTE - NSCHARTNOTEFT_GEN_A_CORE
PDI	First Name	Last Name	Birth Date	Gender	Street Address	[facility]	Zip Code  BYRON Guajardo	1945	Male	[address] [facility]	[address]    Prescription Information      PDI Filter:    PDI	Current Rx	Drug Type	Rx Written	Rx Dispensed	Drug	Quantity	Days Supply	Prescriber Name	Prescriber LIZY #	Payment Method	Dispenser  A	Y		02/12/2025	03/19/2025	pregabalin 100 mg capsule	90	30	Clementine Jacobs)	[id number]	Medicare	[facility]  A	N		02/12/2025	02/17/2025	pregabalin 100 mg capsule	90	30	Clementine Jacobs)	[id number]	Medicare	[facility]	N	O	02/05/2025	02/07/2025	oxycontin er 10 mg tablet	90	30	Ynes Conway NP	[id number]	Medicare	[id number]  A	N	O	01/07/2025	01/08/2025	oxycontin er 10 mg tablet	90	30	Ynes Conway NP	[id number]	Medicare	[id number]  A	N		11/20/2024	12/18/2024	pregabalin 150 mg capsule	90	30	Clementine Jacobs)	[id number]	Medicare	[facility]  A	N	O	12/04/2024	12/08/2024	oxycontin er 10 mg tablet	90	30	Ynes Conway NP	[id number]	Medicare	[id number]

## 2025-03-22 NOTE — ED PROVIDER NOTE - CLINICAL SUMMARY MEDICAL DECISION MAKING FREE TEXT BOX
80 male with hypercholesterolemia, osteoarthritis, Raynaud's, fibromyalgia, ischemic heart disease status post PCI x 2 with R-CP 4 day. Hypoxic to 91% on RA req 2L supp oxyg. Otherwise HD stable. Will eval for ACS, PNA, pleural eff, PE. Likely adm.

## 2025-03-23 LAB
ALBUMIN SERPL ELPH-MCNC: 2.9 G/DL — LOW (ref 3.3–5)
ALP SERPL-CCNC: 114 U/L — SIGNIFICANT CHANGE UP (ref 40–120)
ALT FLD-CCNC: 57 U/L — HIGH (ref 10–45)
ANION GAP SERPL CALC-SCNC: 12 MMOL/L — SIGNIFICANT CHANGE UP (ref 5–17)
APTT BLD: 30.6 SEC — SIGNIFICANT CHANGE UP (ref 24.5–35.6)
AST SERPL-CCNC: 54 U/L — HIGH (ref 10–40)
BASOPHILS # BLD AUTO: 0.02 K/UL — SIGNIFICANT CHANGE UP (ref 0–0.2)
BASOPHILS NFR BLD AUTO: 0.2 % — SIGNIFICANT CHANGE UP (ref 0–2)
BILIRUB DIRECT SERPL-MCNC: 0.2 MG/DL — SIGNIFICANT CHANGE UP (ref 0–0.3)
BILIRUB INDIRECT FLD-MCNC: 0.2 MG/DL — SIGNIFICANT CHANGE UP (ref 0.2–1)
BILIRUB SERPL-MCNC: 0.4 MG/DL — SIGNIFICANT CHANGE UP (ref 0.2–1.2)
BUN SERPL-MCNC: 21 MG/DL — SIGNIFICANT CHANGE UP (ref 7–23)
CALCIUM SERPL-MCNC: 8.9 MG/DL — SIGNIFICANT CHANGE UP (ref 8.4–10.5)
CHLORIDE SERPL-SCNC: 102 MMOL/L — SIGNIFICANT CHANGE UP (ref 96–108)
CK MB CFR SERPL CALC: 2.3 NG/ML — SIGNIFICANT CHANGE UP (ref 0–6.7)
CK SERPL-CCNC: 82 U/L — SIGNIFICANT CHANGE UP (ref 30–200)
CO2 SERPL-SCNC: 25 MMOL/L — SIGNIFICANT CHANGE UP (ref 22–31)
CREAT SERPL-MCNC: 0.92 MG/DL — SIGNIFICANT CHANGE UP (ref 0.5–1.3)
EGFR: 85 ML/MIN/1.73M2 — SIGNIFICANT CHANGE UP
EGFR: 85 ML/MIN/1.73M2 — SIGNIFICANT CHANGE UP
EOSINOPHIL # BLD AUTO: 0.26 K/UL — SIGNIFICANT CHANGE UP (ref 0–0.5)
EOSINOPHIL NFR BLD AUTO: 2.6 % — SIGNIFICANT CHANGE UP (ref 0–6)
GLUCOSE SERPL-MCNC: 124 MG/DL — HIGH (ref 70–99)
GRAM STN FLD: SIGNIFICANT CHANGE UP
HCT VFR BLD CALC: 36.6 % — LOW (ref 39–50)
HGB BLD-MCNC: 12.4 G/DL — LOW (ref 13–17)
IMM GRANULOCYTES NFR BLD AUTO: 0.6 % — SIGNIFICANT CHANGE UP (ref 0–0.9)
LEGIONELLA AG UR QL: NEGATIVE — SIGNIFICANT CHANGE UP
LYMPHOCYTES # BLD AUTO: 0.94 K/UL — LOW (ref 1–3.3)
LYMPHOCYTES # BLD AUTO: 9.5 % — LOW (ref 13–44)
MCHC RBC-ENTMCNC: 32 PG — SIGNIFICANT CHANGE UP (ref 27–34)
MCHC RBC-ENTMCNC: 33.9 G/DL — SIGNIFICANT CHANGE UP (ref 32–36)
MCV RBC AUTO: 94.3 FL — SIGNIFICANT CHANGE UP (ref 80–100)
MONOCYTES # BLD AUTO: 0.99 K/UL — HIGH (ref 0–0.9)
MONOCYTES NFR BLD AUTO: 10 % — SIGNIFICANT CHANGE UP (ref 2–14)
MRSA PCR RESULT.: SIGNIFICANT CHANGE UP
NEUTROPHILS # BLD AUTO: 7.6 K/UL — HIGH (ref 1.8–7.4)
NEUTROPHILS NFR BLD AUTO: 77.1 % — HIGH (ref 43–77)
NRBC BLD AUTO-RTO: 0 /100 WBCS — SIGNIFICANT CHANGE UP (ref 0–0)
PLATELET # BLD AUTO: 159 K/UL — SIGNIFICANT CHANGE UP (ref 150–400)
POTASSIUM SERPL-MCNC: 3.9 MMOL/L — SIGNIFICANT CHANGE UP (ref 3.5–5.3)
POTASSIUM SERPL-SCNC: 3.9 MMOL/L — SIGNIFICANT CHANGE UP (ref 3.5–5.3)
PROT SERPL-MCNC: 6.3 G/DL — SIGNIFICANT CHANGE UP (ref 6–8.3)
RBC # BLD: 3.88 M/UL — LOW (ref 4.2–5.8)
RBC # FLD: 16.2 % — HIGH (ref 10.3–14.5)
S AUREUS DNA NOSE QL NAA+PROBE: SIGNIFICANT CHANGE UP
SODIUM SERPL-SCNC: 139 MMOL/L — SIGNIFICANT CHANGE UP (ref 135–145)
SPECIMEN SOURCE: SIGNIFICANT CHANGE UP
TROPONIN T, HIGH SENSITIVITY RESULT: 21 NG/L — SIGNIFICANT CHANGE UP (ref 0–51)
TROPONIN T, HIGH SENSITIVITY RESULT: 22 NG/L — SIGNIFICANT CHANGE UP (ref 0–51)
WBC # BLD: 9.87 K/UL — SIGNIFICANT CHANGE UP (ref 3.8–10.5)
WBC # FLD AUTO: 9.87 K/UL — SIGNIFICANT CHANGE UP (ref 3.8–10.5)

## 2025-03-23 PROCEDURE — 99233 SBSQ HOSP IP/OBS HIGH 50: CPT

## 2025-03-23 PROCEDURE — 93010 ELECTROCARDIOGRAM REPORT: CPT

## 2025-03-23 RX ORDER — METOPROLOL SUCCINATE 50 MG/1
50 TABLET, EXTENDED RELEASE ORAL DAILY
Refills: 0 | Status: DISCONTINUED | OUTPATIENT
Start: 2025-03-23 | End: 2025-03-25

## 2025-03-23 RX ORDER — KETOROLAC TROMETHAMINE 30 MG/ML
15 INJECTION, SOLUTION INTRAMUSCULAR; INTRAVENOUS ONCE
Refills: 0 | Status: DISCONTINUED | OUTPATIENT
Start: 2025-03-23 | End: 2025-03-23

## 2025-03-23 RX ADMIN — PREGABALIN 100 MILLIGRAM(S): 75 CAPSULE ORAL at 14:32

## 2025-03-23 RX ADMIN — TRAMADOL HYDROCHLORIDE AND ACETAMINOPHEN 1 TABLET(S): 37.5; 325 TABLET ORAL at 11:54

## 2025-03-23 RX ADMIN — PREGABALIN 100 MILLIGRAM(S): 75 CAPSULE ORAL at 21:43

## 2025-03-23 RX ADMIN — Medication 500 MILLIGRAM(S): at 11:55

## 2025-03-23 RX ADMIN — OXYCODONE HYDROCHLORIDE 10 MILLIGRAM(S): 30 TABLET ORAL at 05:45

## 2025-03-23 RX ADMIN — KETOROLAC TROMETHAMINE 15 MILLIGRAM(S): 30 INJECTION, SOLUTION INTRAMUSCULAR; INTRAVENOUS at 12:34

## 2025-03-23 RX ADMIN — KETOROLAC TROMETHAMINE 15 MILLIGRAM(S): 30 INJECTION, SOLUTION INTRAMUSCULAR; INTRAVENOUS at 13:33

## 2025-03-23 RX ADMIN — OXYCODONE HYDROCHLORIDE 10 MILLIGRAM(S): 30 TABLET ORAL at 14:32

## 2025-03-23 RX ADMIN — Medication 25 GRAM(S): at 21:46

## 2025-03-23 RX ADMIN — METOPROLOL SUCCINATE 50 MILLIGRAM(S): 50 TABLET, EXTENDED RELEASE ORAL at 12:34

## 2025-03-23 RX ADMIN — ATORVASTATIN CALCIUM 40 MILLIGRAM(S): 80 TABLET, FILM COATED ORAL at 21:42

## 2025-03-23 RX ADMIN — ENOXAPARIN SODIUM 40 MILLIGRAM(S): 100 INJECTION SUBCUTANEOUS at 11:54

## 2025-03-23 RX ADMIN — PREGABALIN 100 MILLIGRAM(S): 75 CAPSULE ORAL at 05:15

## 2025-03-23 RX ADMIN — OXYCODONE HYDROCHLORIDE 10 MILLIGRAM(S): 30 TABLET ORAL at 05:15

## 2025-03-23 RX ADMIN — OXYCODONE HYDROCHLORIDE 10 MILLIGRAM(S): 30 TABLET ORAL at 21:43

## 2025-03-23 RX ADMIN — OXYCODONE HYDROCHLORIDE 10 MILLIGRAM(S): 30 TABLET ORAL at 22:43

## 2025-03-23 RX ADMIN — Medication 25 GRAM(S): at 05:16

## 2025-03-23 RX ADMIN — TRAMADOL HYDROCHLORIDE AND ACETAMINOPHEN 1 TABLET(S): 37.5; 325 TABLET ORAL at 13:33

## 2025-03-23 RX ADMIN — CLOPIDOGREL BISULFATE 75 MILLIGRAM(S): 75 TABLET, FILM COATED ORAL at 11:55

## 2025-03-23 RX ADMIN — Medication 81 MILLIGRAM(S): at 11:55

## 2025-03-23 RX ADMIN — Medication 25 GRAM(S): at 14:34

## 2025-03-23 RX ADMIN — Medication 40 MILLIGRAM(S): at 05:15

## 2025-03-23 NOTE — CONSULT NOTE ADULT - REASON FOR ADMISSION
cough, and right sided chest pain

## 2025-03-23 NOTE — PROGRESS NOTE ADULT - ASSESSMENT
79 y/o M with PMH of  osteoarthritis, Raynaud's, fibromyalgia, ischemic heart disease status post PCI–February 12 to RCA and LCx, February 27 to mid LAD and D1, patient on aspirin/plavix presenting for right-sided chest pain. Pt admitted for multifocal PNA as below      # Multifocal pneumonia.   ·  Plan: - CTA showing multifocal PNA and distal airways impaction. No pulmonary embolus. Trace pleural effusions. Multivessel coronary artery calcifications versus stenting  - LE Doppler negative for DVT   - limited RVP negative   - Zosyn x1 in Ed   - pro- BNP 1600s, troponin negative x1 at 28  -- continue with Zosyn and Azithro, MRSA swab pending   - legionella, mycoplasma, strep pneumo pending, sputum cx pending, BC pending   - Supplemental O2 as needed.  -Pulmonary consult Dr Reaves called    # CAD (coronary artery disease).   ·  Plan: - continue with ASA Plavix 75mg qd, carvedilol 3.125mg bid and Atorvastatin 40mg qhs.  -Chest pain pleuritic  -Episode pAT  -EP evaluation called

## 2025-03-23 NOTE — CONSULT NOTE ADULT - SUBJECTIVE AND OBJECTIVE BOX
Reese Gregg MD  Cardiology Fellow  [phone]  All Cardiology service information can be found 24/7 on [email or web address], password: [email or web address]    Patient seen and evaluated at bedside    HPI:  81 y/o M with PMH of  osteoarthritis, Raynaud's, fibromyalgia, ischemic heart disease status post PCI–February 12 to RCA and LCx, February 27 to mid LAD and D1, patient on aspirin/plavix presenting for right-sided chest pain. Pain is characterized as constant, fluctuates in intensity, intensifies on deep breathing. Denies diaphoresis, leg swelling. Reports green sputum 3 days ago. Per ED also reports slight chronic left-sided calf pain. Patient admitted for multifocal pneumonia. EP consulted for brief run of paroxysmal AT on telemetry, self resolved after 4-5 seconds. Patient is normotensive on coreg 3.125mg BID. CT chest w/ multifocal opacities.    TTE: EF 61%, mild to mod MR    PMHx:   No pertinent past medical history    Prostate cancer    Concussion    S/P radiation therapy    Eye abnormality    Anxiety and depression    Carpal tunnel syndrome    Psoriatic arthritis    Hearing loss    MARIAMA (obstructive sleep apnea)    PND (post-nasal drip)    DJD (degenerative joint disease)    TMJ (temporomandibular joint disorder)    Diverticulitis    Hip pain, right    Osteoarthritis    Nocturia    Urinary frequency    Lumbar spinal stenosis        PSHx:   History of hip replacement, total, left    History of knee replacement, total, right    S/P rotator cuff surgery    S/P sinus surgery    S/P carpal tunnel release    History of left hip replacement    History of right hip replacement    H/O total knee replacement, right    H/O laminectomy        Allergies:  Keflex (Rash)      Current Medications:   acetaminophen     Tablet .. 650 milliGRAM(s) Oral every 6 hours PRN  acetaminophen  300 mG/codeine 30 mG 1 Tablet(s) Oral every 6 hours PRN  aluminum hydroxide/magnesium hydroxide/simethicone Suspension 30 milliLiter(s) Oral every 4 hours PRN  aspirin  chewable 81 milliGRAM(s) Oral daily  atorvastatin 40 milliGRAM(s) Oral at bedtime  azithromycin   Tablet 500 milliGRAM(s) Oral daily  carvedilol 3.125 milliGRAM(s) Oral every 12 hours  clopidogrel Tablet 75 milliGRAM(s) Oral daily  enoxaparin Injectable 40 milliGRAM(s) SubCutaneous every 24 hours  melatonin 3 milliGRAM(s) Oral at bedtime PRN  ondansetron Injectable 4 milliGRAM(s) IV Push every 8 hours PRN  oxyCODONE  ER Tablet 10 milliGRAM(s) Oral three times a day  pantoprazole    Tablet 40 milliGRAM(s) Oral before breakfast  piperacillin/tazobactam IVPB.. 3.375 Gram(s) IV Intermittent every 8 hours  pregabalin 100 milliGRAM(s) Oral three times a day      FAMILY HISTORY:  Family history of CHF (congestive heart failure) (Mother)    Family history of heart attack (Father, Sibling)          REVIEW OF SYSTEMS:  CONSTITUTIONAL: No weakness, fevers or chills  EYES/ENT: No visual changes;  No dysphagia  NECK: No pain or stiffness  RESPIRATORY: No cough, wheezing, hemoptysis; No shortness of breath  CARDIOVASCULAR: No chest pain or palpitations; No lower extremity edema  GASTROINTESTINAL: No abdominal or epigastric pain. No nausea, vomiting, or hematemesis; No diarrhea or constipation. No melena or hematochezia.  BACK: No back pain  GENITOURINARY: No dysuria, frequency or hematuria  NEUROLOGICAL: No numbness or weakness  SKIN: No itching, burning, rashes, or lesions   All other review of systems is negative unless indicated above.    Physical Exam:  T(F): 98.7 (03-23), Max: 98.7 (03-23)  HR: 69 (03-23) (64 - 75)  BP: 117/67 (03-23) (100/61 - 127/72)  RR: 18 (03-23)  SpO2: 95% (03-23)  GEN: NAD  HEENT: EOMI, clear sclera  PULM: CTA b/l, no wheeze  CV: RRR S1 S2, no murmur, no JVD  ABD: S, NT, ND  EXT: WWP, no edema  PSYCH: normal affect  SKIN: No rash    CXR: Personally reviewed    Labs: Personally reviewed                        12.4   9.87  )-----------( 159      ( 23 Mar 2025 06:51 )             36.6     03-23    139  |  102  |  21  ----------------------------<  124[H]  3.9   |  25  |  0.92    Ca    8.9      23 Mar 2025 06:51  Mg     2.0     03-22    TPro  6.3  /  Alb  2.9[L]  /  TBili  0.4  /  DBili  0.2  /  AST  54[H]  /  ALT  57[H]  /  AlkPhos  114  03-23    PT/INR - ( 22 Mar 2025 08:34 )   PT: 15.1 sec;   INR: 1.33 ratio         PTT - ( 23 Mar 2025 08:08 )  PTT:30.6 sec    CARDIAC MARKERS ( 23 Mar 2025 08:08 )  21 ng/L / x     / x     / x     / x     / 2.3 ng/mL  CARDIAC MARKERS ( 22 Mar 2025 11:50 )  24 ng/L / x     / x     / x     / x     / x      CARDIAC MARKERS ( 22 Mar 2025 08:34 )  28 ng/L / x     / x     / x     / x     / x                    
MR#[id number]  PATIENT NAME:KESHAV BARAHONA    DATE OF SERVICE: 03-22-25 @ 12:29  Patient was seen and examined by Yon Peña MD on    03-22-25 @ 12:29 .  Interim events noted.Consultant notes ,Labs,Telemetry reviewed by me       HOSPITAL COURSE: HPI:  81 y/o M with PMH of  osteoarthritis, Raynaud's, fibromyalgia, ischemic heart disease status post PCI–February 12 to RCA and LCx, February 27 to mid LAD and D1, patient on aspirin/plavix presenting for right-sided chest pain. Pain is characterized as constant, fluctuates in intensity, intensifies on deep breathing. Denies diaphoresis, leg swelling. Reports green sputum 3 days ago. Per ED also reports slight chronic left-sided calf pain.    In the ED pt is afebrile   WBC 12 PT 15 INR 1.33 AST ALT 56 ALT 49  pro- BNP 1651  RVP negative   CTA showing multifocal PNA and distal airways impaction. No pulmonary embolus. Trace pleural effusions. Multivessel coronary artery calcifications versus stenting  LE Doppler negative for DVT  (22 Mar 2025 12:10)      INTERIM EVENTS:Patient seen at bedside ,interim events noted.      PMH -reviewed admission note, no change since admission  HEART FAILURE: Acute[ ]Chronic[ ] Systolic[ ] Diastolic[ ] Combined Systolic and Diastolic[ ]  CAD[ ] CABG[ ] PCI[ ]  DEVICES[ ] PPM[ ] ICD[ ] ILR[ ]  ATRIAL FIBRILLATION[ ] Paroxysmal[ ] Permanent[ ] CHADS2-[  ]  ROLANDO[ ] CKD1[ ] CKD2[ ] CKD3[ ] CKD4[ ] ESRD[ ]  COPD[ ] HTN[ ]   DM[ ] Type1[ ] Type 2[ ]   CVA[ ] Paresis[ ]    AMBULATION: Assisted[ ] Cane/walker[ ] Independent[ ]    MEDICATIONS  (STANDING):  aspirin  chewable 81 milliGRAM(s) Oral daily  atorvastatin 40 milliGRAM(s) Oral at bedtime  azithromycin   Tablet 500 milliGRAM(s) Oral daily  carvedilol 3.125 milliGRAM(s) Oral every 12 hours  clopidogrel Tablet 75 milliGRAM(s) Oral daily  enoxaparin Injectable 40 milliGRAM(s) SubCutaneous every 24 hours  oxyCODONE  ER Tablet 10 milliGRAM(s) Oral three times a day  pantoprazole    Tablet 40 milliGRAM(s) Oral before breakfast  piperacillin/tazobactam IVPB.. 3.375 Gram(s) IV Intermittent every 8 hours  pregabalin 100 milliGRAM(s) Oral three times a day    MEDICATIONS  (PRN):  acetaminophen     Tablet .. 650 milliGRAM(s) Oral every 6 hours PRN Temp greater or equal to 38C (100.4F), Mild Pain (1 - 3)  acetaminophen  300 mG/codeine 30 mG 1 Tablet(s) Oral every 6 hours PRN Moderate Pain (4 - 6)  aluminum hydroxide/magnesium hydroxide/simethicone Suspension 30 milliLiter(s) Oral every 4 hours PRN Dyspepsia  melatonin 3 milliGRAM(s) Oral at bedtime PRN Insomnia  ondansetron Injectable 4 milliGRAM(s) IV Push every 8 hours PRN Nausea and/or Vomiting            REVIEW OF SYSTEMS:  Constitutional: [ ] fever, [ ]weight loss,  [ ]fatigue [ ]weight gain  Eyes: [ ] visual changes  Respiratory: [ ]shortness of breath;  [ ] cough, [ ]wheezing, [ ]chills, [ ]hemoptysis  Cardiovascular: [ ] chest pain, [ ]palpitations, [ ]dizziness,  [ ]leg swelling[ ]orthopnea[ ]PND  Gastrointestinal: [ ] abdominal pain, [ ]nausea, [ ]vomiting,  [ ]diarrhea [ ]Constipation [ ]Melena  Genitourinary: [ ] dysuria, [ ] hematuria [ ]Morales  Neurologic: [ ] headaches [ ] tremors[ ]weakness [ ]Paralysis Right[ ] Left[ ]  Skin: [ ] itching, [ ]burning, [ ] rashes  Endocrine: [ ] heat or cold intolerance  Musculoskeletal: [ ] joint pain or swelling; [ ] muscle, back, or extremity pain  Psychiatric: [ ] depression, [ ]anxiety, [ ]mood swings, or [ ]difficulty sleeping  Hematologic: [ ] easy bruising, [ ] bleeding gums    [ ] All remaining systems negative except as per above.   [ ]Unable to obtain.  [x] No change in ROS since admission      Vital Signs Last 24 Hrs  T(C): 36.6 (22 Mar 2025 16:28), Max: 37.2 (22 Mar 2025 07:48)  T(F): 97.9 (22 Mar 2025 16:28), Max: 98.9 (22 Mar 2025 07:48)  HR: 75 (22 Mar 2025 16:28) (70 - 91)  BP: 101/64 (22 Mar 2025 16:28) (97/60 - 127/72)  BP(mean): 85 (22 Mar 2025 12:02) (85 - 86)  RR: 18 (22 Mar 2025 16:28) (18 - 22)  SpO2: 98% (22 Mar 2025 16:28) (92% - 98%)    Parameters below as of 22 Mar 2025 16:28  Patient On (Oxygen Delivery Method): nasal cannula  O2 Flow (L/min): 2    I&O's Summary      PHYSICAL EXAM:  General: No acute distress BMI-  HEENT: EOMI, PERRL  Neck: Supple, [ ] JVD  Lungs: Equal air entry bilaterally; [ ] rales [ ] wheezing [ ] rhonchi  Heart: Regular rate and rhythm; [x ] murmur   2/6 [ x] systolic [ ] diastolic [ ] radiation[ ] rubs [ ]  gallops  Abdomen: Nontender, bowel sounds present  Extremities: No clubbing, cyanosis, [ ] edema [ ]Pulses  equal and intact  Nervous system:  Alert & Oriented X3, no focal deficits  Psychiatric: Normal affect  Skin: No rashes or lesions    LABS:  03-22    138  |  101  |  20  ----------------------------<  133[H]  4.3   |  24  |  0.89    Ca    9.2      22 Mar 2025 08:34  Mg     2.0     03-22    TPro  7.1  /  Alb  3.5  /  TBili  1.0  /  DBili  x   /  AST  56[H]  /  ALT  49[H]  /  AlkPhos  114  03-22    Creatinine Trend: 0.89<--, 0.77<--, 0.69<--                        13.0   12.01 )-----------( 139      ( 22 Mar 2025 08:34 )             39.2     PT/INR - ( 22 Mar 2025 08:34 )   PT: 15.1 sec;   INR: 1.33 ratio         PTT - ( 22 Mar 2025 08:34 )  PTT:30.2 sec          
  03-23-25 @ 13:47    Patient is a 80y old  Male who presents with a chief complaint of cough, and right sided chest pain (23 Mar 2025 10:31)      HPI:  81 y/o M with PMH of  osteoarthritis, Raynaud's, fibromyalgia, ischemic heart disease status post PCI–February 12 to RCA and LCx, February 27 to mid LAD and D1, patient on aspirin/plavix presenting for right-sided chest pain. Pain is characterized as constant, fluctuates in intensity, intensifies on deep breathing. Denies diaphoresis, leg swelling. Reports green sputum 3 days ago. Per ED also reports slight chronic left-sided calf pain.    In the ED pt is afebrile   WBC 12 PT 15 INR 1.33 AST ALT 56 ALT 49  pro- BNP 1651  RVP negative   CTA showing multifocal PNA and distal airways impaction. No pulmonary embolus. Trace pleural effusions. Multivessel coronary artery calcifications versus stenting  LE Doppler negative for DVT  (22 Mar 2025 12:10)  he says he has no underlying lung disease:  never drank or smoked:   no cough : no phlegm : on 2 L of oxygen         ?FOLLOWING PRESENT  [x ] Hx of PE/DVT, [ x] Hx COPD, [ x] Hx of Asthma, [ ]y Hx of Hospitalization, [x ]  Hx of BiPAP/CPAP use, [ x] Hx of MARIAMA    Allergies    Keflex (Rash)    Intolerances        PAST MEDICAL & SURGICAL HISTORY:  Prostate cancer  s/p radiation 2015      Concussion  "I fell backwards on a hardwood floor"-10/2015      Eye abnormality  "I had a bleed in the back of the eye"-left, 2014      Anxiety and depression  no medication      Hearing loss  b/l hearing aids      MARIAMA (obstructive sleep apnea)  sleep study 8-12-17,no treatment      PND (post-nasal drip)      DJD (degenerative joint disease)  traction in back 15 years ago x 10 days      TMJ (temporomandibular joint disorder)  right pt with clicking x 1 month      Diverticulitis  treated 2007      Osteoarthritis      Nocturia  x 2-3      Urinary frequency      Lumbar spinal stenosis      S/P rotator cuff surgery  right repair open 1-2017      S/P sinus surgery  1981      S/P carpal tunnel release  bilaal 9/2017      History of left hip replacement  2013      History of right hip replacement  2014      H/O total knee replacement, right  2016      H/O laminectomy          FAMILY HISTORY:  Family history of CHF (congestive heart failure) (Mother)    Family history of heart attack (Father, Sibling)        Social History: [x  ] TOBACCO                  [x  ] ETOH                                 [  x] IVDA/DRUGS    REVIEW OF SYSTEMS      General:	x    Skin/Breast:x  	  Ophthalmologic:x  	  ENMT:	x    Respiratory and Thorax:  sob,  wheezing  	  Cardiovascular:	chest pain     Gastrointestinal:	x    Genitourinary:	x    Musculoskeletal:	x    Neurological:	x    Psychiatric:	x    Hematology/Lymphatics:	xx    Endocrine:	x    Allergic/Immunologic:	x    MEDICATIONS  (STANDING):  aspirin  chewable 81 milliGRAM(s) Oral daily  atorvastatin 40 milliGRAM(s) Oral at bedtime  azithromycin   Tablet 500 milliGRAM(s) Oral daily  clopidogrel Tablet 75 milliGRAM(s) Oral daily  enoxaparin Injectable 40 milliGRAM(s) SubCutaneous every 24 hours  metoprolol succinate ER 50 milliGRAM(s) Oral daily  oxyCODONE  ER Tablet 10 milliGRAM(s) Oral three times a day  pantoprazole    Tablet 40 milliGRAM(s) Oral before breakfast  piperacillin/tazobactam IVPB.. 3.375 Gram(s) IV Intermittent every 8 hours  pregabalin 100 milliGRAM(s) Oral three times a day    MEDICATIONS  (PRN):  acetaminophen     Tablet .. 650 milliGRAM(s) Oral every 6 hours PRN Temp greater or equal to 38C (100.4F), Mild Pain (1 - 3)  acetaminophen  300 mG/codeine 30 mG 1 Tablet(s) Oral every 6 hours PRN Moderate Pain (4 - 6)  aluminum hydroxide/magnesium hydroxide/simethicone Suspension 30 milliLiter(s) Oral every 4 hours PRN Dyspepsia  melatonin 3 milliGRAM(s) Oral at bedtime PRN Insomnia  ondansetron Injectable 4 milliGRAM(s) IV Push every 8 hours PRN Nausea and/or Vomiting       Vital Signs Last 24 Hrs  T(C): 37.1 (23 Mar 2025 05:00), Max: 37.1 (23 Mar 2025 05:00)  T(F): 98.7 (23 Mar 2025 05:00), Max: 98.7 (23 Mar 2025 05:00)  HR: 69 (23 Mar 2025 09:12) (64 - 75)  BP: 117/67 (23 Mar 2025 09:12) (100/61 - 117/67)  BP(mean): --  RR: 18 (23 Mar 2025 05:00) (18 - 18)  SpO2: 95% (23 Mar 2025 09:12) (95% - 98%)    Parameters below as of 23 Mar 2025 09:12  Patient On (Oxygen Delivery Method): nasal cannula  O2 Flow (L/min): 2  Orthostatic VS          I&O's Summary    22 Mar 2025 07:01  -  23 Mar 2025 07:00  --------------------------------------------------------  IN: 0 mL / OUT: 400 mL / NET: -400 mL        Physical Exam:   GENERAL: NAD, well-groomed, well-developed  HEENT: AVI/   Atraumatic, Normocephalic  ENMT: No tonsillar erythema, exudates, or enlargement; Moist mucous membranes, Good dentition, No lesions  NECK: Supple, No JVD, Normal thyroid  CHEST/LUNG: Clear to auscultation bilaterally-  CVS: Regular rate and rhythm; No murmurs, rubs, or gallops  GI: : Soft, Nontender, Nondistended; Bowel sounds present  NERVOUS SYSTEM:  Alert & Oriented X3  EXTREMITIES: - edema  LYMPH: No lymphadenopathy noted  SKIN: No rashes or lesions  ENDOCRINOLOGY: No Thyromegaly  PSYCH: Appropriate    Labs:  Venous<47<4>>23<<7.445>>Venous<<3><<4><<5<<239>>  CARDIAC MARKERS ( 23 Mar 2025 08:08 )  x     / x     / x     / x     / 2.3 ng/mL                            12.4   9.87  )-----------( 159      ( 23 Mar 2025 06:51 )             36.6                         13.0   12.01 )-----------( 139      ( 22 Mar 2025 08:34 )             39.2     03-23    139  |  102  |  21  ----------------------------<  124[H]  3.9   |  25  |  0.92  03-22    138  |  101  |  20  ----------------------------<  133[H]  4.3   |  24  |  0.89    Ca    8.9      23 Mar 2025 06:51  Ca    9.2      22 Mar 2025 08:34  Mg     2.0     03-22    TPro  6.3  /  Alb  2.9[L]  /  TBili  0.4  /  DBili  0.2  /  AST  54[H]  /  ALT  57[H]  /  AlkPhos  114  03-23  TPro  7.1  /  Alb  3.5  /  TBili  1.0  /  DBili  x   /  AST  56[H]  /  ALT  49[H]  /  AlkPhos  114  03-22    CAPILLARY BLOOD GLUCOSE        LIVER FUNCTIONS - ( 23 Mar 2025 06:51 )  Alb: 2.9 g/dL / Pro: 6.3 g/dL / ALK PHOS: 114 U/L / ALT: 57 U/L / AST: 54 U/L / GGT: x           PT/INR - ( 22 Mar 2025 08:34 )   PT: 15.1 sec;   INR: 1.33 ratio         PTT - ( 23 Mar 2025 08:08 )  PTT:30.6 sec  Urinalysis Basic - ( 23 Mar 2025 06:51 )    Color: x / Appearance: x / SG: x / pH: x  Gluc: 124 mg/dL / Ketone: x  / Bili: x / Urobili: x   Blood: x / Protein: x / Nitrite: x   Leuk Esterase: x / RBC: x / WBC x   Sq Epi: x / Non Sq Epi: x / Bacteria: x      Culture - Sputum (collected 22 Mar 2025 20:28)  Source: Sputum Sputum  Gram Stain (23 Mar 2025 07:03):    Moderate polymorphonuclear leukocytes per low power field    No Squamous epithelial cells per low power field    No organisms seen per oil power field      D DImer      Studies  Chest X-RAY  CT SCAN Chest   CT Abdomen  Venous Dopplers: LE:   Others      rad< from: [facility] Duplex Lower Ext Vein Scan, Bilat (03.22.25 @ 11:07) >  color and spectral Doppler, with and without compression.    FINDINGS:    RIGHT:  Normal compressibility of the RIGHT common femoral, femoral and popliteal   veins.  Doppler examination shows normal spontaneous and phasic flow.  No RIGHT calf vein thrombosis is detected.    LEFT:  Normal compressibility of the LEFT common femoral, femoral and popliteal   veins.  Doppler examination shows normal spontaneous and phasic flow.  No LEFT calf vein thrombosis is detected.    IMPRESSION:  No evidence of deep venous thrombosis in either lower extremity.    < end of copied text >  < from: CT Angio Chest PE Protocol w/ IV Cont (03.22.25 @ 09:34) >    No pulmonary embolus.    Multifocal pneumonia and distal airways impaction. Component of pulmonary   edema. Trace pleural effusions. Follow to resolution with repeat chest CT   in one month, or sooner as clinically warranted.    Multivessel coronary artery calcifications versus stenting. Correlate   with procedural history and cardiology evaluation.    < end of copied text >  < from: TTE W or WO Ultrasound Enhancing Agent (02.12.25 @ 10:35) >   1. Left ventricular cavity is normal in size. Left ventricular wall thickness is normal. Left ventricular systolic function is normal with an ejection fraction of 61 % by Mascorro's method of disks. There are no regional wall motion abnormalities seen.   2. Normal left ventricular diastolic function, with normal left ventricular filling pressure.   3. Normal right ventricular cavity size and normal right ventricular systolic function.   4. Normal left and right atrial size.   5. Trileaflet aortic valve with normal systolic excursion. There is mild calcification of the aortic valve leaflets.   6. Mild aortic regurgitation.   7. Prolapse of the posterior mitral leaflet.   8. Mild to moderate mitral regurgitation.   9. No pericardial effusion seen.  10. Compared to the transthoracic echocardiogram performed on 11/28/2023, Slight worsening of the mitral valve regurgitation.    < end of copied text >  < from: TTE W or WO Ultrasound Enhancing Agent (02.12.25 @ 10:35) >   1. Left ventricular cavity is normal in size. Left ventricular wall thickness is normal. Left ventricular systolic function is normal with an ejection fraction of 61 % by Mascorro's method of disks. There are no regional wall motion abnormalities seen.   2. Normal left ventricular diastolic function, with normal left ventricular filling pressure.   3. Normal right ventricular cavity size and normal right ventricular systolic function.   4. Normal left and right atrial size.   5. Trileaflet aortic valve with normal systolic excursion. There is mild calcification of the aortic valve leaflets.   6. Mild aortic regurgitation.   7. Prolapse of the posterior mitral leaflet.   8. Mild to moderate mitral regurgitation.   9. No pericardial effusion seen.  10. Compared to the transthoracic echocardiogram performed on 11/28/2023, Slight worsening of the mitral valve regurgitation.    < end of copied text >

## 2025-03-23 NOTE — CHART NOTE - NSCHARTNOTEFT_GEN_A_CORE
CC: PAF on tele and epigastric pain      HPI:  Called by RN patient had episode of PAF on tele for 7 seconds up to 140 patient also complaining of epigastric pain, sob, that he came in with that radiate   Patient denied headache, dizziness, abdominal  pain, N/V/D, numbness/tingling, extremity weakness, dysuria.         ROS:  CONSTITUTIONAL:  No fever, chills, rigors  CARDIOVASCULAR:  c/o epigastric chest pain  RESPIRATORY:   No SOB, cough, dyspnea on exertion.  No wheezing  GASTROINTESTINAL:  No abd pain, N/V, diarrhea/constipation  EXTREMITIES:  No swelling or joint pain  GENITOURINARY:  No burning on urination, increased frequency or urgency.  No flank pain  NEUROLOGIC:  No HA, visual disturbances  SKIN: No rashes      Vital Signs Last 24 Hrs  T(C): 37.1 (23 Mar 2025 05:00), Max: 37.2 (22 Mar 2025 07:48)  T(F): 98.7 (23 Mar 2025 05:00), Max: 98.9 (22 Mar 2025 07:48)  HR: 64 (23 Mar 2025 05:00) (64 - 91)  BP: 108/53 (23 Mar 2025 05:00) (97/60 - 127/72)  BP(mean): 85 (22 Mar 2025 12:02) (85 - 86)  RR: 18 (23 Mar 2025 05:00) (18 - 22)  SpO2: 95% (23 Mar 2025 05:00) (92% - 98%)    Parameters below as of 23 Mar 2025 05:00  Patient On (Oxygen Delivery Method): nasal cannula  O2 Flow (L/min): 2        Physical Exam:  General: WN/WD NAD, AOx3, nontoxic appearing  Head:  NC/AT  CV: RRR, S1S2   Respiratory: CTA B/L, nonlabored  Abdominal: (+) bowel sounds x4. Soft, NT, ND, no palpable mass, no guarding, or rebound tenderness  Genitourinary: ? Morales   MSK: No BLLE edema, + peripheral pulses, FROM all 4 extremity  Skin: (+) warm, dry   Psych: Appropriate affect       Labs:                        13.0   12.01 )-----------( 139      ( 22 Mar 2025 08:34 )             39.2     03-22    138  |  101  |  20  ----------------------------<  133[H]  4.3   |  24  |  0.89    Ca    9.2      22 Mar 2025 08:34  Mg     2.0     03-22    TPro  7.1  /  Alb  3.5  /  TBili  1.0  /  DBili  x   /  AST  56[H]  /  ALT  49[H]  /  AlkPhos  114  03-22        Radiology:  < from: CT Angio Chest PE Protocol w/ IV Cont (03.22.25 @ 09:34) >    IMPRESSION:    No pulmonary embolus.    Multifocal pneumonia and distal airways impaction. Component of pulmonary   edema. Trace pleural effusions. Follow to resolution with repeat chest CT   in one month, or sooner as clinically warranted.    Multivessel coronary artery calcifications versus stenting. Correlate   with procedural history and cardiology evaluation.    --- End of Report ---    < end of copied text >          Assessment & Plan:  HPI:  79 y/o M with PMH of  osteoarthritis, Raynaud's, fibromyalgia, ischemic heart disease status post PCI–February 12 to RCA and LCx, February 27 to mid LAD and D1, patient on aspirin/plavix presenting for right-sided chest pain. Pain is characterized as constant, fluctuates in intensity, intensifies on deep breathing. Denies diaphoresis, leg swelling. Reports green sputum 3 days ago. Per ED also reports slight chronic left-sided calf pain. Now with PAF on tele and c/o epigastric pain        Plan  EKG reviewed by Dr Mariana Deras  Cardiologist Dr Mariana Deras to see patient this am  cardiac enzymes  -  -  -  -Primary Team to follow up in AM, attending to follow       Samanta Gates NP  Dept of Medicine

## 2025-03-23 NOTE — PROVIDER CONTACT NOTE (OTHER) - BACKGROUND
Patient admitted for pneumonia. Patient presents to ED for R sided constant chest pain. PMH of hypercholesterolemia, osteoarthritis, ischemic heart disease

## 2025-03-23 NOTE — PROVIDER CONTACT NOTE (OTHER) - ASSESSMENT
Patient AOx4, alert. Pt VS with /57, HR 67, temp 98.7 and 95% on 2L. Patient has no other symptoms only chest discomfort which he has since admission.

## 2025-03-23 NOTE — CONSULT NOTE ADULT - ASSESSMENT
81 y/o M with PMH of  osteoarthritis, Raynaud's, fibromyalgia, ischemic heart disease status post PCI–February 12 to RCA and LCx, February 27 to mid LAD and D1, patient on aspirin/plavix presenting for right-sided chest pain. Pt admitted for multifocal PNA as below       Problem/Plan - 1:  ·  Problem: Multifocal pneumonia.   ·  Plan: - CTA showing multifocal PNA and distal airways impaction. No pulmonary embolus. Trace pleural effusions. Multivessel coronary artery calcifications versus stenting  - LE Doppler negative for DVT   - limited RVP negative   - Zosyn x1 in Ed   - pro- BNP 1600s, troponin negative x1 at 28 repeat pending  - EKG reviewed sinus with PAC HR 81 Qtc 427  - pt does not appear volume overloaded, anterior chest pain appears more pleuritic in nature  - if cardiac concern arises would reach out to cardiology   - continue with Zosyn and Azithro, MRSA swab pending   - legionella, mycoplasma, strep pneumo pending, sputum cx pending, BC pending   - Supplemental O2 as needed.     Problem/Plan - 2:  ·  Problem: CAD (coronary artery disease).   ·  Plan: - continue with ASA Plavix 75mg qd, carvedilol 3.125mg bid and Atorvastatin 40mg qhs.     Problem/Plan - 3:  ·  Problem: Chronic back pain.   ·  Plan: - continue with home meds Oxycontin 10mg tid  and pregabalin 100mg tid  - iSTOP noted.     Problem/Plan - 4:  ·  Problem: Abnormal transaminases.   ·  Plan: PT 15 INR 1.33 AST ALT 56 ALT 49 on admission   - trend for now, consider RUQ if worsening.     Problem/Plan - 5:  ·  Problem: Prophylactic measure.   ·  Plan: DVT ppx: Lovenox 40 mg qd  Diet: Regular   Dispo pending clinical improvement   
81 y/o M with PMH of  osteoarthritis, Raynaud's, fibromyalgia, ischemic heart disease status post PCI–February 12 to RCA and LCx, February 27 to mid LAD and D1, presenting for right-sided chest pain, found to have multifocal pneumonia. Course c/b brief run of pAT on telemetry. Recent TTE w/ preserved EF.    Recs:  -stop carvedilol  -start metoprolol succinate 50mg PO daily  -continue to monitor on tele  -ensure K>4, Mg>2    
81 y/o M with PMH of  osteoarthritis, Raynaud's, fibromyalgia, ischemic heart disease status post PCI–February 12 to RCA and LCx, February 27 to mid LAD and D1, patient on aspirin/plavix presenting for right-sided chest pain. Pain is characterized as constant, fluctuates in intensity, intensifies on deep breathing. Denies diaphoresis, leg swelling. Reports green sputum 3 days ago. Per ED also reports slight chronic left-sided calf pain.    Multifocal pneumonia;  Raynaud's  CAD: S/P PCI A  Abnormal transaminases     Multifocal pneumonia;  CTA; No pulmonary embolus. Multifocal pneumonia and distal airways impaction. Component of pulmonary edema. Trace pleural effusions. Follow to resolution with repeat chest CT in one month, or sooner as clinically warranted. Multivessel coronary artery calcifications versus stenting. Correlate with procedural history and cardiology evaluation.  agree with antibtiocs;   keep o2 sao2 above 90% all the t delfino;   legionella is negative:   RVP is normal  :     Raynaud's  supportive care     CAD: S/P PCI   clopidogrel Tablet 75 milliGRAM(s) Oral daily  aspirin  chewable 81 milliGRAM(s) Oral daily  atorvastatin 40 milliGRAM(s) Oral at bedtime    Abnormal transaminases   mildly elevated liver enzymes ? secondary to statins:  monitor    dvt prophylaxis    dw acp

## 2025-03-23 NOTE — PROGRESS NOTE ADULT - SUBJECTIVE AND OBJECTIVE BOX
MR#[id number]  PATIENT NAME:KESHAV BARAHONA    DATE OF SERVICE: 03-23-25 @ 09:40  Patient was seen and examined by Yon Peña MD on    03-23-25 @ 09:40 .  Interim events noted.Consultant notes ,Labs,Telemetry reviewed by me       HOSPITAL COURSE: HPI:  81 y/o M with PMH of  osteoarthritis, Raynaud's, fibromyalgia, ischemic heart disease status post PCI–February 12 to RCA and LCx, February 27 to mid LAD and D1, patient on aspirin/plavix presenting for right-sided chest pain. Pain is characterized as constant, fluctuates in intensity, intensifies on deep breathing. Denies diaphoresis, leg swelling. Reports green sputum 3 days ago. Per ED also reports slight chronic left-sided calf pain.    In the ED pt is afebrile   WBC 12 PT 15 INR 1.33 AST ALT 56 ALT 49  pro- BNP 1651  RVP negative   CTA showing multifocal PNA and distal airways impaction. No pulmonary embolus. Trace pleural effusions. Multivessel coronary artery calcifications versus stenting  LE Doppler negative for DVT  (22 Mar 2025 12:10)      INTERIM EVENTS:Patient seen at bedside ,interim events noted.  Awake alert is OOB had episode severe localized Right pleuritic pain also noted pAT now in Sinus with frequent APC's  Afebrile not dyspneac    PMH -reviewed admission note, no change since admission  HEART FAILURE: Acute[ ]Chronic[x ] Systolic[ ] Diastolic[x ] Combined Systolic and Diastolic[ ]  CAD[x ] CABG[ ] PCI[x ]  DEVICES[ ] PPM[ ] ICD[ ] ILR[ ]  ATRIAL FIBRILLATION[ ] Paroxysmal[ ] Permanent[ ] CHADS2-[  ]  ROLANDO[ ] CKD1[ ] CKD2[ ] CKD3[ ] CKD4[ ] ESRD[ ]  COPD[ ] HTN[ ]   DM[ ] Type1[ ] Type 2[ ]   CVA[ ] Paresis[ ]    AMBULATION: Assisted[ ] Cane/walker[ ] Independent[x ]    MEDICATIONS  (STANDING):  aspirin  chewable 81 milliGRAM(s) Oral daily  atorvastatin 40 milliGRAM(s) Oral at bedtime  azithromycin   Tablet 500 milliGRAM(s) Oral daily  carvedilol 3.125 milliGRAM(s) Oral every 12 hours  clopidogrel Tablet 75 milliGRAM(s) Oral daily  enoxaparin Injectable 40 milliGRAM(s) SubCutaneous every 24 hours  oxyCODONE  ER Tablet 10 milliGRAM(s) Oral three times a day  pantoprazole    Tablet 40 milliGRAM(s) Oral before breakfast  piperacillin/tazobactam IVPB.. 3.375 Gram(s) IV Intermittent every 8 hours  pregabalin 100 milliGRAM(s) Oral three times a day    MEDICATIONS  (PRN):  acetaminophen     Tablet .. 650 milliGRAM(s) Oral every 6 hours PRN Temp greater or equal to 38C (100.4F), Mild Pain (1 - 3)  acetaminophen  300 mG/codeine 30 mG 1 Tablet(s) Oral every 6 hours PRN Moderate Pain (4 - 6)  aluminum hydroxide/magnesium hydroxide/simethicone Suspension 30 milliLiter(s) Oral every 4 hours PRN Dyspepsia  melatonin 3 milliGRAM(s) Oral at bedtime PRN Insomnia  ondansetron Injectable 4 milliGRAM(s) IV Push every 8 hours PRN Nausea and/or Vomiting            REVIEW OF SYSTEMS:  Constitutional: [ ] fever, [ ]weight loss,  [x ]fatigue [ ]weight gain  Eyes: [ ] visual changes  Respiratory: [ ]shortness of breath;  [x ] cough, [ ]wheezing, [ ]chills, [ ]hemoptysis  Cardiovascular: [ x] chest pain, [ ]palpitations, [ ]dizziness,  [ ]leg swelling[ ]orthopnea[ ]PND  Gastrointestinal: [ ] abdominal pain, [ ]nausea, [ ]vomiting,  [ ]diarrhea [ ]Constipation [ ]Melena  Genitourinary: [ ] dysuria, [ ] hematuria [ ]Morales  Neurologic: [ ] headaches [ ] tremors[ ]weakness [ ]Paralysis Right[ ] Left[ ]  Skin: [ ] itching, [ ]burning, [ ] rashes  Endocrine: [ ] heat or cold intolerance  Musculoskeletal: [ ] joint pain or swelling; [ ] muscle, back, or extremity pain  Psychiatric: [ ] depression, [ ]anxiety, [ ]mood swings, or [ ]difficulty sleeping  Hematologic: [ ] easy bruising, [ ] bleeding gums    [ ] All remaining systems negative except as per above.   [ ]Unable to obtain.  [x] No change in ROS since admission      Vital Signs Last 24 Hrs  T(C): 37.1 (23 Mar 2025 05:00), Max: 37.1 (23 Mar 2025 05:00)  T(F): 98.7 (23 Mar 2025 05:00), Max: 98.7 (23 Mar 2025 05:00)  HR: 69 (23 Mar 2025 09:12) (64 - 75)  BP: 117/67 (23 Mar 2025 09:12) (100/61 - 127/72)  BP(mean): 85 (22 Mar 2025 12:02) (85 - 85)  RR: 18 (23 Mar 2025 05:00) (18 - 22)  SpO2: 95% (23 Mar 2025 09:12) (94% - 98%)    Parameters below as of 23 Mar 2025 09:12  Patient On (Oxygen Delivery Method): nasal cannula  O2 Flow (L/min): 2    I&O's Summary    22 Mar 2025 07:01  -  23 Mar 2025 07:00  --------------------------------------------------------  IN: 0 mL / OUT: 400 mL / NET: -400 mL        PHYSICAL EXAM:  General: No acute distress BMI-28  HEENT: EOMI, PERRL  Neck: Supple, [ ] JVD  Lungs: Equal air entry bilaterally; [ ] rales [ ] wheezing [ ] rhonchi  Heart: Regular rate and rhythm; [x ] murmur   2/6 [ x] systolic [ ] diastolic [ ] radiation[ ] rubs [ ]  gallops  Abdomen: Nontender, bowel sounds present  Extremities: No clubbing, cyanosis, [ ] edema [ ]Pulses  equal and intact  Nervous system:  Alert & Oriented X3, no focal deficits  Psychiatric: Normal affect  Skin: No rashes or lesions    LABS:  03-23    139  |  102  |  21  ----------------------------<  124[H]  3.9   |  25  |  0.92    Ca    8.9      23 Mar 2025 06:51  Mg     2.0     03-22    TPro  6.3  /  Alb  2.9[L]  /  TBili  0.4  /  DBili  0.2  /  AST  54[H]  /  ALT  57[H]  /  AlkPhos  114  03-23    Creatinine Trend: 0.92<--, 0.89<--, 0.77<--, 0.69<--                        12.4   9.87  )-----------( 159      ( 23 Mar 2025 06:51 )             36.6     PT/INR - ( 22 Mar 2025 08:34 )   PT: 15.1 sec;   INR: 1.33 ratio         PTT - ( 23 Mar 2025 08:08 )  PTT:30.6 sec    VA Duplex Lower Ext Vein Scan, Bilat (03.22.25 @ 11:07) >  IMPRESSION:  No evidence of deep venous thrombosis in either lower extremity.          : TTE W or WO Ultrasound Enhancing Agent (02.12.25 @ 10:35) >  CONCLUSIONS:      1. Left ventricular cavity is normal in size. Left ventricular wall thickness is normal. Left ventricular systolic function is normal with an ejection fraction of 61 % by Mascorro's method of disks. There are no regional wall motion abnormalities seen.   2. Normal left ventricular diastolic function, with normal left ventricular filling pressure.   3. Normal right ventricular cavity size and normal right ventricular systolic function.   4. Normal left and right atrial size.   5. Trileaflet aortic valve with normal systolic excursion. There is mild calcification of the aortic valve leaflets.   6. Mild aortic regurgitation.   7. Prolapse of the posterior mitral leaflet.   8. Mild to moderate mitral regurgitation.   9. No pericardial effusion seen.  10. Compared to the transthoracic echocardiogram performed on 11/28/2023, Slight worsening of the mitral valve regurgitation.    CT Angio Chest PE Protocol w/ IV Cont (03.22.25 @ 09:34) >    IMPRESSION:  No pulmonary embolus.  Multifocal pneumonia and distal airways impaction. Component of pulmonary  edema. Trace pleural effusions. Follow to resolution with repeat chest CT  in one month, or sooner as clinicallywarranted.  Multivessel coronary artery calcifications versus stenting. Correlate  with procedural history and cardiology evaluation.       Xray Chest 2 Views PA/Lat (03.22.25 @ 09:02) >  IMPRESSION:    Opacification of the right middle lobe and posterior segment of the left  upper lobe suspicious for pneumonia.  No acute osseous abnormality.      Cardiac Catheterization (02.27.25 @ 11:27) >  Conclusions:   Successful PCI with BENJAMIN to Mid LAD    Successful POBA to Diagonal after LAD stent deployment            Cardiac Catheterization (02.13.25 @ 12:37) >  PCI Status:             elective     Conclusions:   Right dominant coronary anatomy with severe RCA, LAD and LCx stenosis  (low SYNTAX score)    Successful PCI with BENJAMIN to RCA stenosis    Successful PCI with BENJAMIN to LCx      Will stage PCI to LAD in a few weeks

## 2025-03-23 NOTE — PHYSICAL THERAPY INITIAL EVALUATION ADULT - ADDITIONAL COMMENTS
Pt lives with wife in private house, 3 stairs to enter, 1 flight to bedroom. Is independent ambulator with no device, drives.

## 2025-03-23 NOTE — PHYSICAL THERAPY INITIAL EVALUATION ADULT - PERTINENT HX OF CURRENT PROBLEM, REHAB EVAL
80 y/oM with admitted 3/22 with cough and R sided chest pain. CTA showing multifocal pna and distal airways impaction. PMH osteoarthritis, Raynaud's, fibromyalgia, ischemic heart disease status post PCI–February 12 to RCA and LCx, February 27 to mid LAD and D1, patient on aspirin/plavix presenting for right-sided chest pain. Pain is characterized as constant, fluctuates in intensity, intensifies on deep breathing. Denies diaphoresis, leg swelling. Reports green sputum 3 days ago. Per ED also reports slight chronic left-sided calf pain. 80 y/oM with admitted 3/22 with cough and R sided chest pain. CTA showing multifocal pna and distal airways impaction. No PE. PMH osteoarthritis, Raynaud's, fibromyalgia, ischemic heart disease status post PCI–February 12 to RCA and LCx, February 27 to mid LAD and D1, patient on aspirin/plavix presenting for right-sided chest pain. Pain is characterized as constant, fluctuates in intensity, intensifies on deep breathing. Denies diaphoresis, leg swelling. Reports green sputum 3 days ago. Per ED also reports slight chronic left-sided calf pain. Duplex negative for DVT.

## 2025-03-24 ENCOUNTER — RESULT REVIEW (OUTPATIENT)
Age: 80
End: 2025-03-24

## 2025-03-24 ENCOUNTER — TRANSCRIPTION ENCOUNTER (OUTPATIENT)
Age: 80
End: 2025-03-24

## 2025-03-24 LAB
GRAM STN FLD: ABNORMAL
SPECIMEN SOURCE: SIGNIFICANT CHANGE UP

## 2025-03-24 PROCEDURE — 99223 1ST HOSP IP/OBS HIGH 75: CPT

## 2025-03-24 PROCEDURE — 93356 MYOCRD STRAIN IMG SPCKL TRCK: CPT

## 2025-03-24 PROCEDURE — 93325 DOPPLER ECHO COLOR FLOW MAPG: CPT | Mod: 26

## 2025-03-24 PROCEDURE — 93308 TTE F-UP OR LMTD: CPT | Mod: 26

## 2025-03-24 RX ADMIN — Medication 25 GRAM(S): at 14:07

## 2025-03-24 RX ADMIN — ATORVASTATIN CALCIUM 40 MILLIGRAM(S): 80 TABLET, FILM COATED ORAL at 21:17

## 2025-03-24 RX ADMIN — Medication 25 GRAM(S): at 22:29

## 2025-03-24 RX ADMIN — OXYCODONE HYDROCHLORIDE 10 MILLIGRAM(S): 30 TABLET ORAL at 05:04

## 2025-03-24 RX ADMIN — PREGABALIN 100 MILLIGRAM(S): 75 CAPSULE ORAL at 05:04

## 2025-03-24 RX ADMIN — OXYCODONE HYDROCHLORIDE 10 MILLIGRAM(S): 30 TABLET ORAL at 21:17

## 2025-03-24 RX ADMIN — Medication 40 MILLIGRAM(S): at 05:04

## 2025-03-24 RX ADMIN — Medication 25 GRAM(S): at 05:08

## 2025-03-24 RX ADMIN — OXYCODONE HYDROCHLORIDE 10 MILLIGRAM(S): 30 TABLET ORAL at 06:04

## 2025-03-24 RX ADMIN — OXYCODONE HYDROCHLORIDE 10 MILLIGRAM(S): 30 TABLET ORAL at 22:17

## 2025-03-24 RX ADMIN — Medication 81 MILLIGRAM(S): at 12:13

## 2025-03-24 RX ADMIN — PREGABALIN 100 MILLIGRAM(S): 75 CAPSULE ORAL at 21:17

## 2025-03-24 RX ADMIN — PREGABALIN 100 MILLIGRAM(S): 75 CAPSULE ORAL at 14:05

## 2025-03-24 RX ADMIN — Medication 500 MILLIGRAM(S): at 12:13

## 2025-03-24 RX ADMIN — ENOXAPARIN SODIUM 40 MILLIGRAM(S): 100 INJECTION SUBCUTANEOUS at 12:12

## 2025-03-24 RX ADMIN — METOPROLOL SUCCINATE 50 MILLIGRAM(S): 50 TABLET, EXTENDED RELEASE ORAL at 05:04

## 2025-03-24 RX ADMIN — CLOPIDOGREL BISULFATE 75 MILLIGRAM(S): 75 TABLET, FILM COATED ORAL at 12:12

## 2025-03-24 RX ADMIN — OXYCODONE HYDROCHLORIDE 10 MILLIGRAM(S): 30 TABLET ORAL at 14:06

## 2025-03-24 NOTE — PROGRESS NOTE ADULT - SUBJECTIVE AND OBJECTIVE BOX
Date of Service: 03-24-25 @ 14:48    Patient is a 80y old  Male who presents with a chief complaint of cough, and right sided chest pain (24 Mar 2025 11:45)      Any change in ROS: seems OK:  lying comfortably in  bed:  on 2 L o f oxygen : no chest pain :       MEDICATIONS  (STANDING):  aspirin  chewable 81 milliGRAM(s) Oral daily  atorvastatin 40 milliGRAM(s) Oral at bedtime  clopidogrel Tablet 75 milliGRAM(s) Oral daily  enoxaparin Injectable 40 milliGRAM(s) SubCutaneous every 24 hours  metoprolol succinate ER 50 milliGRAM(s) Oral daily  oxyCODONE  ER Tablet 10 milliGRAM(s) Oral three times a day  pantoprazole    Tablet 40 milliGRAM(s) Oral before breakfast  piperacillin/tazobactam IVPB.. 3.375 Gram(s) IV Intermittent every 8 hours  pregabalin 100 milliGRAM(s) Oral three times a day    MEDICATIONS  (PRN):  acetaminophen     Tablet .. 650 milliGRAM(s) Oral every 6 hours PRN Temp greater or equal to 38C (100.4F), Mild Pain (1 - 3)  acetaminophen  300 mG/codeine 30 mG 1 Tablet(s) Oral every 6 hours PRN Moderate Pain (4 - 6)  aluminum hydroxide/magnesium hydroxide/simethicone Suspension 30 milliLiter(s) Oral every 4 hours PRN Dyspepsia  melatonin 3 milliGRAM(s) Oral at bedtime PRN Insomnia  ondansetron Injectable 4 milliGRAM(s) IV Push every 8 hours PRN Nausea and/or Vomiting    Vital Signs Last 24 Hrs  T(C): 37.6 (24 Mar 2025 10:45), Max: 37.6 (23 Mar 2025 21:39)  T(F): 99.6 (24 Mar 2025 10:45), Max: 99.7 (24 Mar 2025 04:53)  HR: 72 (24 Mar 2025 10:45) (68 - 80)  BP: 116/76 (24 Mar 2025 10:45) (100/65 - 116/76)  BP(mean): --  RR: 18 (24 Mar 2025 10:45) (18 - 18)  SpO2: 88% (24 Mar 2025 14:11) (88% - 96%)    Parameters below as of 24 Mar 2025 14:11  Patient On (Oxygen Delivery Method): room air        I&O's Summary        Physical Exam:   GENERAL: NAD, well-groomed, well-developed  HEENT: AVI/   Atraumatic, Normocephalic  ENMT: No tonsillar erythema, exudates, or enlargement; Moist mucous membranes, Good dentition, No lesions  NECK: Supple, No JVD, Normal thyroid  CHEST/LUNG: Clear to auscultaion  CVS: Regular rate and rhythm; No murmurs, rubs, or gallops  GI: : Soft, Nontender, Nondistended; Bowel sounds present  NERVOUS SYSTEM:  Alert & Oriented X3  EXTREMITIES:  2+ Peripheral Pulses, No clubbing, cyanosis, or edema  LYMPH: No lymphadenopathy noted  SKIN: No rashes or lesions  ENDOCRINOLOGY: No Thyromegaly  PSYCH: Appropriate    Labs:  32  CARDIAC MARKERS ( 23 Mar 2025 08:08 )  x     / x     / x     / x     / 2.3 ng/mL                            12.4   9.87  )-----------( 159      ( 23 Mar 2025 06:51 )             36.6                         13.0   12.01 )-----------( 139      ( 22 Mar 2025 08:34 )             39.2     03-23    139  |  102  |  21  ----------------------------<  124[H]  3.9   |  25  |  0.92  03-22    138  |  101  |  20  ----------------------------<  133[H]  4.3   |  24  |  0.89    Ca    8.9      23 Mar 2025 06:51    TPro  6.3  /  Alb  2.9[L]  /  TBili  0.4  /  DBili  0.2  /  AST  54[H]  /  ALT  57[H]  /  AlkPhos  114  03-23  TPro  7.1  /  Alb  3.5  /  TBili  1.0  /  DBili  x   /  AST  56[H]  /  ALT  49[H]  /  AlkPhos  114  03-22    CAPILLARY BLOOD GLUCOSE          LIVER FUNCTIONS - ( 23 Mar 2025 06:51 )  Alb: 2.9 g/dL / Pro: 6.3 g/dL / ALK PHOS: 114 U/L / ALT: 57 U/L / AST: 54 U/L / GGT: x           PTT - ( 23 Mar 2025 08:08 )  PTT:30.6 sec  Urinalysis Basic - ( 23 Mar 2025 06:51 )    Color: x / Appearance: x / SG: x / pH: x  Gluc: 124 mg/dL / Ketone: x  / Bili: x / Urobili: x   Blood: x / Protein: x / Nitrite: x   Leuk Esterase: x / RBC: x / WBC x   Sq Epi: x / Non Sq Epi: x / Bacteria: x            RECENT CULTURES:  03-22 @ 20:28 Sputum Sputum       Moderate polymorphonuclear leukocytes per low power field  No Squamous epithelial cells per low power field  No organisms seen per oil power field           Commensal yariel consistent with body site    03-22 @ 09:45 Blood Blood-Peripheral       rad< from: VA Duplex Lower Ext Vein Scan, Bilat (03.22.25 @ 11:07) >  TECHNIQUE: Duplex sonography of the BILATERAL LOWER extremity veins with   color and spectral Doppler, with and without compression.    FINDINGS:    RIGHT:  Normal compressibility of the RIGHT common femoral, femoral and popliteal   veins.  Doppler examination shows normal spontaneous and phasic flow.  No RIGHT calf vein thrombosis is detected.    LEFT:  Normal compressibility of the LEFT common femoral, femoral and popliteal   veins.  Doppler examination shows normal spontaneous and phasic flow.  No LEFT calf vein thrombosis is detected.    IMPRESSION:  No evidence of deep venous thrombosis in either lower extremity.            --- End of Report ---            LILLIAN MORENO MD; Attending Radiologist  This document has been electronically signed. Mar 22 2025 11:09AM    < end of copied text >  < from: CT Angio Chest PE Protocol w/ IV Cont (03.22.25 @ 09:34) >  BONES: Degenerative changes of the bones including multilevel spinal   spondylosis. Central canal and neural foramina are not adequately   assessed on this study.    IMPRESSION:    No pulmonary embolus.    Multifocal pneumonia and distal airways impaction. Component of pulmonary   edema. Trace pleural effusions. Follow to resolution with repeat chest CT   in one month, or sooner as clinicallywarranted.    Multivessel coronary artery calcifications versus stenting. Correlate   with procedural history and cardiology evaluation.    < end of copied text >           No growth at 24 hours    03-22 @ 09:30 Blood Blood-Peripheral                No growth at 24 hours          RESPIRATORY CULTURES:          Studies  Chest X-RAY  CT SCAN Chest   Venous Dopplers: LE:   CT Abdomen  Others

## 2025-03-24 NOTE — PROGRESS NOTE ADULT - SUBJECTIVE AND OBJECTIVE BOX
SUBJECTIVE / OVERNIGHT EVENTS:pt seen and examined  03-24-25    MEDICATIONS  (STANDING):  aspirin  chewable 81 milliGRAM(s) Oral daily  atorvastatin 40 milliGRAM(s) Oral at bedtime  clopidogrel Tablet 75 milliGRAM(s) Oral daily  enoxaparin Injectable 40 milliGRAM(s) SubCutaneous every 24 hours  metoprolol succinate ER 50 milliGRAM(s) Oral daily  oxyCODONE  ER Tablet 10 milliGRAM(s) Oral three times a day  pantoprazole    Tablet 40 milliGRAM(s) Oral before breakfast  piperacillin/tazobactam IVPB.. 3.375 Gram(s) IV Intermittent every 8 hours  pregabalin 100 milliGRAM(s) Oral three times a day    MEDICATIONS  (PRN):  acetaminophen     Tablet .. 650 milliGRAM(s) Oral every 6 hours PRN Temp greater or equal to 38C (100.4F), Mild Pain (1 - 3)  acetaminophen  300 mG/codeine 30 mG 1 Tablet(s) Oral every 6 hours PRN Moderate Pain (4 - 6)  aluminum hydroxide/magnesium hydroxide/simethicone Suspension 30 milliLiter(s) Oral every 4 hours PRN Dyspepsia  melatonin 3 milliGRAM(s) Oral at bedtime PRN Insomnia  ondansetron Injectable 4 milliGRAM(s) IV Push every 8 hours PRN Nausea and/or Vomiting    T(C): 36.8 (03-24-25 @ 23:28), Max: 37.6 (03-24-25 @ 04:53)  HR: 82 (03-24-25 @ 23:28) (69 - 82)  BP: 125/75 (03-24-25 @ 23:28) (102/62 - 125/75)  RR: 18 (03-24-25 @ 23:28) (18 - 18)  SpO2: 96% (03-24-25 @ 23:28) (88% - 96%)    CAPILLARY BLOOD GLUCOSE        I&O's Summary      Constitutional: No fever, fatigue  Skin: No rash.  Eyes: No recent vision problems or eye pain.  ENT: No congestion, ear pain, or sore throat.  Cardiovascular: No chest pain or palpation.  Respiratory: No cough, shortness of breath, congestion, or wheezing.  Gastrointestinal: No abdominal pain, nausea, vomiting, or diarrhea.  Genitourinary: No dysuria.  Musculoskeletal: No joint swelling.  Neurologic: No headache.    PHYSICAL EXAM:  GENERAL: NAD  EYES: EOMI, PERRLA  NECK: Supple, No JVD  CHEST/LUNG: dec breath sounds at bases  HEART:  S1 , S2 +  ABDOMEN: soft , bs+  EXTREMITIES:  trace edema  NEUROLOGY:alert awake      LABS:                        12.4   9.87  )-----------( 159      ( 23 Mar 2025 06:51 )             36.6     03-23    139  |  102  |  21  ----------------------------<  124[H]  3.9   |  25  |  0.92    Ca    8.9      23 Mar 2025 06:51    TPro  6.3  /  Alb  2.9[L]  /  TBili  0.4  /  DBili  0.2  /  AST  54[H]  /  ALT  57[H]  /  AlkPhos  114  03-23    PTT - ( 23 Mar 2025 08:08 )  PTT:30.6 sec  CARDIAC MARKERS ( 23 Mar 2025 08:08 )  x     / x     / x     / x     / 2.3 ng/mL      Urinalysis Basic - ( 23 Mar 2025 06:51 )    Color: x / Appearance: x / SG: x / pH: x  Gluc: 124 mg/dL / Ketone: x  / Bili: x / Urobili: x   Blood: x / Protein: x / Nitrite: x   Leuk Esterase: x / RBC: x / WBC x   Sq Epi: x / Non Sq Epi: x / Bacteria: x        Culture - Sputum (collected 03-24-25 @ 13:36)  Source: Sputum Sputum  Gram Stain (03-24-25 @ 20:03):    Few Squamous epithelial cells per low power field    Few polymorphonuclear leukocytes per low power field    Rare Gram Positive Cocci in Clusters per oil power field    Rare Gram Positive Cocci in Pairs and Chains per oil power field      RADIOLOGY & ADDITIONAL TESTS:    Imaging Personally Reviewed:    Consultant(s) Notes Reviewed:      Care Discussed with Consultants/Other Providers:

## 2025-03-24 NOTE — PROGRESS NOTE ADULT - SUBJECTIVE AND OBJECTIVE BOX
MR#[id number]  PATIENT NAME:KESHAV BARAHONA    DATE OF SERVICE: 03-24-25 @ 07:32  Patient was seen and examined by Yon Peña MD on    03-24-25 @ 07:32 .  Interim events noted.Consultant notes ,Labs,Telemetry reviewed by me       HOSPITAL COURSE: HPI:  79 y/o M with PMH of  osteoarthritis, Raynaud's, fibromyalgia, ischemic heart disease status post PCI–February 12 to RCA and LCx, February 27 to mid LAD and D1, patient on aspirin/plavix presenting for right-sided chest pain. Pain is characterized as constant, fluctuates in intensity, intensifies on deep breathing. Denies diaphoresis, leg swelling. Reports green sputum 3 days ago. Per ED also reports slight chronic left-sided calf pain.    In the ED pt is afebrile   WBC 12 PT 15 INR 1.33 AST ALT 56 ALT 49  pro- BNP 1651  RVP negative   CTA showing multifocal PNA and distal airways impaction. No pulmonary embolus. Trace pleural effusions. Multivessel coronary artery calcifications versus stenting  LE Doppler negative for DVT  (22 Mar 2025 12:10)      INTERIM EVENTS:Patient seen at bedside ,interim events noted.  Awake alert is OOB had episode severe localized Right pleuritic pain also noted pAT now in Sinus with frequent APC's  Afebrile not dyspneac still has Right pleuritic pain        PMH -reviewed admission note, no change since admission  HEART FAILURE: Acute[ ]Chronic[x ] Systolic[ ] Diastolic[x ] Combined Systolic and Diastolic[ ]  CAD[x ] CABG[ ] PCI[x ]  DEVICES[ ] PPM[ ] ICD[ ] ILR[ ]  ATRIAL FIBRILLATION[ ] Paroxysmal[ ] Permanent[ ] CHADS2-[  ]  ROLANDO[ ] CKD1[ ] CKD2[ ] CKD3[ ] CKD4[ ] ESRD[ ]  COPD[ ] HTN[ ]   DM[ ] Type1[ ] Type 2[ ]   CVA[ ] Paresis[ ]    AMBULATION: Assisted[ ] Cane/walker[ ] Independent[x ]        MEDICATIONS  (STANDING):  aspirin  chewable 81 milliGRAM(s) Oral daily  atorvastatin 40 milliGRAM(s) Oral at bedtime  azithromycin   Tablet 500 milliGRAM(s) Oral daily  clopidogrel Tablet 75 milliGRAM(s) Oral daily  enoxaparin Injectable 40 milliGRAM(s) SubCutaneous every 24 hours  metoprolol succinate ER 50 milliGRAM(s) Oral daily  oxyCODONE  ER Tablet 10 milliGRAM(s) Oral three times a day  pantoprazole    Tablet 40 milliGRAM(s) Oral before breakfast  piperacillin/tazobactam IVPB.. 3.375 Gram(s) IV Intermittent every 8 hours  pregabalin 100 milliGRAM(s) Oral three times a day    MEDICATIONS  (PRN):  acetaminophen     Tablet .. 650 milliGRAM(s) Oral every 6 hours PRN Temp greater or equal to 38C (100.4F), Mild Pain (1 - 3)  acetaminophen  300 mG/codeine 30 mG 1 Tablet(s) Oral every 6 hours PRN Moderate Pain (4 - 6)  aluminum hydroxide/magnesium hydroxide/simethicone Suspension 30 milliLiter(s) Oral every 4 hours PRN Dyspepsia  melatonin 3 milliGRAM(s) Oral at bedtime PRN Insomnia  ondansetron Injectable 4 milliGRAM(s) IV Push every 8 hours PRN Nausea and/or Vomiting            REVIEW OF SYSTEMS:  Constitutional: [ ] fever, [ ]weight loss,  [x ]fatigue [ ]weight gain  Eyes: [ ] visual changes  Respiratory: [ ]shortness of breath;  [x ] cough, [ ]wheezing, [ ]chills, [ ]hemoptysis  Cardiovascular: [x ] chest pain, [ ]palpitations, [ ]dizziness,  [ ]leg swelling[ ]orthopnea[ ]PND  Gastrointestinal: [ ] abdominal pain, [ ]nausea, [ ]vomiting,  [ ]diarrhea [ ]Constipation [ ]Melena  Genitourinary: [ ] dysuria, [ ] hematuria [ ]Morales  Neurologic: [ ] headaches [ ] tremors[ ]weakness [ ]Paralysis Right[ ] Left[ ]  Skin: [ ] itching, [ ]burning, [ ] rashes  Endocrine: [ ] heat or cold intolerance  Musculoskeletal: [ ] joint pain or swelling; [ ] muscle, back, or extremity pain  Psychiatric: [ ] depression, [ ]anxiety, [ ]mood swings, or [ ]difficulty sleeping  Hematologic: [ ] easy bruising, [ ] bleeding gums    [ ] All remaining systems negative except as per above.   [ ]Unable to obtain.  [x] No change in ROS since admission      Vital Signs Last 24 Hrs  T(C): 37.6 (24 Mar 2025 04:53), Max: 37.6 (23 Mar 2025 21:39)  T(F): 99.7 (24 Mar 2025 04:53), Max: 99.7 (24 Mar 2025 04:53)  HR: 74 (24 Mar 2025 04:53) (68 - 80)  BP: 102/62 (24 Mar 2025 04:53) (100/65 - 117/67)  RR: 18 (24 Mar 2025 04:53) (18 - 18)  SpO2: 96% (24 Mar 2025 04:53) (94% - 96%)    Parameters below as of 24 Mar 2025 04:53  Patient On (Oxygen Delivery Method): nasal cannula  O2 Flow (L/min): 2    I&O's Summary      PHYSICAL EXAM:  General: No acute distress BMI-28  HEENT: EOMI, PERRL  Neck: Supple, [ ] JVD  Lungs: Equal air entry bilaterally; [ ] rales [ ] wheezing [x ] rhonchi  Heart: Regular rate and rhythm; [x ] murmur   2/6 [ x] systolic [ ] diastolic [ ] radiation[ ] rubs [ ]  gallops  Abdomen: Nontender, bowel sounds present  Extremities: No clubbing, cyanosis, [ ] edema [ ]Pulses  equal and intact  Nervous system:  Alert & Oriented X3, no focal deficits  Psychiatric: Normal affect  Skin: No rashes or lesions    LABS:  03-23    139  |  102  |  21  ----------------------------<  124[H]  3.9   |  25  |  0.92    Ca    8.9      23 Mar 2025 06:51  Mg     2.0     03-22    TPro  6.3  /  Alb  2.9[L]  /  TBili  0.4  /  DBili  0.2  /  AST  54[H]  /  ALT  57[H]  /  AlkPhos  114  03-23    Creatinine Trend: 0.92<--, 0.89<--, 0.77<--, 0.69<--                        12.4   9.87  )-----------( 159      ( 23 Mar 2025 06:51 )             36.6     PT/INR - ( 22 Mar 2025 08:34 )   PT: 15.1 sec;   INR: 1.33 ratio         PTT - ( 23 Mar 2025 08:08 )  PTT:30.6 sec    Troponin T, High Sensitivity Result: 22 <--21 <--24 ng/L      12 Lead ECG (03.22.25 @ 07:45) >  SINUS RHYTHM WITH PREMATURE ATRIAL COMPLEXES  OTHERWISE NORMAL ECG      VA Duplex Lower Ext Vein Scan, Bilat (03.22.25 @ 11:07) >  IMPRESSION:  No evidence of deep venous thrombosis in either lower extremity.          : TTE W or WO Ultrasound Enhancing Agent (02.12.25 @ 10:35) >  CONCLUSIONS:      1. Left ventricular cavity is normal in size. Left ventricular wall thickness is normal. Left ventricular systolic function is normal with an ejection fraction of 61 % by Mascorro's method of disks. There are no regional wall motion abnormalities seen.   2. Normal left ventricular diastolic function, with normal left ventricular filling pressure.   3. Normal right ventricular cavity size and normal right ventricular systolic function.   4. Normal left and right atrial size.   5. Trileaflet aortic valve with normal systolic excursion. There is mild calcification of the aortic valve leaflets.   6. Mild aortic regurgitation.   7. Prolapse of the posterior mitral leaflet.   8. Mild to moderate mitral regurgitation.   9. No pericardial effusion seen.  10. Compared to the transthoracic echocardiogram performed on 11/28/2023, Slight worsening of the mitral valve regurgitation.    CT Angio Chest PE Protocol w/ IV Cont (03.22.25 @ 09:34) >    IMPRESSION:  No pulmonary embolus.  Multifocal pneumonia and distal airways impaction. Component of pulmonary  edema. Trace pleural effusions. Follow to resolution with repeat chest CT  in one month, or sooner as clinicallywarranted.  Multivessel coronary artery calcifications versus stenting. Correlate  with procedural history and cardiology evaluation.       Xray Chest 2 Views PA/Lat (03.22.25 @ 09:02) >  IMPRESSION:    Opacification of the right middle lobe and posterior segment of the left  upper lobe suspicious for pneumonia.  No acute osseous abnormality.      Cardiac Catheterization (02.27.25 @ 11:27) >  Conclusions:   Successful PCI with BENJAMIN to Mid LAD    Successful POBA to Diagonal after LAD stent deployment            Cardiac Catheterization (02.13.25 @ 12:37) >  PCI Status:             elective     Conclusions:   Right dominant coronary anatomy with severe RCA, LAD and LCx stenosis  (low SYNTAX score)    Successful PCI with BENJAMIN to RCA stenosis    Successful PCI with BENJAMIN to LCx      Will stage PCI to LAD in a few weeks

## 2025-03-24 NOTE — DISCHARGE NOTE NURSING/CASE MANAGEMENT/SOCIAL WORK - PATIENT PORTAL LINK FT
You can access the FollowMyHealth Patient Portal offered by [facility] by registering at the following website: [email or web address]. By joining [facility]’s FollowMyHealth portal, you will also be able to view your health information using other applications (apps) compatible with our system.

## 2025-03-24 NOTE — PROGRESS NOTE ADULT - ASSESSMENT
81 y/o M with PMH of  osteoart    hritis, Raynaud's, fibromyalgia, ischemic heart disease status post PCI–February 12 to RCA and LCx, February 27 to mid LAD and D1, patient on aspirin/plavix presenting for right-sided chest pain. Pt admitted for multifocal PNA as below      # Multifocal pneumonia.   ·  Plan: - CTA showing multifocal PNA and distal airways impaction. No pulmonary embolus. Trace pleural effusions. Multivessel coronary artery calcifications versus stenting  - LE Doppler negative for DVT   - limited RVP negative   - Zosyn x1 in Ed   - pro- BNP 1600s, troponin negative x1 at 28  -- continue with Zosyn and Azithro, MRSA swab pending   - legionella, mycoplasma, strep pneumo pending, sputum cx pending, BC pending   - Supplemental O2 as needed.  -Pulmonary consult Dr Reaves noted           # CAD (coronary artery disease).   ·  Plan: - continue with ASA Plavix 75mg qd, carvedilol 3.125mg bid and Atorvastatin 40mg qhs.  -Chest pain pleuritic  -Episode pAT  -EP evaluation appreciated now on Metoprolol succ 50mg  -Troponins negative for cardiac injury-ECG unchanged

## 2025-03-24 NOTE — PROGRESS NOTE ADULT - SUBJECTIVE AND OBJECTIVE BOX
24H hour events: Pt without acute complaints    MEDICATIONS:  aspirin  chewable 81 milliGRAM(s) Oral daily  clopidogrel Tablet 75 milliGRAM(s) Oral daily  enoxaparin Injectable 40 milliGRAM(s) SubCutaneous every 24 hours  metoprolol succinate ER 50 milliGRAM(s) Oral daily    azithromycin   Tablet 500 milliGRAM(s) Oral daily  piperacillin/tazobactam IVPB.. 3.375 Gram(s) IV Intermittent every 8 hours      acetaminophen     Tablet .. 650 milliGRAM(s) Oral every 6 hours PRN  acetaminophen  300 mG/codeine 30 mG 1 Tablet(s) Oral every 6 hours PRN  melatonin 3 milliGRAM(s) Oral at bedtime PRN  ondansetron Injectable 4 milliGRAM(s) IV Push every 8 hours PRN  oxyCODONE  ER Tablet 10 milliGRAM(s) Oral three times a day  pregabalin 100 milliGRAM(s) Oral three times a day    aluminum hydroxide/magnesium hydroxide/simethicone Suspension 30 milliLiter(s) Oral every 4 hours PRN  pantoprazole    Tablet 40 milliGRAM(s) Oral before breakfast    atorvastatin 40 milliGRAM(s) Oral at bedtime        REVIEW OF SYSTEMS:  See HPI, otherwise ROS negative.    PHYSICAL EXAM:  T(C): 37.6 (03-24-25 @ 10:45), Max: 37.6 (03-23-25 @ 21:39)  HR: 72 (03-24-25 @ 10:45) (68 - 80)  BP: 116/76 (03-24-25 @ 10:45) (100/65 - 116/76)  RR: 18 (03-24-25 @ 10:45) (18 - 18)  SpO2: 92% (03-24-25 @ 10:45) (92% - 96%)  Wt(kg): --  I&O's Summary      Appearance: Alert. NAD	  HEENT:   NC/AT	  Cardiovascular: +S1S2 RRR   Respiratory: R>L crackles	  Psychiatry: A & O x 3, Mood & affect appropriate  Gastrointestinal:  Soft, NT.ND., + BS	  Skin: No rashes	  Neurologic: Non-focal  Extremities: No edema BLE      LABS:	 	    CBC Full  -  ( 23 Mar 2025 06:51 )  WBC Count : 9.87 K/uL  Hemoglobin : 12.4 g/dL  Hematocrit : 36.6 %  Platelet Count - Automated : 159 K/uL  Mean Cell Volume : 94.3 fl  Mean Cell Hemoglobin : 32.0 pg  Mean Cell Hemoglobin Concentration : 33.9 g/dL  Auto Neutrophil # : x  Auto Lymphocyte # : x  Auto Monocyte # : x  Auto Eosinophil # : x  Auto Basophil # : x  Auto Neutrophil % : x  Auto Lymphocyte % : x  Auto Monocyte % : x  Auto Eosinophil % : x  Auto Basophil % : x    03-23    139  |  102  |  21  ----------------------------<  124[H]  3.9   |  25  |  0.92    Ca    8.9      23 Mar 2025 06:51    TPro  6.3  /  Alb  2.9[L]  /  TBili  0.4  /  DBili  0.2  /  AST  54[H]  /  ALT  57[H]  /  AlkPhos  114  03-23        TELEMETRY: NSR, frequent APCs, 8 seconds of PAT on 3/23 early AM. SInus rates 60s-80s  	    ECG:  	    < from: TTE W or WO Ultrasound Enhancing Agent (02.12.25 @ 10:35) >  CONCLUSIONS:      1. Left ventricular cavity is normal in size. Left ventricular wall thickness is normal. Left ventricular systolic function is normal with an ejection fraction of 61 % by Mascorro's method of disks. There are no regional wall motion abnormalities seen.   2. Normal left ventricular diastolic function, with normal left ventricular filling pressure.   3. Normal right ventricular cavity size and normal right ventricular systolic function.   4. Normal left and right atrial size.   5. Trileaflet aortic valve with normal systolic excursion. There is mild calcification of the aortic valve leaflets.   6. Mild aortic regurgitation.   7. Prolapse of the posterior mitral leaflet.   8. Mild to moderate mitral regurgitation.   9. No pericardial effusion seen.  10. Compared to the transthoracic echocardiogram performed on 11/28/2023, Slight worsening of the mitral valve regurgitation.    < end of copied text >    	  ASSESSMENT/PLAN: 	     24H hour events: Pt without acute complaints    MEDICATIONS:  aspirin  chewable 81 milliGRAM(s) Oral daily  clopidogrel Tablet 75 milliGRAM(s) Oral daily  enoxaparin Injectable 40 milliGRAM(s) SubCutaneous every 24 hours  metoprolol succinate ER 50 milliGRAM(s) Oral daily    azithromycin   Tablet 500 milliGRAM(s) Oral daily  piperacillin/tazobactam IVPB.. 3.375 Gram(s) IV Intermittent every 8 hours      acetaminophen     Tablet .. 650 milliGRAM(s) Oral every 6 hours PRN  acetaminophen  300 mG/codeine 30 mG 1 Tablet(s) Oral every 6 hours PRN  melatonin 3 milliGRAM(s) Oral at bedtime PRN  ondansetron Injectable 4 milliGRAM(s) IV Push every 8 hours PRN  oxyCODONE  ER Tablet 10 milliGRAM(s) Oral three times a day  pregabalin 100 milliGRAM(s) Oral three times a day    aluminum hydroxide/magnesium hydroxide/simethicone Suspension 30 milliLiter(s) Oral every 4 hours PRN  pantoprazole    Tablet 40 milliGRAM(s) Oral before breakfast    atorvastatin 40 milliGRAM(s) Oral at bedtime        REVIEW OF SYSTEMS:  See HPI, otherwise ROS negative.    PHYSICAL EXAM:  T(C): 37.6 (03-24-25 @ 10:45), Max: 37.6 (03-23-25 @ 21:39)  HR: 72 (03-24-25 @ 10:45) (68 - 80)  BP: 116/76 (03-24-25 @ 10:45) (100/65 - 116/76)  RR: 18 (03-24-25 @ 10:45) (18 - 18)  SpO2: 92% (03-24-25 @ 10:45) (92% - 96%)  Wt(kg): --  I&O's Summary      Appearance: Alert. NAD	  HEENT:   NC/AT	  Cardiovascular: +S1S2 RRR   Respiratory: R>L crackles	  Psychiatry: A & O x 3, Mood & affect appropriate  Gastrointestinal:  Soft, NT.ND., + BS	  Skin: No rashes	  Neurologic: Non-focal  Extremities: No edema BLE      LABS:	 	    CBC Full  -  ( 23 Mar 2025 06:51 )  WBC Count : 9.87 K/uL  Hemoglobin : 12.4 g/dL  Hematocrit : 36.6 %  Platelet Count - Automated : 159 K/uL  Mean Cell Volume : 94.3 fl  Mean Cell Hemoglobin : 32.0 pg  Mean Cell Hemoglobin Concentration : 33.9 g/dL  Auto Neutrophil # : x  Auto Lymphocyte # : x  Auto Monocyte # : x  Auto Eosinophil # : x  Auto Basophil # : x  Auto Neutrophil % : x  Auto Lymphocyte % : x  Auto Monocyte % : x  Auto Eosinophil % : x  Auto Basophil % : x    03-23    139  |  102  |  21  ----------------------------<  124[H]  3.9   |  25  |  0.92    Ca    8.9      23 Mar 2025 06:51    TPro  6.3  /  Alb  2.9[L]  /  TBili  0.4  /  DBili  0.2  /  AST  54[H]  /  ALT  57[H]  /  AlkPhos  114  03-23      TELEMETRY: NSR, frequent APCs, 8 seconds of PAT on 3/23 early AM. SInus rates 60s-80s    < from: TTE W or WO Ultrasound Enhancing Agent (02.12.25 @ 10:35) >  CONCLUSIONS:      1. Left ventricular cavity is normal in size. Left ventricular wall thickness is normal. Left ventricular systolic function is normal with an ejection fraction of 61 % by Mascorro's method of disks. There are no regional wall motion abnormalities seen.   2. Normal left ventricular diastolic function, with normal left ventricular filling pressure.   3. Normal right ventricular cavity size and normal right ventricular systolic function.   4. Normal left and right atrial size.   5. Trileaflet aortic valve with normal systolic excursion. There is mild calcification of the aortic valve leaflets.   6. Mild aortic regurgitation.   7. Prolapse of the posterior mitral leaflet.   8. Mild to moderate mitral regurgitation.   9. No pericardial effusion seen.  10. Compared to the transthoracic echocardiogram performed on 11/28/2023, Slight worsening of the mitral valve regurgitation.    < end of copied text >    	  ASSESSMENT/PLAN:

## 2025-03-24 NOTE — PROGRESS NOTE ADULT - ASSESSMENT
79 y/o M with PMH of  osteoarthritis, Raynaud's, fibromyalgia, ischemic heart disease status post PCI–February 12 to RCA and LCx, February 27 to mid LAD and D1, patient on aspirin/plavix presenting for right-sided chest pain. Pain is characterized as constant, fluctuates in intensity, intensifies on deep breathing. Denies diaphoresis, leg swelling. Reports green sputum 3 days ago. Per ED also reports slight chronic left-sided calf pain.    Multifocal pneumonia;  Raynaud's  CAD: S/P PCI A  Abnormal transaminases     Multifocal pneumonia;  CTA; No pulmonary embolus. Multifocal pneumonia and distal airways impaction. Component of pulmonary edema. Trace pleural effusions. Follow to resolution with repeat chest CT in one month, or sooner as clinically warranted. Multivessel coronary artery calcifications versus stenting. Correlate with procedural history and cardiology evaluation.  agree with antibtiocs;   keep o2 sao2 above 90% all the t delfino;   legionella is negative:   RVP is normal  :     3/24:  seems to be doing  ok : no sob: no cough :  no phlegm :  on 2 L of oxygen : he feels s lightly better     Raynaud's  supportive care     CAD: S/P PCI   clopidogrel Tablet 75 milliGRAM(s) Oral daily  aspirin  chewable 81 milliGRAM(s) Oral daily  atorvastatin 40 milliGRAM(s) Oral at bedtime    Abnormal transaminases   mildly elevated liver enzymes ? secondary to statins:  monitor  3/24: no labs today so far     dvt prophylaxis    dw acp

## 2025-03-24 NOTE — DISCHARGE NOTE NURSING/CASE MANAGEMENT/SOCIAL WORK - FINANCIAL ASSISTANCE
[facility] provides services at a reduced cost to those who are determined to be eligible through [facility]’s financial assistance program. Information regarding [facility]’s financial assistance program can be found by going to [email or web address] or by calling [phone].

## 2025-03-24 NOTE — PROGRESS NOTE ADULT - ASSESSMENT
79 y/o M with PMH of osteoarthritis, Raynaud's, fibromyalgia, ischemic heart disease status post PCI–February 12 to RCA and LCx, February 27 to mid LAD and D1, presenting for right-sided chest pain, found to have multifocal pneumonia. Course c/b brief run of pAT on telemetry (~240bpm, for 8 seconds on 3/23 at 0619). Recent TTE w/ preserved EF.    Recs:  -stop carvedilol  -started on metoprolol succinate 50mg PO daily  -continue to monitor on tele  -ensure K>4, Mg>2  -Ongoing PNA management with abx as per Pulm 81 y/o M with PMH of osteoarthritis, Raynaud's, fibromyalgia, ischemic heart disease status post PCI–February 12 to RCA and LCx, February 27 to mid LAD and D1, presenting for right-sided chest pain, found to have multifocal pneumonia. Course c/b brief run of pAT on telemetry (~240bpm, for 8 seconds on 3/23 at 0619). Recent TTE w/ preserved EF.    Recs:  -stop carvedilol  -started on metoprolol succinate 50mg PO daily  -continue to monitor on tele  -ensure K>4, Mg>2  -Ongoing PNA management with abx as per Pulm  -EP will sign off, no plan for EP intervention at this time. Recommend BB for very brief pAT in setting of active PNA. Please call back with further issues

## 2025-03-24 NOTE — DISCHARGE NOTE NURSING/CASE MANAGEMENT/SOCIAL WORK - NSDCVIVACCINE_GEN_ALL_CORE_FT
Tdap; 19-Nov-2019 22:36; Jenifer Mata (TINO); [employer]; [id number] (Exp. Date: 21-Sep-2021); IntraMuscular; Deltoid Left.; 0.5 milliLiter(s); VIS (VIS Published: 09-May-2013, VIS Presented: 19-Nov-2019);

## 2025-03-24 NOTE — DISCHARGE NOTE NURSING/CASE MANAGEMENT/SOCIAL WORK - NSDCPEFALRISK_GEN_ALL_CORE
For information on Fall & Injury Prevention, visit: [email or web address] OR  [email or web address] OR  https://www.cdc.gov/steadi/patient.html

## 2025-03-25 ENCOUNTER — TRANSCRIPTION ENCOUNTER (OUTPATIENT)
Age: 80
End: 2025-03-25

## 2025-03-25 VITALS — OXYGEN SATURATION: 92 %

## 2025-03-25 LAB
CULTURE RESULTS: SIGNIFICANT CHANGE UP
M PNEUMO IGM SER-ACNC: 0.79 INDEX — SIGNIFICANT CHANGE UP (ref 0–0.9)
MYCOPLASMA AG SPEC QL: NEGATIVE — SIGNIFICANT CHANGE UP
S PNEUM AG UR QL: NEGATIVE — SIGNIFICANT CHANGE UP
SPECIMEN SOURCE: SIGNIFICANT CHANGE UP

## 2025-03-25 PROCEDURE — 71046 X-RAY EXAM CHEST 2 VIEWS: CPT

## 2025-03-25 PROCEDURE — 87040 BLOOD CULTURE FOR BACTERIA: CPT

## 2025-03-25 PROCEDURE — 83605 ASSAY OF LACTIC ACID: CPT

## 2025-03-25 PROCEDURE — 82553 CREATINE MB FRACTION: CPT

## 2025-03-25 PROCEDURE — 86713 LEGIONELLA ANTIBODY: CPT

## 2025-03-25 PROCEDURE — 71275 CT ANGIOGRAPHY CHEST: CPT | Mod: MC

## 2025-03-25 PROCEDURE — 85025 COMPLETE CBC W/AUTO DIFF WBC: CPT

## 2025-03-25 PROCEDURE — 82435 ASSAY OF BLOOD CHLORIDE: CPT

## 2025-03-25 PROCEDURE — 83735 ASSAY OF MAGNESIUM: CPT

## 2025-03-25 PROCEDURE — 93970 EXTREMITY STUDY: CPT

## 2025-03-25 PROCEDURE — 87070 CULTURE OTHR SPECIMN AEROBIC: CPT

## 2025-03-25 PROCEDURE — 82550 ASSAY OF CK (CPK): CPT

## 2025-03-25 PROCEDURE — 80076 HEPATIC FUNCTION PANEL: CPT

## 2025-03-25 PROCEDURE — 96365 THER/PROPH/DIAG IV INF INIT: CPT

## 2025-03-25 PROCEDURE — 80048 BASIC METABOLIC PNL TOTAL CA: CPT

## 2025-03-25 PROCEDURE — 87637 SARSCOV2&INF A&B&RSV AMP PRB: CPT

## 2025-03-25 PROCEDURE — 87899 AGENT NOS ASSAY W/OPTIC: CPT

## 2025-03-25 PROCEDURE — 87449 NOS EACH ORGANISM AG IA: CPT

## 2025-03-25 PROCEDURE — 85610 PROTHROMBIN TIME: CPT

## 2025-03-25 PROCEDURE — 84295 ASSAY OF SERUM SODIUM: CPT

## 2025-03-25 PROCEDURE — 87640 STAPH A DNA AMP PROBE: CPT

## 2025-03-25 PROCEDURE — 87641 MR-STAPH DNA AMP PROBE: CPT

## 2025-03-25 PROCEDURE — 87205 SMEAR GRAM STAIN: CPT

## 2025-03-25 PROCEDURE — 83880 ASSAY OF NATRIURETIC PEPTIDE: CPT

## 2025-03-25 PROCEDURE — 85014 HEMATOCRIT: CPT

## 2025-03-25 PROCEDURE — 86738 MYCOPLASMA ANTIBODY: CPT

## 2025-03-25 PROCEDURE — 84132 ASSAY OF SERUM POTASSIUM: CPT

## 2025-03-25 PROCEDURE — 82330 ASSAY OF CALCIUM: CPT

## 2025-03-25 PROCEDURE — 97161 PT EVAL LOW COMPLEX 20 MIN: CPT

## 2025-03-25 PROCEDURE — 85730 THROMBOPLASTIN TIME PARTIAL: CPT

## 2025-03-25 PROCEDURE — 82803 BLOOD GASES ANY COMBINATION: CPT

## 2025-03-25 PROCEDURE — 84484 ASSAY OF TROPONIN QUANT: CPT

## 2025-03-25 PROCEDURE — 93308 TTE F-UP OR LMTD: CPT

## 2025-03-25 PROCEDURE — 82947 ASSAY GLUCOSE BLOOD QUANT: CPT

## 2025-03-25 PROCEDURE — 93325 DOPPLER ECHO COLOR FLOW MAPG: CPT

## 2025-03-25 PROCEDURE — 36415 COLL VENOUS BLD VENIPUNCTURE: CPT

## 2025-03-25 PROCEDURE — 93005 ELECTROCARDIOGRAM TRACING: CPT

## 2025-03-25 PROCEDURE — 94640 AIRWAY INHALATION TREATMENT: CPT

## 2025-03-25 PROCEDURE — 80053 COMPREHEN METABOLIC PANEL: CPT

## 2025-03-25 PROCEDURE — 87077 CULTURE AEROBIC IDENTIFY: CPT

## 2025-03-25 PROCEDURE — 85018 HEMOGLOBIN: CPT

## 2025-03-25 PROCEDURE — 99285 EMERGENCY DEPT VISIT HI MDM: CPT

## 2025-03-25 PROCEDURE — 93356 MYOCRD STRAIN IMG SPCKL TRCK: CPT

## 2025-03-25 RX ORDER — CARVEDILOL 3.12 MG/1
1 TABLET, FILM COATED ORAL
Refills: 0 | DISCHARGE

## 2025-03-25 RX ORDER — METOPROLOL SUCCINATE 50 MG/1
1 TABLET, EXTENDED RELEASE ORAL
Qty: 30 | Refills: 0
Start: 2025-03-25 | End: 2025-04-23

## 2025-03-25 RX ORDER — DOXYCYCLINE HYCLATE 100 MG
1 TABLET ORAL
Qty: 10 | Refills: 0
Start: 2025-03-25 | End: 2025-03-29

## 2025-03-25 RX ADMIN — Medication 40 MILLIGRAM(S): at 06:01

## 2025-03-25 RX ADMIN — CLOPIDOGREL BISULFATE 75 MILLIGRAM(S): 75 TABLET, FILM COATED ORAL at 11:27

## 2025-03-25 RX ADMIN — PREGABALIN 100 MILLIGRAM(S): 75 CAPSULE ORAL at 06:01

## 2025-03-25 RX ADMIN — METOPROLOL SUCCINATE 50 MILLIGRAM(S): 50 TABLET, EXTENDED RELEASE ORAL at 11:27

## 2025-03-25 RX ADMIN — OXYCODONE HYDROCHLORIDE 10 MILLIGRAM(S): 30 TABLET ORAL at 06:01

## 2025-03-25 RX ADMIN — ENOXAPARIN SODIUM 40 MILLIGRAM(S): 100 INJECTION SUBCUTANEOUS at 11:28

## 2025-03-25 RX ADMIN — Medication 25 GRAM(S): at 06:01

## 2025-03-25 RX ADMIN — Medication 81 MILLIGRAM(S): at 11:27

## 2025-03-25 NOTE — PROGRESS NOTE ADULT - PROVIDER SPECIALTY LIST ADULT
Pulmonology
Cardiology
Cardiology
Electrophysiology
Cardiology
Pulmonology
Internal Medicine
Internal Medicine

## 2025-03-25 NOTE — PROGRESS NOTE ADULT - PROBLEM SELECTOR PLAN 1
-CTA; No pulmonary embolus. Multifocal pneumonia and distal airways impaction. Component of pulmonary edema. Trace pleural effusions. Follow to resolution with repeat chest CT in one month, or sooner as clinically warranted. Multivessel coronary artery calcifications versus stenting. Correlate with procedural history and cardiology evaluation.  abx  keep o2 sao2 above 90% all the t delfino;   legionella is negative:   RVP is normal  :   cont suppl oxygeN  Pt clinically improved, clear for dc by pulm
-CTA; No pulmonary embolus. Multifocal pneumonia and distal airways impaction. Component of pulmonary edema. Trace pleural effusions. Follow to resolution with repeat chest CT in one month, or sooner as clinically warranted. Multivessel coronary artery calcifications versus stenting. Correlate with procedural history and cardiology evaluation.  abx  keep o2 sao2 above 90% all the t delfino;   legionella is negative:   RVP is normal  :   cont suppl oxygen

## 2025-03-25 NOTE — DISCHARGE NOTE PROVIDER - NSDCMRMEDTOKEN_GEN_ALL_CORE_FT
aspirin 81 mg oral tablet: 1 tab(s) orally once a day  atorvastatin 40 mg oral tablet: 1 tab(s) orally once a day (at bedtime)  carvedilol 3.125 mg oral tablet: 1 tab(s) orally every 12 hours  clopidogrel 75 mg oral tablet: 1 tab(s) orally once a day  OxyCONTIN 10 mg oral tablet, extended release: 1 tab(s) orally 4 times a day  pantoprazole 40 mg oral delayed release tablet: 1 tab(s) orally 2 times a day  pregabalin 100 mg oral capsule: 1 cap(s) orally 3 times a day   aspirin 81 mg oral tablet: 1 tab(s) orally once a day  atorvastatin 40 mg oral tablet: 1 tab(s) orally once a day (at bedtime)  clopidogrel 75 mg oral tablet: 1 tab(s) orally once a day  doxycycline hyclate 100 mg oral capsule: 1 cap(s) orally 2 times a day  metoprolol succinate 50 mg oral tablet, extended release: 1 tab(s) orally once a day  OxyCONTIN 10 mg oral tablet, extended release: 1 tab(s) orally 4 times a day  pantoprazole 40 mg oral delayed release tablet: 1 tab(s) orally 2 times a day  pregabalin 100 mg oral capsule: 1 cap(s) orally 3 times a day

## 2025-03-25 NOTE — PROGRESS NOTE ADULT - REASON FOR ADMISSION
cough, and right sided chest pain

## 2025-03-25 NOTE — DISCHARGE NOTE PROVIDER - NSDCCPCAREPLAN_GEN_ALL_CORE_FT
PRINCIPAL DISCHARGE DIAGNOSIS  Diagnosis: Pneumonia  Assessment and Plan of Treatment: Pneumonia is a lung infection that can cause a fever, cough, and trouble breathing.  Continue all antibiotics as ordered until complete.  Nutrition is important, eat small frequent meals.  Get lots of rest and drink fluids.  Call your health care provider upon arrival home from hospital and make a follow up appointment for one week.  If your cough worsens, you develop fever greater than 101', you have shaking chills, a fast heartbeat, trouble breathing and/or feel your are breathing much faster than usual, call your healthcare provider.  Make sure you wash your hands frequently.        SECONDARY DISCHARGE DIAGNOSES  Diagnosis: Multifocal pneumonia  Assessment and Plan of Treatment:     Diagnosis: CAD (coronary artery disease)  Assessment and Plan of Treatment:     Diagnosis: Chronic back pain  Assessment and Plan of Treatment: continue pain meds    Diagnosis: Hypoxia  Assessment and Plan of Treatment: improved    Diagnosis: Abnormal transaminases  Assessment and Plan of Treatment: repeat liver enzymes outpatient

## 2025-03-25 NOTE — DISCHARGE NOTE PROVIDER - HOSPITAL COURSE
HPI:  81 y/o M with PMH of  osteoarthritis, Raynaud's, fibromyalgia, ischemic heart disease status post PCI–February 12 to RCA and LCx, February 27 to mid LAD and D1, patient on aspirin/plavix presenting for right-sided chest pain. Pain is characterized as constant, fluctuates in intensity, intensifies on deep breathing. Denies diaphoresis, leg swelling. Reports green sputum 3 days ago. Per ED also reports slight chronic left-sided calf pain.    In the ED pt is afebrile   WBC 12 PT 15 INR 1.33 AST ALT 56 ALT 49  pro- BNP 1651  RVP negative   CTA showing multifocal PNA and distal airways impaction. No pulmonary embolus. Trace pleural effusions. Multivessel coronary artery calcifications versus stenting  LE Doppler negative for DVT  (22 Mar 2025 12:10)    Hospital Course: Patient admitted multifocal PNA     >  Multifocal pneumonia.   ·  Plan: -CTA; No pulmonary embolus. Multifocal pneumonia and distal airways impaction. Component of pulmonary edema. Trace pleural effusions. Follow to resolution with repeat chest CT in one month, or sooner as clinically warranted. Multivessel coronary artery calcifications versus stenting. Correlate with procedural history and cardiology evaluation.  abx  - S/P Zithromax and Zosyn  - O2 saturation stable > 90%, use supplemental O2 as needed  - legionella is negative:   - RVP is normal      >  CAD (coronary artery disease).   ·  Plan: - continue with ASA Plavix 75mg qd, carvedilol 3.125mg bid and Atorvastatin 40mg qhs.    >  Chronic back pain.   ·  Plan: - continue with home meds Oxycontin 10mg tid  and pregabalin 100mg tid  - iSTOP noted.    >  Abnormal transaminases.   ·  Plan: PT 15 INR 1.33 AST ALT 56 ALT 49 on admission   monitor closely outpatient      Important Medication Changes and Reason:    Active or Pending Issues Requiring Follow-up:    Advanced Directives:   [x ] Full code  [ ] DNR  [ ] Hospice    Discharge Diagnoses:  Multifocal pneumonia  Transaminitis         HPI:  79 y/o M with PMH of  osteoarthritis, Raynaud's, fibromyalgia, ischemic heart disease status post PCI–February 12 to RCA and LCx, February 27 to mid LAD and D1, patient on aspirin/plavix presenting for right-sided chest pain. Pain is characterized as constant, fluctuates in intensity, intensifies on deep breathing. Denies diaphoresis, leg swelling. Reports green sputum 3 days ago. Per ED also reports slight chronic left-sided calf pain.    In the ED pt is afebrile   WBC 12 PT 15 INR 1.33 AST ALT 56 ALT 49  pro- BNP 1651  RVP negative   CTA showing multifocal PNA and distal airways impaction. No pulmonary embolus. Trace pleural effusions. Multivessel coronary artery calcifications versus stenting  LE Doppler negative for DVT  (22 Mar 2025 12:10)    Hospital Course: Patient admitted multifocal PNA     >  Multifocal pneumonia.   ·  Plan: -CTA; No pulmonary embolus. Multifocal pneumonia and distal airways impaction. Component of pulmonary edema. Trace pleural effusions. Follow to resolution with repeat chest CT in one month, or sooner as clinically warranted. Multivessel coronary artery calcifications versus stenting. Correlate with procedural history and cardiology evaluation.  abx  - S/P Zithromax and Zosyn  - O2 saturation stable > 90%, use supplemental O2 as needed  - legionella is negative:   - RVP is normal      >  CAD (coronary artery disease).   ·  Plan: - continue with ASA Plavix 75mg qd, carvedilol 3.125mg bid and Atorvastatin 40mg qhs.    >  Chronic back pain.   ·  Plan: - continue with home meds Oxycontin 10mg tid  and pregabalin 100mg tid  - iSTOP noted.    >  Abnormal transaminases.   ·  Plan: PT 15 INR 1.33 AST ALT 56 ALT 49 on admission   monitor closely outpatient      Important Medication Changes and Reason:    Active or Pending Issues Requiring Follow-up: Dr. Russell   DCP with med rec discussed with Dr. Peña     Advanced Directives:   [x ] Full code  [ ] DNR  [ ] Hospice    Discharge Diagnoses:  Multifocal pneumonia  Transaminitis

## 2025-03-25 NOTE — DISCHARGE NOTE PROVIDER - NSDCFUSCHEDAPPT_GEN_ALL_CORE_FT
Feliz Linares  [facility]  CARDIOLOGY [address]  Scheduled Appointment: 06/18/2025

## 2025-03-25 NOTE — PROGRESS NOTE ADULT - PROBLEM SELECTOR PLAN 4
PT 15 INR 1.33 AST ALT 56 ALT 49 on admission   monitor closely
PT 15 INR 1.33 AST ALT 56 ALT 49 on admission   monitor closely

## 2025-03-25 NOTE — PROGRESS NOTE ADULT - SUBJECTIVE AND OBJECTIVE BOX
SUBJECTIVE / OVERNIGHT EVENTS:pt seen and examined  03-25-25    MEDICATIONS  (STANDING):  aspirin  chewable 81 milliGRAM(s) Oral daily  atorvastatin 40 milliGRAM(s) Oral at bedtime  clopidogrel Tablet 75 milliGRAM(s) Oral daily  enoxaparin Injectable 40 milliGRAM(s) SubCutaneous every 24 hours  metoprolol succinate ER 50 milliGRAM(s) Oral daily  oxyCODONE  ER Tablet 10 milliGRAM(s) Oral three times a day  pantoprazole    Tablet 40 milliGRAM(s) Oral before breakfast  piperacillin/tazobactam IVPB.. 3.375 Gram(s) IV Intermittent every 8 hours  pregabalin 100 milliGRAM(s) Oral three times a day    MEDICATIONS  (PRN):  acetaminophen     Tablet .. 650 milliGRAM(s) Oral every 6 hours PRN Temp greater or equal to 38C (100.4F), Mild Pain (1 - 3)  acetaminophen  300 mG/codeine 30 mG 1 Tablet(s) Oral every 6 hours PRN Moderate Pain (4 - 6)  aluminum hydroxide/magnesium hydroxide/simethicone Suspension 30 milliLiter(s) Oral every 4 hours PRN Dyspepsia  melatonin 3 milliGRAM(s) Oral at bedtime PRN Insomnia  ondansetron Injectable 4 milliGRAM(s) IV Push every 8 hours PRN Nausea and/or Vomiting    Vital Signs Last 24 Hrs  T(C): 37.2 (03-25-25 @ 08:29), Max: 37.2 (03-25-25 @ 08:29)  T(F): 98.9 (03-25-25 @ 08:29), Max: 98.9 (03-25-25 @ 08:29)  HR: 73 (03-25-25 @ 11:22) (64 - 82)  BP: 104/70 (03-25-25 @ 11:22) (101/55 - 125/75)  BP(mean): --  RR: 18 (03-25-25 @ 08:29) (18 - 18)  SpO2: 92% (03-25-25 @ 13:00) (92% - 96%)      Constitutional: No fever, fatigue  Skin: No rash.  Eyes: No recent vision problems or eye pain.  ENT: No congestion, ear pain, or sore throat.  Cardiovascular: No chest pain or palpation.  Respiratory: No cough, shortness of breath, congestion, or wheezing.  Gastrointestinal: No abdominal pain, nausea, vomiting, or diarrhea.  Genitourinary: No dysuria.  Musculoskeletal: No joint swelling.  Neurologic: No headache.    PHYSICAL EXAM:  GENERAL: NAD  EYES: EOMI, PERRLA  NECK: Supple, No JVD  CHEST/LUNG: dec breath sounds at bases  HEART:  S1 , S2 +  ABDOMEN: soft , bs+  EXTREMITIES:  trace edema  NEUROLOGY:alert awake      LABS:        Creatinine Trend: 0.92 <--, 0.89 <--    Urine Studies:  Urinalysis Basic - ( 23 Mar 2025 06:51 )    Color:  / Appearance:  / SG:  / pH:   Gluc: 124 mg/dL / Ketone:   / Bili:  / Urobili:    Blood:  / Protein:  / Nitrite:    Leuk Esterase:  / RBC:  / WBC    Sq Epi:  / Non Sq Epi:  / Bacteria:                     Few Squamous epithelial cells per low power field    Few polymorphonuclear leukocytes per low power field    Rare Gram Positive Cocci in Clusters per oil power field    Rare Gram Positive Cocci in Pairs and Chains per oil power field      RADIOLOGY & ADDITIONAL TESTS:    Imaging Personally Reviewed:    Consultant(s) Notes Reviewed:      Care Discussed with Consultants/Other Providers:

## 2025-03-25 NOTE — PROGRESS NOTE ADULT - PROBLEM SELECTOR PLAN 2
- continue with ASA Plavix 75mg qd, carvedilol 3.125mg bid and Atorvastatin 40mg qhs
- continue with ASA Plavix 75mg qd, carvedilol 3.125mg bid and Atorvastatin 40mg qhs

## 2025-03-25 NOTE — PROGRESS NOTE ADULT - SUBJECTIVE AND OBJECTIVE BOX
MR#[id number]  PATIENT NAME:KESHAV BARAHONA    DATE OF SERVICE: 03-25-25 @ 07:15  Patient was seen and examined by Yon Peña MD on    03-25-25 @ 07:15 .  Interim events noted.Consultant notes ,Labs,Telemetry reviewed by me       HOSPITAL COURSE: HPI:  81 y/o M with PMH of  osteoarthritis, Raynaud's, fibromyalgia, ischemic heart disease status post PCI–February 12 to RCA and LCx, February 27 to mid LAD and D1, patient on aspirin/plavix presenting for right-sided chest pain. Pain is characterized as constant, fluctuates in intensity, intensifies on deep breathing. Denies diaphoresis, leg swelling. Reports green sputum 3 days ago. Per ED also reports slight chronic left-sided calf pain.    In the ED pt is afebrile   WBC 12 PT 15 INR 1.33 AST ALT 56 ALT 49  pro- BNP 1651  RVP negative   CTA showing multifocal PNA and distal airways impaction. No pulmonary embolus. Trace pleural effusions. Multivessel coronary artery calcifications versus stenting  LE Doppler negative for DVT  (22 Mar 2025 12:10)      INTERIM EVENTS:Patient seen at bedside ,interim events noted.  Awake alert is OOB had episode severe localized Right pleuritic pain also noted pAT now in Sinus with frequent APC's  Afebrile not dyspneac still has Right pleuritic pain albeit better  On 1LPM O2-Sats-96%      PMH -reviewed admission note, no change since admission  HEART FAILURE: Acute[ ]Chronic[ ] Systolic[ ] Diastolic[ ] Combined Systolic and Diastolic[ ]  CAD[x ] CABG[ ] PCI[x ]  DEVICES[ ] PPM[ ] ICD[ ] ILR[ ]  ATRIAL FIBRILLATION[ ] Paroxysmal[ ] Permanent[ ] CHADS2-[  ]  ROLANDO[ ] CKD1[ ] CKD2[ ] CKD3[ ] CKD4[ ] ESRD[ ]  COPD[ ] HTN[x ]   DM[ ] Type1[ ] Type 2[ ]   CVA[ ] Paresis[ ]    AMBULATION: Assisted[ ] Cane/walker[ ] Independent[x ]    MEDICATIONS  (STANDING):  aspirin  chewable 81 milliGRAM(s) Oral daily  atorvastatin 40 milliGRAM(s) Oral at bedtime  clopidogrel Tablet 75 milliGRAM(s) Oral daily  enoxaparin Injectable 40 milliGRAM(s) SubCutaneous every 24 hours  metoprolol succinate ER 50 milliGRAM(s) Oral daily  oxyCODONE  ER Tablet 10 milliGRAM(s) Oral three times a day  pantoprazole    Tablet 40 milliGRAM(s) Oral before breakfast  piperacillin/tazobactam IVPB.. 3.375 Gram(s) IV Intermittent every 8 hours  pregabalin 100 milliGRAM(s) Oral three times a day    MEDICATIONS  (PRN):  acetaminophen     Tablet .. 650 milliGRAM(s) Oral every 6 hours PRN Temp greater or equal to 38C (100.4F), Mild Pain (1 - 3)  acetaminophen  300 mG/codeine 30 mG 1 Tablet(s) Oral every 6 hours PRN Moderate Pain (4 - 6)  aluminum hydroxide/magnesium hydroxide/simethicone Suspension 30 milliLiter(s) Oral every 4 hours PRN Dyspepsia  melatonin 3 milliGRAM(s) Oral at bedtime PRN Insomnia  ondansetron Injectable 4 milliGRAM(s) IV Push every 8 hours PRN Nausea and/or Vomiting            REVIEW OF SYSTEMS:  Constitutional: [ ] fever, [ ]weight loss,  [ x]fatigue [ ]weight gain  Eyes: [ ] visual changes  Respiratory: [ ]shortness of breath;  [x ] cough, [ ]wheezing, [ ]chills, [ ]hemoptysis  Cardiovascular: [ x] chest pain, [ ]palpitations, [ ]dizziness,  [ ]leg swelling[ ]orthopnea[ ]PND  Gastrointestinal: [ ] abdominal pain, [ ]nausea, [ ]vomiting,  [ ]diarrhea [ ]Constipation [ ]Melena  Genitourinary: [ ] dysuria, [ ] hematuria [ ]Morales  Neurologic: [ ] headaches [ ] tremors[ ]weakness [ ]Paralysis Right[ ] Left[ ]  Skin: [ ] itching, [ ]burning, [ ] rashes  Endocrine: [ ] heat or cold intolerance  Musculoskeletal: [ ] joint pain or swelling; [ ] muscle, back, or extremity pain  Psychiatric: [ ] depression, [ ]anxiety, [ ]mood swings, or [ ]difficulty sleeping  Hematologic: [ ] easy bruising, [ ] bleeding gums    [ ] All remaining systems negative except as per above.   [ ]Unable to obtain.  [x] No change in ROS since admission      Vital Signs Last 24 Hrs  T(C): 36.8 (24 Mar 2025 23:28), Max: 37.6 (24 Mar 2025 10:45)  T(F): 98.2 (24 Mar 2025 23:28), Max: 99.6 (24 Mar 2025 10:45)  HR: 82 (24 Mar 2025 23:28) (69 - 82)  BP: 108/67 (25 Mar 2025 05:47) (108/67 - 125/75)  RR: 18 (24 Mar 2025 23:28) (18 - 18)  SpO2: 96% (24 Mar 2025 23:28) (88% - 96%)    Parameters below as of 24 Mar 2025 23:28  Patient On (Oxygen Delivery Method): nasal cannula  O2 Flow (L/min): 1    I&O's Summary    24 Mar 2025 07:01  -  25 Mar 2025 07:00  --------------------------------------------------------  IN: 0 mL / OUT: 600 mL / NET: -600 mL        PHYSICAL EXAM:  General: No acute distress BMI-28  HEENT: EOMI, PERRL  Neck: Supple, [ ] JVD  Lungs: Equal air entry bilaterally; [ ] rales [ ] wheezing [ ] rhonchi  Heart: Regular rate and rhythm; [x ] murmur   2/6 [ x] systolic [ ] diastolic [ ] radiation[ ] rubs [ ]  gallops  Abdomen: Nontender, bowel sounds present  Extremities: No clubbing, cyanosis, [ ] edema [ ]Pulses  equal and intact  Nervous system:  Alert & Oriented X3, no focal deficits  Psychiatric: Normal affect  Skin: No rashes or lesions    LABS:        Creatinine Trend: 0.92<--, 0.89<--, 0.77<--, 0.69<--    PTT - ( 23 Mar 2025 08:08 )  PTT:30.6 sec      Culture - Sputum . (03.24.25 @ 13:36)   Gram Stain: Few Squamous epithelial cells per low power field   Few polymorphonuclear leukocytes per low power field   Rare Gram Positive Cocci in Clusters per oil power field   Rare Gram Positive Cocci in Pairs and Chains per oil power field  Specimen Source: Sputum Sputum      12 Lead ECG (03.22.25 @ 07:45) >   SINUS RHYTHM WITH PREMATURE ATRIAL COMPLEXES  OTHERWISE NORMAL ECG        VA Duplex Lower Ext Vein Scan, Bilat (03.22.25 @ 11:07) >  IMPRESSION:  No evidence of deep venous thrombosis in either lower extremity.          TTE W or WO Ultrasound Enhancing Agent (03.24.25 @ 14:33) >  CONCLUSIONS:      1. Left ventricular cavity is normal in size.Left ventricular wall thickness is normal. Left ventricular systolic function is normal with an ejection fraction of 59 % by Mascorro's method of disks. There are no regional wall motion abnormalities seen.   2. Normal right ventricular cavity size, with normal wall thickness, and normal right ventricular systolic function.   3. Mild to moderate mitral regurgitation.   4. Normal left and right atrial size.   5. No pericardial effusion seen.   6. Compared to the transthoracic echocardiogram performed on 2/12/2025, there have been no significant interval changes.        CT Angio Chest PE Protocol w/ IV Cont (03.22.25 @ 09:34) >    IMPRESSION:  No pulmonary embolus.  Multifocal pneumonia and distal airways impaction. Component of pulmonary  edema. Trace pleural effusions. Follow to resolution with repeat chest CT  in one month, or sooner as clinicallywarranted.  Multivessel coronary artery calcifications versus stenting. Correlate  with procedural history and cardiology evaluation.       Xray Chest 2 Views PA/Lat (03.22.25 @ 09:02) >  IMPRESSION:    Opacification of the right middle lobe and posterior segment of the left  upper lobe suspicious for pneumonia.  No acute osseous abnormality.      Cardiac Catheterization (02.27.25 @ 11:27) >  Conclusions:   Successful PCI with BENJAMIN to Mid LAD    Successful POBA to Diagonal after LAD stent deployment            Cardiac Catheterization (02.13.25 @ 12:37) >  PCI Status:             elective     Conclusions:   Right dominant coronary anatomy with severe RCA, LAD and LCx stenosis  (low SYNTAX score)    Successful PCI with BENJAMIN to RCA stenosis    Successful PCI with BENJAMIN to LCx      Will stage PCI to LAD in a few weeks

## 2025-03-25 NOTE — PROGRESS NOTE ADULT - ASSESSMENT
81 y/o M with PMH of  osteoarthritis, Raynaud's, fibromyalgia, ischemic heart disease status post PCI–February 12 to RCA and LCx, February 27 to mid LAD and D1, patient on aspirin/plavix presenting for right-sided chest pain. Pt admitted for multifocal PNA as below      # Multifocal pneumonia.   ·  Plan: - CTA showing multifocal PNA and distal airways impaction. No pulmonary embolus. Trace pleural effusions. Multivessel coronary artery calcifications versus stenting  - LE Doppler negative for DVT   - limited RVP negative   - Zosyn x1 in Ed   - pro- BNP 1600s, troponin negative  at 28  -- continue with Zosyn and Azithro, MRSA swab pending   - Supplemental O2 as needed now at 1LPM taper off  -Pulmonary consult Dr Reaves   -ON IV ABx Day #4    # CAD (coronary artery disease).   ·  Plan: - continue with ASA Plavix 75mg qd, carvedilol 3.125mg bid and Atorvastatin 40mg qhs.  -Chest pain pleuritic  -Episode pAT  -EP evaluation called changed Carvedilol to Metoprolol succ 50 mg Daily  Repeat TTE EF 59%  Chronic Stable CAD      DISPOSITION HOME  PLAN TO DISCHARGE ONCE OFF O2 on PO ABx

## 2025-03-25 NOTE — PROGRESS NOTE ADULT - SUBJECTIVE AND OBJECTIVE BOX
Date of Service: 03-25-25 @ 13:53    Patient is a 80y old  Male who presents with a chief complaint of cough, and right sided chest pain (25 Mar 2025 11:03)      Any change in ROS: seems to be doing  ok : for dc today  : on room air:  no symptoms       MEDICATIONS  (STANDING):  aspirin  chewable 81 milliGRAM(s) Oral daily  atorvastatin 40 milliGRAM(s) Oral at bedtime  clopidogrel Tablet 75 milliGRAM(s) Oral daily  enoxaparin Injectable 40 milliGRAM(s) SubCutaneous every 24 hours  metoprolol succinate ER 50 milliGRAM(s) Oral daily  oxyCODONE  ER Tablet 10 milliGRAM(s) Oral three times a day  pantoprazole    Tablet 40 milliGRAM(s) Oral before breakfast  piperacillin/tazobactam IVPB.. 3.375 Gram(s) IV Intermittent every 8 hours  pregabalin 100 milliGRAM(s) Oral three times a day    MEDICATIONS  (PRN):  acetaminophen     Tablet .. 650 milliGRAM(s) Oral every 6 hours PRN Temp greater or equal to 38C (100.4F), Mild Pain (1 - 3)  acetaminophen  300 mG/codeine 30 mG 1 Tablet(s) Oral every 6 hours PRN Moderate Pain (4 - 6)  aluminum hydroxide/magnesium hydroxide/simethicone Suspension 30 milliLiter(s) Oral every 4 hours PRN Dyspepsia  melatonin 3 milliGRAM(s) Oral at bedtime PRN Insomnia  ondansetron Injectable 4 milliGRAM(s) IV Push every 8 hours PRN Nausea and/or Vomiting    Vital Signs Last 24 Hrs  T(C): 37.2 (25 Mar 2025 08:29), Max: 37.2 (25 Mar 2025 08:29)  T(F): 98.9 (25 Mar 2025 08:29), Max: 98.9 (25 Mar 2025 08:29)  HR: 73 (25 Mar 2025 11:22) (64 - 82)  BP: 104/70 (25 Mar 2025 11:22) (101/55 - 125/75)  BP(mean): --  RR: 18 (25 Mar 2025 08:29) (18 - 18)  SpO2: 92% (25 Mar 2025 13:00) (88% - 96%)    Parameters below as of 25 Mar 2025 13:00  Patient On (Oxygen Delivery Method): room air        I&O's Summary    24 Mar 2025 07:01  -  25 Mar 2025 07:00  --------------------------------------------------------  IN: 0 mL / OUT: 600 mL / NET: -600 mL          Physical Exam:   GENERAL: NAD, well-groomed, well-developed  HEENT: AVI/   Atraumatic, Normocephalic  ENMT: No tonsillar erythema, exudates, or enlargement; Moist mucous membranes, Good dentition, No lesions  NECK: Supple, No JVD, Normal thyroid  CHEST/LUNG: Clear to auscultaion  CVS: Regular rate and rhythm; No murmurs, rubs, or gallops  GI: : Soft, Nontender, Nondistended; Bowel sounds present  NERVOUS SYSTEM:  Alert & Oriented X3  EXTREMITIES:  2+ Peripheral Pulses, No clubbing, cyanosis, or edema  LYMPH: No lymphadenopathy noted  SKIN: No rashes or lesions  ENDOCRINOLOGY: No Thyromegaly  PSYCH: Appropriate    Labs:  32                            12.4   9.87  )-----------( 159      ( 23 Mar 2025 06:51 )             36.6                         13.0   12.01 )-----------( 139      ( 22 Mar 2025 08:34 )             39.2     03-23    139  |  102  |  21  ----------------------------<  124[H]  3.9   |  25  |  0.92  03-22    138  |  101  |  20  ----------------------------<  133[H]  4.3   |  24  |  0.89      TPro  6.3  /  Alb  2.9[L]  /  TBili  0.4  /  DBili  0.2  /  AST  54[H]  /  ALT  57[H]  /  AlkPhos  114  03-23  TPro  7.1  /  Alb  3.5  /  TBili  1.0  /  DBili  x   /  AST  56[H]  /  ALT  49[H]  /  AlkPhos  114  03-22    CAPILLARY BLOOD GLUCOSE              rad< from: VA Duplex Lower Ext Vein Scan, Bilat (03.22.25 @ 11:07) >    TECHNIQUE: Duplex sonography of the BILATERAL LOWER extremity veins with   color and spectral Doppler, with and without compression.    FINDINGS:    RIGHT:  Normal compressibility of the RIGHT common femoral, femoral and popliteal   veins.  Doppler examination shows normal spontaneous and phasic flow.  No RIGHT calf vein thrombosis is detected.    LEFT:  Normal compressibility of the LEFT common femoral, femoral and popliteal   veins.  Doppler examination shows normal spontaneous and phasic flow.  No LEFT calf vein thrombosis is detected.    IMPRESSION:  No evidence of deep venous thrombosis in either lower extremity.            --- End of Report ---            LILLIAN MORENO MD; Attending Radiologist  This document has been electronically signed. Mar 22 2025 11:09AM    < end of copied text >  < from: CT Angio Chest PE Protocol w/ IV Cont (03.22.25 @ 09:34) >    No pulmonary embolus.    Multifocal pneumonia and distal airways impaction. Component of pulmonary   edema. Trace pleural effusions. Follow to resolution with repeat chest CT   in one month, or sooner as clinicallywarranted.    Multivessel coronary artery calcifications versus stenting. Correlate   with procedural history and cardiology evaluation.    < end of copied text >          RECENT CULTURES:  03-24 @ 13:36 Sputum Sputum       Few Squamous epithelial cells per low power field  Few polymorphonuclear leukocytes per low power field  Rare Gram Positive Cocci in Clusters per oil power field  Rare Gram Positive Cocci in Pairs and Chains per oil power field           Commensal yariel consistent with body site    03-22 @ 20:28 Sputum Sputum       Moderate polymorphonuclear leukocytes per low power field  No Squamous epithelial cells per low power field  No organisms seen per oil power field           Commensal yariel consistent with body site    03-22 @ 09:45 Blood Blood-Peripheral                No growth at 48 Hours    03-22 @ 09:30 Blood Blood-Peripheral                No growth at 48 Hours          RESPIRATORY CULTURES:          Studies  Chest X-RAY  CT SCAN Chest   Venous Dopplers: LE:   CT Abdomen  Others

## 2025-03-25 NOTE — DISCHARGE NOTE PROVIDER - CARE PROVIDER_API CALL
Yon Peña.  Cardiology  [address] [place] [address]  Phone: [phone]  Fax: [phone]  Follow Up Time: 1-3 days

## 2025-03-25 NOTE — PROGRESS NOTE ADULT - ASSESSMENT
79 y/o M with PMH of  osteoarthritis, Raynaud's, fibromyalgia, ischemic heart disease status post PCI–February 12 to RCA and LCx, February 27 to mid LAD and D1, patient on aspirin/plavix presenting for right-sided chest pain. Pain is characterized as constant, fluctuates in intensity, intensifies on deep breathing. Denies diaphoresis, leg swelling. Reports green sputum 3 days ago. Per ED also reports slight chronic left-sided calf pain.    Multifocal pneumonia;  Raynaud's  CAD: S/P PCI A  Abnormal transaminases     Multifocal pneumonia;  CTA; No pulmonary embolus. Multifocal pneumonia and distal airways impaction. Component of pulmonary edema. Trace pleural effusions. Follow to resolution with repeat chest CT in one month, or sooner as clinically warranted. Multivessel coronary artery calcifications versus stenting. Correlate with procedural history and cardiology evaluation.  agree with antibtiocs;   keep o2 sao2 above 90% all the t delfino;   legionella is negative:   RVP is normal  :     3/24:  seems to be doing  ok : no sob: no cough :  no phlegm :  on 2 L of oxygen : he feels s lightly better     3/25: doing pretty good:  for dc today  :     Raynaud's  supportive care     CAD: S/P PCI   clopidogrel Tablet 75 milliGRAM(s) Oral daily  aspirin  chewable 81 milliGRAM(s) Oral daily  atorvastatin 40 milliGRAM(s) Oral at bedtime  3/25: no new symptoms    Abnormal transaminases   mildly elevated liver enzymes ? secondary to statins:  monitor  3/24: no labs today so far   3/25:  outpt follow up     dvt prophylaxis    dw acp

## 2025-03-25 NOTE — PROGRESS NOTE ADULT - TIME BILLING
- Review of records, telemetry, vital signs and daily labs.   - General and cardiovascular physical examination.  - Generation of cardiovascular treatment plan and completion of note .  - Coordination of care.      Patient was seen and examined by me on  03/25/2025 ,interim events noted,labs and radiology studies reviewed.  Yon Peña MD,FACC.  [id number] [facility].  [phone]  Availabe to call or text on [employer] TEAMS.
- Review of records, telemetry, vital signs and daily labs.   - General and cardiovascular physical examination.  - Generation of cardiovascular treatment plan and completion of note .  - Coordination of care.      Patient was seen and examined by me on  03/23/2025 ,interim events noted,labs and radiology studies reviewed.  Yon Peña MD,FACC.  [address] [facility].  [phone]  Availabe to call or text on [employer] TEAMS.
- Review of records, telemetry, vital signs and daily labs.   - General and cardiovascular physical examination.  - Generation of cardiovascular treatment plan and completion of note .  - Coordination of care.      Patient was seen and examined by me on  03/24/2025 ,interim events noted,labs and radiology studies reviewed.  Yon Peña MD,FACC.  [address] [place], [place] [id number].  [phone]  Availabe to call or text on [employer] TEAMS.

## 2025-03-26 LAB
CULTURE RESULTS: ABNORMAL
SPECIMEN SOURCE: SIGNIFICANT CHANGE UP

## 2025-03-27 LAB
CULTURE RESULTS: SIGNIFICANT CHANGE UP
CULTURE RESULTS: SIGNIFICANT CHANGE UP
SPECIMEN SOURCE: SIGNIFICANT CHANGE UP
SPECIMEN SOURCE: SIGNIFICANT CHANGE UP

## 2025-03-28 LAB — LEGIONELLA AB SER-ACNC: SIGNIFICANT CHANGE UP

## 2025-04-01 ENCOUNTER — APPOINTMENT (OUTPATIENT)
Dept: CARDIOLOGY | Facility: CLINIC | Age: 80
End: 2025-04-01

## 2025-04-01 ENCOUNTER — NON-APPOINTMENT (OUTPATIENT)
Age: 80
End: 2025-04-01

## 2025-04-02 ENCOUNTER — NON-APPOINTMENT (OUTPATIENT)
Age: 80
End: 2025-04-02

## 2025-04-02 ENCOUNTER — APPOINTMENT (OUTPATIENT)
Dept: CARDIOLOGY | Facility: CLINIC | Age: 80
End: 2025-04-02

## 2025-04-02 VITALS
WEIGHT: 146 LBS | SYSTOLIC BLOOD PRESSURE: 115 MMHG | DIASTOLIC BLOOD PRESSURE: 78 MMHG | BODY MASS INDEX: 24.32 KG/M2 | HEART RATE: 58 BPM | HEIGHT: 65 IN | OXYGEN SATURATION: 98 %

## 2025-04-02 PROCEDURE — G2211 COMPLEX E/M VISIT ADD ON: CPT

## 2025-04-02 PROCEDURE — 99215 OFFICE O/P EST HI 40 MIN: CPT

## 2025-04-02 PROCEDURE — 93000 ELECTROCARDIOGRAM COMPLETE: CPT

## 2025-04-04 ENCOUNTER — APPOINTMENT (OUTPATIENT)
Dept: PAIN MANAGEMENT | Facility: CLINIC | Age: 80
End: 2025-04-04
Payer: MEDICARE

## 2025-04-04 DIAGNOSIS — M54.12 RADICULOPATHY, CERVICAL REGION: ICD-10-CM

## 2025-04-04 DIAGNOSIS — Z98.890 OTHER SPECIFIED POSTPROCEDURAL STATES: ICD-10-CM

## 2025-04-04 PROCEDURE — 99212 OFFICE O/P EST SF 10 MIN: CPT | Mod: 2W

## 2025-04-21 ENCOUNTER — APPOINTMENT (OUTPATIENT)
Dept: PHARMACY | Facility: CLINIC | Age: 80
End: 2025-04-21

## 2025-04-23 ENCOUNTER — APPOINTMENT (OUTPATIENT)
Dept: PHARMACY | Facility: CLINIC | Age: 80
End: 2025-04-23
Payer: SELF-PAY

## 2025-04-23 PROCEDURE — V5299A: CUSTOM

## 2025-04-25 ENCOUNTER — APPOINTMENT (OUTPATIENT)
Dept: PHARMACY | Facility: CLINIC | Age: 80
End: 2025-04-25

## 2025-04-28 ENCOUNTER — APPOINTMENT (OUTPATIENT)
Dept: CARDIOLOGY | Facility: CLINIC | Age: 80
End: 2025-04-28

## 2025-05-02 ENCOUNTER — APPOINTMENT (OUTPATIENT)
Dept: PAIN MANAGEMENT | Facility: CLINIC | Age: 80
End: 2025-05-02
Payer: MEDICARE

## 2025-05-02 DIAGNOSIS — M48.07 SPINAL STENOSIS, LUMBOSACRAL REGION: ICD-10-CM

## 2025-05-02 PROCEDURE — 99213 OFFICE O/P EST LOW 20 MIN: CPT

## 2025-05-12 ENCOUNTER — APPOINTMENT (OUTPATIENT)
Dept: CARDIOLOGY | Facility: CLINIC | Age: 80
End: 2025-05-12

## 2025-05-14 ENCOUNTER — APPOINTMENT (OUTPATIENT)
Dept: CARDIOLOGY | Facility: CLINIC | Age: 80
End: 2025-05-14

## 2025-05-15 ENCOUNTER — APPOINTMENT (OUTPATIENT)
Dept: CARDIOLOGY | Facility: CLINIC | Age: 80
End: 2025-05-15

## 2025-05-19 ENCOUNTER — APPOINTMENT (OUTPATIENT)
Dept: CARDIOLOGY | Facility: CLINIC | Age: 80
End: 2025-05-19

## 2025-05-21 ENCOUNTER — APPOINTMENT (OUTPATIENT)
Dept: CARDIOLOGY | Facility: CLINIC | Age: 80
End: 2025-05-21

## 2025-05-22 ENCOUNTER — APPOINTMENT (OUTPATIENT)
Dept: CARDIOLOGY | Facility: CLINIC | Age: 80
End: 2025-05-22

## 2025-05-28 ENCOUNTER — APPOINTMENT (OUTPATIENT)
Dept: CARDIOLOGY | Facility: CLINIC | Age: 80
End: 2025-05-28

## 2025-05-29 ENCOUNTER — APPOINTMENT (OUTPATIENT)
Dept: CARDIOLOGY | Facility: CLINIC | Age: 80
End: 2025-05-29

## 2025-06-02 ENCOUNTER — APPOINTMENT (OUTPATIENT)
Dept: PAIN MANAGEMENT | Facility: CLINIC | Age: 80
End: 2025-06-02
Payer: MEDICARE

## 2025-06-02 ENCOUNTER — APPOINTMENT (OUTPATIENT)
Dept: CARDIOLOGY | Facility: CLINIC | Age: 80
End: 2025-06-02

## 2025-06-02 DIAGNOSIS — M54.12 RADICULOPATHY, CERVICAL REGION: ICD-10-CM

## 2025-06-02 DIAGNOSIS — Z98.890 OTHER SPECIFIED POSTPROCEDURAL STATES: ICD-10-CM

## 2025-06-02 PROCEDURE — 99212 OFFICE O/P EST SF 10 MIN: CPT | Mod: 2W

## 2025-06-04 ENCOUNTER — APPOINTMENT (OUTPATIENT)
Dept: CARDIOLOGY | Facility: CLINIC | Age: 80
End: 2025-06-04

## 2025-06-05 ENCOUNTER — APPOINTMENT (OUTPATIENT)
Dept: CARDIOLOGY | Facility: CLINIC | Age: 80
End: 2025-06-05

## 2025-06-09 ENCOUNTER — APPOINTMENT (OUTPATIENT)
Dept: CARDIOLOGY | Facility: CLINIC | Age: 80
End: 2025-06-09

## 2025-06-11 ENCOUNTER — APPOINTMENT (OUTPATIENT)
Dept: CARDIOLOGY | Facility: CLINIC | Age: 80
End: 2025-06-11

## 2025-06-12 ENCOUNTER — APPOINTMENT (OUTPATIENT)
Dept: CARDIOLOGY | Facility: CLINIC | Age: 80
End: 2025-06-12

## 2025-06-16 ENCOUNTER — APPOINTMENT (OUTPATIENT)
Dept: CARDIOLOGY | Facility: CLINIC | Age: 80
End: 2025-06-16

## 2025-06-16 VITALS
SYSTOLIC BLOOD PRESSURE: 125 MMHG | OXYGEN SATURATION: 100 % | DIASTOLIC BLOOD PRESSURE: 75 MMHG | WEIGHT: 146 LBS | HEART RATE: 50 BPM | BODY MASS INDEX: 24.3 KG/M2

## 2025-06-16 PROCEDURE — G2211 COMPLEX E/M VISIT ADD ON: CPT

## 2025-06-16 PROCEDURE — 99214 OFFICE O/P EST MOD 30 MIN: CPT

## 2025-06-16 PROCEDURE — 93000 ELECTROCARDIOGRAM COMPLETE: CPT

## 2025-06-18 ENCOUNTER — APPOINTMENT (OUTPATIENT)
Dept: CARDIOLOGY | Facility: CLINIC | Age: 80
End: 2025-06-18

## 2025-06-19 ENCOUNTER — APPOINTMENT (OUTPATIENT)
Dept: CT IMAGING | Facility: CLINIC | Age: 80
End: 2025-06-19
Payer: MEDICARE

## 2025-06-19 ENCOUNTER — APPOINTMENT (OUTPATIENT)
Dept: CARDIOLOGY | Facility: CLINIC | Age: 80
End: 2025-06-19

## 2025-06-19 ENCOUNTER — OUTPATIENT (OUTPATIENT)
Dept: OUTPATIENT SERVICES | Facility: HOSPITAL | Age: 80
LOS: 1 days | End: 2025-06-19
Payer: MEDICARE

## 2025-06-19 DIAGNOSIS — J18.9 PNEUMONIA, UNSPECIFIED ORGANISM: ICD-10-CM

## 2025-06-19 DIAGNOSIS — Z98.890 OTHER SPECIFIED POSTPROCEDURAL STATES: Chronic | ICD-10-CM

## 2025-06-19 DIAGNOSIS — Z96.641 PRESENCE OF RIGHT ARTIFICIAL HIP JOINT: Chronic | ICD-10-CM

## 2025-06-19 DIAGNOSIS — Z96.642 PRESENCE OF LEFT ARTIFICIAL HIP JOINT: Chronic | ICD-10-CM

## 2025-06-19 DIAGNOSIS — Z96.651 PRESENCE OF RIGHT ARTIFICIAL KNEE JOINT: Chronic | ICD-10-CM

## 2025-06-19 PROCEDURE — 71250 CT THORAX DX C-: CPT | Mod: MH,PO

## 2025-06-19 PROCEDURE — 71250 CT THORAX DX C-: CPT | Mod: 26

## 2025-06-23 ENCOUNTER — APPOINTMENT (OUTPATIENT)
Dept: CARDIOLOGY | Facility: CLINIC | Age: 80
End: 2025-06-23

## 2025-06-25 ENCOUNTER — APPOINTMENT (OUTPATIENT)
Dept: CARDIOLOGY | Facility: CLINIC | Age: 80
End: 2025-06-25

## 2025-06-26 ENCOUNTER — APPOINTMENT (OUTPATIENT)
Dept: CARDIOLOGY | Facility: CLINIC | Age: 80
End: 2025-06-26

## 2025-06-30 ENCOUNTER — APPOINTMENT (OUTPATIENT)
Dept: CARDIOLOGY | Facility: CLINIC | Age: 80
End: 2025-06-30

## 2025-06-30 ENCOUNTER — APPOINTMENT (OUTPATIENT)
Dept: PAIN MANAGEMENT | Facility: CLINIC | Age: 80
End: 2025-06-30
Payer: MEDICARE

## 2025-06-30 PROCEDURE — 99212 OFFICE O/P EST SF 10 MIN: CPT | Mod: 2W

## 2025-07-02 ENCOUNTER — APPOINTMENT (OUTPATIENT)
Dept: CARDIOLOGY | Facility: CLINIC | Age: 80
End: 2025-07-02

## 2025-07-03 ENCOUNTER — APPOINTMENT (OUTPATIENT)
Dept: CARDIOLOGY | Facility: CLINIC | Age: 80
End: 2025-07-03

## 2025-07-07 ENCOUNTER — APPOINTMENT (OUTPATIENT)
Dept: CARDIOLOGY | Facility: CLINIC | Age: 80
End: 2025-07-07

## 2025-07-07 NOTE — PATIENT PROFILE ADULT - FUNCTIONAL ASSESSMENT - BASIC MOBILITY 6.
Price (Do Not Change): 0.00 Instructions: This plan will send the code FBSE to the PM system.  DO NOT or CHANGE the price. Detail Level: Simple 4 = No assist / stand by assistance

## 2025-07-09 ENCOUNTER — APPOINTMENT (OUTPATIENT)
Dept: CARDIOLOGY | Facility: CLINIC | Age: 80
End: 2025-07-09

## 2025-07-10 ENCOUNTER — APPOINTMENT (OUTPATIENT)
Dept: CARDIOLOGY | Facility: CLINIC | Age: 80
End: 2025-07-10

## 2025-07-14 ENCOUNTER — APPOINTMENT (OUTPATIENT)
Dept: CARDIOLOGY | Facility: CLINIC | Age: 80
End: 2025-07-14

## 2025-07-16 ENCOUNTER — APPOINTMENT (OUTPATIENT)
Dept: CARDIOLOGY | Facility: CLINIC | Age: 80
End: 2025-07-16

## 2025-07-17 ENCOUNTER — APPOINTMENT (OUTPATIENT)
Dept: CARDIOLOGY | Facility: CLINIC | Age: 80
End: 2025-07-17

## 2025-07-21 ENCOUNTER — APPOINTMENT (OUTPATIENT)
Dept: CARDIOLOGY | Facility: CLINIC | Age: 80
End: 2025-07-21

## 2025-07-23 ENCOUNTER — APPOINTMENT (OUTPATIENT)
Dept: CARDIOLOGY | Facility: CLINIC | Age: 80
End: 2025-07-23

## 2025-07-24 ENCOUNTER — APPOINTMENT (OUTPATIENT)
Dept: CARDIOLOGY | Facility: CLINIC | Age: 80
End: 2025-07-24

## 2025-07-28 ENCOUNTER — APPOINTMENT (OUTPATIENT)
Dept: CARDIOLOGY | Facility: CLINIC | Age: 80
End: 2025-07-28

## 2025-07-28 ENCOUNTER — APPOINTMENT (OUTPATIENT)
Dept: PAIN MANAGEMENT | Facility: CLINIC | Age: 80
End: 2025-07-28
Payer: MEDICARE

## 2025-07-28 DIAGNOSIS — Z98.890 OTHER SPECIFIED POSTPROCEDURAL STATES: ICD-10-CM

## 2025-07-28 DIAGNOSIS — M54.12 RADICULOPATHY, CERVICAL REGION: ICD-10-CM

## 2025-07-28 PROCEDURE — 99212 OFFICE O/P EST SF 10 MIN: CPT | Mod: 2W

## 2025-07-29 ENCOUNTER — NON-APPOINTMENT (OUTPATIENT)
Age: 80
End: 2025-07-29

## 2025-07-29 ENCOUNTER — APPOINTMENT (OUTPATIENT)
Age: 80
End: 2025-07-29
Payer: MEDICARE

## 2025-07-29 PROCEDURE — 92134 CPTRZ OPH DX IMG PST SGM RTA: CPT

## 2025-07-29 PROCEDURE — 92015 DETERMINE REFRACTIVE STATE: CPT

## 2025-07-29 PROCEDURE — 92202 OPSCPY EXTND ON/MAC DRAW: CPT

## 2025-07-29 PROCEDURE — 92014 COMPRE OPH EXAM EST PT 1/>: CPT

## 2025-07-30 ENCOUNTER — APPOINTMENT (OUTPATIENT)
Dept: CARDIOLOGY | Facility: CLINIC | Age: 80
End: 2025-07-30

## 2025-07-31 ENCOUNTER — APPOINTMENT (OUTPATIENT)
Dept: CARDIOLOGY | Facility: CLINIC | Age: 80
End: 2025-07-31

## 2025-08-04 ENCOUNTER — APPOINTMENT (OUTPATIENT)
Dept: CARDIOLOGY | Facility: CLINIC | Age: 80
End: 2025-08-04

## 2025-08-27 ENCOUNTER — APPOINTMENT (OUTPATIENT)
Dept: PAIN MANAGEMENT | Facility: CLINIC | Age: 80
End: 2025-08-27
Payer: MEDICARE

## 2025-08-27 VITALS — HEIGHT: 65 IN | WEIGHT: 146 LBS | BODY MASS INDEX: 24.32 KG/M2

## 2025-08-27 DIAGNOSIS — M54.12 RADICULOPATHY, CERVICAL REGION: ICD-10-CM

## 2025-08-27 PROCEDURE — 99213 OFFICE O/P EST LOW 20 MIN: CPT

## 2025-09-02 ENCOUNTER — RX RENEWAL (OUTPATIENT)
Age: 80
End: 2025-09-02

## 2025-09-13 ENCOUNTER — EMERGENCY (EMERGENCY)
Facility: HOSPITAL | Age: 80
LOS: 1 days | End: 2025-09-13
Attending: EMERGENCY MEDICINE
Payer: MEDICARE

## 2025-09-13 VITALS
RESPIRATION RATE: 17 BRPM | SYSTOLIC BLOOD PRESSURE: 93 MMHG | HEART RATE: 86 BPM | OXYGEN SATURATION: 97 % | TEMPERATURE: 98 F | DIASTOLIC BLOOD PRESSURE: 68 MMHG

## 2025-09-13 VITALS
DIASTOLIC BLOOD PRESSURE: 77 MMHG | TEMPERATURE: 98 F | RESPIRATION RATE: 18 BRPM | SYSTOLIC BLOOD PRESSURE: 127 MMHG | HEART RATE: 65 BPM | OXYGEN SATURATION: 96 %

## 2025-09-13 DIAGNOSIS — Z98.890 OTHER SPECIFIED POSTPROCEDURAL STATES: Chronic | ICD-10-CM

## 2025-09-13 DIAGNOSIS — Z96.651 PRESENCE OF RIGHT ARTIFICIAL KNEE JOINT: Chronic | ICD-10-CM

## 2025-09-13 DIAGNOSIS — Z96.642 PRESENCE OF LEFT ARTIFICIAL HIP JOINT: Chronic | ICD-10-CM

## 2025-09-13 DIAGNOSIS — Z96.641 PRESENCE OF RIGHT ARTIFICIAL HIP JOINT: Chronic | ICD-10-CM

## 2025-09-13 LAB
ALBUMIN SERPL ELPH-MCNC: 4.4 G/DL — SIGNIFICANT CHANGE UP (ref 3.3–5)
ALP SERPL-CCNC: 82 U/L — SIGNIFICANT CHANGE UP (ref 40–120)
ALT FLD-CCNC: 26 U/L — SIGNIFICANT CHANGE UP (ref 10–45)
ANION GAP SERPL CALC-SCNC: 16 MMOL/L — SIGNIFICANT CHANGE UP (ref 5–17)
APTT BLD: 31.5 SEC — SIGNIFICANT CHANGE UP (ref 26.1–36.8)
AST SERPL-CCNC: 25 U/L — SIGNIFICANT CHANGE UP (ref 10–40)
BASOPHILS # BLD AUTO: 0.02 K/UL — SIGNIFICANT CHANGE UP (ref 0–0.2)
BASOPHILS NFR BLD AUTO: 0.2 % — SIGNIFICANT CHANGE UP (ref 0–2)
BILIRUB SERPL-MCNC: 2 MG/DL — HIGH (ref 0.2–1.2)
BUN SERPL-MCNC: 32 MG/DL — HIGH (ref 7–23)
CALCIUM SERPL-MCNC: 10.3 MG/DL — SIGNIFICANT CHANGE UP (ref 8.4–10.5)
CHLORIDE SERPL-SCNC: 107 MMOL/L — SIGNIFICANT CHANGE UP (ref 96–108)
CO2 SERPL-SCNC: 20 MMOL/L — LOW (ref 22–31)
CREAT SERPL-MCNC: 0.99 MG/DL — SIGNIFICANT CHANGE UP (ref 0.5–1.3)
EGFR: 78 ML/MIN/1.73M2 — SIGNIFICANT CHANGE UP
EGFR: 78 ML/MIN/1.73M2 — SIGNIFICANT CHANGE UP
EOSINOPHIL # BLD AUTO: 0.1 K/UL — SIGNIFICANT CHANGE UP (ref 0–0.5)
EOSINOPHIL NFR BLD AUTO: 0.9 % — SIGNIFICANT CHANGE UP (ref 0–6)
GAS PNL BLDV: SIGNIFICANT CHANGE UP
GLUCOSE SERPL-MCNC: 108 MG/DL — HIGH (ref 70–99)
HCT VFR BLD CALC: 47.4 % — SIGNIFICANT CHANGE UP (ref 39–50)
HGB BLD-MCNC: 15.9 G/DL — SIGNIFICANT CHANGE UP (ref 13–17)
IMM GRANULOCYTES # BLD AUTO: 0.03 K/UL — SIGNIFICANT CHANGE UP (ref 0–0.07)
IMM GRANULOCYTES NFR BLD AUTO: 0.3 % — SIGNIFICANT CHANGE UP (ref 0–0.9)
INR BLD: 1.14 RATIO — SIGNIFICANT CHANGE UP (ref 0.85–1.16)
LIDOCAIN IGE QN: 22 U/L — SIGNIFICANT CHANGE UP (ref 7–60)
LYMPHOCYTES # BLD AUTO: 1.05 K/UL — SIGNIFICANT CHANGE UP (ref 1–3.3)
LYMPHOCYTES NFR BLD AUTO: 10 % — LOW (ref 13–44)
MAGNESIUM SERPL-MCNC: 2.4 MG/DL — SIGNIFICANT CHANGE UP (ref 1.6–2.6)
MCHC RBC-ENTMCNC: 31.5 PG — SIGNIFICANT CHANGE UP (ref 27–34)
MCHC RBC-ENTMCNC: 33.5 G/DL — SIGNIFICANT CHANGE UP (ref 32–36)
MCV RBC AUTO: 94 FL — SIGNIFICANT CHANGE UP (ref 80–100)
MONOCYTES # BLD AUTO: 0.75 K/UL — SIGNIFICANT CHANGE UP (ref 0–0.9)
MONOCYTES NFR BLD AUTO: 7.1 % — SIGNIFICANT CHANGE UP (ref 2–14)
NEUTROPHILS # BLD AUTO: 8.59 K/UL — HIGH (ref 1.8–7.4)
NEUTROPHILS NFR BLD AUTO: 81.5 % — HIGH (ref 43–77)
NRBC # BLD AUTO: 0 K/UL — SIGNIFICANT CHANGE UP (ref 0–0)
NRBC # FLD: 0 K/UL — SIGNIFICANT CHANGE UP (ref 0–0)
NRBC BLD AUTO-RTO: 0 /100 WBCS — SIGNIFICANT CHANGE UP (ref 0–0)
PHOSPHATE SERPL-MCNC: 3.9 MG/DL — SIGNIFICANT CHANGE UP (ref 2.5–4.5)
PLATELET # BLD AUTO: 183 K/UL — SIGNIFICANT CHANGE UP (ref 150–400)
PMV BLD: 10.1 FL — SIGNIFICANT CHANGE UP (ref 7–13)
POTASSIUM SERPL-MCNC: 4.6 MMOL/L — SIGNIFICANT CHANGE UP (ref 3.5–5.3)
POTASSIUM SERPL-SCNC: 4.6 MMOL/L — SIGNIFICANT CHANGE UP (ref 3.5–5.3)
PROT SERPL-MCNC: 8 G/DL — SIGNIFICANT CHANGE UP (ref 6–8.3)
PROTHROM AB SERPL-ACNC: 13.2 SEC — SIGNIFICANT CHANGE UP (ref 9.9–13.4)
RBC # BLD: 5.04 M/UL — SIGNIFICANT CHANGE UP (ref 4.2–5.8)
RBC # FLD: 14.4 % — SIGNIFICANT CHANGE UP (ref 10.3–14.5)
SODIUM SERPL-SCNC: 143 MMOL/L — SIGNIFICANT CHANGE UP (ref 135–145)
WBC # BLD: 10.54 K/UL — HIGH (ref 3.8–10.5)
WBC # FLD AUTO: 10.54 K/UL — HIGH (ref 3.8–10.5)

## 2025-09-13 PROCEDURE — 85610 PROTHROMBIN TIME: CPT

## 2025-09-13 PROCEDURE — 80053 COMPREHEN METABOLIC PANEL: CPT

## 2025-09-13 PROCEDURE — 96374 THER/PROPH/DIAG INJ IV PUSH: CPT | Mod: XU

## 2025-09-13 PROCEDURE — 83605 ASSAY OF LACTIC ACID: CPT

## 2025-09-13 PROCEDURE — 93010 ELECTROCARDIOGRAM REPORT: CPT

## 2025-09-13 PROCEDURE — 85025 COMPLETE CBC W/AUTO DIFF WBC: CPT

## 2025-09-13 PROCEDURE — 99285 EMERGENCY DEPT VISIT HI MDM: CPT | Mod: 25

## 2025-09-13 PROCEDURE — 83690 ASSAY OF LIPASE: CPT

## 2025-09-13 PROCEDURE — 85730 THROMBOPLASTIN TIME PARTIAL: CPT

## 2025-09-13 PROCEDURE — 93005 ELECTROCARDIOGRAM TRACING: CPT

## 2025-09-13 PROCEDURE — 82947 ASSAY GLUCOSE BLOOD QUANT: CPT

## 2025-09-13 PROCEDURE — 74177 CT ABD & PELVIS W/CONTRAST: CPT

## 2025-09-13 PROCEDURE — 83735 ASSAY OF MAGNESIUM: CPT

## 2025-09-13 PROCEDURE — 82435 ASSAY OF BLOOD CHLORIDE: CPT

## 2025-09-13 PROCEDURE — 96375 TX/PRO/DX INJ NEW DRUG ADDON: CPT

## 2025-09-13 PROCEDURE — 99285 EMERGENCY DEPT VISIT HI MDM: CPT | Mod: FS

## 2025-09-13 PROCEDURE — 85018 HEMOGLOBIN: CPT

## 2025-09-13 PROCEDURE — 82330 ASSAY OF CALCIUM: CPT

## 2025-09-13 PROCEDURE — 84295 ASSAY OF SERUM SODIUM: CPT

## 2025-09-13 PROCEDURE — 84100 ASSAY OF PHOSPHORUS: CPT

## 2025-09-13 PROCEDURE — 85014 HEMATOCRIT: CPT

## 2025-09-13 PROCEDURE — 84132 ASSAY OF SERUM POTASSIUM: CPT

## 2025-09-13 PROCEDURE — 82803 BLOOD GASES ANY COMBINATION: CPT

## 2025-09-13 PROCEDURE — 74177 CT ABD & PELVIS W/CONTRAST: CPT | Mod: 26

## 2025-09-13 RX ORDER — SODIUM CHLORIDE 9 G/1000ML
1000 INJECTION, SOLUTION INTRAVENOUS ONCE
Refills: 0 | Status: COMPLETED | OUTPATIENT
Start: 2025-09-13 | End: 2025-09-13

## 2025-09-13 RX ORDER — ACETAMINOPHEN 500 MG/5ML
1000 LIQUID (ML) ORAL ONCE
Refills: 0 | Status: COMPLETED | OUTPATIENT
Start: 2025-09-13 | End: 2025-09-13

## 2025-09-13 RX ORDER — AMPICILLIN SODIUM AND SULBACTAM SODIUM 1; .5 G/1; G/1
3 INJECTION, POWDER, FOR SOLUTION INTRAMUSCULAR; INTRAVENOUS ONCE
Refills: 0 | Status: COMPLETED | OUTPATIENT
Start: 2025-09-13 | End: 2025-09-13

## 2025-09-13 RX ORDER — AMOXICILLIN AND CLAVULANATE POTASSIUM 500; 125 MG/1; MG/1
1 TABLET, FILM COATED ORAL
Qty: 30 | Refills: 0
Start: 2025-09-13 | End: 2025-09-22

## 2025-09-13 RX ADMIN — AMPICILLIN SODIUM AND SULBACTAM SODIUM 200 GRAM(S): 1; .5 INJECTION, POWDER, FOR SOLUTION INTRAMUSCULAR; INTRAVENOUS at 12:17

## 2025-09-13 RX ADMIN — Medication 400 MILLIGRAM(S): at 09:50

## 2025-09-13 RX ADMIN — SODIUM CHLORIDE 2000 MILLILITER(S): 9 INJECTION, SOLUTION INTRAVENOUS at 09:50

## 2025-09-17 ENCOUNTER — APPOINTMENT (OUTPATIENT)
Dept: CARDIOLOGY | Facility: CLINIC | Age: 80
End: 2025-09-17

## 2025-09-17 VITALS
WEIGHT: 140 LBS | SYSTOLIC BLOOD PRESSURE: 130 MMHG | HEIGHT: 65 IN | OXYGEN SATURATION: 97 % | HEART RATE: 69 BPM | BODY MASS INDEX: 23.32 KG/M2 | DIASTOLIC BLOOD PRESSURE: 81 MMHG

## (undated) DEVICE — TUBING ALARIS PUMP MODULE NON-DEHP

## (undated) DEVICE — NDL SPINAL 22G X 3.5" QUINCKE

## (undated) DEVICE — FOLEY HOLDER STATLOCK 2 WAY ADULT

## (undated) DEVICE — SENSOR O2 FINGER ADULT

## (undated) DEVICE — TUBING IV SET GRAVITY 3Y 100" MACRO

## (undated) DEVICE — TUBING IV EXTENSION MACRO W CLAVE 7"

## (undated) DEVICE — COLONOSCOPE 2416901: Type: DURABLE MEDICAL EQUIPMENT

## (undated) DEVICE — PACK IV START WITH CHG

## (undated) DEVICE — SYR ALLIANCE II INFLATION 60ML

## (undated) DEVICE — SOL INJ LR 500ML

## (undated) DEVICE — TUBING SUCTION 20FT

## (undated) DEVICE — STYLET  ENDOTRACH 7.5MM X 10MM

## (undated) DEVICE — CATH IV SAFE BC 22G X 1" (BLUE)

## (undated) DEVICE — SOL INJ NS 0.9% 500ML 2 PORT

## (undated) DEVICE — BITE BLOCK ADULT 20 X 27MM (GREEN)

## (undated) DEVICE — BIOPSY FORCEP RADIAL JAW 4 STANDARD WITH NEEDLE

## (undated) DEVICE — BALLOON US ENDO

## (undated) DEVICE — SYR IV FLUSH SALINE 10ML 30/TY

## (undated) DEVICE — CATH IV SAFE BC 20G X 1.16" (PINK)

## (undated) DEVICE — TRAY EPIDURAL SINGLE DOSE

## (undated) DEVICE — TUBING SUCTION CONN 6FT STERILE

## (undated) DEVICE — GLV 8.5 PROTEXIS (WHITE)

## (undated) DEVICE — SUCTION YANKAUER NO CONTROL VENT